# Patient Record
Sex: FEMALE | Race: WHITE | NOT HISPANIC OR LATINO | URBAN - METROPOLITAN AREA
[De-identification: names, ages, dates, MRNs, and addresses within clinical notes are randomized per-mention and may not be internally consistent; named-entity substitution may affect disease eponyms.]

---

## 2017-02-01 ENCOUNTER — EMERGENCY (EMERGENCY)
Facility: HOSPITAL | Age: 37
LOS: 1 days | Discharge: HOME | End: 2017-02-01
Attending: EMERGENCY MEDICINE
Payer: MEDICAID

## 2017-02-01 VITALS
SYSTOLIC BLOOD PRESSURE: 127 MMHG | WEIGHT: 199.96 LBS | RESPIRATION RATE: 18 BRPM | HEIGHT: 62 IN | TEMPERATURE: 98 F | HEART RATE: 89 BPM | DIASTOLIC BLOOD PRESSURE: 72 MMHG | OXYGEN SATURATION: 99 %

## 2017-02-01 DIAGNOSIS — Z90.49 ACQUIRED ABSENCE OF OTHER SPECIFIED PARTS OF DIGESTIVE TRACT: Chronic | ICD-10-CM

## 2017-02-01 DIAGNOSIS — R10.84 GENERALIZED ABDOMINAL PAIN: ICD-10-CM

## 2017-02-01 DIAGNOSIS — F90.0 ATTENTION-DEFICIT HYPERACTIVITY DISORDER, PREDOMINANTLY INATTENTIVE TYPE: ICD-10-CM

## 2017-02-01 DIAGNOSIS — N30.00 ACUTE CYSTITIS WITHOUT HEMATURIA: ICD-10-CM

## 2017-02-01 DIAGNOSIS — M67.432 GANGLION, LEFT WRIST: Chronic | ICD-10-CM

## 2017-02-01 DIAGNOSIS — D64.9 ANEMIA, UNSPECIFIED: ICD-10-CM

## 2017-02-01 DIAGNOSIS — Z98.84 BARIATRIC SURGERY STATUS: Chronic | ICD-10-CM

## 2017-02-01 DIAGNOSIS — F31.30 BIPOLAR DISORDER, CURRENT EPISODE DEPRESSED, MILD OR MODERATE SEVERITY, UNSPECIFIED: ICD-10-CM

## 2017-02-01 DIAGNOSIS — D50.9 IRON DEFICIENCY ANEMIA, UNSPECIFIED: ICD-10-CM

## 2017-02-01 DIAGNOSIS — F31.9 BIPOLAR DISORDER, UNSPECIFIED: ICD-10-CM

## 2017-02-01 DIAGNOSIS — Z98.89 OTHER SPECIFIED POSTPROCEDURAL STATES: Chronic | ICD-10-CM

## 2017-02-01 DIAGNOSIS — F32.9 MAJOR DEPRESSIVE DISORDER, SINGLE EPISODE, UNSPECIFIED: ICD-10-CM

## 2017-02-01 DIAGNOSIS — R53.1 WEAKNESS: ICD-10-CM

## 2017-02-01 DIAGNOSIS — Z46.51 ENCOUNTER FOR FITTING AND ADJUSTMENT OF GASTRIC LAP BAND: Chronic | ICD-10-CM

## 2017-02-01 DIAGNOSIS — K21.9 GASTRO-ESOPHAGEAL REFLUX DISEASE WITHOUT ESOPHAGITIS: ICD-10-CM

## 2017-02-01 LAB
ALBUMIN SERPL ELPH-MCNC: 3.5 G/DL — SIGNIFICANT CHANGE UP (ref 3.3–5)
ALP SERPL-CCNC: 51 U/L — SIGNIFICANT CHANGE UP (ref 40–120)
ALT FLD-CCNC: 21 U/L — SIGNIFICANT CHANGE UP (ref 12–78)
ANION GAP SERPL CALC-SCNC: 12 MMOL/L — SIGNIFICANT CHANGE UP (ref 5–17)
APPEARANCE UR: CLEAR — SIGNIFICANT CHANGE UP
APTT BLD: 31.5 SEC — SIGNIFICANT CHANGE UP (ref 27.5–37.4)
AST SERPL-CCNC: 11 U/L — LOW (ref 15–37)
BACTERIA # UR AUTO: ABNORMAL
BASOPHILS # BLD AUTO: 0.1 K/UL — SIGNIFICANT CHANGE UP (ref 0–0.2)
BASOPHILS NFR BLD AUTO: 0.9 % — SIGNIFICANT CHANGE UP (ref 0–2)
BILIRUB SERPL-MCNC: 0.2 MG/DL — SIGNIFICANT CHANGE UP (ref 0.2–1.2)
BILIRUB UR-MCNC: NEGATIVE — SIGNIFICANT CHANGE UP
BLD GP AB SCN SERPL QL: SIGNIFICANT CHANGE UP
BUN SERPL-MCNC: 15 MG/DL — SIGNIFICANT CHANGE UP (ref 7–23)
CALCIUM SERPL-MCNC: 8.3 MG/DL — LOW (ref 8.5–10.1)
CHLORIDE SERPL-SCNC: 107 MMOL/L — SIGNIFICANT CHANGE UP (ref 96–108)
CO2 SERPL-SCNC: 25 MMOL/L — SIGNIFICANT CHANGE UP (ref 22–31)
COLOR SPEC: YELLOW — SIGNIFICANT CHANGE UP
CREAT SERPL-MCNC: 0.73 MG/DL — SIGNIFICANT CHANGE UP (ref 0.5–1.3)
DIFF PNL FLD: NEGATIVE — SIGNIFICANT CHANGE UP
EOSINOPHIL # BLD AUTO: 0.1 K/UL — SIGNIFICANT CHANGE UP (ref 0–0.5)
EOSINOPHIL NFR BLD AUTO: 0.8 % — SIGNIFICANT CHANGE UP (ref 0–6)
EPI CELLS # UR: ABNORMAL
GLUCOSE SERPL-MCNC: 84 MG/DL — SIGNIFICANT CHANGE UP (ref 70–99)
GLUCOSE UR QL: NEGATIVE MG/DL — SIGNIFICANT CHANGE UP
HCG SERPL-ACNC: <1 MIU/ML — SIGNIFICANT CHANGE UP
HCT VFR BLD CALC: 34.2 % — LOW (ref 34.5–45)
HGB BLD-MCNC: 10.9 G/DL — LOW (ref 11.5–15.5)
INR BLD: 0.95 RATIO — SIGNIFICANT CHANGE UP (ref 0.88–1.16)
KETONES UR-MCNC: NEGATIVE — SIGNIFICANT CHANGE UP
LACTATE SERPL-SCNC: 0.8 MMOL/L — SIGNIFICANT CHANGE UP (ref 0.7–2)
LEUKOCYTE ESTERASE UR-ACNC: ABNORMAL
LIDOCAIN IGE QN: 149 U/L — SIGNIFICANT CHANGE UP (ref 73–393)
LYMPHOCYTES # BLD AUTO: 3.5 K/UL — HIGH (ref 1–3.3)
LYMPHOCYTES # BLD AUTO: 31.9 % — SIGNIFICANT CHANGE UP (ref 13–44)
MCHC RBC-ENTMCNC: 24.9 PG — LOW (ref 27–34)
MCHC RBC-ENTMCNC: 32 GM/DL — SIGNIFICANT CHANGE UP (ref 32–36)
MCV RBC AUTO: 77.8 FL — LOW (ref 80–100)
MONOCYTES # BLD AUTO: 0.5 K/UL — SIGNIFICANT CHANGE UP (ref 0–0.9)
MONOCYTES NFR BLD AUTO: 4.8 % — SIGNIFICANT CHANGE UP (ref 2–14)
NEUTROPHILS # BLD AUTO: 6.8 K/UL — SIGNIFICANT CHANGE UP (ref 1.8–7.4)
NEUTROPHILS NFR BLD AUTO: 61.6 % — SIGNIFICANT CHANGE UP (ref 43–77)
NITRITE UR-MCNC: NEGATIVE — SIGNIFICANT CHANGE UP
OB PNL STL: NEGATIVE — SIGNIFICANT CHANGE UP
PH UR: 8 — SIGNIFICANT CHANGE UP (ref 4.8–8)
PLATELET # BLD AUTO: 392 K/UL — SIGNIFICANT CHANGE UP (ref 150–400)
POTASSIUM SERPL-MCNC: 3.9 MMOL/L — SIGNIFICANT CHANGE UP (ref 3.5–5.3)
POTASSIUM SERPL-SCNC: 3.9 MMOL/L — SIGNIFICANT CHANGE UP (ref 3.5–5.3)
PROT SERPL-MCNC: 7 GM/DL — SIGNIFICANT CHANGE UP (ref 6–8.3)
PROT UR-MCNC: NEGATIVE MG/DL — SIGNIFICANT CHANGE UP
PROTHROM AB SERPL-ACNC: 10.6 SEC — SIGNIFICANT CHANGE UP (ref 10–13.1)
RBC # BLD: 4.39 M/UL — SIGNIFICANT CHANGE UP (ref 3.8–5.2)
RBC # FLD: 18.8 % — HIGH (ref 11–15)
SODIUM SERPL-SCNC: 144 MMOL/L — SIGNIFICANT CHANGE UP (ref 135–145)
SP GR SPEC: 1.01 — SIGNIFICANT CHANGE UP (ref 1.01–1.02)
UROBILINOGEN FLD QL: 4 MG/DL
WBC # BLD: 11 K/UL — HIGH (ref 3.8–10.5)
WBC # FLD AUTO: 11 K/UL — HIGH (ref 3.8–10.5)
WBC UR QL: SIGNIFICANT CHANGE UP

## 2017-02-01 PROCEDURE — 99285 EMERGENCY DEPT VISIT HI MDM: CPT

## 2017-02-01 RX ORDER — KETOROLAC TROMETHAMINE 30 MG/ML
30 SYRINGE (ML) INJECTION ONCE
Qty: 0 | Refills: 0 | Status: DISCONTINUED | OUTPATIENT
Start: 2017-02-01 | End: 2017-02-01

## 2017-02-01 RX ORDER — SODIUM CHLORIDE 9 MG/ML
1000 INJECTION INTRAMUSCULAR; INTRAVENOUS; SUBCUTANEOUS ONCE
Qty: 0 | Refills: 0 | Status: COMPLETED | OUTPATIENT
Start: 2017-02-01 | End: 2017-02-01

## 2017-02-01 RX ORDER — FAMOTIDINE 10 MG/ML
40 INJECTION INTRAVENOUS ONCE
Qty: 0 | Refills: 0 | Status: COMPLETED | OUTPATIENT
Start: 2017-02-01 | End: 2017-02-01

## 2017-02-01 RX ORDER — MORPHINE SULFATE 50 MG/1
6 CAPSULE, EXTENDED RELEASE ORAL ONCE
Qty: 0 | Refills: 0 | Status: DISCONTINUED | OUTPATIENT
Start: 2017-02-01 | End: 2017-02-01

## 2017-02-01 RX ORDER — ONDANSETRON 8 MG/1
4 TABLET, FILM COATED ORAL ONCE
Qty: 0 | Refills: 0 | Status: COMPLETED | OUTPATIENT
Start: 2017-02-01 | End: 2017-02-01

## 2017-02-01 RX ORDER — IOHEXOL 300 MG/ML
30 INJECTION, SOLUTION INTRAVENOUS ONCE
Qty: 0 | Refills: 0 | Status: COMPLETED | OUTPATIENT
Start: 2017-02-01 | End: 2017-02-01

## 2017-02-01 RX ADMIN — Medication 30 MILLILITER(S): at 20:23

## 2017-02-01 RX ADMIN — IOHEXOL 30 MILLILITER(S): 300 INJECTION, SOLUTION INTRAVENOUS at 20:28

## 2017-02-01 RX ADMIN — MORPHINE SULFATE 6 MILLIGRAM(S): 50 CAPSULE, EXTENDED RELEASE ORAL at 23:59

## 2017-02-01 RX ADMIN — Medication 30 MILLIGRAM(S): at 22:05

## 2017-02-01 RX ADMIN — ONDANSETRON 4 MILLIGRAM(S): 8 TABLET, FILM COATED ORAL at 20:29

## 2017-02-01 RX ADMIN — Medication 30 MILLIGRAM(S): at 23:56

## 2017-02-01 RX ADMIN — SODIUM CHLORIDE 1000 MILLILITER(S): 9 INJECTION INTRAMUSCULAR; INTRAVENOUS; SUBCUTANEOUS at 20:29

## 2017-02-01 RX ADMIN — FAMOTIDINE 40 MILLIGRAM(S): 10 INJECTION INTRAVENOUS at 20:29

## 2017-02-01 NOTE — ED PROVIDER NOTE - MEDICAL DECISION MAKING DETAILS
Patient with concern for anemia and acute on chronic abdominal pain.  VSS, Labs unremarkable, CT normal.  UA shows UTI, patient reports symptoms will treat.  Patient appears well.  Advised to follow up with her bariatric surgeon and already has hematology follow up in 2 days.  Discussed results and outcome of today's visit with the patient.  Patient advised to please follow up with their primary care doctor within the next 24 hours and return to the Emergency Department for worsening symptoms or any other concerns.  Patient advised that their doctor may call  to follow up on the specific results of the tests performed today in the emergency department.   Patient appears well on discharge.  Patient given prescription medications for their condition and advised to take them as prescribed and check with their Primary Care Provider if any questions arise.

## 2017-02-01 NOTE — ED PROVIDER NOTE - OBJECTIVE STATEMENT
Pertinent PMH/PSH/FHx/SHx and Review of Systems contained within:  Patient with history of ADD, depression, and recently diagnosed iron deficiency anemia presents to the ED for generalized weakness and abdominal pain.  Patient is concerned that her hemoglobin may be low.  Had new onset anemia requiring 2 transfusions at outside hospital over the last 10 days, also received iron infusion on Friday.  Denies history of heavy menses or GI bleed.  Has been having generalized abdominal pain, more periumbilical and RLQ.  Reports mild nausea, no vomiting, no diarrhea, no melena.  Last BM was.  Patient denies EtOH/tobacco/illicit substance use.    No fever/chills, No photophobia/eye pain/changes in vision, No ear pain/sore throat/dysphagia, No chest pain/palpitations, no SOB/cough/wheeze/stridor, no dysuria/frequency/discharge, No neck/back pain, no rash, no changes in neurological status/function. Pertinent PMH/PSH/FHx/SHx and Review of Systems contained within:  Patient with history of ADD, depression, and recently diagnosed iron deficiency anemia presents to the ED for generalized weakness and abdominal pain.  Patient is concerned that her hemoglobin may be low.  Had new onset anemia requiring 2 transfusions at outside hospital over the last 10 days, also received iron infusion on Friday.  Denies history of heavy menses or GI bleed.  Has been having generalized abdominal pain, more periumbilical and RLQ.  Reports mild nausea, no vomiting, no diarrhea, no melena.  Last BM was today, denies constipation.  Patient denies EtOH/tobacco/illicit substance use.    No fever/chills, No photophobia/eye pain/changes in vision, No ear pain/sore throat/dysphagia, No chest pain/palpitations, no SOB/cough/wheeze/stridor, no dysuria/frequency/discharge, No neck/back pain, no rash, no changes in neurological status/function. Pertinent PMH/PSH/FHx/SHx and Review of Systems contained within:  Patient with history of ADD, depression, and recently diagnosed iron deficiency anemia presents to the ED for generalized weakness and abdominal pain.  Patient is concerned that her hemoglobin may be low.  Had new onset anemia requiring 2 transfusions at outside hospital over the last 10 days, also received iron infusion on Friday.  Denies history of heavy menses or GI bleed.  Has been having generalized abdominal pain, more periumbilical and RLQ.  Reports mild nausea, no vomiting, no diarrhea, no melena.  Also has increased urinary frequency, no dysuria.  Last BM was today, denies constipation.  Patient denies EtOH/tobacco/illicit substance use.    No fever/chills, No photophobia/eye pain/changes in vision, No ear pain/sore throat/dysphagia, No chest pain/palpitations, no SOB/cough/wheeze/stridor, no discharge, No neck/back pain, no rash, no changes in neurological status/function. Pertinent PMH/PSH/FHx/SHx and Review of Systems contained within:  Patient with history of bariatric surgery, ADD, depression, and recently diagnosed iron deficiency anemia presents to the ED for generalized weakness and abdominal pain.  Patient is concerned that her hemoglobin may be low.  Had new onset anemia requiring 2 transfusions at outside hospital over the last 10 days, also received iron infusion on Friday.  Denies history of heavy menses or GI bleed.  Has been having generalized abdominal pain, more periumbilical and RLQ.  Reports mild nausea, no vomiting, no diarrhea, no melena.  Also has increased urinary frequency, no dysuria.  Last BM was today, denies constipation.  Patient denies EtOH/tobacco/illicit substance use.    No fever/chills, No photophobia/eye pain/changes in vision, No ear pain/sore throat/dysphagia, No chest pain/palpitations, no SOB/cough/wheeze/stridor, no discharge, No neck/back pain, no rash, no changes in neurological status/function.

## 2017-02-01 NOTE — ED PROVIDER NOTE - PROGRESS NOTE DETAILS
Patient walking in the ER, appears well, texting on phone.  Admits that her abdominal pain has been on/off for some time now.  Was seen at [facility] last week, also had transvag US to assess ovaries which appeared normal.  TSH pending. Patient walking in the ER, appears well, texting on phone.  Admits that her abdominal pain has been on/off for some time now.  Was seen at [facility] last week, also had transvag US to assess ovaries which appeared normal.  TSH pending.  Patient is mostly concerned that her Hgb was low since she feels week.  Has hematology follow up in 2 days.

## 2017-02-01 NOTE — ED ADULT TRIAGE NOTE - CHIEF COMPLAINT QUOTE
pt complaining of fatigue, weakness and abdominal pain. pt states she has history of anemia. states she feels like her blood count has dropped and she needs a blood transfusion

## 2017-02-01 NOTE — ED PROVIDER NOTE - CARE PLAN
Principal Discharge DX:	Acute cystitis without hematuria  Secondary Diagnosis:	Generalized abdominal pain  Secondary Diagnosis:	Iron deficiency anemia, unspecified iron deficiency anemia type

## 2017-02-01 NOTE — ED ADULT NURSE NOTE - OBJECTIVE STATEMENT
pt came in with c/o abd. pain for 3days and anaemia with transfusion 3days ago , denies any blood in stool , abd. soft and non distended, with nausea, denies vomiting , will monitor.

## 2017-02-02 LAB
CULTURE RESULTS: SIGNIFICANT CHANGE UP
SPECIMEN SOURCE: SIGNIFICANT CHANGE UP
TSH SERPL-MCNC: 1.05 UU/ML — SIGNIFICANT CHANGE UP (ref 0.36–3.74)

## 2017-02-02 PROCEDURE — 74177 CT ABD & PELVIS W/CONTRAST: CPT | Mod: 26

## 2017-02-02 RX ORDER — AZTREONAM 2 G
1 VIAL (EA) INJECTION
Qty: 6 | Refills: 0 | OUTPATIENT
Start: 2017-02-02 | End: 2017-02-05

## 2017-02-02 RX ADMIN — MORPHINE SULFATE 6 MILLIGRAM(S): 50 CAPSULE, EXTENDED RELEASE ORAL at 01:29

## 2017-02-02 RX ADMIN — Medication 1 TABLET(S): at 02:12

## 2017-02-08 ENCOUNTER — EMERGENCY (EMERGENCY)
Facility: HOSPITAL | Age: 37
LOS: 1 days | Discharge: HOME | End: 2017-02-08
Attending: EMERGENCY MEDICINE | Admitting: EMERGENCY MEDICINE
Payer: COMMERCIAL

## 2017-02-08 VITALS
SYSTOLIC BLOOD PRESSURE: 136 MMHG | TEMPERATURE: 99 F | HEART RATE: 93 BPM | RESPIRATION RATE: 18 BRPM | DIASTOLIC BLOOD PRESSURE: 86 MMHG | OXYGEN SATURATION: 98 %

## 2017-02-08 DIAGNOSIS — Z98.890 OTHER SPECIFIED POSTPROCEDURAL STATES: ICD-10-CM

## 2017-02-08 DIAGNOSIS — M79.1 MYALGIA: ICD-10-CM

## 2017-02-08 DIAGNOSIS — Z98.84 BARIATRIC SURGERY STATUS: Chronic | ICD-10-CM

## 2017-02-08 DIAGNOSIS — R60.0 LOCALIZED EDEMA: ICD-10-CM

## 2017-02-08 DIAGNOSIS — M67.432 GANGLION, LEFT WRIST: Chronic | ICD-10-CM

## 2017-02-08 DIAGNOSIS — F32.9 MAJOR DEPRESSIVE DISORDER, SINGLE EPISODE, UNSPECIFIED: ICD-10-CM

## 2017-02-08 DIAGNOSIS — Z46.51 ENCOUNTER FOR FITTING AND ADJUSTMENT OF GASTRIC LAP BAND: Chronic | ICD-10-CM

## 2017-02-08 DIAGNOSIS — Z90.49 ACQUIRED ABSENCE OF OTHER SPECIFIED PARTS OF DIGESTIVE TRACT: Chronic | ICD-10-CM

## 2017-02-08 DIAGNOSIS — Z90.49 ACQUIRED ABSENCE OF OTHER SPECIFIED PARTS OF DIGESTIVE TRACT: ICD-10-CM

## 2017-02-08 DIAGNOSIS — K21.9 GASTRO-ESOPHAGEAL REFLUX DISEASE WITHOUT ESOPHAGITIS: ICD-10-CM

## 2017-02-08 DIAGNOSIS — M25.572 PAIN IN LEFT ANKLE AND JOINTS OF LEFT FOOT: ICD-10-CM

## 2017-02-08 DIAGNOSIS — F17.200 NICOTINE DEPENDENCE, UNSPECIFIED, UNCOMPLICATED: ICD-10-CM

## 2017-02-08 DIAGNOSIS — Z98.89 OTHER SPECIFIED POSTPROCEDURAL STATES: Chronic | ICD-10-CM

## 2017-02-08 DIAGNOSIS — Z98.84 BARIATRIC SURGERY STATUS: ICD-10-CM

## 2017-02-08 PROCEDURE — 99053 MED SERV 10PM-8AM 24 HR FAC: CPT

## 2017-02-08 PROCEDURE — 99284 EMERGENCY DEPT VISIT MOD MDM: CPT | Mod: 25

## 2017-02-08 NOTE — ED ADULT NURSE NOTE - PMH
ADD (attention deficit disorder)    ADD (attention deficit disorder)    Attention deficit disorder (ADD)    Bipolar affect, depressed    Bipolar mood disorder    Depression    Depression    Depression    GERD (gastroesophageal reflux disease)

## 2017-02-08 NOTE — ED ADULT NURSE NOTE - OBJECTIVE STATEMENT
Presents c/o lt leg pain increasing tonight. Pt previously seen and evaluated for DVT at [facility]. DVT study negative at that time. Pt states pain became increasingly severe tonight and had difficulty with weight bearing. On arrival to ED, pt A&Ox3. ZAVALA. Ambulates with limp. +pulses. No CP/SOB. Respirations even/unlabored. Abd soft. No n/v/d. Skin WDI.

## 2017-02-08 NOTE — ED ADULT NURSE NOTE - PSH
Bariatric surgery status  x2  gastric sleeve  gastric band  Delivery by emergency caesarean section    Fitting and adjustment of gastric lap band    Ganglion cyst of wrist, left    H/O bariatric surgery    S/P  section    S/P cholecystectomy    S/P cholecystectomy    Status post cholecystectomy

## 2017-02-08 NOTE — ED ADULT NURSE NOTE - CHIEF COMPLAINT QUOTE
lt leg pain .swelling  and ha s/p blood transfusion last week  took DVT test was negative but still pain

## 2017-02-09 VITALS
SYSTOLIC BLOOD PRESSURE: 104 MMHG | DIASTOLIC BLOOD PRESSURE: 66 MMHG | HEART RATE: 94 BPM | RESPIRATION RATE: 18 BRPM | TEMPERATURE: 98 F | OXYGEN SATURATION: 96 %

## 2017-02-09 LAB
ALBUMIN SERPL ELPH-MCNC: 3.8 G/DL — SIGNIFICANT CHANGE UP (ref 3.3–5)
ALP SERPL-CCNC: 46 U/L — SIGNIFICANT CHANGE UP (ref 40–120)
ALT FLD-CCNC: 17 U/L RC — SIGNIFICANT CHANGE UP (ref 10–45)
ANION GAP SERPL CALC-SCNC: 14 MMOL/L — SIGNIFICANT CHANGE UP (ref 5–17)
AST SERPL-CCNC: 13 U/L — SIGNIFICANT CHANGE UP (ref 10–40)
BILIRUB SERPL-MCNC: 0.2 MG/DL — SIGNIFICANT CHANGE UP (ref 0.2–1.2)
BUN SERPL-MCNC: 16 MG/DL — SIGNIFICANT CHANGE UP (ref 7–23)
CALCIUM SERPL-MCNC: 9.2 MG/DL — SIGNIFICANT CHANGE UP (ref 8.4–10.5)
CHLORIDE SERPL-SCNC: 108 MMOL/L — SIGNIFICANT CHANGE UP (ref 96–108)
CO2 SERPL-SCNC: 22 MMOL/L — SIGNIFICANT CHANGE UP (ref 22–31)
CREAT SERPL-MCNC: 0.8 MG/DL — SIGNIFICANT CHANGE UP (ref 0.5–1.3)
GLUCOSE SERPL-MCNC: 112 MG/DL — HIGH (ref 70–99)
HCT VFR BLD CALC: 32.6 % — LOW (ref 34.5–45)
HGB BLD-MCNC: 10.9 G/DL — LOW (ref 11.5–15.5)
MAGNESIUM SERPL-MCNC: 1.9 MG/DL — SIGNIFICANT CHANGE UP (ref 1.6–2.6)
MCHC RBC-ENTMCNC: 27.4 PG — SIGNIFICANT CHANGE UP (ref 27–34)
MCHC RBC-ENTMCNC: 33.5 GM/DL — SIGNIFICANT CHANGE UP (ref 32–36)
MCV RBC AUTO: 81.8 FL — SIGNIFICANT CHANGE UP (ref 80–100)
PLATELET # BLD AUTO: 307 K/UL — SIGNIFICANT CHANGE UP (ref 150–400)
POTASSIUM SERPL-MCNC: 4 MMOL/L — SIGNIFICANT CHANGE UP (ref 3.5–5.3)
POTASSIUM SERPL-SCNC: 4 MMOL/L — SIGNIFICANT CHANGE UP (ref 3.5–5.3)
PROT SERPL-MCNC: 6.5 G/DL — SIGNIFICANT CHANGE UP (ref 6–8.3)
RBC # BLD: 3.99 M/UL — SIGNIFICANT CHANGE UP (ref 3.8–5.2)
RBC # FLD: 20.6 % — HIGH (ref 10.3–14.5)
SODIUM SERPL-SCNC: 144 MMOL/L — SIGNIFICANT CHANGE UP (ref 135–145)
WBC # BLD: 9.4 K/UL — SIGNIFICANT CHANGE UP (ref 3.8–10.5)
WBC # FLD AUTO: 9.4 K/UL — SIGNIFICANT CHANGE UP (ref 3.8–10.5)

## 2017-02-09 PROCEDURE — 83735 ASSAY OF MAGNESIUM: CPT

## 2017-02-09 PROCEDURE — 96372 THER/PROPH/DIAG INJ SC/IM: CPT

## 2017-02-09 PROCEDURE — 85027 COMPLETE CBC AUTOMATED: CPT

## 2017-02-09 PROCEDURE — 80053 COMPREHEN METABOLIC PANEL: CPT

## 2017-02-09 PROCEDURE — 99283 EMERGENCY DEPT VISIT LOW MDM: CPT | Mod: 25

## 2017-02-09 RX ORDER — KETOROLAC TROMETHAMINE 30 MG/ML
30 SYRINGE (ML) INJECTION ONCE
Qty: 0 | Refills: 0 | Status: DISCONTINUED | OUTPATIENT
Start: 2017-02-09 | End: 2017-02-09

## 2017-02-09 RX ADMIN — Medication 30 MILLIGRAM(S): at 01:48

## 2017-02-09 NOTE — ED PROVIDER NOTE - NEUROLOGICAL, MLM
Alert and oriented, speech normal, CN II-XII intact, no focal deficits, no motor or sensory deficits no pronator drift, finger to nose testing normal, gait normal.

## 2017-02-09 NOTE — ED PROVIDER NOTE - ATTENDING CONTRIBUTION TO CARE
patient presenting with negative DVT study today at OSH presenting with left lower extremity pain. no appreciable swelling, redness. patient has f/u with her hematologist that she see for anemia.

## 2017-02-09 NOTE — ED PROVIDER NOTE - PROGRESS NOTE DETAILS
Patient in bed and comfortable. Lengthy discussion regarding treatment, discharge instructions, and need for follow up. Patient understands and wishes to go home. NATHANIEL Kang MD Patient in bed and comfortable. Lengthy discussion regarding treatment, discharge instructions, and need for follow up. She knows if pain continues she should have DVT study repeated or additional testing. Patient understands and wishes to go home. NATHANIEL Kang MD

## 2017-02-09 NOTE — ED PROVIDER NOTE - PMH
ADD (attention deficit disorder)    ADD (attention deficit disorder)    Anemia    Attention deficit disorder (ADD)    Bipolar affect, depressed    Bipolar mood disorder    Depression    Depression    Depression    GERD (gastroesophageal reflux disease)

## 2017-02-09 NOTE — ED PROVIDER NOTE - OBJECTIVE STATEMENT
36 F PMH bariatric surgery, ADD, depression, and recently diagnosed iron deficiency anemia requiring transfusion x2 10 days and 7 days ago presents with leg swelling.  Developed headache last night, then noted L ankle pain, redness and swelling.  Applied ice and went to sleep, woke up and could not put weight on it, entire leg was painful.  Did DVT study which was negative at [facility].  Now complaining of pain in left arm and altered sensation, feels "limp," "doesn't feel correct."  Also has developed burning pain in R leg in waiting room.

## 2017-02-09 NOTE — ED PROVIDER NOTE - MUSCULOSKELETAL, MLM
5/5 strength upper and lower extremities.  Tender over entire foot, calf, and thigh on left side.  No swelling.  Full ROM.  DP and PT pulses +2 BL.

## 2017-02-27 ENCOUNTER — EMERGENCY (EMERGENCY)
Facility: HOSPITAL | Age: 37
LOS: 1 days | Discharge: HOME | End: 2017-02-27
Attending: EMERGENCY MEDICINE | Admitting: EMERGENCY MEDICINE
Payer: MEDICAID

## 2017-02-27 VITALS
SYSTOLIC BLOOD PRESSURE: 125 MMHG | HEART RATE: 92 BPM | RESPIRATION RATE: 14 BRPM | OXYGEN SATURATION: 99 % | DIASTOLIC BLOOD PRESSURE: 66 MMHG

## 2017-02-27 VITALS
TEMPERATURE: 99 F | HEART RATE: 92 BPM | SYSTOLIC BLOOD PRESSURE: 137 MMHG | OXYGEN SATURATION: 100 % | DIASTOLIC BLOOD PRESSURE: 84 MMHG | RESPIRATION RATE: 18 BRPM

## 2017-02-27 DIAGNOSIS — Z98.84 BARIATRIC SURGERY STATUS: Chronic | ICD-10-CM

## 2017-02-27 DIAGNOSIS — Z46.51 ENCOUNTER FOR FITTING AND ADJUSTMENT OF GASTRIC LAP BAND: Chronic | ICD-10-CM

## 2017-02-27 DIAGNOSIS — Z98.89 OTHER SPECIFIED POSTPROCEDURAL STATES: Chronic | ICD-10-CM

## 2017-02-27 DIAGNOSIS — Z90.49 ACQUIRED ABSENCE OF OTHER SPECIFIED PARTS OF DIGESTIVE TRACT: Chronic | ICD-10-CM

## 2017-02-27 DIAGNOSIS — M67.432 GANGLION, LEFT WRIST: Chronic | ICD-10-CM

## 2017-02-27 LAB
ALBUMIN SERPL ELPH-MCNC: 4.4 G/DL — SIGNIFICANT CHANGE UP (ref 3.3–5)
ALP SERPL-CCNC: 52 U/L — SIGNIFICANT CHANGE UP (ref 40–120)
ALT FLD-CCNC: 9 U/L — SIGNIFICANT CHANGE UP (ref 4–33)
APTT BLD: 33.7 SEC — SIGNIFICANT CHANGE UP (ref 27.5–37.4)
AST SERPL-CCNC: 12 U/L — SIGNIFICANT CHANGE UP (ref 4–32)
BASOPHILS # BLD AUTO: 0.03 K/UL — SIGNIFICANT CHANGE UP (ref 0–0.2)
BASOPHILS NFR BLD AUTO: 0.4 % — SIGNIFICANT CHANGE UP (ref 0–2)
BILIRUB SERPL-MCNC: < 0.2 MG/DL — LOW (ref 0.2–1.2)
BUN SERPL-MCNC: 15 MG/DL — SIGNIFICANT CHANGE UP (ref 7–23)
CALCIUM SERPL-MCNC: 9.2 MG/DL — SIGNIFICANT CHANGE UP (ref 8.4–10.5)
CHLORIDE SERPL-SCNC: 108 MMOL/L — HIGH (ref 98–107)
CK MB BLD-MCNC: 1 NG/ML — SIGNIFICANT CHANGE UP (ref 1–4.7)
CK SERPL-CCNC: 71 U/L — SIGNIFICANT CHANGE UP (ref 25–170)
CO2 SERPL-SCNC: 24 MMOL/L — SIGNIFICANT CHANGE UP (ref 22–31)
CREAT SERPL-MCNC: 0.82 MG/DL — SIGNIFICANT CHANGE UP (ref 0.5–1.3)
D DIMER BLD IA.RAPID-MCNC: < 150 NG/ML — SIGNIFICANT CHANGE UP
EOSINOPHIL # BLD AUTO: 0.14 K/UL — SIGNIFICANT CHANGE UP (ref 0–0.5)
EOSINOPHIL NFR BLD AUTO: 1.7 % — SIGNIFICANT CHANGE UP (ref 0–6)
GLUCOSE SERPL-MCNC: 101 MG/DL — HIGH (ref 70–99)
HCT VFR BLD CALC: 39.1 % — SIGNIFICANT CHANGE UP (ref 34.5–45)
HGB BLD-MCNC: 12.2 G/DL — SIGNIFICANT CHANGE UP (ref 11.5–15.5)
IMM GRANULOCYTES NFR BLD AUTO: 0.4 % — SIGNIFICANT CHANGE UP (ref 0–1.5)
INR BLD: 0.93 — SIGNIFICANT CHANGE UP (ref 0.87–1.18)
LYMPHOCYTES # BLD AUTO: 2.94 K/UL — SIGNIFICANT CHANGE UP (ref 1–3.3)
LYMPHOCYTES # BLD AUTO: 34.8 % — SIGNIFICANT CHANGE UP (ref 13–44)
MCHC RBC-ENTMCNC: 27.1 PG — SIGNIFICANT CHANGE UP (ref 27–34)
MCHC RBC-ENTMCNC: 31.2 % — LOW (ref 32–36)
MCV RBC AUTO: 86.7 FL — SIGNIFICANT CHANGE UP (ref 80–100)
MONOCYTES # BLD AUTO: 0.49 K/UL — SIGNIFICANT CHANGE UP (ref 0–0.9)
MONOCYTES NFR BLD AUTO: 5.8 % — SIGNIFICANT CHANGE UP (ref 2–14)
NEUTROPHILS # BLD AUTO: 4.81 K/UL — SIGNIFICANT CHANGE UP (ref 1.8–7.4)
NEUTROPHILS NFR BLD AUTO: 56.9 % — SIGNIFICANT CHANGE UP (ref 43–77)
PLATELET # BLD AUTO: 315 K/UL — SIGNIFICANT CHANGE UP (ref 150–400)
PMV BLD: 9.6 FL — SIGNIFICANT CHANGE UP (ref 7–13)
POTASSIUM SERPL-MCNC: 4.3 MMOL/L — SIGNIFICANT CHANGE UP (ref 3.5–5.3)
POTASSIUM SERPL-SCNC: 4.3 MMOL/L — SIGNIFICANT CHANGE UP (ref 3.5–5.3)
PROT SERPL-MCNC: 7 G/DL — SIGNIFICANT CHANGE UP (ref 6–8.3)
PROTHROM AB SERPL-ACNC: 10.6 SEC — SIGNIFICANT CHANGE UP (ref 10–13.1)
RBC # BLD: 4.51 M/UL — SIGNIFICANT CHANGE UP (ref 3.8–5.2)
RBC # FLD: 21.8 % — HIGH (ref 10.3–14.5)
SODIUM SERPL-SCNC: 147 MMOL/L — HIGH (ref 135–145)
TROPONIN T SERPL-MCNC: < 0.06 NG/ML — SIGNIFICANT CHANGE UP (ref 0–0.06)
WBC # BLD: 8.44 K/UL — SIGNIFICANT CHANGE UP (ref 3.8–10.5)
WBC # FLD AUTO: 8.44 K/UL — SIGNIFICANT CHANGE UP (ref 3.8–10.5)

## 2017-02-27 PROCEDURE — 93010 ELECTROCARDIOGRAM REPORT: CPT

## 2017-02-27 PROCEDURE — 71020: CPT | Mod: 26

## 2017-02-27 PROCEDURE — 99285 EMERGENCY DEPT VISIT HI MDM: CPT | Mod: 25

## 2017-02-27 RX ORDER — KETOROLAC TROMETHAMINE 30 MG/ML
30 SYRINGE (ML) INJECTION ONCE
Qty: 0 | Refills: 0 | Status: DISCONTINUED | OUTPATIENT
Start: 2017-02-27 | End: 2017-02-27

## 2017-02-27 RX ORDER — METOCLOPRAMIDE HCL 10 MG
10 TABLET ORAL ONCE
Qty: 0 | Refills: 0 | Status: COMPLETED | OUTPATIENT
Start: 2017-02-27 | End: 2017-02-27

## 2017-02-27 RX ORDER — ACETAMINOPHEN 500 MG
650 TABLET ORAL ONCE
Qty: 0 | Refills: 0 | Status: COMPLETED | OUTPATIENT
Start: 2017-02-27 | End: 2017-02-27

## 2017-02-27 RX ORDER — SODIUM CHLORIDE 9 MG/ML
1000 INJECTION INTRAMUSCULAR; INTRAVENOUS; SUBCUTANEOUS ONCE
Qty: 0 | Refills: 0 | Status: COMPLETED | OUTPATIENT
Start: 2017-02-27 | End: 2017-02-27

## 2017-02-27 RX ADMIN — SODIUM CHLORIDE 2000 MILLILITER(S): 9 INJECTION INTRAMUSCULAR; INTRAVENOUS; SUBCUTANEOUS at 21:19

## 2017-02-27 RX ADMIN — Medication 10 MILLIGRAM(S): at 21:30

## 2017-02-27 RX ADMIN — Medication 650 MILLIGRAM(S): at 22:30

## 2017-02-27 RX ADMIN — Medication 650 MILLIGRAM(S): at 21:30

## 2017-02-27 NOTE — ED PROVIDER NOTE - ATTENDING CONTRIBUTION TO CARE
LONDON HOLM, ATTENDING NOTE:  36 F past medical history of ADD, depression, and recently iron deficiency anemia presents with chest pain today. Patient states that symptoms started when at rest this AM, including left sided chest tightness, intermittent lasting minutes, no exacerbating/relieving symptoms. Patient also c/o nausea without vomiting and lightheaded today. Patient states she also has left leg numbness gradually worsening over the past several months. Patient also c/o leg swelling, ongoing for weeks with neg U/S 2 weeks ago.  Patient is awake and alert and in no acute distress.  Normocephalic/atraumatic.  Auricles are normal.  Neck supple.  Lungs CTAB, no wheeze, no rhonchi,  no rales.  Heart is regular rate and rhythm.  Abdomen is soft, not distended +BS.  Back is nontender, no CVAT.  Moving all 4 extremities.   Neurologically grossly intact.  Affect is appropriate.   DR. HOLM, ATTENDING MD-  I performed a face to face bedside interview with patient regarding history of present illness, review of symptoms and past medical history. I completed an independent physical exam.  I have discussed patient's plan of care with Dr. Gilman.   I agree with note as stated above, having amended the EMR as needed to reflect my findings.  I have discussed the assessment and plan of care.  This includes during the time I functioned as the attending physician for this patient.

## 2017-02-27 NOTE — ED PROVIDER NOTE - MEDICAL DECISION MAKING DETAILS
35 y/o female c/o intermittent chest pain and left arm numbness. Low suspicion for acs, heart score 1. PERC 1, ekg wnl. Given hx of chronicity to numbness and leg pain, will refer the patient to rheum/neuro for followup, if labs WNL will dispo to home

## 2017-02-27 NOTE — ED ADULT TRIAGE NOTE - CHIEF COMPLAINT QUOTE
Pt c/o L arm tingling/numbness for several days, today began to have L side CP and L side HA w/ left eye blurry vision. Pt states "my whole left side feels funny for several days"   PMH: anemia requiring blood and iron transfusions

## 2017-02-27 NOTE — ED ADULT NURSE NOTE - OBJECTIVE STATEMENT
pt is a 36 yr old female c/o chest/ HA pain 8/10 with blurred vision at times x 1 week, low grade temps. pt states" not feeling well" overall. EKG done in triage, NSR. pt states falling at home with possible LOC, denies head trama. no sob. PIC placed, labs sent. pt voiding, ambulatory with steady gait noted.

## 2017-02-27 NOTE — ED PROVIDER NOTE - OBJECTIVE STATEMENT
36 F PMH ADD, depression, and recently diagnosed iron deficiency anemia p/w chest pain for 1 day. Reports that symptoms started when at rest this AM, described left sided, intermittent lasting a few mins tightness. +nausea and lightheaded all day. no fever or chills. Also endorsing left leg numbness that was worsened over the past few months. When patient got up to go to bathroom, endorsed lightheadedness and being on the floor.  No falls or trauma. 36 F PMH ADD, depression, and recently diagnosed iron deficiency anemia p/w chest pain for 1 day. Reports that symptoms started when at rest this AM, described left sided, intermittent lasting a few mins tightness. +nausea and lightheaded all day. no fever or chills. Also endorsing left leg numbness that was worsened over the past few months. When patient got up to go to bathroom, endorsed lightheadedness and being on the floor.  No falls or trauma. ON ROS, she has also been c/o left lower leg tenderness and swelling. Has had an US 2 weeks ago and unremarkable

## 2017-02-28 RX ADMIN — Medication 30 MILLIGRAM(S): at 00:00

## 2017-02-28 RX ADMIN — Medication 30 MILLIGRAM(S): at 00:16

## 2017-03-30 ENCOUNTER — APPOINTMENT (OUTPATIENT)
Dept: CARDIOLOGY | Facility: CLINIC | Age: 37
End: 2017-03-30

## 2017-03-30 VITALS
BODY MASS INDEX: 42.14 KG/M2 | HEIGHT: 62 IN | WEIGHT: 229 LBS | SYSTOLIC BLOOD PRESSURE: 130 MMHG | HEART RATE: 107 BPM | OXYGEN SATURATION: 98 % | DIASTOLIC BLOOD PRESSURE: 84 MMHG

## 2017-03-30 DIAGNOSIS — Z01.810 ENCOUNTER FOR PREPROCEDURAL CARDIOVASCULAR EXAMINATION: ICD-10-CM

## 2017-03-30 DIAGNOSIS — R07.89 OTHER CHEST PAIN: ICD-10-CM

## 2017-03-30 RX ORDER — DULOXETINE HYDROCHLORIDE 60 MG/1
60 CAPSULE, DELAYED RELEASE ORAL TWICE DAILY
Qty: 180 | Refills: 3 | Status: ACTIVE | COMMUNITY

## 2017-04-04 PROBLEM — R07.89 CHEST DISCOMFORT: Status: ACTIVE | Noted: 2017-04-04

## 2017-04-12 ENCOUNTER — APPOINTMENT (OUTPATIENT)
Dept: CARDIOLOGY | Facility: CLINIC | Age: 37
End: 2017-04-12

## 2017-04-26 ENCOUNTER — APPOINTMENT (OUTPATIENT)
Dept: CARDIOLOGY | Facility: CLINIC | Age: 37
End: 2017-04-26

## 2017-05-30 ENCOUNTER — EMERGENCY (EMERGENCY)
Facility: HOSPITAL | Age: 37
LOS: 1 days | Discharge: HOME | End: 2017-05-30
Attending: EMERGENCY MEDICINE
Payer: MEDICAID

## 2017-05-30 VITALS
DIASTOLIC BLOOD PRESSURE: 85 MMHG | WEIGHT: 229.94 LBS | HEIGHT: 63 IN | TEMPERATURE: 99 F | RESPIRATION RATE: 19 BRPM | OXYGEN SATURATION: 99 % | HEART RATE: 91 BPM | SYSTOLIC BLOOD PRESSURE: 126 MMHG

## 2017-05-30 DIAGNOSIS — Z98.84 BARIATRIC SURGERY STATUS: Chronic | ICD-10-CM

## 2017-05-30 DIAGNOSIS — Z90.49 ACQUIRED ABSENCE OF OTHER SPECIFIED PARTS OF DIGESTIVE TRACT: Chronic | ICD-10-CM

## 2017-05-30 DIAGNOSIS — M25.572 PAIN IN LEFT ANKLE AND JOINTS OF LEFT FOOT: ICD-10-CM

## 2017-05-30 DIAGNOSIS — Z79.1 LONG TERM (CURRENT) USE OF NON-STEROIDAL ANTI-INFLAMMATORIES (NSAID): ICD-10-CM

## 2017-05-30 DIAGNOSIS — M67.432 GANGLION, LEFT WRIST: Chronic | ICD-10-CM

## 2017-05-30 DIAGNOSIS — Z98.89 OTHER SPECIFIED POSTPROCEDURAL STATES: Chronic | ICD-10-CM

## 2017-05-30 DIAGNOSIS — Z46.51 ENCOUNTER FOR FITTING AND ADJUSTMENT OF GASTRIC LAP BAND: Chronic | ICD-10-CM

## 2017-05-30 PROCEDURE — 29515 APPLICATION SHORT LEG SPLINT: CPT | Mod: LT

## 2017-05-30 PROCEDURE — 99283 EMERGENCY DEPT VISIT LOW MDM: CPT | Mod: 25

## 2017-05-30 PROCEDURE — 73610 X-RAY EXAM OF ANKLE: CPT | Mod: 26,LT

## 2017-05-30 PROCEDURE — 73630 X-RAY EXAM OF FOOT: CPT | Mod: 26,LT

## 2017-05-30 RX ORDER — DEXMETHYLPHENIDATE HYDROCHLORIDE 35 MG/1
0 CAPSULE, EXTENDED RELEASE ORAL
Qty: 0 | Refills: 0 | COMMUNITY

## 2017-05-30 NOTE — ED PROVIDER NOTE - OBJECTIVE STATEMENT
Pertinent PMH/PSH/FHx/SHx and Review of Systems contained within:  37f no med hx pw left ankle pain x3 weeks. had plain films taken at an osh that did not show a fx at the time of an inversion injury but she continues to have pain. no new trauma. symptoms have not improved  Fh and Sh not otherwise contributory  ROS otherwise negative

## 2017-05-30 NOTE — ED PROVIDER NOTE - MEDICAL DECISION MAKING DETAILS
patient pw left ankle pain for 3 weeks. small effusion noted. will splint and xray again. patient pw left ankle pain for 3 weeks. small effusion noted. will splint and xray again. xray neg for acute fracture. will splint, give cane and dc.

## 2017-05-30 NOTE — ED ADULT NURSE NOTE - OBJECTIVE STATEMENT
38 yo female, pt complaining of left ankle pain times 3 weeks. pt states she fell and twisted her ankle 3 weeks ago. was seen at [facility] and told that she has a sprain. pt states pain is not improving .

## 2017-05-30 NOTE — ED ADULT TRIAGE NOTE - CHIEF COMPLAINT QUOTE
pt complaining of left ankle pain times 3 weeks. pt states she fell and twisted her ankle 3 weeks ago. was seen at [facility] and told that she has a sprain. pt states pain is not improving .

## 2017-05-30 NOTE — ED ADULT NURSE NOTE - CHIEF COMPLAINT QUOTE
pt complaining of left ankle pain times 3 weeks. pt states she fell and twisted her ankle 3 weeks ago. was seen at [facility] and told that she has a sprain. pt states pain is not improving .

## 2017-05-30 NOTE — ED PROVIDER NOTE - PHYSICAL EXAMINATION
Gen: Alert, NAD  Head: NC, AT   Eyes: PERRL, EOMI, normal lids/conjunctiva  ENT: normal hearing, patent oropharynx without erythema/exudate, uvula midline  Neck: supple, no tenderness, Trachea midline  Pulm: Bilateral BS, normal resp effort, no wheeze/stridor/retractions  CV: RRR, no M/R/G, 2+ radial and dp pulses bl, no edema  Abd: soft, NT/ND, +BS, no hepatosplenomegaly  Mskel: left ankle with small effusion anterior to the lat mal. pain with passive rom.   Skin: no rash, no bruising   Neuro: AAOx3, no sensory/motor deficits, CN 2-12 intact

## 2017-06-07 ENCOUNTER — EMERGENCY (EMERGENCY)
Facility: HOSPITAL | Age: 37
LOS: 1 days | Discharge: HOME | End: 2017-06-07
Admitting: EMERGENCY MEDICINE
Payer: MEDICAID

## 2017-06-07 VITALS
HEART RATE: 73 BPM | SYSTOLIC BLOOD PRESSURE: 106 MMHG | RESPIRATION RATE: 17 BRPM | OXYGEN SATURATION: 99 % | DIASTOLIC BLOOD PRESSURE: 53 MMHG | TEMPERATURE: 99 F

## 2017-06-07 VITALS
RESPIRATION RATE: 16 BRPM | SYSTOLIC BLOOD PRESSURE: 122 MMHG | DIASTOLIC BLOOD PRESSURE: 82 MMHG | HEART RATE: 83 BPM | TEMPERATURE: 99 F | OXYGEN SATURATION: 100 %

## 2017-06-07 DIAGNOSIS — Z90.49 ACQUIRED ABSENCE OF OTHER SPECIFIED PARTS OF DIGESTIVE TRACT: Chronic | ICD-10-CM

## 2017-06-07 DIAGNOSIS — Z98.84 BARIATRIC SURGERY STATUS: Chronic | ICD-10-CM

## 2017-06-07 DIAGNOSIS — M67.432 GANGLION, LEFT WRIST: Chronic | ICD-10-CM

## 2017-06-07 DIAGNOSIS — Z98.89 OTHER SPECIFIED POSTPROCEDURAL STATES: Chronic | ICD-10-CM

## 2017-06-07 DIAGNOSIS — Z46.51 ENCOUNTER FOR FITTING AND ADJUSTMENT OF GASTRIC LAP BAND: Chronic | ICD-10-CM

## 2017-06-07 LAB
ALBUMIN SERPL ELPH-MCNC: 4.2 G/DL — SIGNIFICANT CHANGE UP (ref 3.3–5)
ALP SERPL-CCNC: 50 U/L — SIGNIFICANT CHANGE UP (ref 40–120)
ALT FLD-CCNC: 10 U/L — SIGNIFICANT CHANGE UP (ref 4–33)
AST SERPL-CCNC: 14 U/L — SIGNIFICANT CHANGE UP (ref 4–32)
BASOPHILS # BLD AUTO: 0.02 K/UL — SIGNIFICANT CHANGE UP (ref 0–0.2)
BASOPHILS NFR BLD AUTO: 0.3 % — SIGNIFICANT CHANGE UP (ref 0–2)
BILIRUB SERPL-MCNC: < 0.2 MG/DL — LOW (ref 0.2–1.2)
BUN SERPL-MCNC: 17 MG/DL — SIGNIFICANT CHANGE UP (ref 7–23)
CALCIUM SERPL-MCNC: 9 MG/DL — SIGNIFICANT CHANGE UP (ref 8.4–10.5)
CHLORIDE SERPL-SCNC: 104 MMOL/L — SIGNIFICANT CHANGE UP (ref 98–107)
CO2 SERPL-SCNC: 22 MMOL/L — SIGNIFICANT CHANGE UP (ref 22–31)
CREAT SERPL-MCNC: 1.04 MG/DL — SIGNIFICANT CHANGE UP (ref 0.5–1.3)
EOSINOPHIL # BLD AUTO: 0.11 K/UL — SIGNIFICANT CHANGE UP (ref 0–0.5)
EOSINOPHIL NFR BLD AUTO: 1.5 % — SIGNIFICANT CHANGE UP (ref 0–6)
GLUCOSE SERPL-MCNC: 82 MG/DL — SIGNIFICANT CHANGE UP (ref 70–99)
HCT VFR BLD CALC: 40.1 % — SIGNIFICANT CHANGE UP (ref 34.5–45)
HGB BLD-MCNC: 12.9 G/DL — SIGNIFICANT CHANGE UP (ref 11.5–15.5)
IMM GRANULOCYTES NFR BLD AUTO: 0.1 % — SIGNIFICANT CHANGE UP (ref 0–1.5)
LYMPHOCYTES # BLD AUTO: 2.51 K/UL — SIGNIFICANT CHANGE UP (ref 1–3.3)
LYMPHOCYTES # BLD AUTO: 35.4 % — SIGNIFICANT CHANGE UP (ref 13–44)
MCHC RBC-ENTMCNC: 30.3 PG — SIGNIFICANT CHANGE UP (ref 27–34)
MCHC RBC-ENTMCNC: 32.2 % — SIGNIFICANT CHANGE UP (ref 32–36)
MCV RBC AUTO: 94.1 FL — SIGNIFICANT CHANGE UP (ref 80–100)
MONOCYTES # BLD AUTO: 0.65 K/UL — SIGNIFICANT CHANGE UP (ref 0–0.9)
MONOCYTES NFR BLD AUTO: 9.2 % — SIGNIFICANT CHANGE UP (ref 2–14)
NEUTROPHILS # BLD AUTO: 3.8 K/UL — SIGNIFICANT CHANGE UP (ref 1.8–7.4)
NEUTROPHILS NFR BLD AUTO: 53.5 % — SIGNIFICANT CHANGE UP (ref 43–77)
PLATELET # BLD AUTO: 290 K/UL — SIGNIFICANT CHANGE UP (ref 150–400)
PMV BLD: 9.4 FL — SIGNIFICANT CHANGE UP (ref 7–13)
POTASSIUM SERPL-MCNC: 4.3 MMOL/L — SIGNIFICANT CHANGE UP (ref 3.5–5.3)
POTASSIUM SERPL-SCNC: 4.3 MMOL/L — SIGNIFICANT CHANGE UP (ref 3.5–5.3)
PROT SERPL-MCNC: 6.9 G/DL — SIGNIFICANT CHANGE UP (ref 6–8.3)
RBC # BLD: 4.26 M/UL — SIGNIFICANT CHANGE UP (ref 3.8–5.2)
RBC # FLD: 12.7 % — SIGNIFICANT CHANGE UP (ref 10.3–14.5)
SODIUM SERPL-SCNC: 140 MMOL/L — SIGNIFICANT CHANGE UP (ref 135–145)
WBC # BLD: 7.1 K/UL — SIGNIFICANT CHANGE UP (ref 3.8–10.5)
WBC # FLD AUTO: 7.1 K/UL — SIGNIFICANT CHANGE UP (ref 3.8–10.5)

## 2017-06-07 PROCEDURE — 99285 EMERGENCY DEPT VISIT HI MDM: CPT

## 2017-06-07 NOTE — ED ADULT NURSE NOTE - OBJECTIVE STATEMENT
Pt rec'd A&Ox3 c/o of rt jaw pain s/p tooth extraction procedure on Monday. Pt states she took up to 5 motrin q 4 hrs with no relief. On exam pt has slight selling to right check, no drainage or pus noted on exam. States she felt like she had a fever last night with sweats and chills. Currently in NAD, v/s stable. PA at bedside. Pt states she was bleeding in gums until this am x 2 days , states has hx of anemia, iron transfusions, also rpt hx of depression, anxiety , add, on lamictal, cymbalta.

## 2017-06-07 NOTE — ED PROVIDER NOTE - OBJECTIVE STATEMENT
37 year old female with a PMHx of anemia (s/p transfusion in Feb) pw right lower molar pain and subjective fever after tooth extraction on Monday. Pt also states that she bled for 48 hours after the procedure passing clots - now feeling fatigued and lightheaded. Pt has been taking Motrin last dose 17:00 with no relief. Denies n/v, CP, SOB, active bleeding, abdominal pain, urinary symptoms.

## 2017-06-07 NOTE — ED PROVIDER NOTE - MEDICAL DECISION MAKING DETAILS
37 year old female with a PMHx of anemia (s/p transfusion in Feb) pw right lower molar pain and subjective fever after tooth extraction on Monday.  Plan: pain management, dental consult, cbc, cmp

## 2017-06-07 NOTE — ED ADULT TRIAGE NOTE - CHIEF COMPLAINT QUOTE
Pt reports Rt sided dental pain - had tooth pulled RL Jaw on Monday, believes is having "dry sockets", has had before from wisdom teeth being extracted and this is similar.  Pt reports also hx Anemia and has had to be transfused before (Last in Feb), feels she went thru several gauze pads overnight and concerned her H&H is low 2/2 feeling weak at this time. Denies any current active bleeding - stopped this AM.  Took Motrin/Iced today w/o relief. Pt appearing calm and in NAD in triage, not appearing pale, ambulating w/o asst. Pt reports Rt sided dental pain - had tooth pulled RL Jaw on Monday, believes is having "dry sockets", has had before from wisdom teeth being extracted and this is similar.  Pt reports also hx Anemia and has had to be transfused before (Last in Feb), went thru several gauze pads overnight and concerned her H&H is low 2/2 feeling weak at this time. Denies any current active bleeding - stopped this AM.  Took Motrin/Iced today w/o relief. Pt appearing calm and in NAD in triage, not appearing pale, ambulating w/o asst.

## 2017-06-07 NOTE — ED PROVIDER NOTE - PROGRESS NOTE DETAILS
VIRGEN Merchant: paged dental - awaiting call back. VIRGEN Merchant: spoke with dental - will evaluate the patient shortly in the ED. VIRGEN Merchant: pt signed out to VIRGEN Alex - discussed plan of care - awaiting dental to evaluate the patient VIRGEN Alex: Dental paged again, state they will see the pt but it will probably be in an hour and a half secondary to many dental consults in the peds ED. Pt aware and wants to continue waiting. Toradol given now for persistent pain. Will sign out to VIRGEN Bradshaw to f/u dental rec's. Dental management appreciated - dry socket packed.  Recommend to prescribe Amoxicillin and pain medication.  Patient encouraged to follow-up with her dentist later today for further evaluation and management.  Discharge instructions reviewed with patient.

## 2017-06-07 NOTE — ED PROVIDER NOTE - PLAN OF CARE
Percocet 1 tablet every 6 hrs as needed for breakthrough discomfort- caution drowsiness while taking this medication- do not drive or operate heavy machinery.  Follow up in our Dental Clinic tomorrow - [phone] - call to make an appointment.  Return to the Emergency Department for any new, worsening or concerning symptoms.

## 2017-06-07 NOTE — ED ADULT NURSE NOTE - CHIEF COMPLAINT QUOTE
Pt reports Rt sided dental pain - had tooth pulled RL Jaw on Monday, believes is having "dry sockets", has had before from wisdom teeth being extracted and this is similar.  Pt reports also hx Anemia and has had to be transfused before (Last in Feb), went thru several gauze pads overnight and concerned her H&H is low 2/2 feeling weak at this time. Denies any current active bleeding - stopped this AM.  Took Motrin/Iced today w/o relief. Pt appearing calm and in NAD in triage, not appearing pale, ambulating w/o asst.

## 2017-06-07 NOTE — ED PROVIDER NOTE - CARE PLAN
Principal Discharge DX:	Tooth pain  Instructions for follow-up, activity and diet:	Percocet 1 tablet every 6 hrs as needed for breakthrough discomfort- caution drowsiness while taking this medication- do not drive or operate heavy machinery.  Follow up in our Dental Clinic tomorrow - [phone] - call to make an appointment.  Return to the Emergency Department for any new, worsening or concerning symptoms. Principal Discharge DX:	Tooth pain  Instructions for follow-up, activity and diet:	Percocet 1 tablet every 6 hrs as needed for breakthrough discomfort- caution drowsiness while taking this medication- do not drive or operate heavy machinery.  Follow up in our Dental Clinic tomorrow - [phone] - call to make an appointment.  Return to the Emergency Department for any new, worsening or concerning symptoms. Principal Discharge DX:	Tooth pain  Instructions for follow-up, activity and diet:	Percocet 1 tablet every 6 hrs as needed for breakthrough discomfort- caution drowsiness while taking this medication- do not drive or operate heavy machinery.  Follow up in our Dental Clinic tomorrow - [phone] - call to make an appointment.  Return to the Emergency Department for any new, worsening or concerning symptoms. Principal Discharge DX:	Tooth pain  Instructions for follow-up, activity and diet:	Percocet 1 tablet every 6 hrs as needed for breakthrough discomfort- caution drowsiness while taking this medication- do not drive or operate heavy machinery.  Follow up in our Dental Clinic tomorrow - [phone] - call to make an appointment.  Return to the Emergency Department for any new, worsening or concerning symptoms. Principal Discharge DX:	Tooth pain  Instructions for follow-up, activity and diet:	Percocet 1 tablet every 6 hrs as needed for breakthrough discomfort- caution drowsiness while taking this medication- do not drive or operate heavy machinery.  Follow up in our Dental Clinic tomorrow - [phone] - call to make an appointment.  Return to the Emergency Department for any new, worsening or concerning symptoms.

## 2017-06-08 LAB
BLD GP AB SCN SERPL QL: NEGATIVE — SIGNIFICANT CHANGE UP
RH IG SCN BLD-IMP: POSITIVE — SIGNIFICANT CHANGE UP

## 2017-06-08 RX ORDER — AMOXICILLIN 250 MG/5ML
1 SUSPENSION, RECONSTITUTED, ORAL (ML) ORAL
Qty: 30 | Refills: 0 | OUTPATIENT
Start: 2017-06-08 | End: 2017-06-18

## 2017-06-08 RX ORDER — SODIUM CHLORIDE 9 MG/ML
1000 INJECTION INTRAMUSCULAR; INTRAVENOUS; SUBCUTANEOUS ONCE
Qty: 0 | Refills: 0 | Status: COMPLETED | OUTPATIENT
Start: 2017-06-08 | End: 2017-06-08

## 2017-06-08 RX ORDER — KETOROLAC TROMETHAMINE 30 MG/ML
30 SYRINGE (ML) INJECTION ONCE
Qty: 0 | Refills: 0 | Status: DISCONTINUED | OUTPATIENT
Start: 2017-06-08 | End: 2017-06-08

## 2017-06-08 RX ORDER — OXYCODONE HYDROCHLORIDE 5 MG/1
1 TABLET ORAL
Qty: 8 | Refills: 0
Start: 2017-06-08 | End: 2017-06-10

## 2017-06-08 RX ADMIN — SODIUM CHLORIDE 1000 MILLILITER(S): 9 INJECTION INTRAMUSCULAR; INTRAVENOUS; SUBCUTANEOUS at 00:28

## 2017-06-08 RX ADMIN — Medication 30 MILLIGRAM(S): at 00:41

## 2017-06-08 RX ADMIN — Medication 30 MILLIGRAM(S): at 01:10

## 2017-06-08 NOTE — CONSULT NOTE ADULT - SUBJECTIVE AND OBJECTIVE BOX
Patient is a 37y old  Female who presents with a chief complaint of tooth pain 2 days after tooth extraction    HPI: 37 year old female with lower molar pain after tooth extraction on Monday.  Patient states that her surgery was surgical due to curved roots; patient states she bled about 2 days post surgery.  Has been taking Motrin with no relief.        PAST MEDICAL & SURGICAL HISTORY:  Anemia  GERD (gastroesophageal reflux disease)  Depression  Bipolar affect, depressed  ADD (attention deficit disorder)  Attention deficit disorder (ADD)  Bipolar mood disorder  Depression  ADD (attention deficit disorder)  Depression  Ganglion cyst of wrist, left  Bariatric surgery status: x2  gastric sleeve  gastric band  S/P  section  S/P cholecystectomy  Status post cholecystectomy  Fitting and adjustment of gastric lap band  Delivery by emergency caesarean section  H/O bariatric surgery  S/P cholecystectomy      Meds: Ketorolac, oxycodone, ambien, buspirone, colace, cymbalta, deyrel, klonopin, lamictal, lexapro, protonix, tenex, topomax, vyvanse, wellbutrin      Allergies No Known Allergies      FAMILY HISTORY:  No pertinent family history in first degree relatives    *Last Dental Visit: Monday    Vital Signs Last 24 Hrs  T(C): 37.1, Max: 37.3 (- @ 20:47)  T(F): 98.7, Max: 99.2 (- @ 20:47)  HR: 73 (73 - 83)  BP: 106/53 (106/53 - 122/82)  BP(mean): --  RR: 17 (16 - 17)  SpO2: 99% (99% - 100%)    EOE:  TMJ ( - ) clicks                    ( - ) pops                    ( - ) crepitus                           Facial bones grossly intact             ( + ) slight trismus             ( -  ) LAD             ( -  ) swelling             ( -  ) asymmetry             ( +  ) palpation left mandibular ramus             ( -  ) SOB             ( -  ) dysphagia             ( -  ) LOC    IOE:  permanent dentition present; multiple restorations and missing teeth           Limited oral exam performed.  Extraction site present on lower right; no heme or purulence expressed. Clot not present. Debri and food particles present in socket.  Patient is extremely tender to touch on the buccal vestibular near extraction site.  Gingival inflammation on the buccal of extraction site; gingiva very erythematous.  Slight inflammation on the lingual of extraction site.    LABS:                        12.9   7.10  )-----------( 290      ( 2017 22:24 )             40.1         140  |  104  |  17  ----------------------------<  82  4.3   |  22  |  1.04    Ca    9.0      2017 22:24    TPro  6.9  /  Alb  4.2  /  TBili  < 0.2<L>  /  DBili  x   /  AST  14  /  ALT  10  /  AlkPhos  50      WBC Count: 7.10 K/uL [3.8 - 10.5] ( @ 22:24)  Platelet Count - Automated: 290 K/uL [150 - 400] ( @ 22:24)        Procedure: Irrigated socket x2 with sterile water.  Dry socket paste placed into extraction socket      RECOMMENDATIONS:  1) Follow up with private dentist   2) Soft food diet   3) Pain med PRN  4) Amoxicillin 500mg TIP x 7 days    Eileen Tillman DDS

## 2017-06-10 ENCOUNTER — EMERGENCY (EMERGENCY)
Facility: HOSPITAL | Age: 37
LOS: 1 days | Discharge: HOME | End: 2017-06-10
Attending: EMERGENCY MEDICINE | Admitting: EMERGENCY MEDICINE
Payer: MEDICAID

## 2017-06-10 VITALS
TEMPERATURE: 99 F | HEART RATE: 80 BPM | SYSTOLIC BLOOD PRESSURE: 142 MMHG | OXYGEN SATURATION: 100 % | RESPIRATION RATE: 16 BRPM | DIASTOLIC BLOOD PRESSURE: 86 MMHG

## 2017-06-10 DIAGNOSIS — M67.432 GANGLION, LEFT WRIST: Chronic | ICD-10-CM

## 2017-06-10 DIAGNOSIS — Z90.49 ACQUIRED ABSENCE OF OTHER SPECIFIED PARTS OF DIGESTIVE TRACT: Chronic | ICD-10-CM

## 2017-06-10 DIAGNOSIS — Z98.89 OTHER SPECIFIED POSTPROCEDURAL STATES: Chronic | ICD-10-CM

## 2017-06-10 DIAGNOSIS — Z46.51 ENCOUNTER FOR FITTING AND ADJUSTMENT OF GASTRIC LAP BAND: Chronic | ICD-10-CM

## 2017-06-10 DIAGNOSIS — Z98.84 BARIATRIC SURGERY STATUS: Chronic | ICD-10-CM

## 2017-06-10 LAB
ALBUMIN SERPL ELPH-MCNC: 4.2 G/DL — SIGNIFICANT CHANGE UP (ref 3.3–5)
ALP SERPL-CCNC: 49 U/L — SIGNIFICANT CHANGE UP (ref 40–120)
ALT FLD-CCNC: 13 U/L — SIGNIFICANT CHANGE UP (ref 4–33)
AST SERPL-CCNC: 14 U/L — SIGNIFICANT CHANGE UP (ref 4–32)
BASE EXCESS BLDV CALC-SCNC: 2.3 MMOL/L — SIGNIFICANT CHANGE UP
BASOPHILS # BLD AUTO: 0.03 K/UL — SIGNIFICANT CHANGE UP (ref 0–0.2)
BASOPHILS NFR BLD AUTO: 0.4 % — SIGNIFICANT CHANGE UP (ref 0–2)
BILIRUB SERPL-MCNC: 0.2 MG/DL — SIGNIFICANT CHANGE UP (ref 0.2–1.2)
BLOOD GAS VENOUS - CREATININE: 0.82 MG/DL — SIGNIFICANT CHANGE UP (ref 0.5–1.3)
BUN SERPL-MCNC: 15 MG/DL — SIGNIFICANT CHANGE UP (ref 7–23)
CALCIUM SERPL-MCNC: 9 MG/DL — SIGNIFICANT CHANGE UP (ref 8.4–10.5)
CHLORIDE BLDV-SCNC: 108 MMOL/L — SIGNIFICANT CHANGE UP (ref 96–108)
CHLORIDE SERPL-SCNC: 106 MMOL/L — SIGNIFICANT CHANGE UP (ref 98–107)
CO2 SERPL-SCNC: 24 MMOL/L — SIGNIFICANT CHANGE UP (ref 22–31)
CREAT SERPL-MCNC: 0.85 MG/DL — SIGNIFICANT CHANGE UP (ref 0.5–1.3)
EOSINOPHIL # BLD AUTO: 0.11 K/UL — SIGNIFICANT CHANGE UP (ref 0–0.5)
EOSINOPHIL NFR BLD AUTO: 1.3 % — SIGNIFICANT CHANGE UP (ref 0–6)
GAS PNL BLDV: 140 MMOL/L — SIGNIFICANT CHANGE UP (ref 136–146)
GLUCOSE BLDV-MCNC: 85 — SIGNIFICANT CHANGE UP (ref 70–99)
GLUCOSE SERPL-MCNC: 90 MG/DL — SIGNIFICANT CHANGE UP (ref 70–99)
HCO3 BLDV-SCNC: 24 MMOL/L — SIGNIFICANT CHANGE UP (ref 20–27)
HCT VFR BLD CALC: 39.4 % — SIGNIFICANT CHANGE UP (ref 34.5–45)
HCT VFR BLDV CALC: 40.8 % — SIGNIFICANT CHANGE UP (ref 34.5–45)
HGB BLD-MCNC: 13 G/DL — SIGNIFICANT CHANGE UP (ref 11.5–15.5)
HGB BLDV-MCNC: 13.3 G/DL — SIGNIFICANT CHANGE UP (ref 11.5–15.5)
IMM GRANULOCYTES NFR BLD AUTO: 0.2 % — SIGNIFICANT CHANGE UP (ref 0–1.5)
LACTATE BLDV-MCNC: 0.7 MMOL/L — SIGNIFICANT CHANGE UP (ref 0.5–2)
LYMPHOCYTES # BLD AUTO: 3.19 K/UL — SIGNIFICANT CHANGE UP (ref 1–3.3)
LYMPHOCYTES # BLD AUTO: 38.8 % — SIGNIFICANT CHANGE UP (ref 13–44)
MCHC RBC-ENTMCNC: 30.7 PG — SIGNIFICANT CHANGE UP (ref 27–34)
MCHC RBC-ENTMCNC: 33 % — SIGNIFICANT CHANGE UP (ref 32–36)
MCV RBC AUTO: 93.1 FL — SIGNIFICANT CHANGE UP (ref 80–100)
MONOCYTES # BLD AUTO: 0.55 K/UL — SIGNIFICANT CHANGE UP (ref 0–0.9)
MONOCYTES NFR BLD AUTO: 6.7 % — SIGNIFICANT CHANGE UP (ref 2–14)
NEUTROPHILS # BLD AUTO: 4.33 K/UL — SIGNIFICANT CHANGE UP (ref 1.8–7.4)
NEUTROPHILS NFR BLD AUTO: 52.6 % — SIGNIFICANT CHANGE UP (ref 43–77)
PCO2 BLDV: 55 MMHG — HIGH (ref 41–51)
PH BLDV: 7.33 PH — SIGNIFICANT CHANGE UP (ref 7.32–7.43)
PLATELET # BLD AUTO: 333 K/UL — SIGNIFICANT CHANGE UP (ref 150–400)
PMV BLD: 9.3 FL — SIGNIFICANT CHANGE UP (ref 7–13)
PO2 BLDV: < 24 MMHG — LOW (ref 35–40)
POTASSIUM BLDV-SCNC: 4.2 MMOL/L — SIGNIFICANT CHANGE UP (ref 3.4–4.5)
POTASSIUM SERPL-MCNC: 4.4 MMOL/L — SIGNIFICANT CHANGE UP (ref 3.5–5.3)
POTASSIUM SERPL-SCNC: 4.4 MMOL/L — SIGNIFICANT CHANGE UP (ref 3.5–5.3)
PROT SERPL-MCNC: 6.9 G/DL — SIGNIFICANT CHANGE UP (ref 6–8.3)
RBC # BLD: 4.23 M/UL — SIGNIFICANT CHANGE UP (ref 3.8–5.2)
RBC # FLD: 12.6 % — SIGNIFICANT CHANGE UP (ref 10.3–14.5)
SAO2 % BLDV: 32.6 % — LOW (ref 60–85)
SODIUM SERPL-SCNC: 144 MMOL/L — SIGNIFICANT CHANGE UP (ref 135–145)
WBC # BLD: 8.23 K/UL — SIGNIFICANT CHANGE UP (ref 3.8–10.5)
WBC # FLD AUTO: 8.23 K/UL — SIGNIFICANT CHANGE UP (ref 3.8–10.5)

## 2017-06-10 PROCEDURE — 99285 EMERGENCY DEPT VISIT HI MDM: CPT

## 2017-06-10 RX ORDER — MORPHINE SULFATE 50 MG/1
4 CAPSULE, EXTENDED RELEASE ORAL ONCE
Qty: 0 | Refills: 0 | Status: DISCONTINUED | OUTPATIENT
Start: 2017-06-10 | End: 2017-06-10

## 2017-06-10 RX ADMIN — MORPHINE SULFATE 4 MILLIGRAM(S): 50 CAPSULE, EXTENDED RELEASE ORAL at 23:15

## 2017-06-10 RX ADMIN — MORPHINE SULFATE 4 MILLIGRAM(S): 50 CAPSULE, EXTENDED RELEASE ORAL at 23:30

## 2017-06-10 NOTE — ED PROVIDER NOTE - MEDICAL DECISION MAKING DETAILS
37 year old female with a PMHx of anemia (s/p transfusion in Feb) pw right lower molar pain that radiates to the right side of the neck, jaw and ear and fever after tooth extraction on 6/5/2017.   Plan: cbc, cmp, pain control, dental consult

## 2017-06-10 NOTE — ED ADULT NURSE NOTE - OBJECTIVE STATEMENT
Patient c/o pain in her mouth she was in the hospital on Wednesday for a tooth extraction, sent home in [facility], still not feeling well.

## 2017-06-10 NOTE — ED PROVIDER NOTE - ATTENDING CONTRIBUTION TO CARE
I performed a face to face evaluation of this patient and performed a history and physical examination on the patient.  I agree with the PA's history, physical examination, and plan of the patient. patient complaining of pain at site of recent tooth extraction. describes pain as going up and down neck, difficulty swallowing. well appearing, neck supple.  ct performed.  negative for abscess. afebrile, no leukocytosis. dental block performed by dental. follow up with her dentist.

## 2017-06-10 NOTE — ED PROVIDER NOTE - OBJECTIVE STATEMENT
37 year old female with a PMHx of anemia (s/p transfusion in Feb) pw right lower molar pain that radiates to the right side of the neck, jaw and ear and fever after tooth extraction on 6/5/2017. T-max 104. Pt has been taking Motrin 5 tabs at a time and tramadol with no relief. Pt has been seen here on the 7th - seen by dental - irrigated socket and socket paste placed in extraction socket - was seen by her private dentist yesterday and packing was placed - removed the packing a half hour and pain   Pt also states that she bled for 48 hours after the procedure passing clots - now feeling fatigued and lightheaded.  Denies n/v, CP, SOB, active bleeding, abdominal pain, urinary symptoms. 37 year old female with a PMHx of anemia (s/p transfusion in Feb) pw right lower molar pain that radiates to the right side of the neck, jaw and ear and fever after tooth extraction on 6/5/2017. T-max 104. Pt has been taking Motrin 5 tabs at a time and tramadol with no relief. Pt has been seen here on the 7th - seen by dental - irrigated socket and socket paste placed in extraction socket - was seen by her private dentist yesterday and packing was placed - removed the packing a half hour and pain has worsened. Denies n/v, CP, SOB, active bleeding, abdominal pain, urinary symptoms.

## 2017-06-10 NOTE — ED ADULT TRIAGE NOTE - CHIEF COMPLAINT QUOTE
Pt c/o "entire mouth hurting" sp R lower molar being pulled last week. Also c/o weakness, nausea, chills, fever. States amoxicillin & percocet (which she ran out of).

## 2017-06-11 VITALS
SYSTOLIC BLOOD PRESSURE: 131 MMHG | RESPIRATION RATE: 18 BRPM | OXYGEN SATURATION: 99 % | TEMPERATURE: 99 F | HEART RATE: 77 BPM | DIASTOLIC BLOOD PRESSURE: 76 MMHG

## 2017-06-11 PROCEDURE — 70487 CT MAXILLOFACIAL W/DYE: CPT | Mod: 26

## 2017-06-11 PROCEDURE — 70491 CT SOFT TISSUE NECK W/DYE: CPT | Mod: 26

## 2017-06-11 RX ORDER — IBUPROFEN 200 MG
1 TABLET ORAL
Qty: 21 | Refills: 0 | OUTPATIENT
Start: 2017-06-11 | End: 2017-06-18

## 2017-06-11 RX ORDER — IBUPROFEN 200 MG
800 TABLET ORAL ONCE
Qty: 0 | Refills: 0 | Status: COMPLETED | OUTPATIENT
Start: 2017-06-11 | End: 2017-06-11

## 2017-06-11 RX ORDER — SODIUM CHLORIDE 9 MG/ML
2000 INJECTION INTRAMUSCULAR; INTRAVENOUS; SUBCUTANEOUS ONCE
Qty: 0 | Refills: 0 | Status: COMPLETED | OUTPATIENT
Start: 2017-06-11 | End: 2017-06-11

## 2017-06-11 RX ORDER — DEXAMETHASONE 0.5 MG/5ML
10 ELIXIR ORAL ONCE
Qty: 0 | Refills: 0 | Status: COMPLETED | OUTPATIENT
Start: 2017-06-11 | End: 2017-06-11

## 2017-06-11 RX ADMIN — Medication 102 MILLIGRAM(S): at 00:42

## 2017-06-11 RX ADMIN — SODIUM CHLORIDE 2000 MILLILITER(S): 9 INJECTION INTRAMUSCULAR; INTRAVENOUS; SUBCUTANEOUS at 00:18

## 2017-06-11 RX ADMIN — Medication 800 MILLIGRAM(S): at 02:06

## 2017-06-11 NOTE — CONSULT NOTE ADULT - SUBJECTIVE AND OBJECTIVE BOX
Patient is a 37y old  Female who presents with a chief complaint of pain from dry socket    HPI: Pt had #30 extracted last week, then was seen at [facility] ED where dry socket was diagnosed. Dry socket paste was placed and pt was told to follow up with private dentist. Dry socket paste was lost both times they were placed by [facility] ED and dentist.      PAST MEDICAL & SURGICAL HISTORY:  Anemia  GERD (gastroesophageal reflux disease)  Depression  Bipolar affect, depressed  ADD (attention deficit disorder)  Attention deficit disorder (ADD)  Bipolar mood disorder  Depression  ADD (attention deficit disorder)  Depression  Ganglion cyst of wrist, left  Bariatric surgery status: x2  gastric sleeve  gastric band  S/P  section  S/P cholecystectomy  Status post cholecystectomy  Fitting and adjustment of gastric lap band  Delivery by emergency caesarean section  H/O bariatric surgery  S/P cholecystectomy    Allergies: No Known Allergies    FAMILY HISTORY:  No pertinent family history in first degree relatives      Vital Signs Last 24 Hrs  T(C): 37.4, Max: 37.4 (06-10 @ 21:22)  T(F): 99.3, Max: 99.3 (06-10 @ 21:22)  HR: 80 (80 - 80)  BP: 142/86 (142/86 - 142/86)  BP(mean): --  RR: 16 (16 - 16)  SpO2: 100% (100% - 100%)    EOE:  TMJ (   ) clicks                    (    ) pops                    (    ) crepitus             Mandible guarding, no true trismus noted             Facial bones and MOM grossly intact             (   ) trismus             (   ) LAD             (   ) swelling             (   ) asymmetry             (   ) palpation             (   ) SOB             (   ) dysphagia             (   ) LOC    IOE:  permanent dentition: grossly intact. #30 dry socket.           hard/soft palate:  (   ) palatal torus           tongue/FOM <<WNL>>           labial/buccal mucosa <<WNL>>           (   ) percussion           (   ) palpation           (   ) swelling     LABS:                        13.0   8.23  )-----------( 333      ( 10 Torsten 2017 11:00 )             39.4     06-10    144  |  106  |  15  ----------------------------<  90  4.4   |  24  |  0.85    Ca    9.0      10 Torsten 2017 11:00    TPro  6.9  /  Alb  4.2  /  TBili  0.2  /  DBili  x   /  AST  14  /  ALT  13  /  AlkPhos  49  -10    WBC Count: 8.23 K/uL [3.8 - 10.5] (06-10 @ 11:00)  Platelet Count - Automated: 333 K/uL [150 - 400] (06-10 @ 11:00)      RADIOLOGY & ADDITIONAL STUDIES: CT scan taken as pt was complaining of radiating pain.    ASSESSMENT: Dry socket, no infection noted.     PROCEDURE: Dry socket paste and iodoform gauze placed into dry socket. 0.5% marcaine 1:100,000 epi administered via local infiltration and right DARLENE block.    RECOMMENDATIONS:  1) Dental F/U with outpatient dentist for comprehensive dental care.   2) If any difficulty swallowing/breathing, fever occur, page dental.     Denton Byrne, DMD #[id number]

## 2017-11-04 ENCOUNTER — EMERGENCY (EMERGENCY)
Facility: HOSPITAL | Age: 37
LOS: 0 days | Discharge: HOME | End: 2017-11-04
Attending: EMERGENCY MEDICINE
Payer: MEDICAID

## 2017-11-04 VITALS
HEART RATE: 82 BPM | DIASTOLIC BLOOD PRESSURE: 88 MMHG | SYSTOLIC BLOOD PRESSURE: 145 MMHG | RESPIRATION RATE: 17 BRPM | OXYGEN SATURATION: 100 % | WEIGHT: 231.93 LBS | HEIGHT: 62 IN | TEMPERATURE: 98 F

## 2017-11-04 DIAGNOSIS — Z46.51 ENCOUNTER FOR FITTING AND ADJUSTMENT OF GASTRIC LAP BAND: Chronic | ICD-10-CM

## 2017-11-04 DIAGNOSIS — Z90.49 ACQUIRED ABSENCE OF OTHER SPECIFIED PARTS OF DIGESTIVE TRACT: Chronic | ICD-10-CM

## 2017-11-04 DIAGNOSIS — Z98.84 BARIATRIC SURGERY STATUS: Chronic | ICD-10-CM

## 2017-11-04 DIAGNOSIS — Z98.89 OTHER SPECIFIED POSTPROCEDURAL STATES: Chronic | ICD-10-CM

## 2017-11-04 DIAGNOSIS — M67.432 GANGLION, LEFT WRIST: Chronic | ICD-10-CM

## 2017-11-04 PROCEDURE — 73610 X-RAY EXAM OF ANKLE: CPT | Mod: 26,LT

## 2017-11-04 PROCEDURE — 99284 EMERGENCY DEPT VISIT MOD MDM: CPT

## 2017-11-04 PROCEDURE — 73630 X-RAY EXAM OF FOOT: CPT | Mod: 26,LT

## 2017-11-04 RX ORDER — KETOROLAC TROMETHAMINE 30 MG/ML
30 SYRINGE (ML) INJECTION ONCE
Qty: 0 | Refills: 0 | Status: DISCONTINUED | OUTPATIENT
Start: 2017-11-04 | End: 2017-11-04

## 2017-11-04 RX ADMIN — Medication 30 MILLIGRAM(S): at 19:04

## 2017-11-04 RX ADMIN — Medication 30 MILLIGRAM(S): at 19:34

## 2017-11-04 NOTE — ED ADULT NURSE NOTE - DISCHARGE DATE/TIME
Catrina Cordon was admitted to [facility] from ED via cart accompanied by RN.   Reason for hospitalization is SVT.   Upon arrival, patient is stable. Patient has history significant for CAD.  Patient oriented to bed, call light,  and room.  Patient provided with the following educational materials upon admission:safety, advanced directives, infection control and pain.   Level of understanding patient verbalized understanding.   Admission orders received at this time.   Param Starks NP notified of patient arrival.   See Epic documentation for patient individualized nursing care plan.   04-Nov-2017 19:33

## 2017-11-04 NOTE — ED ADULT TRIAGE NOTE - CHIEF COMPLAINT QUOTE
" Thursday I was moving for 13 hours and I got this pain, Friday I can barely walk" pain in left foot
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
## 2017-11-06 DIAGNOSIS — M79.672 PAIN IN LEFT FOOT: ICD-10-CM

## 2017-11-06 DIAGNOSIS — F32.9 MAJOR DEPRESSIVE DISORDER, SINGLE EPISODE, UNSPECIFIED: ICD-10-CM
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
## 2017-11-06 DIAGNOSIS — F98.8 OTHER SPECIFIED BEHAVIORAL AND EMOTIONAL DISORDERS WITH ONSET USUALLY OCCURRING IN CHILDHOOD AND ADOLESCENCE: ICD-10-CM

## 2017-11-06 DIAGNOSIS — S93.602A UNSPECIFIED SPRAIN OF LEFT FOOT, INITIAL ENCOUNTER: ICD-10-CM

## 2017-11-06 DIAGNOSIS — F31.9 BIPOLAR DISORDER, UNSPECIFIED: ICD-10-CM

## 2017-11-06 DIAGNOSIS — D64.9 ANEMIA, UNSPECIFIED: ICD-10-CM

## 2018-04-21 ENCOUNTER — EMERGENCY (EMERGENCY)
Facility: HOSPITAL | Age: 38
LOS: 0 days | Discharge: HOME | End: 2018-04-21
Attending: EMERGENCY MEDICINE
Payer: MEDICAID

## 2018-04-21 VITALS
HEIGHT: 62 IN | SYSTOLIC BLOOD PRESSURE: 113 MMHG | OXYGEN SATURATION: 98 % | WEIGHT: 210.1 LBS | DIASTOLIC BLOOD PRESSURE: 82 MMHG | HEART RATE: 118 BPM | RESPIRATION RATE: 20 BRPM | TEMPERATURE: 98 F

## 2018-04-21 VITALS
RESPIRATION RATE: 18 BRPM | SYSTOLIC BLOOD PRESSURE: 115 MMHG | OXYGEN SATURATION: 98 % | HEART RATE: 80 BPM | TEMPERATURE: 98 F | DIASTOLIC BLOOD PRESSURE: 68 MMHG

## 2018-04-21 DIAGNOSIS — Z98.84 BARIATRIC SURGERY STATUS: Chronic | ICD-10-CM

## 2018-04-21 DIAGNOSIS — M54.5 LOW BACK PAIN: ICD-10-CM

## 2018-04-21 DIAGNOSIS — Z90.49 ACQUIRED ABSENCE OF OTHER SPECIFIED PARTS OF DIGESTIVE TRACT: Chronic | ICD-10-CM

## 2018-04-21 DIAGNOSIS — F32.9 MAJOR DEPRESSIVE DISORDER, SINGLE EPISODE, UNSPECIFIED: ICD-10-CM

## 2018-04-21 DIAGNOSIS — M67.432 GANGLION, LEFT WRIST: Chronic | ICD-10-CM

## 2018-04-21 DIAGNOSIS — Z46.51 ENCOUNTER FOR FITTING AND ADJUSTMENT OF GASTRIC LAP BAND: Chronic | ICD-10-CM

## 2018-04-21 DIAGNOSIS — Z98.89 OTHER SPECIFIED POSTPROCEDURAL STATES: Chronic | ICD-10-CM

## 2018-04-21 DIAGNOSIS — K21.9 GASTRO-ESOPHAGEAL REFLUX DISEASE WITHOUT ESOPHAGITIS: ICD-10-CM

## 2018-04-21 DIAGNOSIS — D64.9 ANEMIA, UNSPECIFIED: ICD-10-CM

## 2018-04-21 DIAGNOSIS — F90.0 ATTENTION-DEFICIT HYPERACTIVITY DISORDER, PREDOMINANTLY INATTENTIVE TYPE: ICD-10-CM

## 2018-04-21 LAB
ANION GAP SERPL CALC-SCNC: 6 MMOL/L — SIGNIFICANT CHANGE UP (ref 5–17)
BASOPHILS # BLD AUTO: 0.04 K/UL — SIGNIFICANT CHANGE UP (ref 0–0.2)
BASOPHILS NFR BLD AUTO: 0.7 % — SIGNIFICANT CHANGE UP (ref 0–2)
BUN SERPL-MCNC: 10 MG/DL — SIGNIFICANT CHANGE UP (ref 7–23)
CALCIUM SERPL-MCNC: 8.4 MG/DL — LOW (ref 8.5–10.1)
CHLORIDE SERPL-SCNC: 111 MMOL/L — HIGH (ref 96–108)
CO2 SERPL-SCNC: 25 MMOL/L — SIGNIFICANT CHANGE UP (ref 22–31)
CREAT SERPL-MCNC: 0.66 MG/DL — SIGNIFICANT CHANGE UP (ref 0.5–1.3)
EOSINOPHIL # BLD AUTO: 0.09 K/UL — SIGNIFICANT CHANGE UP (ref 0–0.5)
EOSINOPHIL NFR BLD AUTO: 1.5 % — SIGNIFICANT CHANGE UP (ref 0–6)
GLUCOSE SERPL-MCNC: 81 MG/DL — SIGNIFICANT CHANGE UP (ref 70–99)
HCG SERPL-ACNC: <1 MIU/ML — SIGNIFICANT CHANGE UP
HCT VFR BLD CALC: 37.4 % — SIGNIFICANT CHANGE UP (ref 34.5–45)
HGB BLD-MCNC: 12.3 G/DL — SIGNIFICANT CHANGE UP (ref 11.5–15.5)
IMM GRANULOCYTES NFR BLD AUTO: 0.5 % — SIGNIFICANT CHANGE UP (ref 0–1.5)
LYMPHOCYTES # BLD AUTO: 2.16 K/UL — SIGNIFICANT CHANGE UP (ref 1–3.3)
LYMPHOCYTES # BLD AUTO: 35.3 % — SIGNIFICANT CHANGE UP (ref 13–44)
MCHC RBC-ENTMCNC: 30.2 PG — SIGNIFICANT CHANGE UP (ref 27–34)
MCHC RBC-ENTMCNC: 32.9 GM/DL — SIGNIFICANT CHANGE UP (ref 32–36)
MCV RBC AUTO: 91.9 FL — SIGNIFICANT CHANGE UP (ref 80–100)
MONOCYTES # BLD AUTO: 0.46 K/UL — SIGNIFICANT CHANGE UP (ref 0–0.9)
MONOCYTES NFR BLD AUTO: 7.5 % — SIGNIFICANT CHANGE UP (ref 2–14)
NEUTROPHILS # BLD AUTO: 3.34 K/UL — SIGNIFICANT CHANGE UP (ref 1.8–7.4)
NEUTROPHILS NFR BLD AUTO: 54.5 % — SIGNIFICANT CHANGE UP (ref 43–77)
NRBC # BLD: 0 /100 WBCS — SIGNIFICANT CHANGE UP (ref 0–0)
PLATELET # BLD AUTO: 284 K/UL — SIGNIFICANT CHANGE UP (ref 150–400)
POTASSIUM SERPL-MCNC: 3.8 MMOL/L — SIGNIFICANT CHANGE UP (ref 3.5–5.3)
POTASSIUM SERPL-SCNC: 3.8 MMOL/L — SIGNIFICANT CHANGE UP (ref 3.5–5.3)
RBC # BLD: 4.07 M/UL — SIGNIFICANT CHANGE UP (ref 3.8–5.2)
RBC # FLD: 13.6 % — SIGNIFICANT CHANGE UP (ref 10.3–14.5)
SODIUM SERPL-SCNC: 142 MMOL/L — SIGNIFICANT CHANGE UP (ref 135–145)
WBC # BLD: 6.12 K/UL — SIGNIFICANT CHANGE UP (ref 3.8–10.5)
WBC # FLD AUTO: 6.12 K/UL — SIGNIFICANT CHANGE UP (ref 3.8–10.5)

## 2018-04-21 PROCEDURE — 99285 EMERGENCY DEPT VISIT HI MDM: CPT | Mod: 25

## 2018-04-21 PROCEDURE — 72131 CT LUMBAR SPINE W/O DYE: CPT | Mod: 26

## 2018-04-21 RX ORDER — DULOXETINE HYDROCHLORIDE 30 MG/1
1 CAPSULE, DELAYED RELEASE ORAL
Qty: 0 | Refills: 0 | COMMUNITY

## 2018-04-21 RX ORDER — MORPHINE SULFATE 50 MG/1
4 CAPSULE, EXTENDED RELEASE ORAL ONCE
Qty: 0 | Refills: 0 | Status: DISCONTINUED | OUTPATIENT
Start: 2018-04-21 | End: 2018-04-21

## 2018-04-21 RX ORDER — CLONAZEPAM 1 MG
0 TABLET ORAL
Qty: 0 | Refills: 0 | COMMUNITY

## 2018-04-21 RX ORDER — LAMOTRIGINE 25 MG/1
0 TABLET, ORALLY DISINTEGRATING ORAL
Qty: 0 | Refills: 0 | COMMUNITY

## 2018-04-21 RX ORDER — LAMOTRIGINE 25 MG/1
1 TABLET, ORALLY DISINTEGRATING ORAL
Qty: 0 | Refills: 0 | COMMUNITY

## 2018-04-21 RX ORDER — ZOLPIDEM TARTRATE 10 MG/1
1 TABLET ORAL
Qty: 0 | Refills: 0 | COMMUNITY

## 2018-04-21 RX ORDER — KETOROLAC TROMETHAMINE 30 MG/ML
15 SYRINGE (ML) INJECTION ONCE
Qty: 0 | Refills: 0 | Status: DISCONTINUED | OUTPATIENT
Start: 2018-04-21 | End: 2018-04-21

## 2018-04-21 RX ORDER — IBUPROFEN 200 MG
1 TABLET ORAL
Qty: 20 | Refills: 0
Start: 2018-04-21 | End: 2018-04-25

## 2018-04-21 RX ADMIN — Medication 15 MILLIGRAM(S): at 07:40

## 2018-04-21 RX ADMIN — MORPHINE SULFATE 4 MILLIGRAM(S): 50 CAPSULE, EXTENDED RELEASE ORAL at 07:40

## 2018-04-21 RX ADMIN — Medication 15 MILLIGRAM(S): at 04:33

## 2018-04-21 RX ADMIN — MORPHINE SULFATE 4 MILLIGRAM(S): 50 CAPSULE, EXTENDED RELEASE ORAL at 06:02

## 2018-04-21 RX ADMIN — MORPHINE SULFATE 4 MILLIGRAM(S): 50 CAPSULE, EXTENDED RELEASE ORAL at 04:33

## 2018-04-21 NOTE — ED PROVIDER NOTE - PHYSICAL EXAMINATION
Gen: Alert, moderate distress, overweight  Head: NC, AT, EOMI, normal lids/conjunctiva  ENT: normal hearing, patent oropharynx without erythema/exudate, uvula midline  Neck: +supple, no tenderness/meningismus, +Trachea midline  Pulm: Bilateral BS, normal resp effort, no wheeze/stridor/retractions  CV: RRR, no M/R/G, +dist pulses  Abd: soft, NT/ND, +BS  Mskel: no edema/erythema/cyanosis, +midline lumbar pain, L1-L4  Skin: no rash, warm/dry  Neuro: AAOx3, no sensory/motor deficits, CN 2-12 intact, BL patellar reflexes

## 2018-04-21 NOTE — ED PROVIDER NOTE - OBJECTIVE STATEMENT
Pertinent PMH/PSH/FHx/SHx and Review of Systems contained within:  Patient presents to the ED for midline back pain.  Started yesterday, says that she was on the floor playing with her child, says that when she stood up felt a pull in her back with acute onset of nonradiating pain at L2, L3 level.  Denies fall or impact to the back.  Denies any flank pain, dysuria, hematuria, urinary retention, saddle numbness, or leg weakness.  Denies use of IVDU or fevers.      ROS: No fever/chills, No headache/photophobia/eye pain/changes in vision, No ear pain/sore throat/dysphagia, No chest pain/palpitations, no SOB/cough/wheeze/stridor, No abdominal pain, No N/V/D/melena, no dysuria/frequency/discharge, No neck pain, no rash, no changes in neurological status/function.

## 2018-04-21 NOTE — ED ADULT TRIAGE NOTE - CHIEF COMPLAINT QUOTE
Pt presents to ED with lower back pain gradual onset since yesterday but progressively worsening. . Pt denies injury.

## 2018-04-21 NOTE — ED ADULT NURSE NOTE - OBJECTIVE STATEMENT
patient stated that she was on the floor playing Angella with her daughter and she got up she felt pain in the lower back and she has been having pain since.  Reports pain of 10 on a scale of 0 to 10, lower back/spine.  Denies chest pain, SOB, numbness, tingling, denies urine and bowels incontinence.

## 2018-04-21 NOTE — ED PROVIDER NOTE - MEDICAL DECISION MAKING DETAILS
Lumbar back pain after going into standing position.  VSS.  No signs of cord compression, clinical symptoms discussed, has disk herniation.  Patient is already following with ortho spine and pain management, declined any referrals at this time. Lumbar back pain after going into standing position.  VSS.  No signs of cord compression, clinical symptoms discussed, has disk herniation.  Patient is already following with ortho spine and pain management, declined any referrals at this time. Discussed results and outcome of today's visit with the patient.  Patient advised to please follow up with another healthcare provider within the next 24 hours and return to the Emergency Department for worsening symptoms or any other concerns.  Patient advised that their doctor may call  to follow up on the specific results of the tests performed today in the emergency department.   Patient appears well on discharge. Lumbar back pain after going into standing position, has disk herniation.  VSS.  No signs of cord compression, clinical symptoms discussed.  Patient is already following with ortho spine for her neck and pain management, declined any referrals at this time. Discussed results and outcome of today's visit with the patient.  Patient advised to please follow up with another healthcare provider within the next 24 hours and return to the Emergency Department for worsening symptoms or any other concerns.  Patient advised that their doctor may call  to follow up on the specific results of the tests performed today in the emergency department.   Patient appears well on discharge.

## 2018-05-30 NOTE — ED ADULT NURSE NOTE - DOES PATIENT HAVE ADVANCE DIRECTIVE
From: Roseline Pandya  Sent: 5/29/2018 5:36 PM CDT  Subject: Medication Renewal Request    Roseline Pandya would like a refill of the following medications:     Metoprolol Succinate ER 25 MG Oral Tablet 24 Hr Tucker Le MD]     Triamterene-HCTZ 37.5-25 MG Oral Tab Tucker Le MD]     atorvastatin 10 MG Oral Tab Tucker Le MD]    Preferred pharmacy: [facility], [address] AT [address], [phone], [phone] No

## 2018-05-31 ENCOUNTER — TRANSCRIPTION ENCOUNTER (OUTPATIENT)
Age: 38
End: 2018-05-31

## 2019-03-02 ENCOUNTER — TRANSCRIPTION ENCOUNTER (OUTPATIENT)
Age: 39
End: 2019-03-02

## 2019-05-22 ENCOUNTER — EMERGENCY (EMERGENCY)
Facility: HOSPITAL | Age: 39
LOS: 1 days | Discharge: HOME | End: 2019-05-22
Attending: EMERGENCY MEDICINE
Payer: COMMERCIAL

## 2019-05-22 VITALS
TEMPERATURE: 99 F | WEIGHT: 190.04 LBS | HEART RATE: 81 BPM | DIASTOLIC BLOOD PRESSURE: 87 MMHG | RESPIRATION RATE: 17 BRPM | SYSTOLIC BLOOD PRESSURE: 142 MMHG | HEIGHT: 63 IN | OXYGEN SATURATION: 100 %

## 2019-05-22 VITALS
TEMPERATURE: 98 F | HEART RATE: 71 BPM | RESPIRATION RATE: 19 BRPM | DIASTOLIC BLOOD PRESSURE: 81 MMHG | SYSTOLIC BLOOD PRESSURE: 132 MMHG | OXYGEN SATURATION: 99 %

## 2019-05-22 DIAGNOSIS — M67.432 GANGLION, LEFT WRIST: Chronic | ICD-10-CM

## 2019-05-22 DIAGNOSIS — Z98.84 BARIATRIC SURGERY STATUS: Chronic | ICD-10-CM

## 2019-05-22 DIAGNOSIS — Z46.51 ENCOUNTER FOR FITTING AND ADJUSTMENT OF GASTRIC LAP BAND: Chronic | ICD-10-CM

## 2019-05-22 DIAGNOSIS — Z98.89 OTHER SPECIFIED POSTPROCEDURAL STATES: Chronic | ICD-10-CM

## 2019-05-22 DIAGNOSIS — Z90.49 ACQUIRED ABSENCE OF OTHER SPECIFIED PARTS OF DIGESTIVE TRACT: Chronic | ICD-10-CM

## 2019-05-22 LAB
ALBUMIN SERPL ELPH-MCNC: 3.7 G/DL — SIGNIFICANT CHANGE UP (ref 3.3–5)
ALP SERPL-CCNC: 77 U/L — SIGNIFICANT CHANGE UP (ref 40–120)
ALT FLD-CCNC: 18 U/L — SIGNIFICANT CHANGE UP (ref 12–78)
ANION GAP SERPL CALC-SCNC: 9 MMOL/L — SIGNIFICANT CHANGE UP (ref 5–17)
APTT BLD: 34.3 SEC — SIGNIFICANT CHANGE UP (ref 27.5–36.3)
AST SERPL-CCNC: 15 U/L — SIGNIFICANT CHANGE UP (ref 15–37)
BASOPHILS # BLD AUTO: 0.06 K/UL — SIGNIFICANT CHANGE UP (ref 0–0.2)
BASOPHILS NFR BLD AUTO: 0.6 % — SIGNIFICANT CHANGE UP (ref 0–2)
BILIRUB SERPL-MCNC: 0.2 MG/DL — SIGNIFICANT CHANGE UP (ref 0.2–1.2)
BUN SERPL-MCNC: 15 MG/DL — SIGNIFICANT CHANGE UP (ref 7–23)
CALCIUM SERPL-MCNC: 8.2 MG/DL — LOW (ref 8.5–10.1)
CHLORIDE SERPL-SCNC: 109 MMOL/L — HIGH (ref 96–108)
CO2 SERPL-SCNC: 24 MMOL/L — SIGNIFICANT CHANGE UP (ref 22–31)
CREAT SERPL-MCNC: 0.8 MG/DL — SIGNIFICANT CHANGE UP (ref 0.5–1.3)
EOSINOPHIL # BLD AUTO: 0.09 K/UL — SIGNIFICANT CHANGE UP (ref 0–0.5)
EOSINOPHIL NFR BLD AUTO: 0.9 % — SIGNIFICANT CHANGE UP (ref 0–6)
GLUCOSE SERPL-MCNC: 68 MG/DL — LOW (ref 70–99)
HCT VFR BLD CALC: 38.8 % — SIGNIFICANT CHANGE UP (ref 34.5–45)
HGB BLD-MCNC: 12.9 G/DL — SIGNIFICANT CHANGE UP (ref 11.5–15.5)
IMM GRANULOCYTES NFR BLD AUTO: 0.5 % — SIGNIFICANT CHANGE UP (ref 0–1.5)
INR BLD: 1 RATIO — SIGNIFICANT CHANGE UP (ref 0.88–1.16)
LYMPHOCYTES # BLD AUTO: 2.91 K/UL — SIGNIFICANT CHANGE UP (ref 1–3.3)
LYMPHOCYTES # BLD AUTO: 30.1 % — SIGNIFICANT CHANGE UP (ref 13–44)
MCHC RBC-ENTMCNC: 30.6 PG — SIGNIFICANT CHANGE UP (ref 27–34)
MCHC RBC-ENTMCNC: 33.2 GM/DL — SIGNIFICANT CHANGE UP (ref 32–36)
MCV RBC AUTO: 91.9 FL — SIGNIFICANT CHANGE UP (ref 80–100)
MONOCYTES # BLD AUTO: 0.51 K/UL — SIGNIFICANT CHANGE UP (ref 0–0.9)
MONOCYTES NFR BLD AUTO: 5.3 % — SIGNIFICANT CHANGE UP (ref 2–14)
NEUTROPHILS # BLD AUTO: 6.05 K/UL — SIGNIFICANT CHANGE UP (ref 1.8–7.4)
NEUTROPHILS NFR BLD AUTO: 62.6 % — SIGNIFICANT CHANGE UP (ref 43–77)
NRBC # BLD: 0 /100 WBCS — SIGNIFICANT CHANGE UP (ref 0–0)
PLATELET # BLD AUTO: 350 K/UL — SIGNIFICANT CHANGE UP (ref 150–400)
POTASSIUM SERPL-MCNC: 3.9 MMOL/L — SIGNIFICANT CHANGE UP (ref 3.5–5.3)
POTASSIUM SERPL-SCNC: 3.9 MMOL/L — SIGNIFICANT CHANGE UP (ref 3.5–5.3)
PROT SERPL-MCNC: 7.1 G/DL — SIGNIFICANT CHANGE UP (ref 6–8.3)
PROTHROM AB SERPL-ACNC: 11.3 SEC — SIGNIFICANT CHANGE UP (ref 10–12.9)
RBC # BLD: 4.22 M/UL — SIGNIFICANT CHANGE UP (ref 3.8–5.2)
RBC # FLD: 12.7 % — SIGNIFICANT CHANGE UP (ref 10.3–14.5)
SODIUM SERPL-SCNC: 142 MMOL/L — SIGNIFICANT CHANGE UP (ref 135–145)
WBC # BLD: 9.67 K/UL — SIGNIFICANT CHANGE UP (ref 3.8–10.5)
WBC # FLD AUTO: 9.67 K/UL — SIGNIFICANT CHANGE UP (ref 3.8–10.5)

## 2019-05-22 PROCEDURE — 85027 COMPLETE CBC AUTOMATED: CPT

## 2019-05-22 PROCEDURE — 85610 PROTHROMBIN TIME: CPT

## 2019-05-22 PROCEDURE — 99283 EMERGENCY DEPT VISIT LOW MDM: CPT

## 2019-05-22 PROCEDURE — 80053 COMPREHEN METABOLIC PANEL: CPT

## 2019-05-22 PROCEDURE — 85730 THROMBOPLASTIN TIME PARTIAL: CPT

## 2019-05-22 PROCEDURE — 36415 COLL VENOUS BLD VENIPUNCTURE: CPT

## 2019-05-22 RX ORDER — SODIUM CHLORIDE 9 MG/ML
3 INJECTION INTRAMUSCULAR; INTRAVENOUS; SUBCUTANEOUS ONCE
Refills: 0 | Status: COMPLETED | OUTPATIENT
Start: 2019-05-22 | End: 2019-05-22

## 2019-05-22 RX ADMIN — SODIUM CHLORIDE 3 MILLILITER(S): 9 INJECTION INTRAMUSCULAR; INTRAVENOUS; SUBCUTANEOUS at 19:14

## 2019-05-22 NOTE — ED PROVIDER NOTE - PROGRESS NOTE DETAILS
labs reviewed. pt advised to follow up with her hematologist dr bates. All  labs reviewed. all results reviewed with pt including abnormal results. pt given a copy of results. pt advised to follow up with pmd regarding abnormal results. All questions answered and concerns addressed. pt verbalized understanding and agreement with plan and dx. pt advised on next step and when/where to follow up. pt advised on all take home and otc medications. pt advised to follow up with PMD. pt advised to return to ed for worsenng symptoms including fever, cp, sob. will dc.

## 2019-05-22 NOTE — ED PROVIDER NOTE - OBJECTIVE STATEMENT
pt is a 40yo female with pmhx of JESS, bipolar, depression, c/o fatigue x 1 week. pt reports increased fatigue with sleepiness. pt reports she usually gets iron transfusions but has not had one in 8 months, follows up at [facility]. pt denies fever, cp, sob, n/v, abd pain, SI or homicidal ideation.

## 2019-05-22 NOTE — ED PROVIDER NOTE - NSFOLLOWUPCLINICS_GEN_ALL_ED_FT
[facility]  [address] [place] [address]  Phone: [phone]  Fax:   Follow Up Time:

## 2019-05-22 NOTE — ED PROVIDER NOTE - ATTENDING CONTRIBUTION TO CARE
I have personally performed a face to face diagnostic evaluation on this patient.  I have reviewed the PA note and agree with the history, exam, and plan of care, except as noted.  History and Exam by me shows  generalized weakness for 1-2 weeks and concerned for low hg c no other complaint.  lungs cta bl.  heart s1,s2, rrr

## 2019-05-22 NOTE — ED ADULT NURSE NOTE - OBJECTIVE STATEMENT
Patient with history of urosepsis, anxiety, depression and iron deficiency anemia presents to the ED with c/o weakness, mild SOB and increasing lethargy x1 week.  Patient sees an oncologist (Srinath Connor) and typically receives iron infusions every three months; last infusion was about eight months ago.  Also has history of blood transfusions.

## 2019-05-22 NOTE — ED PROVIDER NOTE - CHPI ED SYMPTOMS NEG
no fever/no decreased eating/drinking/no vomiting/no weakness/no nausea/no numbness/no pain/no tingling

## 2019-05-23 PROBLEM — D64.9 ANEMIA, UNSPECIFIED: Chronic | Status: ACTIVE | Noted: 2017-02-09

## 2019-06-03 ENCOUNTER — EMERGENCY (EMERGENCY)
Facility: HOSPITAL | Age: 39
LOS: 1 days | Discharge: HOME | End: 2019-06-03
Attending: EMERGENCY MEDICINE | Admitting: EMERGENCY MEDICINE
Payer: MEDICAID

## 2019-06-03 VITALS
DIASTOLIC BLOOD PRESSURE: 84 MMHG | OXYGEN SATURATION: 98 % | HEART RATE: 80 BPM | TEMPERATURE: 98 F | RESPIRATION RATE: 18 BRPM | SYSTOLIC BLOOD PRESSURE: 123 MMHG

## 2019-06-03 VITALS
HEART RATE: 80 BPM | WEIGHT: 190.04 LBS | DIASTOLIC BLOOD PRESSURE: 82 MMHG | RESPIRATION RATE: 18 BRPM | TEMPERATURE: 99 F | SYSTOLIC BLOOD PRESSURE: 124 MMHG | OXYGEN SATURATION: 97 %

## 2019-06-03 DIAGNOSIS — Z90.49 ACQUIRED ABSENCE OF OTHER SPECIFIED PARTS OF DIGESTIVE TRACT: Chronic | ICD-10-CM

## 2019-06-03 DIAGNOSIS — Z98.84 BARIATRIC SURGERY STATUS: Chronic | ICD-10-CM

## 2019-06-03 DIAGNOSIS — Z46.51 ENCOUNTER FOR FITTING AND ADJUSTMENT OF GASTRIC LAP BAND: Chronic | ICD-10-CM

## 2019-06-03 DIAGNOSIS — Z98.89 OTHER SPECIFIED POSTPROCEDURAL STATES: Chronic | ICD-10-CM

## 2019-06-03 DIAGNOSIS — M67.432 GANGLION, LEFT WRIST: Chronic | ICD-10-CM

## 2019-06-03 LAB
ALBUMIN SERPL ELPH-MCNC: 3.9 G/DL — SIGNIFICANT CHANGE UP (ref 3.3–5)
ALP SERPL-CCNC: 70 U/L — SIGNIFICANT CHANGE UP (ref 40–120)
ALT FLD-CCNC: 17 U/L — SIGNIFICANT CHANGE UP (ref 12–78)
ANION GAP SERPL CALC-SCNC: 6 MMOL/L — SIGNIFICANT CHANGE UP (ref 5–17)
APPEARANCE UR: ABNORMAL
AST SERPL-CCNC: 12 U/L — LOW (ref 15–37)
BASOPHILS # BLD AUTO: 0.04 K/UL — SIGNIFICANT CHANGE UP (ref 0–0.2)
BASOPHILS NFR BLD AUTO: 0.6 % — SIGNIFICANT CHANGE UP (ref 0–2)
BILIRUB SERPL-MCNC: 0.4 MG/DL — SIGNIFICANT CHANGE UP (ref 0.2–1.2)
BILIRUB UR-MCNC: NEGATIVE — SIGNIFICANT CHANGE UP
BUN SERPL-MCNC: 11 MG/DL — SIGNIFICANT CHANGE UP (ref 7–23)
CALCIUM SERPL-MCNC: 8.6 MG/DL — SIGNIFICANT CHANGE UP (ref 8.5–10.1)
CHLORIDE SERPL-SCNC: 107 MMOL/L — SIGNIFICANT CHANGE UP (ref 96–108)
CO2 SERPL-SCNC: 27 MMOL/L — SIGNIFICANT CHANGE UP (ref 22–31)
COLOR SPEC: SIGNIFICANT CHANGE UP
CREAT SERPL-MCNC: 0.84 MG/DL — SIGNIFICANT CHANGE UP (ref 0.5–1.3)
DIFF PNL FLD: NEGATIVE — SIGNIFICANT CHANGE UP
EOSINOPHIL # BLD AUTO: 0.04 K/UL — SIGNIFICANT CHANGE UP (ref 0–0.5)
EOSINOPHIL NFR BLD AUTO: 0.6 % — SIGNIFICANT CHANGE UP (ref 0–6)
GLUCOSE SERPL-MCNC: 76 MG/DL — SIGNIFICANT CHANGE UP (ref 70–99)
GLUCOSE UR QL: NEGATIVE — SIGNIFICANT CHANGE UP
HCG SERPL-ACNC: <1 MIU/ML — SIGNIFICANT CHANGE UP
HCT VFR BLD CALC: 39.4 % — SIGNIFICANT CHANGE UP (ref 34.5–45)
HGB BLD-MCNC: 13.1 G/DL — SIGNIFICANT CHANGE UP (ref 11.5–15.5)
IMM GRANULOCYTES NFR BLD AUTO: 0.3 % — SIGNIFICANT CHANGE UP (ref 0–1.5)
KETONES UR-MCNC: NEGATIVE — SIGNIFICANT CHANGE UP
LEUKOCYTE ESTERASE UR-ACNC: NEGATIVE — SIGNIFICANT CHANGE UP
LIDOCAIN IGE QN: 110 U/L — SIGNIFICANT CHANGE UP (ref 73–393)
LYMPHOCYTES # BLD AUTO: 2.44 K/UL — SIGNIFICANT CHANGE UP (ref 1–3.3)
LYMPHOCYTES # BLD AUTO: 38.3 % — SIGNIFICANT CHANGE UP (ref 13–44)
MCHC RBC-ENTMCNC: 30.7 PG — SIGNIFICANT CHANGE UP (ref 27–34)
MCHC RBC-ENTMCNC: 33.2 GM/DL — SIGNIFICANT CHANGE UP (ref 32–36)
MCV RBC AUTO: 92.3 FL — SIGNIFICANT CHANGE UP (ref 80–100)
MONOCYTES # BLD AUTO: 0.29 K/UL — SIGNIFICANT CHANGE UP (ref 0–0.9)
MONOCYTES NFR BLD AUTO: 4.6 % — SIGNIFICANT CHANGE UP (ref 2–14)
NEUTROPHILS # BLD AUTO: 3.54 K/UL — SIGNIFICANT CHANGE UP (ref 1.8–7.4)
NEUTROPHILS NFR BLD AUTO: 55.6 % — SIGNIFICANT CHANGE UP (ref 43–77)
NITRITE UR-MCNC: NEGATIVE — SIGNIFICANT CHANGE UP
NRBC # BLD: 0 /100 WBCS — SIGNIFICANT CHANGE UP (ref 0–0)
PH UR: 6 — SIGNIFICANT CHANGE UP (ref 5–8)
PLATELET # BLD AUTO: 317 K/UL — SIGNIFICANT CHANGE UP (ref 150–400)
POTASSIUM SERPL-MCNC: 4.1 MMOL/L — SIGNIFICANT CHANGE UP (ref 3.5–5.3)
POTASSIUM SERPL-SCNC: 4.1 MMOL/L — SIGNIFICANT CHANGE UP (ref 3.5–5.3)
PROT SERPL-MCNC: 7.1 G/DL — SIGNIFICANT CHANGE UP (ref 6–8.3)
PROT UR-MCNC: NEGATIVE — SIGNIFICANT CHANGE UP
RBC # BLD: 4.27 M/UL — SIGNIFICANT CHANGE UP (ref 3.8–5.2)
RBC # FLD: 12.7 % — SIGNIFICANT CHANGE UP (ref 10.3–14.5)
SODIUM SERPL-SCNC: 140 MMOL/L — SIGNIFICANT CHANGE UP (ref 135–145)
SP GR SPEC: 1.01 — SIGNIFICANT CHANGE UP (ref 1.01–1.02)
UROBILINOGEN FLD QL: NEGATIVE — SIGNIFICANT CHANGE UP
WBC # BLD: 6.37 K/UL — SIGNIFICANT CHANGE UP (ref 3.8–10.5)
WBC # FLD AUTO: 6.37 K/UL — SIGNIFICANT CHANGE UP (ref 3.8–10.5)

## 2019-06-03 PROCEDURE — 74176 CT ABD & PELVIS W/O CONTRAST: CPT | Mod: 26

## 2019-06-03 PROCEDURE — 83690 ASSAY OF LIPASE: CPT

## 2019-06-03 PROCEDURE — 84702 CHORIONIC GONADOTROPIN TEST: CPT

## 2019-06-03 PROCEDURE — 99284 EMERGENCY DEPT VISIT MOD MDM: CPT

## 2019-06-03 PROCEDURE — 36415 COLL VENOUS BLD VENIPUNCTURE: CPT

## 2019-06-03 PROCEDURE — 96375 TX/PRO/DX INJ NEW DRUG ADDON: CPT

## 2019-06-03 PROCEDURE — 80053 COMPREHEN METABOLIC PANEL: CPT

## 2019-06-03 PROCEDURE — 85027 COMPLETE CBC AUTOMATED: CPT

## 2019-06-03 PROCEDURE — 99284 EMERGENCY DEPT VISIT MOD MDM: CPT | Mod: 25

## 2019-06-03 PROCEDURE — 81001 URINALYSIS AUTO W/SCOPE: CPT

## 2019-06-03 PROCEDURE — 74176 CT ABD & PELVIS W/O CONTRAST: CPT

## 2019-06-03 PROCEDURE — 96374 THER/PROPH/DIAG INJ IV PUSH: CPT

## 2019-06-03 RX ORDER — SODIUM CHLORIDE 9 MG/ML
1000 INJECTION INTRAMUSCULAR; INTRAVENOUS; SUBCUTANEOUS ONCE
Refills: 0 | Status: COMPLETED | OUTPATIENT
Start: 2019-06-03 | End: 2019-06-03

## 2019-06-03 RX ORDER — CYCLOBENZAPRINE HYDROCHLORIDE 10 MG/1
1 TABLET, FILM COATED ORAL
Qty: 20 | Refills: 0
Start: 2019-06-03 | End: 2019-06-09

## 2019-06-03 RX ORDER — KETOROLAC TROMETHAMINE 30 MG/ML
30 SYRINGE (ML) INJECTION ONCE
Refills: 0 | Status: DISCONTINUED | OUTPATIENT
Start: 2019-06-03 | End: 2019-06-03

## 2019-06-03 RX ORDER — LIDOCAINE 4 G/100G
1 CREAM TOPICAL ONCE
Refills: 0 | Status: DISCONTINUED | OUTPATIENT
Start: 2019-06-03 | End: 2019-06-07

## 2019-06-03 RX ORDER — LIDOCAINE 4 G/100G
1 CREAM TOPICAL
Qty: 10 | Refills: 0
Start: 2019-06-03 | End: 2019-06-12

## 2019-06-03 RX ORDER — ONDANSETRON 8 MG/1
4 TABLET, FILM COATED ORAL ONCE
Refills: 0 | Status: COMPLETED | OUTPATIENT
Start: 2019-06-03 | End: 2019-06-03

## 2019-06-03 RX ORDER — IBUPROFEN 200 MG
1 TABLET ORAL
Qty: 30 | Refills: 0
Start: 2019-06-03 | End: 2019-06-12

## 2019-06-03 RX ADMIN — SODIUM CHLORIDE 1000 MILLILITER(S): 9 INJECTION INTRAMUSCULAR; INTRAVENOUS; SUBCUTANEOUS at 11:42

## 2019-06-03 RX ADMIN — ONDANSETRON 4 MILLIGRAM(S): 8 TABLET, FILM COATED ORAL at 11:42

## 2019-06-03 RX ADMIN — Medication 30 MILLIGRAM(S): at 11:44

## 2019-06-03 NOTE — ED PROVIDER NOTE - OBJECTIVE STATEMENT
39 year old female with history of depression, anxiety, ADD, and bipolar presents with left mid back pain that started about 2-3 days ago. no known injury or trauma. thought it was muscular pain. Then pain started to radiate down to left groin next day and occ to left shoulder today. no chest pain or RODGER. no urinary symptoms. has been taking motrin without improvement. pain worse with movement and palpation. mild nausea, no vomiting.  pain 8/10  PCP Monika Renteria

## 2019-06-03 NOTE — ED ADULT NURSE NOTE - OBJECTIVE STATEMENT
pt with complaints of left upper flank pain with radiation to left groin and under left breast. pt also states she has left shoulder blade pain. pt also states she has nausea.

## 2019-06-03 NOTE — ED PROVIDER NOTE - MUSCULOSKELETAL, MLM
Spine appears normal, no vert tenderness. soft tissue tenderness to left mid-upper back. no shoulder tenderness to palpation

## 2019-06-03 NOTE — ED PROVIDER NOTE - CLINICAL SUMMARY MEDICAL DECISION MAKING FREE TEXT BOX
left mid-upper flank pain for 2-3 days. pain radiates to left groin and occ left shoulder. will order labs, CT renal stone hunt, UA, preg, toradol, zofran

## 2019-06-03 NOTE — ED ADULT TRIAGE NOTE - CHIEF COMPLAINT QUOTE
" A couple of days ago I got pain on my back thought I pulled a muscle but now the pain goes to my groin and shoulder"

## 2019-06-03 NOTE — ED PROVIDER NOTE - ATTENDING CONTRIBUTION TO CARE
40 yo female hx of depression, anxiety, add, bipolar c/o left mid back pain x 2-3 days, radiating down to left groin today and to left shoulder.  No nausea/vomiting/diarrhea.  No chest pain.  No shortness of breath.  Took motrin without much relief.  No urinary symptoms.      Gen: Alert, NAD  Head/eyes: NC/AT, PERRL, EOMI, normal lids/conjunctiva, no scleral icterus  ENT: Bilateral TM WNL, normal hearing, patent oropharynx without erythema/exudate, uvula midline, no peritonsillar abscess, no tongue/uvula swelling  Neck: supple, no tenderness/meningismus/JVD, Trachea midline  Pulm/lung: Bilateral clear BS, normal resp effort, no wheeze/stridor/retractions  CV/heart: RRR, no M/R/G, +2 dist pulses (radial, pedal DP/PT, popliteal)  GI/Abd: soft, +mild ttp left flank/ND, +BS, no guarding/rebound tenderness  Musculoskeletal: no edema/erythema/cyanosis, FROM in all extremities, no C/T/L spine ttp, no CVAT b/l  Skin: no rash, no vesicles, no petechaie, no ecchymosis, no swelling  Neuro: AAOx3, CN 2-12 intact, normal sensation, 5/5 motor strength in all extremities, normal gait, no dysmetria     labs, CT renal wnl, no acute pathology, anti-inflammatories, muscle relaxer, f/u with PMD

## 2019-08-01 ENCOUNTER — OUTPATIENT (OUTPATIENT)
Dept: OUTPATIENT SERVICES | Facility: HOSPITAL | Age: 39
LOS: 1 days | End: 2019-08-01
Payer: MEDICAID

## 2019-08-01 DIAGNOSIS — Z98.84 BARIATRIC SURGERY STATUS: Chronic | ICD-10-CM

## 2019-08-01 DIAGNOSIS — Z46.51 ENCOUNTER FOR FITTING AND ADJUSTMENT OF GASTRIC LAP BAND: Chronic | ICD-10-CM

## 2019-08-01 DIAGNOSIS — Z98.89 OTHER SPECIFIED POSTPROCEDURAL STATES: Chronic | ICD-10-CM

## 2019-08-01 DIAGNOSIS — Z90.49 ACQUIRED ABSENCE OF OTHER SPECIFIED PARTS OF DIGESTIVE TRACT: Chronic | ICD-10-CM

## 2019-08-01 DIAGNOSIS — M67.432 GANGLION, LEFT WRIST: Chronic | ICD-10-CM

## 2019-08-06 ENCOUNTER — APPOINTMENT (OUTPATIENT)
Dept: SPINE | Facility: CLINIC | Age: 39
End: 2019-08-06
Payer: MEDICAID

## 2019-08-06 VITALS
DIASTOLIC BLOOD PRESSURE: 86 MMHG | WEIGHT: 220 LBS | HEIGHT: 62 IN | SYSTOLIC BLOOD PRESSURE: 122 MMHG | BODY MASS INDEX: 40.48 KG/M2

## 2019-08-06 PROCEDURE — 99204 OFFICE O/P NEW MOD 45 MIN: CPT

## 2019-08-07 NOTE — ASSESSMENT
[FreeTextEntry1] : Assess:\par lumbar radiculopathy \par MRI shows significant stenosis\par \par PLAN:\par L2 L3 Laminectomy and possible L 2 L3 instrumentation and fusion \par Risks of procedure reviewed and understood\par To schedule surgery

## 2019-08-07 NOTE — HISTORY OF PRESENT ILLNESS
[FreeTextEntry1] : Lower back pain  [de-identified] : 39 year old female who presents with a long standing history of lower back pain and has recently had urinary urgency and frequency.  She has had conservative measures over the last few years and underwent epidural injections in 2017 and this year in 2019.  She has no relief with conservative management and routinely presented to the ED with multiple admissions for lower back pain.  The pain is a 10/10 and is currently unable to work.  She reports radicular pain radiating into her left leg and goes into her groin with difficulty walking.  No urinary or GI symptoms.

## 2019-08-11 ENCOUNTER — INPATIENT (INPATIENT)
Facility: HOSPITAL | Age: 39
LOS: 0 days | Discharge: HOME | DRG: 552 | End: 2019-08-12
Attending: NEUROLOGICAL SURGERY | Admitting: NEUROLOGICAL SURGERY
Payer: MEDICAID

## 2019-08-11 VITALS
WEIGHT: 210.1 LBS | RESPIRATION RATE: 20 BRPM | HEIGHT: 62 IN | OXYGEN SATURATION: 98 % | HEART RATE: 81 BPM | DIASTOLIC BLOOD PRESSURE: 83 MMHG | TEMPERATURE: 98 F | SYSTOLIC BLOOD PRESSURE: 114 MMHG

## 2019-08-11 DIAGNOSIS — Z90.49 ACQUIRED ABSENCE OF OTHER SPECIFIED PARTS OF DIGESTIVE TRACT: Chronic | ICD-10-CM

## 2019-08-11 DIAGNOSIS — Z46.51 ENCOUNTER FOR FITTING AND ADJUSTMENT OF GASTRIC LAP BAND: Chronic | ICD-10-CM

## 2019-08-11 DIAGNOSIS — N84.0 POLYP OF CORPUS UTERI: Chronic | ICD-10-CM

## 2019-08-11 DIAGNOSIS — Z98.89 OTHER SPECIFIED POSTPROCEDURAL STATES: Chronic | ICD-10-CM

## 2019-08-11 DIAGNOSIS — M67.432 GANGLION, LEFT WRIST: Chronic | ICD-10-CM

## 2019-08-11 DIAGNOSIS — Z98.84 BARIATRIC SURGERY STATUS: Chronic | ICD-10-CM

## 2019-08-11 DIAGNOSIS — M54.9 DORSALGIA, UNSPECIFIED: ICD-10-CM

## 2019-08-11 LAB
ALBUMIN SERPL ELPH-MCNC: 4.3 G/DL — SIGNIFICANT CHANGE UP (ref 3.3–5)
ALBUMIN SERPL ELPH-MCNC: 4.3 G/DL — SIGNIFICANT CHANGE UP (ref 3.3–5)
ALP SERPL-CCNC: 82 U/L — SIGNIFICANT CHANGE UP (ref 40–120)
ALP SERPL-CCNC: 89 U/L — SIGNIFICANT CHANGE UP (ref 40–120)
ALT FLD-CCNC: 102 U/L — HIGH (ref 10–45)
ALT FLD-CCNC: 60 U/L — HIGH (ref 10–45)
ANION GAP SERPL CALC-SCNC: 10 MMOL/L — SIGNIFICANT CHANGE UP (ref 5–17)
ANION GAP SERPL CALC-SCNC: 11 MMOL/L — SIGNIFICANT CHANGE UP (ref 5–17)
APPEARANCE UR: ABNORMAL
APTT BLD: 33.8 SEC — SIGNIFICANT CHANGE UP (ref 27.5–36.3)
AST SERPL-CCNC: 23 U/L — SIGNIFICANT CHANGE UP (ref 10–40)
AST SERPL-CCNC: 88 U/L — HIGH (ref 10–40)
BACTERIA # UR AUTO: ABNORMAL
BASOPHILS # BLD AUTO: 0 K/UL — SIGNIFICANT CHANGE UP (ref 0–0.2)
BASOPHILS # BLD AUTO: 0.1 K/UL — SIGNIFICANT CHANGE UP (ref 0–0.2)
BASOPHILS NFR BLD AUTO: 0.4 % — SIGNIFICANT CHANGE UP (ref 0–2)
BASOPHILS NFR BLD AUTO: 0.5 % — SIGNIFICANT CHANGE UP (ref 0–2)
BILIRUB SERPL-MCNC: 0.3 MG/DL — SIGNIFICANT CHANGE UP (ref 0.2–1.2)
BILIRUB SERPL-MCNC: 0.3 MG/DL — SIGNIFICANT CHANGE UP (ref 0.2–1.2)
BILIRUB UR-MCNC: NEGATIVE — SIGNIFICANT CHANGE UP
BLD GP AB SCN SERPL QL: NEGATIVE — SIGNIFICANT CHANGE UP
BUN SERPL-MCNC: 13 MG/DL — SIGNIFICANT CHANGE UP (ref 7–23)
BUN SERPL-MCNC: 14 MG/DL — SIGNIFICANT CHANGE UP (ref 7–23)
CALCIUM SERPL-MCNC: 9.7 MG/DL — SIGNIFICANT CHANGE UP (ref 8.4–10.5)
CALCIUM SERPL-MCNC: 9.9 MG/DL — SIGNIFICANT CHANGE UP (ref 8.4–10.5)
CHLORIDE SERPL-SCNC: 100 MMOL/L — SIGNIFICANT CHANGE UP (ref 96–108)
CHLORIDE SERPL-SCNC: 100 MMOL/L — SIGNIFICANT CHANGE UP (ref 96–108)
CO2 SERPL-SCNC: 26 MMOL/L — SIGNIFICANT CHANGE UP (ref 22–31)
CO2 SERPL-SCNC: 28 MMOL/L — SIGNIFICANT CHANGE UP (ref 22–31)
COLOR SPEC: YELLOW — SIGNIFICANT CHANGE UP
CREAT SERPL-MCNC: 0.75 MG/DL — SIGNIFICANT CHANGE UP (ref 0.5–1.3)
CREAT SERPL-MCNC: 0.8 MG/DL — SIGNIFICANT CHANGE UP (ref 0.5–1.3)
DIFF PNL FLD: ABNORMAL
EOSINOPHIL # BLD AUTO: 0.1 K/UL — SIGNIFICANT CHANGE UP (ref 0–0.5)
EOSINOPHIL # BLD AUTO: 0.1 K/UL — SIGNIFICANT CHANGE UP (ref 0–0.5)
EOSINOPHIL NFR BLD AUTO: 0.7 % — SIGNIFICANT CHANGE UP (ref 0–6)
EOSINOPHIL NFR BLD AUTO: 0.8 % — SIGNIFICANT CHANGE UP (ref 0–6)
EPI CELLS # UR: 20 /HPF — HIGH
GAS PNL BLDV: SIGNIFICANT CHANGE UP
GLUCOSE SERPL-MCNC: 84 MG/DL — SIGNIFICANT CHANGE UP (ref 70–99)
GLUCOSE SERPL-MCNC: 87 MG/DL — SIGNIFICANT CHANGE UP (ref 70–99)
GLUCOSE UR QL: NEGATIVE — SIGNIFICANT CHANGE UP
HCG SERPL-ACNC: <2 MIU/ML — SIGNIFICANT CHANGE UP
HCT VFR BLD CALC: 40.8 % — SIGNIFICANT CHANGE UP (ref 34.5–45)
HCT VFR BLD CALC: 42.9 % — SIGNIFICANT CHANGE UP (ref 34.5–45)
HGB BLD-MCNC: 13.5 G/DL — SIGNIFICANT CHANGE UP (ref 11.5–15.5)
HGB BLD-MCNC: 13.8 G/DL — SIGNIFICANT CHANGE UP (ref 11.5–15.5)
HYALINE CASTS # UR AUTO: 5 /LPF — HIGH (ref 0–2)
INR BLD: 0.92 RATIO — SIGNIFICANT CHANGE UP (ref 0.88–1.16)
KETONES UR-MCNC: NEGATIVE — SIGNIFICANT CHANGE UP
LEUKOCYTE ESTERASE UR-ACNC: NEGATIVE — SIGNIFICANT CHANGE UP
LYMPHOCYTES # BLD AUTO: 3.8 K/UL — HIGH (ref 1–3.3)
LYMPHOCYTES # BLD AUTO: 31.2 % — SIGNIFICANT CHANGE UP (ref 13–44)
LYMPHOCYTES # BLD AUTO: 34.7 % — SIGNIFICANT CHANGE UP (ref 13–44)
LYMPHOCYTES # BLD AUTO: 4.6 K/UL — HIGH (ref 1–3.3)
MCHC RBC-ENTMCNC: 30.5 PG — SIGNIFICANT CHANGE UP (ref 27–34)
MCHC RBC-ENTMCNC: 31.2 PG — SIGNIFICANT CHANGE UP (ref 27–34)
MCHC RBC-ENTMCNC: 32.2 GM/DL — SIGNIFICANT CHANGE UP (ref 32–36)
MCHC RBC-ENTMCNC: 33 GM/DL — SIGNIFICANT CHANGE UP (ref 32–36)
MCV RBC AUTO: 94.4 FL — SIGNIFICANT CHANGE UP (ref 80–100)
MCV RBC AUTO: 94.7 FL — SIGNIFICANT CHANGE UP (ref 80–100)
MONOCYTES # BLD AUTO: 0.7 K/UL — SIGNIFICANT CHANGE UP (ref 0–0.9)
MONOCYTES # BLD AUTO: 0.8 K/UL — SIGNIFICANT CHANGE UP (ref 0–0.9)
MONOCYTES NFR BLD AUTO: 5.4 % — SIGNIFICANT CHANGE UP (ref 2–14)
MONOCYTES NFR BLD AUTO: 6 % — SIGNIFICANT CHANGE UP (ref 2–14)
NEUTROPHILS # BLD AUTO: 7.6 K/UL — HIGH (ref 1.8–7.4)
NEUTROPHILS # BLD AUTO: 7.7 K/UL — HIGH (ref 1.8–7.4)
NEUTROPHILS NFR BLD AUTO: 58.1 % — SIGNIFICANT CHANGE UP (ref 43–77)
NEUTROPHILS NFR BLD AUTO: 62 % — SIGNIFICANT CHANGE UP (ref 43–77)
NITRITE UR-MCNC: NEGATIVE — SIGNIFICANT CHANGE UP
PH UR: 6 — SIGNIFICANT CHANGE UP (ref 5–8)
PLATELET # BLD AUTO: 342 K/UL — SIGNIFICANT CHANGE UP (ref 150–400)
PLATELET # BLD AUTO: 362 K/UL — SIGNIFICANT CHANGE UP (ref 150–400)
POTASSIUM SERPL-MCNC: 4.4 MMOL/L — SIGNIFICANT CHANGE UP (ref 3.5–5.3)
POTASSIUM SERPL-MCNC: 4.5 MMOL/L — SIGNIFICANT CHANGE UP (ref 3.5–5.3)
POTASSIUM SERPL-SCNC: 4.4 MMOL/L — SIGNIFICANT CHANGE UP (ref 3.5–5.3)
POTASSIUM SERPL-SCNC: 4.5 MMOL/L — SIGNIFICANT CHANGE UP (ref 3.5–5.3)
PROT SERPL-MCNC: 6.8 G/DL — SIGNIFICANT CHANGE UP (ref 6–8.3)
PROT SERPL-MCNC: 7.1 G/DL — SIGNIFICANT CHANGE UP (ref 6–8.3)
PROT UR-MCNC: ABNORMAL
PROTHROM AB SERPL-ACNC: 10.5 SEC — SIGNIFICANT CHANGE UP (ref 10–12.9)
RBC # BLD: 4.32 M/UL — SIGNIFICANT CHANGE UP (ref 3.8–5.2)
RBC # BLD: 4.53 M/UL — SIGNIFICANT CHANGE UP (ref 3.8–5.2)
RBC # FLD: 11.8 % — SIGNIFICANT CHANGE UP (ref 10.3–14.5)
RBC # FLD: 11.9 % — SIGNIFICANT CHANGE UP (ref 10.3–14.5)
RBC CASTS # UR COMP ASSIST: 4 /HPF — SIGNIFICANT CHANGE UP (ref 0–4)
RH IG SCN BLD-IMP: POSITIVE — SIGNIFICANT CHANGE UP
SODIUM SERPL-SCNC: 137 MMOL/L — SIGNIFICANT CHANGE UP (ref 135–145)
SODIUM SERPL-SCNC: 138 MMOL/L — SIGNIFICANT CHANGE UP (ref 135–145)
SP GR SPEC: 1.03 — HIGH (ref 1.01–1.02)
UROBILINOGEN FLD QL: ABNORMAL
WBC # BLD: 12.2 K/UL — HIGH (ref 3.8–10.5)
WBC # BLD: 13.3 K/UL — HIGH (ref 3.8–10.5)
WBC # FLD AUTO: 12.2 K/UL — HIGH (ref 3.8–10.5)
WBC # FLD AUTO: 13.3 K/UL — HIGH (ref 3.8–10.5)
WBC UR QL: 3 /HPF — SIGNIFICANT CHANGE UP (ref 0–5)

## 2019-08-11 PROCEDURE — 72148 MRI LUMBAR SPINE W/O DYE: CPT | Mod: 26

## 2019-08-11 PROCEDURE — 72131 CT LUMBAR SPINE W/O DYE: CPT | Mod: 26

## 2019-08-11 PROCEDURE — 99284 EMERGENCY DEPT VISIT MOD MDM: CPT

## 2019-08-11 PROCEDURE — 72146 MRI CHEST SPINE W/O DYE: CPT | Mod: 26

## 2019-08-11 PROCEDURE — 72141 MRI NECK SPINE W/O DYE: CPT | Mod: 26

## 2019-08-11 RX ORDER — CLONAZEPAM 1 MG
0.5 TABLET ORAL THREE TIMES A DAY
Refills: 0 | Status: DISCONTINUED | OUTPATIENT
Start: 2019-08-11 | End: 2019-08-12

## 2019-08-11 RX ORDER — LIDOCAINE 4 G/100G
1 CREAM TOPICAL ONCE
Refills: 0 | Status: COMPLETED | OUTPATIENT
Start: 2019-08-11 | End: 2019-08-11

## 2019-08-11 RX ORDER — ONDANSETRON 8 MG/1
4 TABLET, FILM COATED ORAL EVERY 6 HOURS
Refills: 0 | Status: DISCONTINUED | OUTPATIENT
Start: 2019-08-11 | End: 2019-08-12

## 2019-08-11 RX ORDER — GABAPENTIN 400 MG/1
300 CAPSULE ORAL THREE TIMES A DAY
Refills: 0 | Status: DISCONTINUED | OUTPATIENT
Start: 2019-08-11 | End: 2019-08-12

## 2019-08-11 RX ORDER — OXYCODONE HYDROCHLORIDE 5 MG/1
10 TABLET ORAL EVERY 4 HOURS
Refills: 0 | Status: DISCONTINUED | OUTPATIENT
Start: 2019-08-11 | End: 2019-08-12

## 2019-08-11 RX ORDER — SODIUM CHLORIDE 9 MG/ML
1000 INJECTION INTRAMUSCULAR; INTRAVENOUS; SUBCUTANEOUS
Refills: 0 | Status: DISCONTINUED | OUTPATIENT
Start: 2019-08-11 | End: 2019-08-12

## 2019-08-11 RX ORDER — ONDANSETRON 8 MG/1
4 TABLET, FILM COATED ORAL ONCE
Refills: 0 | Status: COMPLETED | OUTPATIENT
Start: 2019-08-11 | End: 2019-08-11

## 2019-08-11 RX ORDER — TRAZODONE HCL 50 MG
150 TABLET ORAL DAILY
Refills: 0 | Status: DISCONTINUED | OUTPATIENT
Start: 2019-08-11 | End: 2019-08-12

## 2019-08-11 RX ORDER — OXYCODONE HYDROCHLORIDE 5 MG/1
5 TABLET ORAL EVERY 4 HOURS
Refills: 0 | Status: DISCONTINUED | OUTPATIENT
Start: 2019-08-11 | End: 2019-08-12

## 2019-08-11 RX ORDER — DULOXETINE HYDROCHLORIDE 30 MG/1
60 CAPSULE, DELAYED RELEASE ORAL DAILY
Refills: 0 | Status: DISCONTINUED | OUTPATIENT
Start: 2019-08-11 | End: 2019-08-12

## 2019-08-11 RX ORDER — VENLAFAXINE HCL 75 MG
300 CAPSULE, EXT RELEASE 24 HR ORAL DAILY
Refills: 0 | Status: DISCONTINUED | OUTPATIENT
Start: 2019-08-11 | End: 2019-08-12

## 2019-08-11 RX ORDER — SENNA PLUS 8.6 MG/1
2 TABLET ORAL AT BEDTIME
Refills: 0 | Status: DISCONTINUED | OUTPATIENT
Start: 2019-08-11 | End: 2019-08-12

## 2019-08-11 RX ORDER — HYDROMORPHONE HYDROCHLORIDE 2 MG/ML
1 INJECTION INTRAMUSCULAR; INTRAVENOUS; SUBCUTANEOUS ONCE
Refills: 0 | Status: DISCONTINUED | OUTPATIENT
Start: 2019-08-11 | End: 2019-08-11

## 2019-08-11 RX ORDER — VENLAFAXINE HCL 75 MG
300 CAPSULE, EXT RELEASE 24 HR ORAL DAILY
Refills: 0 | Status: DISCONTINUED | OUTPATIENT
Start: 2019-08-11 | End: 2019-08-11

## 2019-08-11 RX ORDER — OXYCODONE AND ACETAMINOPHEN 5; 325 MG/1; MG/1
2 TABLET ORAL EVERY 4 HOURS
Refills: 0 | Status: DISCONTINUED | OUTPATIENT
Start: 2019-08-11 | End: 2019-08-11

## 2019-08-11 RX ORDER — ACETAMINOPHEN 500 MG
975 TABLET ORAL ONCE
Refills: 0 | Status: COMPLETED | OUTPATIENT
Start: 2019-08-11 | End: 2019-08-11

## 2019-08-11 RX ORDER — HYDROMORPHONE HYDROCHLORIDE 2 MG/ML
0.5 INJECTION INTRAMUSCULAR; INTRAVENOUS; SUBCUTANEOUS ONCE
Refills: 0 | Status: DISCONTINUED | OUTPATIENT
Start: 2019-08-11 | End: 2019-08-11

## 2019-08-11 RX ORDER — FLUOXETINE HCL 10 MG
40 CAPSULE ORAL DAILY
Refills: 0 | Status: DISCONTINUED | OUTPATIENT
Start: 2019-08-11 | End: 2019-08-11

## 2019-08-11 RX ORDER — LAMOTRIGINE 25 MG/1
100 TABLET, ORALLY DISINTEGRATING ORAL DAILY
Refills: 0 | Status: DISCONTINUED | OUTPATIENT
Start: 2019-08-11 | End: 2019-08-12

## 2019-08-11 RX ORDER — HYDROMORPHONE HYDROCHLORIDE 2 MG/ML
0.5 INJECTION INTRAMUSCULAR; INTRAVENOUS; SUBCUTANEOUS EVERY 6 HOURS
Refills: 0 | Status: DISCONTINUED | OUTPATIENT
Start: 2019-08-11 | End: 2019-08-12

## 2019-08-11 RX ORDER — DOCUSATE SODIUM 100 MG
100 CAPSULE ORAL THREE TIMES A DAY
Refills: 0 | Status: DISCONTINUED | OUTPATIENT
Start: 2019-08-11 | End: 2019-08-12

## 2019-08-11 RX ORDER — ACETAMINOPHEN 500 MG
650 TABLET ORAL EVERY 6 HOURS
Refills: 0 | Status: DISCONTINUED | OUTPATIENT
Start: 2019-08-11 | End: 2019-08-12

## 2019-08-11 RX ORDER — OXYCODONE AND ACETAMINOPHEN 5; 325 MG/1; MG/1
1 TABLET ORAL EVERY 4 HOURS
Refills: 0 | Status: DISCONTINUED | OUTPATIENT
Start: 2019-08-11 | End: 2019-08-11

## 2019-08-11 RX ADMIN — HYDROMORPHONE HYDROCHLORIDE 0.5 MILLIGRAM(S): 2 INJECTION INTRAMUSCULAR; INTRAVENOUS; SUBCUTANEOUS at 20:31

## 2019-08-11 RX ADMIN — HYDROMORPHONE HYDROCHLORIDE 0.5 MILLIGRAM(S): 2 INJECTION INTRAMUSCULAR; INTRAVENOUS; SUBCUTANEOUS at 16:33

## 2019-08-11 RX ADMIN — LAMOTRIGINE 100 MILLIGRAM(S): 25 TABLET, ORALLY DISINTEGRATING ORAL at 20:32

## 2019-08-11 RX ADMIN — HYDROMORPHONE HYDROCHLORIDE 0.5 MILLIGRAM(S): 2 INJECTION INTRAMUSCULAR; INTRAVENOUS; SUBCUTANEOUS at 23:03

## 2019-08-11 RX ADMIN — SODIUM CHLORIDE 30 MILLILITER(S): 9 INJECTION INTRAMUSCULAR; INTRAVENOUS; SUBCUTANEOUS at 20:30

## 2019-08-11 RX ADMIN — Medication 975 MILLIGRAM(S): at 16:33

## 2019-08-11 RX ADMIN — Medication 100 MILLIGRAM(S): at 22:39

## 2019-08-11 RX ADMIN — HYDROMORPHONE HYDROCHLORIDE 1 MILLIGRAM(S): 2 INJECTION INTRAMUSCULAR; INTRAVENOUS; SUBCUTANEOUS at 17:30

## 2019-08-11 RX ADMIN — GABAPENTIN 300 MILLIGRAM(S): 400 CAPSULE ORAL at 20:31

## 2019-08-11 RX ADMIN — LIDOCAINE 1 PATCH: 4 CREAM TOPICAL at 16:33

## 2019-08-11 RX ADMIN — Medication 0.5 MILLIGRAM(S): at 20:31

## 2019-08-11 RX ADMIN — Medication 150 MILLIGRAM(S): at 20:31

## 2019-08-11 RX ADMIN — ONDANSETRON 4 MILLIGRAM(S): 8 TABLET, FILM COATED ORAL at 17:30

## 2019-08-11 RX ADMIN — HYDROMORPHONE HYDROCHLORIDE 0.5 MILLIGRAM(S): 2 INJECTION INTRAMUSCULAR; INTRAVENOUS; SUBCUTANEOUS at 22:38

## 2019-08-11 NOTE — ED ADULT NURSE REASSESSMENT NOTE - NS ED NURSE REASSESS COMMENT FT1
Pt ambulating to bathroom with walker.  Steady gait noted.  Pt states "I have anxiety and being in the hallway does not help.  My pain is returning after the MRI".  MD made aware, pending further action.  Pending MRI read.  Safety maintained.   Will continue to monitor.

## 2019-08-11 NOTE — ED PROVIDER NOTE - PHYSICAL EXAMINATION
PHYSICAL EXAM:   General: well-appearing, appears stated age, in no acute distress  HEENT: NC/AT, PERRLA, conjunctiva WNL, airway patent  Cardiovascular: regular rate and rhythm, + S1/S2, no murmurs, rubs, gallops appreciated  Respiratory: clear to auscultation bilaterally, good aeration bilaterally, nonlabored respirations  Abdominal: soft, nontender, nondistended, no rebound, guarding or rigidity,  Back: +lower thoracic midline spinal ttp, +lumbosacral midline spinal ttp, gets worse as you progress down the spine, +b/l sacroiliac joint ttp, no rashes noted  Extremities: no LE edema b/l  Neuro: Alert and oriented x3. CN2-12 grossly intact, Strength 5/5 in upper extremities Evaluation of lower extremity strength limited 2/2 pain - however patient able to flex both lower extremities at the knee. Sensation intact to light touch in upper and lower extremities - no objective LE sensory deficits to light touch.   Skin: appropriate color, warm, dry.   -Brenda Barreto PGY-2 PHYSICAL EXAM:   General: well-appearing, appears stated age, in no acute distress  HEENT: NC/AT, PERRLA, conjunctiva WNL, airway patent  Cardiovascular: regular rate and rhythm, + S1/S2, no murmurs, rubs, gallops appreciated  Respiratory: clear to auscultation bilaterally, good aeration bilaterally, nonlabored respirations  Abdominal: soft, nontender, nondistended, no rebound, guarding or rigidity,  Back: +lower thoracic midline spinal ttp, +lumbosacral midline spinal ttp, gets worse as you progress down the spine, +b/l sacroiliac joint ttp, no rashes noted  Extremities: no LE edema b/l  Neuro: Alert and oriented x3. CN2-12 grossly intact, Strength 5/5 in upper extremities Evaluation of lower extremity strength limited 2/2 pain - however patient able to flex both lower extremities at the knee. Sensation intact to light touch in upper and lower extremities - no objective LE sensory deficits to light touch.   Skin: appropriate color, warm, dry.   -Brenda Barreto PGY-2    Rectal: performed with chaperone TAMMIE Clement: +rectal tone

## 2019-08-11 NOTE — ED ADULT NURSE REASSESSMENT NOTE - NS ED NURSE REASSESS COMMENT FT1
MD at bedside talking to patient and updating on plan of care.  Safety and comfort maintained.  Will continue to monitor.

## 2019-08-11 NOTE — ED PROVIDER NOTE - CLINICAL SUMMARY MEDICAL DECISION MAKING FREE TEXT BOX
Brenda Barreto MD PGY-2 pt Is a 40 yo F with PMH depression, bipolar, ADD s/p recent endometrial ablation on 8/5 for polyps p/w worsening b/l lower back pain radiating into L groin and anterior L thigh x 4 days, a/w R leg numbness/tingling and urinary incontinence as well as constipation (patient states she "can't push" 2/2 pain). Most concerning in cord compression. will get labs, neurosx consult, pain control, imaging, reassess.

## 2019-08-11 NOTE — H&P ADULT - NSICDXPASTSURGICALHX_GEN_ALL_CORE_FT
PAST SURGICAL HISTORY:  Bariatric surgery status x2  gastric sleeve  gastric band    Delivery by emergency caesarean section     Endometrial polyp s/p ablation    Fitting and adjustment of gastric lap band     Ganglion cyst of wrist, left     H/O bariatric surgery     S/P  section     S/P cholecystectomy     S/P cholecystectomy     Status post cholecystectomy

## 2019-08-11 NOTE — ED ADULT NURSE NOTE - OBJECTIVE STATEMENT
39 yr old female came in with l leg numbness due to herniated disk in back scheduled for surg the 26th but could not take the pain anymore. on assessment a and o x 3 lungs clear abd soft non tender no swelling in extremities no n/v/d no fevers, no loss of urine and stool.  walks in pain and says l leg feels numb

## 2019-08-11 NOTE — ED PROVIDER NOTE - ATTENDING CONTRIBUTION TO CARE
39 yof pmh depression, anxiety, bipolar, scheduled for lumber laminectomy 8/26/19 for herniated disc here with back pain 39 yof pmh depression, anxiety, bipolar, scheduled for lumber laminectomy 8/26/19 for herniated disc here with worsening back pain x 4 days. Reports 39 yof pmh depression, anxiety, bipolar, scheduled for lumber laminectomy 8/26/19 for herniated disc here with worsening back pain x 4 days. Has been dealing with back pain for 2 months a/w pain radiating to bilateral groin and numbness to left leg. For past 4 days, symptoms have been the same but worsening. Also with episode of urinary incontinence a few days ago. Denies headache, dizziness, n/v, cp, sob, abd pain, dysuria, diarrhea/constipation, weakness. Came to ED via car, able to ambulate slowly.    Vital Signs Stable  Gen: well appearing, NAD  HEENT: MMM, neck supple  Cardiac: regular rate rhythm, normal S1S2  Chest: CTA BL, no wheezes or crackles  Abdomen: normal BS, soft, non tender non distended  Extremity:   Skin: no rash  Neuro: aox3, no weakness in bilateral UE or LE, +rectal tone, SILT bilateral UE or LE. motor intact    AP - will get neurosurgery consult stat concern for cord compression. MRI. labs. pain control. admit

## 2019-08-11 NOTE — ED PROVIDER NOTE - NS ED ROS FT
REVIEW OF SYSTEMS:  General:  no fever, no chills  HEENT: no headache, no vision changes  Cardiac: no chest pain, no palpitations  Respiratory: no cough, no shortness of breath  Gastrointestinal: no abdominal pain, no nausea, no vomiting, no diarrhea, no melena, no hematochezia, +constipation  Genitourinary: +urinary incontinence   Extremities: +L back pain radiating down anterior L thigh, +R leg subjective numbness/tingling  Neuro: see above  -Brenda Barreto PGY-2

## 2019-08-11 NOTE — ED PROVIDER NOTE - PROGRESS NOTE DETAILS
neurosurg at bedside. recommending CT Lumbar non contrast and MRI lumbar with contrast. Radiology aware and reporting she will go soon for MRI neurosurg at bedside. recommending CT Lumbar non contrast and MRI lumbar without contrast. Radiology aware and reporting she will go soon for MRI Brenda Barreto MD PGY-2 pt deferring rectal exam for rectal tone until after pain meds Brenda Barreto MD PGY-2 patient is still deferring rectal  to "wait for dilaudid to kick in" Brenda Barreto MD PGY-2 pt reportedly set to go to MRI at 6 Brenda Barreto MD PGY-2 pt reportedly set to go to MRI at 6. Brenda Barreto MD PGY-2 of note, blood in urine but pt had recent endometrial ablation

## 2019-08-11 NOTE — H&P ADULT - HISTORY OF PRESENT ILLNESS
38 yo F with PMH depression, bipolar, ADD s/p recent endometrial ablation on 8/5 for polyps p/w worsening b/l lower back pain radiating into L groin and anterior L thigh x 4 days, a/w R leg numbness/tingling and urinary incontinence as well as constipation (patient states she "can't push" 2/2 pain). Has been taking Advil and Motrin without relief. Has a surgery scheduled with Dr. Kerns on 8/26 for a L2/L3 laminectomy with possible fusion for an L2/L3 extruded disc. Denies saddle anesthesia. MRi from 7/29 shows Impression: "L sided disc protrusion at L2-L3 causing severe left spinal canal and left neural foraminal stenosis"

## 2019-08-11 NOTE — H&P ADULT - ASSESSMENT
Flor Paul   39F pw b/l LBP radiating into groin in L2/3 distribution, numbness anterior LEs below knee, intermittent urinary incontinence over the course of the last 4 days. She is scheduled to have surgery with Dr. Kerns is the 26th but is now presenting to ED due to worsening pain. Exam: AAOx3, UE 5/5, LLE 4/5 pain limited, RLE 5/5  -Admit to floor  -Pre op   -MRI L spine  -CT Lspine

## 2019-08-11 NOTE — ED ADULT TRIAGE NOTE - CHIEF COMPLAINT QUOTE
lower back since June, worsening x3 weeks  urinary incontinence x1 week  scheduled for lumbar laminectomy 08/26  denies trauma

## 2019-08-11 NOTE — ED PROVIDER NOTE - PSH
Bariatric surgery status  x2  gastric sleeve  gastric band  Delivery by emergency caesarean section    Endometrial polyp  s/p ablation  Fitting and adjustment of gastric lap band    Ganglion cyst of wrist, left    H/O bariatric surgery    S/P  section    S/P cholecystectomy    S/P cholecystectomy    Status post cholecystectomy

## 2019-08-11 NOTE — H&P ADULT - NSICDXPASTMEDICALHX_GEN_ALL_CORE_FT
PAST MEDICAL HISTORY:  ADD (attention deficit disorder)     ADD (attention deficit disorder)     Anemia     Attention deficit disorder (ADD)     Bipolar affect, depressed     Bipolar mood disorder     Depression     Depression     Depression     GERD (gastroesophageal reflux disease)

## 2019-08-11 NOTE — ED PROVIDER NOTE - OBJECTIVE STATEMENT
Brenda Barreto MD PGY-2 pt Is a 40 yo F with PMH depression, bipolar, ADD s/p recent endometrial ablation on 8/5 for polyps p/w worsening b/l lower back pain radiating into L groin and anterior L thigh x 4 days, a/w R leg numbness/tingling and urinary incontinence as well as constipation (patient states she "can't push" 2/2 pain). Has been taking Advil and Motrin without relief. Has a surgery scheduled with Dr. Kerns on 8/26 for a L2/L3 laminectomy with possible fusion for an L2/L3 extruded disc. Denies saddle anesthesia. MRi from 7/29 shows Impression: "L sided disc protrusion at L2-L3 causing severe left spinal canal and left neural foraminal stenosis"

## 2019-08-12 ENCOUNTER — TRANSCRIPTION ENCOUNTER (OUTPATIENT)
Age: 39
End: 2019-08-12

## 2019-08-12 VITALS
DIASTOLIC BLOOD PRESSURE: 73 MMHG | SYSTOLIC BLOOD PRESSURE: 110 MMHG | HEART RATE: 76 BPM | OXYGEN SATURATION: 96 % | RESPIRATION RATE: 18 BRPM | TEMPERATURE: 98 F

## 2019-08-12 PROCEDURE — 99239 HOSP IP/OBS DSCHRG MGMT >30: CPT

## 2019-08-12 RX ORDER — ACETAMINOPHEN 500 MG
2 TABLET ORAL
Qty: 0 | Refills: 0 | DISCHARGE
Start: 2019-08-12

## 2019-08-12 RX ORDER — SODIUM CHLORIDE 9 MG/ML
1000 INJECTION INTRAMUSCULAR; INTRAVENOUS; SUBCUTANEOUS ONCE
Refills: 0 | Status: COMPLETED | OUTPATIENT
Start: 2019-08-12 | End: 2019-08-12

## 2019-08-12 RX ORDER — DOCUSATE SODIUM 100 MG
1 CAPSULE ORAL
Qty: 180 | Refills: 0
Start: 2019-08-12 | End: 2019-10-10

## 2019-08-12 RX ORDER — SENNA PLUS 8.6 MG/1
2 TABLET ORAL
Qty: 120 | Refills: 0
Start: 2019-08-12 | End: 2019-10-10

## 2019-08-12 RX ORDER — ACETAMINOPHEN 500 MG
1000 TABLET ORAL ONCE
Refills: 0 | Status: COMPLETED | OUTPATIENT
Start: 2019-08-12 | End: 2019-08-12

## 2019-08-12 RX ORDER — HYDROMORPHONE HYDROCHLORIDE 2 MG/ML
1 INJECTION INTRAMUSCULAR; INTRAVENOUS; SUBCUTANEOUS
Qty: 12 | Refills: 0
Start: 2019-08-12

## 2019-08-12 RX ADMIN — Medication 400 MILLIGRAM(S): at 05:03

## 2019-08-12 RX ADMIN — ONDANSETRON 4 MILLIGRAM(S): 8 TABLET, FILM COATED ORAL at 00:15

## 2019-08-12 RX ADMIN — GABAPENTIN 300 MILLIGRAM(S): 400 CAPSULE ORAL at 05:02

## 2019-08-12 RX ADMIN — DULOXETINE HYDROCHLORIDE 60 MILLIGRAM(S): 30 CAPSULE, DELAYED RELEASE ORAL at 05:03

## 2019-08-12 RX ADMIN — Medication 300 MILLIGRAM(S): at 12:54

## 2019-08-12 RX ADMIN — OXYCODONE HYDROCHLORIDE 10 MILLIGRAM(S): 5 TABLET ORAL at 06:58

## 2019-08-12 RX ADMIN — LIDOCAINE 1 PATCH: 4 CREAM TOPICAL at 04:25

## 2019-08-12 RX ADMIN — Medication 100 MILLIGRAM(S): at 05:02

## 2019-08-12 RX ADMIN — OXYCODONE HYDROCHLORIDE 10 MILLIGRAM(S): 5 TABLET ORAL at 00:45

## 2019-08-12 RX ADMIN — OXYCODONE HYDROCHLORIDE 10 MILLIGRAM(S): 5 TABLET ORAL at 11:06

## 2019-08-12 RX ADMIN — OXYCODONE HYDROCHLORIDE 10 MILLIGRAM(S): 5 TABLET ORAL at 00:15

## 2019-08-12 RX ADMIN — LAMOTRIGINE 100 MILLIGRAM(S): 25 TABLET, ORALLY DISINTEGRATING ORAL at 12:55

## 2019-08-12 RX ADMIN — SODIUM CHLORIDE 1000 MILLILITER(S): 9 INJECTION INTRAMUSCULAR; INTRAVENOUS; SUBCUTANEOUS at 05:06

## 2019-08-12 RX ADMIN — Medication 150 MILLIGRAM(S): at 12:54

## 2019-08-12 RX ADMIN — OXYCODONE HYDROCHLORIDE 10 MILLIGRAM(S): 5 TABLET ORAL at 06:28

## 2019-08-12 RX ADMIN — Medication 1000 MILLIGRAM(S): at 05:33

## 2019-08-12 RX ADMIN — OXYCODONE HYDROCHLORIDE 10 MILLIGRAM(S): 5 TABLET ORAL at 11:37

## 2019-08-12 RX ADMIN — DULOXETINE HYDROCHLORIDE 60 MILLIGRAM(S): 30 CAPSULE, DELAYED RELEASE ORAL at 12:54

## 2019-08-12 NOTE — DISCHARGE NOTE NURSING/CASE MANAGEMENT/SOCIAL WORK - NSDCDPATPORTLINK_GEN_ALL_CORE
You can access the [facility] Portal, offered by [facility], by registering with the following website: [email or web address]

## 2019-08-12 NOTE — PATIENT PROFILE ADULT - STATED REASON FOR ADMISSION
"I was scheduled to have surgery on the 26th but I can't wait because I'm in so much pain so I came into the emergency room"

## 2019-08-12 NOTE — DISCHARGE NOTE PROVIDER - HOSPITAL COURSE
40 yo F with PMH depression, bipolar, ADD s/p recent endometrial ablation on 8/5 for polyps p/w worsening b/l lower back pain radiating into L groin and anterior L thigh x 4 days, a/w R leg numbness/tingling and urinary incontinence as well as constipation (patient states she "can't push" 2/2 pain). Has been taking Advil and Motrin without relief. Has a surgery scheduled with Dr. Kerns on 8/26 for a L2/L3 laminectomy with possible fusion for an L2/L3 extruded disc. CT lumbar spine done which showed No CT evidence of lumbar spine fracture or traumatic malalignment. Multilevel degenerative changes appear unchanged from 04/21/2019. Most  significantly, L2-L3, a left-sided disc bulge causes mild central spinal  canal stenosis, left lateral recess stenosis and mild to moderate left neural foraminal narrowing. Pain controlled on PO medications. Per attending Dr. Kerns plan to discharge patient home will schedule surgery date as an outpatient. 40 yo F with PMH depression, bipolar, ADD s/p recent endometrial ablation on 8/5 for polyps p/w worsening b/l lower back pain radiating into L groin and anterior L thigh x 4 days, a/w R leg numbness/tingling and urinary incontinence as well as constipation (patient states she "can't push" 2/2 pain). Has been taking Advil and Motrin without relief. Has a surgery scheduled with Dr. Kerns on 8/26 for a L2/L3 laminectomy with possible fusion for an L2/L3 extruded disc. Patient was admitted on 8/11. Imaging was done CT lumbar spine done which showed No CT evidence of lumbar spine fracture or traumatic malalignment. Multilevel degenerative changes appear unchanged from 04/21/2019. Most  significantly, L2-L3, a left-sided disc bulge causes mild central spinal  canal stenosis, left lateral recess stenosis and mild to moderate left neural foraminal narrowing. Pain controlled on PO medications. Per attending Dr. Kerns plan to discharge patient home will schedule surgery date as an outpatient.

## 2019-08-12 NOTE — DISCHARGE NOTE PROVIDER - CARE PROVIDER_API CALL
Frederick Kerns (DO)  Neurological Surgery  [address] [place] [address], [address]  [place] [address]  Phone: [phone]  Fax: [phone]  Follow Up Time:

## 2019-08-12 NOTE — CHART NOTE - NSCHARTNOTEFT_GEN_A_CORE
CAPRINI SCORE [CLOT] Score on Admission for     AGE RELATED RISK FACTORS                                                       MOBILITY RELATED FACTORS  [ ] Age 41-60 years                                            (1 Point)                  [ ] Bed rest                                                        (1 Point)  [ ] Age: 61-74 years                                           (2 Points)                 [ ] Plaster cast                                                   (2 Points)  [ ] Age= 75 years                                              (3 Points)                 [ ] Bed bound for more than 72 hours                 (2 Points)    DISEASE RELATED RISK FACTORS                                               GENDER SPECIFIC FACTORS  [ ] Edema in the lower extremities                       (1 Point)                  [ ] Pregnancy                                                     (1 Point)  [ ] Varicose veins                                               (1 Point)                  [ ] Post-partum < 6 weeks                                   (1 Point)             [x ] BMI > 25 Kg/m2                                            (1 Point)                  [ ] Hormonal therapy  or oral contraception          (1 Point)                 [ ] Sepsis (in the previous month)                        (1 Point)                  [ ] History of pregnancy complications                 (1 point)  [ ] Pneumonia or serious lung disease                                               [ ] Unexplained or recurrent                     (1 Point)           (in the previous month)                               (1 Point)  [ ] Abnormal pulmonary function test                     (1 Point)                 SURGERY RELATED RISK FACTORS (include planned surgeries)  [ ] Acute myocardial infarction                              (1 Point)                 [ ]  Section                                             (1 Point)  [ ] Congestive heart failure (in the previous month)  (1 Point)         [ ] Minor surgery                                                  (1 Point)   [ ] Inflammatory bowel disease                             (1 Point)                 [ ] Arthroscopic surgery                                        (2 Points)  [ ] Central venous access                                      (2 Points)                [ ] General surgery lasting more than 45 minutes   (2 Points)       [ ] Stroke (in the previous month)                          (5 Points)               [ ] Elective arthroplasty                                         (5 Points)            [ ] current or past malignancy                              (2 Points)                                                                                                       HEMATOLOGY RELATED FACTORS                                                 TRAUMA RELATED RISK FACTORS  [ ] Prior episodes of VTE                                     (3 Points)                [ ] Fracture of the hip, pelvis, or leg                       (5 Points)  [ ] Positive family history for VTE                         (3 Points)                 [ ] Acute spinal cord injury (in the previous month)  (5 Points)  [ ] Prothrombin 80811 A                                     (3 Points)                 [ ] Paralysis  (less than 1 month)                             (5 Points)  [ ] Factor V Leiden                                             (3 Points)                  [ ] Multiple Trauma within 1 month                        (5 Points)  [ ] Lupus anticoagulants                                     (3 Points)                                                           [ ] Anticardiolipin antibodies                               (3 Points)                                                       [ ] High homocysteine in the blood                      (3 Points)                                             [ ] Other congenital or acquired thrombophilia      (3 Points)                                                [ ] Heparin induced thrombocytopenia                  (3 Points)                                          Total Score [    1      ]    Risk:  Very low 0   Low 1 to 2   Moderate 3 to 4   High =5       VTE Prophylasix Recommednations:  [x ] mechanical pneumatic compression devices                                      [ ] contraindicated: _____________________  [ ] chemo prophylasix                                                                                   [x ] contraindicated ( OR PLANNING)     **** HIGH LIKELIHOOD DVT PRESENT ON ADMISSION  [ ] (please order LE dopplers within 24 hours of admission)

## 2019-08-12 NOTE — PROGRESS NOTE ADULT - SUBJECTIVE AND OBJECTIVE BOX
SUBJECTIVE: Seen and examined at bedside. Admitted overnight. This AM complains of back pain improved with pain medications. CT Lumbar spine was completed which showed no CT evidence of lumbar spine fracture or traumatic malalignment. Multilevel degenerative changes appear unchanged from 04/21/2019. Most  significantly, L2-L3, a left-sided disc bulge causes mild central spinal canal stenosis, left lateral recess stenosis and mild to moderate left   neural foraminal narrowing. Per attending Dr. Kerns plan to discharge patient home will schedule surgery date as an outpatient.     Vital Signs Last 24 Hrs  T(C): 36.6 (08-12-19 @ 09:18), Max: 36.7 (08-11-19 @ 14:07)  T(F): 97.8 (08-12-19 @ 09:18), Max: 98.1 (08-11-19 @ 14:07)  HR: 81 (08-12-19 @ 09:18) (66 - 81)  BP: 107/71 (08-12-19 @ 09:18) (89/57 - 114/83)  BP(mean): --  RR: 18 (08-12-19 @ 09:18) (18 - 20)  SpO2: 98% (08-12-19 @ 09:18) (93% - 100%)    PHYSICAL EXAM:    Constitutional: laying in bed    Neurological: Awake, alert, oriented to person, place and time, speech clear and fluent, face equal, tongue midline, briskly following commands, no drift, strength b/l UE 5/5, LLE 4/5  RLE 4/5 pain limited exam.   sensation intact to light touch throughout.     Pulmonary: Clear to Auscultation, No rales, No rhonchi, No wheezes     Cardiovascular: S1, S2, Regular rate and rhythm     Gastrointestinal: Soft, Non-tender, Non-distended, +bowel sounds x 4    Extremities: No calf tenderness bilaterally, no cyanosis, clubbing or edema          LABS:                          13.8   13.3  )-----------( 342      ( 11 Aug 2019 20:57 )             42.9    08-11    138  |  100  |  13  ----------------------------<  87  4.5   |  28  |  0.80    Ca    9.9      11 Aug 2019 20:57    TPro  7.1  /  Alb  4.3  /  TBili  0.3  /  DBili  x   /  AST  88<H>  /  ALT  102<H>  /  AlkPhos  89  08-11  PT/INR - ( 11 Aug 2019 16:16 )   PT: 10.5 sec;   INR: 0.92 ratio         PTT - ( 11 Aug 2019 16:16 )  PTT:33.8 sec    08-11 @ 07:01  -  08-12 @ 07:00  --------------------------------------------------------  IN: 400 mL / OUT: 375 mL / NET: 25 mL        IMAGING:     CT- Lumbar spine (8/11):  No CT evidence of lumbar spine fracture or traumatic malalignment. Multilevel degenerative changes appear unchanged from 04/21/2019. Most   significantly, L2-L3, a left-sided disc bulge causes mild central spinal  canal stenosis, left lateral recess stenosis and mild to moderate left neural foraminal narrowing.       MEDICATIONS:  Antibiotics:    Neuro:  acetaminophen   Tablet .. 650 milliGRAM(s) Oral every 6 hours PRN Temp greater or equal to 38C (100.4F), Mild Pain (1 - 3)  clonazePAM  Tablet 0.5 milliGRAM(s) Oral three times a day PRN pain  DULoxetine 60 milliGRAM(s) Oral daily  gabapentin 300 milliGRAM(s) Oral three times a day  HYDROmorphone  Injectable 0.5 milliGRAM(s) IV Push every 6 hours PRN breakthrough  lamoTRIgine 100 milliGRAM(s) Oral daily  ondansetron Injectable 4 milliGRAM(s) IV Push every 6 hours PRN Nausea and/or Vomiting  oxyCODONE    IR 5 milliGRAM(s) Oral every 4 hours PRN Moderate Pain (4 - 6)  oxyCODONE    IR 10 milliGRAM(s) Oral every 4 hours PRN Severe Pain (7 - 10)  traZODone Oral Tab/Cap - Peds 150 milliGRAM(s) Oral daily  venlafaxine XR. 300 milliGRAM(s) Oral daily    Cardiac:    Pulm:    GI/:  docusate sodium 100 milliGRAM(s) Oral three times a day  senna 2 Tablet(s) Oral at bedtime PRN Constipation    Other:   sodium chloride 0.9%. 1000 milliLiter(s) IV Continuous <Continuous>        DIET: Regular Diet

## 2019-08-12 NOTE — DISCHARGE NOTE PROVIDER - INSTRUCTIONS
Please resume regular diet  Please call to schedule surgery planning with Dr. Kerns upon discharge [phone]  While on pain medication. DO NOT DRIVE OR OPERATE MACHINERY.  Please take senna/ colace to prevent constipation  Return to ER immediately for any of the following: fever, bleeding, new onset numbness/tingling/weakness, nausea and/or vomiting, chest pain, shortness of breath, confusion, seizure, altered mental status, urinary and/or fecal incontinence or retention.

## 2019-08-12 NOTE — PROGRESS NOTE ADULT - ASSESSMENT
HPI: 38 yo F with PMH depression, bipolar, ADD s/p recent endometrial ablation on 8/5 for polyps p/w worsening b/l lower back pain radiating into L groin and anterior L thigh x 4 days, a/w R leg numbness/tingling and urinary incontinence as well as constipation (patient states she "can't push" 2/2 pain). Has been taking Advil and Motrin without relief. Has a surgery scheduled with Dr. Kerns on 8/26 for a L2/L3 laminectomy with possible fusion for an L2/L3 extruded disc. CT lumbar spine done which showed No CT evidence of lumbar spine fracture or traumatic malalignment. Multilevel degenerative changes appear unchanged from 04/21/2019. Most   significantly, L2-L3, a left-sided disc bulge causes mild central spinal  canal stenosis, left lateral recess stenosis and mild to moderate left neural foraminal narrowing.     PLAN:  - Con't acetaminophen, gabapentin, hydromorphone, oxycodone, trazodone for pain control  - Con't clonazepam, duloxetine, lamotrigine for mood stabilization  - Con't GI ppx senna/colace. Regular Diet   - Con't DVT ppx. Venodynes, OOB to chair with assistance    - Per attending Dr. Kerns plan to discharge patient home will schedule surgery date as an outpatient.     Case and plan discuss with attending Dr. Kerns  [id number]    Assessment:  Please Check When Present   [] Acute blood loss anemia  []Cerebral edema, [] Brain compression/ herniation,     []  GCS  E   V  M     Heart Failure: []Acute, [] acute on chronic , []chronic  Heart Failure:  [] Diastolic (HFpEF), [] Systolic (HFrEF), []Combined (HFpEF and HFrEF), [] RHF, [] Pulm HTN, [] Other    [] ARMANDO, [] ATN, [] AIN, [] other  [] CKD1, [] CKD2, [] CKD 3, [] CKD 4, [] CKD 5, []ESRD    Encephalopathy: [] Metabolic, [] Hepatic, [] toxic, [] Neurological, [] Other    Abnormal Nurtitional Status: [] malnurtition (see nutrition note), [ ]underweight: BMI < 19, [] morbid obesity: BMI >40, [] Cachexia  [] Functional quadriplegia  [] Sepsis  [] hypovolemic shock,[] cardiogenic shock, [] hemorrhagic shock, [] neuogenic shock  [] Acute Respiratory Failure

## 2019-08-12 NOTE — DISCHARGE NOTE PROVIDER - NSDCCPCAREPLAN_GEN_ALL_CORE_FT
PRINCIPAL DISCHARGE DIAGNOSIS  Diagnosis: Back pain  Assessment and Plan of Treatment: plan for surgery

## 2019-08-20 DIAGNOSIS — Z71.89 OTHER SPECIFIED COUNSELING: ICD-10-CM

## 2019-08-21 ENCOUNTER — OUTPATIENT (OUTPATIENT)
Dept: OUTPATIENT SERVICES | Facility: HOSPITAL | Age: 39
LOS: 1 days | End: 2019-08-21
Payer: MEDICAID

## 2019-08-21 VITALS
RESPIRATION RATE: 14 BRPM | SYSTOLIC BLOOD PRESSURE: 110 MMHG | TEMPERATURE: 99 F | DIASTOLIC BLOOD PRESSURE: 77 MMHG | HEIGHT: 63 IN | WEIGHT: 210.1 LBS | HEART RATE: 84 BPM | OXYGEN SATURATION: 96 %

## 2019-08-21 DIAGNOSIS — Z01.818 ENCOUNTER FOR OTHER PREPROCEDURAL EXAMINATION: ICD-10-CM

## 2019-08-21 DIAGNOSIS — Z98.84 BARIATRIC SURGERY STATUS: Chronic | ICD-10-CM

## 2019-08-21 DIAGNOSIS — M54.5 LOW BACK PAIN: ICD-10-CM

## 2019-08-21 DIAGNOSIS — Z90.49 ACQUIRED ABSENCE OF OTHER SPECIFIED PARTS OF DIGESTIVE TRACT: Chronic | ICD-10-CM

## 2019-08-21 DIAGNOSIS — M67.432 GANGLION, LEFT WRIST: Chronic | ICD-10-CM

## 2019-08-21 DIAGNOSIS — N84.0 POLYP OF CORPUS UTERI: Chronic | ICD-10-CM

## 2019-08-21 DIAGNOSIS — Z29.9 ENCOUNTER FOR PROPHYLACTIC MEASURES, UNSPECIFIED: ICD-10-CM

## 2019-08-21 DIAGNOSIS — Z46.51 ENCOUNTER FOR FITTING AND ADJUSTMENT OF GASTRIC LAP BAND: Chronic | ICD-10-CM

## 2019-08-21 DIAGNOSIS — Z98.89 OTHER SPECIFIED POSTPROCEDURAL STATES: Chronic | ICD-10-CM

## 2019-08-21 DIAGNOSIS — M51.9 UNSPECIFIED THORACIC, THORACOLUMBAR AND LUMBOSACRAL INTERVERTEBRAL DISC DISORDER: ICD-10-CM

## 2019-08-21 LAB
HCT VFR BLD CALC: 40.3 % — SIGNIFICANT CHANGE UP (ref 34.5–45)
HGB BLD-MCNC: 13.2 G/DL — SIGNIFICANT CHANGE UP (ref 11.5–15.5)
MCHC RBC-ENTMCNC: 30.6 PG — SIGNIFICANT CHANGE UP (ref 27–34)
MCHC RBC-ENTMCNC: 32.8 GM/DL — SIGNIFICANT CHANGE UP (ref 32–36)
MCV RBC AUTO: 93.5 FL — SIGNIFICANT CHANGE UP (ref 80–100)
PLATELET # BLD AUTO: 375 K/UL — SIGNIFICANT CHANGE UP (ref 150–400)
RBC # BLD: 4.31 M/UL — SIGNIFICANT CHANGE UP (ref 3.8–5.2)
RBC # FLD: 12.3 % — SIGNIFICANT CHANGE UP (ref 10.3–14.5)
WBC # BLD: 8.34 K/UL — SIGNIFICANT CHANGE UP (ref 3.8–10.5)
WBC # FLD AUTO: 8.34 K/UL — SIGNIFICANT CHANGE UP (ref 3.8–10.5)

## 2019-08-21 PROCEDURE — 87640 STAPH A DNA AMP PROBE: CPT

## 2019-08-21 PROCEDURE — G0463: CPT

## 2019-08-21 PROCEDURE — 85027 COMPLETE CBC AUTOMATED: CPT

## 2019-08-21 PROCEDURE — 87641 MR-STAPH DNA AMP PROBE: CPT

## 2019-08-21 RX ORDER — CEFAZOLIN SODIUM 1 G
2000 VIAL (EA) INJECTION ONCE
Refills: 0 | Status: DISCONTINUED | OUTPATIENT
Start: 2019-08-26 | End: 2019-08-27

## 2019-08-21 RX ORDER — LAMOTRIGINE 25 MG/1
1 TABLET, ORALLY DISINTEGRATING ORAL
Qty: 0 | Refills: 0 | DISCHARGE

## 2019-08-21 RX ORDER — LISDEXAMFETAMINE DIMESYLATE 70 MG/1
1 CAPSULE ORAL
Qty: 0 | Refills: 0 | DISCHARGE

## 2019-08-21 NOTE — H&P PST ADULT - NSICDXPROBLEM_GEN_ALL_CORE_FT
PROBLEM DIAGNOSES  Problem: Chronic lumbosacral pain  Assessment and Plan: L2-3 lumbar laminectomy  possible L2-3 instrumentation and fusion    Problem: Need for prophylactic measure  Assessment and Plan: The Caprini score indicates this patient is at risk for a VTE event (score 3-5).  Most surgical patients in this group would benefit from pharmacologic prophylaxis.  The surgical team will determine the balance between VTE risk and bleeding risk

## 2019-08-21 NOTE — H&P PST ADULT - PSY GEN HX ROS MEA POS PC
anxiety/bipolar, depression well mananged, in the past few years, the only hospitalization for mood disorder was in 12/2018 which was a voluntary admission due to "medication adjustment"/depression depression/anxiety/visual hallucinations/bipolar, depression well mananged, sees psychiatrist monthly, in the past few years, the only hospitalization for mood disorder was in 12/2018 which was a voluntary admission due to "medication adjustment"

## 2019-08-21 NOTE — H&P PST ADULT - ACTIVITY
prior to onset of severe low back pain, pt able to jog, walk up stairs, dance, currently activity level limited by backpain

## 2019-08-21 NOTE — H&P PST ADULT - ASSESSMENT

## 2019-08-21 NOTE — H&P PST ADULT - NSICDXPASTSURGICALHX_GEN_ALL_CORE_FT
PAST SURGICAL HISTORY:  Bariatric surgery status gastric sleeve  gastric band  removal of gastric band due displacement 2017    Delivery by emergency caesarean section     Endometrial polyp s/p ablation    Fitting and adjustment of gastric lap band     Ganglion cyst of wrist, left     H/O bariatric surgery     S/P  section     S/P cholecystectomy     S/P cholecystectomy     Status post cholecystectomy PAST SURGICAL HISTORY:  Bariatric surgery status s/p gastric sleeve, then s/p insertion of gastric band which was   removed in  secondary to displacement    Delivery by emergency caesarean section     Endometrial polyp s/p ablation 2019    Ganglion cyst of wrist, left     S/P  section     S/P cholecystectomy

## 2019-08-21 NOTE — H&P PST ADULT - HISTORY OF PRESENT ILLNESS
40 yo F with PMH depression, bipolar, ADD s/p recent endometrial ablation on 8/5 for polyps p/w worsening b/l lower back pain radiating into L groin and anterior L thigh x 4 days, a/w R leg numbness/tingling and urinary incontinence as well as constipation (patient states she "can't push" 2/2 pain). Has been taking Advil and Motrin without relief. Has a surgery scheduled with Dr. Kerns on 8/26 for a L2/L3 laminectomy with possible fusion for an L2/L3 extruded disc. Denies saddle anesthesia. MRi from 7/29 shows Impression: "L sided disc protrusion at L2-L3 causing severe left spinal canal and left neural foraminal stenosis"    40 yo F with PMH depression, bipolar, ADD s/p recent endometrial ablation on  8/5 for polyps p/w worsening b/l lower back pain radiating into L groin and  anterior L thigh x 4 days, a/w R leg numbness/tingling and urinary incontinence  as well as constipation (patient states she "can't push" 2/2 pain). Has been  taking Advil and Motrin without relief. Has a surgery scheduled with Dr. Kerns  on 8/26 for a L2/L3 laminectomy with possible fusion for an L2/L3 extruded  disc. Patient was admitted on 8/11. Imaging was done CT lumbar spine done which  showed No CT evidence of lumbar spine fracture or traumatic malalignment.    RUPALI CRUZ is a 39 year old female being seen for a new patient visit, Lumbar back pain. Patient accompanied by Self.       Multilevel degenerative changes appear unchanged from 04/21/2019. Most  significantly, L2-L3, a left-sided disc bulge causes mild central spinal canal  stenosis, left lateral recess stenosis and mild to moderate left neural  foraminal narrowing. Pain controlled on PO medications. Per attending Dr. Kerns plan to discharge patient home will schedule surgery date as an  outpatient. 38 yo F with PMH depression, bipolar, ADD. Hospitalized at [facility] 8/11-12 with c/o worsening b/l lower back pain radiating to BLE, L>R, pain started around May but got a lot worse, pain is constant, severe, "annoying, hurting, painful",9.5/10, associated with right leg numbness/tingling and urinary incontinence as well as constipation (patient states she "can't push" 2/2 pain). MRI revealedL sided disc protrusion at L2-L3 causing severe left spinal canal and left neural foraminal stenosis. now presents to PST scheduled for lumbar laminectomy and fusion. 38 yo F with PMH depression, bipolar, ADD. Hospitalized at [facility] 8/11-12 with c/o worsening b/l lower back pain radiating to BLE, L>R, pain started around May but got a lot worse in the past few weeks, pain is constant, severe, "annoying, hurting, painful",9.5/10, associated with right leg numbness/tingling and urinary incontinence as well as constipation (patient states she "can't push" 2/2 pain). MRI revealedL sided disc protrusion at L2-L3 causing severe left spinal canal and left neural foraminal stenosis. now presents to PST scheduled for lumbar laminectomy and fusion.

## 2019-08-21 NOTE — H&P PST ADULT - VENOUS THROMBOEMBOLISM CURRENT STATUS
(0) indicator not present (1) other risk factor (includes escalating BMI, pack-years of smoking, diabetes requiring insulin, chemotherapy, female gender and length of surgery)

## 2019-08-21 NOTE — H&P PST ADULT - NSICDXPASTMEDICALHX_GEN_ALL_CORE_FT
PAST MEDICAL HISTORY:  ADD (attention deficit disorder)     ADD (attention deficit disorder)     Anemia     Attention deficit disorder (ADD)     Bipolar affect, depressed     Bipolar mood disorder     Depression     Depression     Depression     GERD (gastroesophageal reflux disease) PAST MEDICAL HISTORY:  ADD (attention deficit disorder)     ADD (attention deficit disorder)     Anemia     Bipolar mood disorder     Depression     Depression     Depression     GERD (gastroesophageal reflux disease)

## 2019-08-21 NOTE — H&P PST ADULT - NSANTHOSAYNRD_GEN_A_CORE
No. FARHEEN screening performed.  STOP BANG Legend: 0-2 = LOW Risk; 3-4 = INTERMEDIATE Risk; 5-8 = HIGH Risk No. FARHEEN screening performed.  STOP BANG Legend: 0-2 = LOW Risk; 3-4 = INTERMEDIATE Risk; 5-8 = HIGH Risk/h/o FARHEEN prior to bariatric surgery.  post bariatric surgery, pt stated she had another sleep study and was tested negative

## 2019-08-22 LAB
MRSA PCR RESULT.: SIGNIFICANT CHANGE UP
S AUREUS DNA NOSE QL NAA+PROBE: SIGNIFICANT CHANGE UP

## 2019-08-25 ENCOUNTER — TRANSCRIPTION ENCOUNTER (OUTPATIENT)
Age: 39
End: 2019-08-25

## 2019-08-26 ENCOUNTER — APPOINTMENT (OUTPATIENT)
Dept: SPINE | Facility: HOSPITAL | Age: 39
End: 2019-08-26
Payer: MEDICAID

## 2019-08-26 ENCOUNTER — INPATIENT (INPATIENT)
Facility: HOSPITAL | Age: 39
LOS: 0 days | Discharge: HOME | DRG: 460 | End: 2019-08-27
Attending: NEUROLOGICAL SURGERY | Admitting: NEUROLOGICAL SURGERY
Payer: MEDICAID

## 2019-08-26 VITALS
SYSTOLIC BLOOD PRESSURE: 123 MMHG | HEART RATE: 82 BPM | HEIGHT: 63 IN | WEIGHT: 210.1 LBS | RESPIRATION RATE: 20 BRPM | DIASTOLIC BLOOD PRESSURE: 85 MMHG | OXYGEN SATURATION: 98 % | TEMPERATURE: 98 F

## 2019-08-26 DIAGNOSIS — Z90.49 ACQUIRED ABSENCE OF OTHER SPECIFIED PARTS OF DIGESTIVE TRACT: Chronic | ICD-10-CM

## 2019-08-26 DIAGNOSIS — Z98.84 BARIATRIC SURGERY STATUS: Chronic | ICD-10-CM

## 2019-08-26 DIAGNOSIS — M67.432 GANGLION, LEFT WRIST: Chronic | ICD-10-CM

## 2019-08-26 DIAGNOSIS — M51.9 UNSPECIFIED THORACIC, THORACOLUMBAR AND LUMBOSACRAL INTERVERTEBRAL DISC DISORDER: ICD-10-CM

## 2019-08-26 DIAGNOSIS — N84.0 POLYP OF CORPUS UTERI: Chronic | ICD-10-CM

## 2019-08-26 DIAGNOSIS — Z98.89 OTHER SPECIFIED POSTPROCEDURAL STATES: Chronic | ICD-10-CM

## 2019-08-26 LAB
BLD GP AB SCN SERPL QL: NEGATIVE — SIGNIFICANT CHANGE UP
HCG UR QL: NEGATIVE — SIGNIFICANT CHANGE UP
RH IG SCN BLD-IMP: POSITIVE — SIGNIFICANT CHANGE UP

## 2019-08-26 PROCEDURE — 22612 ARTHRD PST TQ 1NTRSPC LUMBAR: CPT

## 2019-08-26 PROCEDURE — 63047 LAM FACETEC & FORAMOT LUMBAR: CPT

## 2019-08-26 RX ORDER — LIDOCAINE HCL 20 MG/ML
0.2 VIAL (ML) INJECTION ONCE
Refills: 0 | Status: DISCONTINUED | OUTPATIENT
Start: 2019-08-26 | End: 2019-08-26

## 2019-08-26 RX ORDER — CHLORHEXIDINE GLUCONATE 213 G/1000ML
1 SOLUTION TOPICAL DAILY
Refills: 0 | Status: DISCONTINUED | OUTPATIENT
Start: 2019-08-26 | End: 2019-08-26

## 2019-08-26 RX ORDER — CLONAZEPAM 1 MG
0.5 TABLET ORAL THREE TIMES A DAY
Refills: 0 | Status: DISCONTINUED | OUTPATIENT
Start: 2019-08-26 | End: 2019-08-27

## 2019-08-26 RX ORDER — FLUOXETINE HCL 10 MG
40 CAPSULE ORAL DAILY
Refills: 0 | Status: DISCONTINUED | OUTPATIENT
Start: 2019-08-26 | End: 2019-08-27

## 2019-08-26 RX ORDER — DULOXETINE HYDROCHLORIDE 30 MG/1
90 CAPSULE, DELAYED RELEASE ORAL DAILY
Refills: 0 | Status: DISCONTINUED | OUTPATIENT
Start: 2019-08-26 | End: 2019-08-27

## 2019-08-26 RX ORDER — ENOXAPARIN SODIUM 100 MG/ML
40 INJECTION SUBCUTANEOUS AT BEDTIME
Refills: 0 | Status: DISCONTINUED | OUTPATIENT
Start: 2019-08-27 | End: 2019-08-27

## 2019-08-26 RX ORDER — TRAZODONE HCL 50 MG
150 TABLET ORAL AT BEDTIME
Refills: 0 | Status: DISCONTINUED | OUTPATIENT
Start: 2019-08-26 | End: 2019-08-27

## 2019-08-26 RX ORDER — ASCORBIC ACID 60 MG
500 TABLET,CHEWABLE ORAL
Refills: 0 | Status: DISCONTINUED | OUTPATIENT
Start: 2019-08-26 | End: 2019-08-27

## 2019-08-26 RX ORDER — HYDROMORPHONE HYDROCHLORIDE 2 MG/ML
30 INJECTION INTRAMUSCULAR; INTRAVENOUS; SUBCUTANEOUS
Refills: 0 | Status: DISCONTINUED | OUTPATIENT
Start: 2019-08-26 | End: 2019-08-26

## 2019-08-26 RX ORDER — GABAPENTIN 400 MG/1
600 CAPSULE ORAL AT BEDTIME
Refills: 0 | Status: DISCONTINUED | OUTPATIENT
Start: 2019-08-26 | End: 2019-08-27

## 2019-08-26 RX ORDER — TRAZODONE HCL 50 MG
150 TABLET ORAL AT BEDTIME
Refills: 0 | Status: DISCONTINUED | OUTPATIENT
Start: 2019-08-26 | End: 2019-08-26

## 2019-08-26 RX ORDER — CEFAZOLIN SODIUM 1 G
2000 VIAL (EA) INJECTION EVERY 8 HOURS
Refills: 0 | Status: COMPLETED | OUTPATIENT
Start: 2019-08-26 | End: 2019-08-27

## 2019-08-26 RX ORDER — VENLAFAXINE HCL 75 MG
300 CAPSULE, EXT RELEASE 24 HR ORAL DAILY
Refills: 0 | Status: DISCONTINUED | OUTPATIENT
Start: 2019-08-26 | End: 2019-08-26

## 2019-08-26 RX ORDER — SODIUM CHLORIDE 9 MG/ML
3 INJECTION INTRAMUSCULAR; INTRAVENOUS; SUBCUTANEOUS EVERY 8 HOURS
Refills: 0 | Status: DISCONTINUED | OUTPATIENT
Start: 2019-08-26 | End: 2019-08-27

## 2019-08-26 RX ORDER — LAMOTRIGINE 25 MG/1
100 TABLET, ORALLY DISINTEGRATING ORAL AT BEDTIME
Refills: 0 | Status: DISCONTINUED | OUTPATIENT
Start: 2019-08-26 | End: 2019-08-26

## 2019-08-26 RX ORDER — FLUOXETINE HCL 10 MG
40 CAPSULE ORAL DAILY
Refills: 0 | Status: DISCONTINUED | OUTPATIENT
Start: 2019-08-26 | End: 2019-08-26

## 2019-08-26 RX ORDER — DIPHENHYDRAMINE HCL 50 MG
12.5 CAPSULE ORAL EVERY 4 HOURS
Refills: 0 | Status: DISCONTINUED | OUTPATIENT
Start: 2019-08-26 | End: 2019-08-26

## 2019-08-26 RX ORDER — HYDROMORPHONE HYDROCHLORIDE 2 MG/ML
1 INJECTION INTRAMUSCULAR; INTRAVENOUS; SUBCUTANEOUS EVERY 4 HOURS
Refills: 0 | Status: DISCONTINUED | OUTPATIENT
Start: 2019-08-26 | End: 2019-08-27

## 2019-08-26 RX ORDER — ACETAMINOPHEN 500 MG
650 TABLET ORAL EVERY 6 HOURS
Refills: 0 | Status: DISCONTINUED | OUTPATIENT
Start: 2019-08-26 | End: 2019-08-27

## 2019-08-26 RX ORDER — LIDOCAINE HCL 20 MG/ML
0.2 VIAL (ML) INJECTION ONCE
Refills: 0 | Status: DISCONTINUED | OUTPATIENT
Start: 2019-08-26 | End: 2019-08-27

## 2019-08-26 RX ORDER — ONDANSETRON 8 MG/1
4 TABLET, FILM COATED ORAL ONCE
Refills: 0 | Status: COMPLETED | OUTPATIENT
Start: 2019-08-26 | End: 2019-08-26

## 2019-08-26 RX ORDER — HYDROMORPHONE HYDROCHLORIDE 2 MG/ML
1 INJECTION INTRAMUSCULAR; INTRAVENOUS; SUBCUTANEOUS EVERY 4 HOURS
Refills: 0 | Status: DISCONTINUED | OUTPATIENT
Start: 2019-08-26 | End: 2019-08-26

## 2019-08-26 RX ORDER — ONDANSETRON 8 MG/1
4 TABLET, FILM COATED ORAL EVERY 6 HOURS
Refills: 0 | Status: DISCONTINUED | OUTPATIENT
Start: 2019-08-26 | End: 2019-08-26

## 2019-08-26 RX ORDER — GABAPENTIN 400 MG/1
300 CAPSULE ORAL THREE TIMES A DAY
Refills: 0 | Status: DISCONTINUED | OUTPATIENT
Start: 2019-08-26 | End: 2019-08-26

## 2019-08-26 RX ORDER — LAMOTRIGINE 25 MG/1
100 TABLET, ORALLY DISINTEGRATING ORAL AT BEDTIME
Refills: 0 | Status: DISCONTINUED | OUTPATIENT
Start: 2019-08-26 | End: 2019-08-27

## 2019-08-26 RX ORDER — CLONAZEPAM 1 MG
0.5 TABLET ORAL THREE TIMES A DAY
Refills: 0 | Status: DISCONTINUED | OUTPATIENT
Start: 2019-08-26 | End: 2019-08-26

## 2019-08-26 RX ORDER — FOLIC ACID 0.8 MG
1 TABLET ORAL DAILY
Refills: 0 | Status: DISCONTINUED | OUTPATIENT
Start: 2019-08-26 | End: 2019-08-27

## 2019-08-26 RX ORDER — ACETAMINOPHEN 500 MG
1000 TABLET ORAL ONCE
Refills: 0 | Status: COMPLETED | OUTPATIENT
Start: 2019-08-26 | End: 2019-08-26

## 2019-08-26 RX ORDER — DEXTROSE MONOHYDRATE, SODIUM CHLORIDE, AND POTASSIUM CHLORIDE 50; .745; 4.5 G/1000ML; G/1000ML; G/1000ML
1000 INJECTION, SOLUTION INTRAVENOUS
Refills: 0 | Status: DISCONTINUED | OUTPATIENT
Start: 2019-08-26 | End: 2019-08-27

## 2019-08-26 RX ORDER — SENNA PLUS 8.6 MG/1
2 TABLET ORAL AT BEDTIME
Refills: 0 | Status: DISCONTINUED | OUTPATIENT
Start: 2019-08-26 | End: 2019-08-27

## 2019-08-26 RX ORDER — NALOXONE HYDROCHLORIDE 4 MG/.1ML
0.1 SPRAY NASAL
Refills: 0 | Status: DISCONTINUED | OUTPATIENT
Start: 2019-08-26 | End: 2019-08-26

## 2019-08-26 RX ORDER — SODIUM CHLORIDE 9 MG/ML
3 INJECTION INTRAMUSCULAR; INTRAVENOUS; SUBCUTANEOUS EVERY 8 HOURS
Refills: 0 | Status: DISCONTINUED | OUTPATIENT
Start: 2019-08-26 | End: 2019-08-26

## 2019-08-26 RX ORDER — DOCUSATE SODIUM 100 MG
100 CAPSULE ORAL THREE TIMES A DAY
Refills: 0 | Status: DISCONTINUED | OUTPATIENT
Start: 2019-08-26 | End: 2019-08-27

## 2019-08-26 RX ORDER — OXYCODONE HYDROCHLORIDE 5 MG/1
10 TABLET ORAL EVERY 6 HOURS
Refills: 0 | Status: DISCONTINUED | OUTPATIENT
Start: 2019-08-26 | End: 2019-08-26

## 2019-08-26 RX ORDER — OXYCODONE HYDROCHLORIDE 5 MG/1
10 TABLET ORAL EVERY 4 HOURS
Refills: 0 | Status: DISCONTINUED | OUTPATIENT
Start: 2019-08-26 | End: 2019-08-27

## 2019-08-26 RX ORDER — DULOXETINE HYDROCHLORIDE 30 MG/1
90 CAPSULE, DELAYED RELEASE ORAL DAILY
Refills: 0 | Status: DISCONTINUED | OUTPATIENT
Start: 2019-08-26 | End: 2019-08-26

## 2019-08-26 RX ORDER — VENLAFAXINE HCL 75 MG
150 CAPSULE, EXT RELEASE 24 HR ORAL DAILY
Refills: 0 | Status: DISCONTINUED | OUTPATIENT
Start: 2019-08-26 | End: 2019-08-27

## 2019-08-26 RX ORDER — HYDROMORPHONE HYDROCHLORIDE 2 MG/ML
0.5 INJECTION INTRAMUSCULAR; INTRAVENOUS; SUBCUTANEOUS
Refills: 0 | Status: DISCONTINUED | OUTPATIENT
Start: 2019-08-26 | End: 2019-08-26

## 2019-08-26 RX ADMIN — OXYCODONE HYDROCHLORIDE 10 MILLIGRAM(S): 5 TABLET ORAL at 21:31

## 2019-08-26 RX ADMIN — Medication 650 MILLIGRAM(S): at 22:00

## 2019-08-26 RX ADMIN — Medication 1000 MILLIGRAM(S): at 14:23

## 2019-08-26 RX ADMIN — Medication 100 MILLIGRAM(S): at 16:21

## 2019-08-26 RX ADMIN — Medication 400 MILLIGRAM(S): at 13:47

## 2019-08-26 RX ADMIN — Medication 650 MILLIGRAM(S): at 21:31

## 2019-08-26 RX ADMIN — LAMOTRIGINE 100 MILLIGRAM(S): 25 TABLET, ORALLY DISINTEGRATING ORAL at 21:37

## 2019-08-26 RX ADMIN — HYDROMORPHONE HYDROCHLORIDE 0.5 MILLIGRAM(S): 2 INJECTION INTRAMUSCULAR; INTRAVENOUS; SUBCUTANEOUS at 11:30

## 2019-08-26 RX ADMIN — OXYCODONE HYDROCHLORIDE 10 MILLIGRAM(S): 5 TABLET ORAL at 13:00

## 2019-08-26 RX ADMIN — SODIUM CHLORIDE 3 MILLILITER(S): 9 INJECTION INTRAMUSCULAR; INTRAVENOUS; SUBCUTANEOUS at 06:55

## 2019-08-26 RX ADMIN — OXYCODONE HYDROCHLORIDE 10 MILLIGRAM(S): 5 TABLET ORAL at 22:00

## 2019-08-26 RX ADMIN — HYDROMORPHONE HYDROCHLORIDE 1 MILLIGRAM(S): 2 INJECTION INTRAMUSCULAR; INTRAVENOUS; SUBCUTANEOUS at 19:52

## 2019-08-26 RX ADMIN — Medication 0.5 MILLIGRAM(S): at 11:15

## 2019-08-26 RX ADMIN — SODIUM CHLORIDE 3 MILLILITER(S): 9 INJECTION INTRAMUSCULAR; INTRAVENOUS; SUBCUTANEOUS at 21:38

## 2019-08-26 RX ADMIN — HYDROMORPHONE HYDROCHLORIDE 0.5 MILLIGRAM(S): 2 INJECTION INTRAMUSCULAR; INTRAVENOUS; SUBCUTANEOUS at 11:52

## 2019-08-26 RX ADMIN — Medication 100 MILLIGRAM(S): at 21:33

## 2019-08-26 RX ADMIN — OXYCODONE HYDROCHLORIDE 10 MILLIGRAM(S): 5 TABLET ORAL at 16:21

## 2019-08-26 RX ADMIN — HYDROMORPHONE HYDROCHLORIDE 1 MILLIGRAM(S): 2 INJECTION INTRAMUSCULAR; INTRAVENOUS; SUBCUTANEOUS at 19:37

## 2019-08-26 RX ADMIN — HYDROMORPHONE HYDROCHLORIDE 1 MILLIGRAM(S): 2 INJECTION INTRAMUSCULAR; INTRAVENOUS; SUBCUTANEOUS at 15:35

## 2019-08-26 RX ADMIN — HYDROMORPHONE HYDROCHLORIDE 1 MILLIGRAM(S): 2 INJECTION INTRAMUSCULAR; INTRAVENOUS; SUBCUTANEOUS at 11:30

## 2019-08-26 RX ADMIN — HYDROMORPHONE HYDROCHLORIDE 1 MILLIGRAM(S): 2 INJECTION INTRAMUSCULAR; INTRAVENOUS; SUBCUTANEOUS at 11:45

## 2019-08-26 RX ADMIN — OXYCODONE HYDROCHLORIDE 10 MILLIGRAM(S): 5 TABLET ORAL at 16:51

## 2019-08-26 RX ADMIN — GABAPENTIN 600 MILLIGRAM(S): 400 CAPSULE ORAL at 21:37

## 2019-08-26 RX ADMIN — DEXTROSE MONOHYDRATE, SODIUM CHLORIDE, AND POTASSIUM CHLORIDE 75 MILLILITER(S): 50; .745; 4.5 INJECTION, SOLUTION INTRAVENOUS at 11:23

## 2019-08-26 RX ADMIN — HYDROMORPHONE HYDROCHLORIDE 1 MILLIGRAM(S): 2 INJECTION INTRAMUSCULAR; INTRAVENOUS; SUBCUTANEOUS at 23:43

## 2019-08-26 RX ADMIN — HYDROMORPHONE HYDROCHLORIDE 0.5 MILLIGRAM(S): 2 INJECTION INTRAMUSCULAR; INTRAVENOUS; SUBCUTANEOUS at 12:00

## 2019-08-26 RX ADMIN — HYDROMORPHONE HYDROCHLORIDE 1 MILLIGRAM(S): 2 INJECTION INTRAMUSCULAR; INTRAVENOUS; SUBCUTANEOUS at 15:20

## 2019-08-26 RX ADMIN — OXYCODONE HYDROCHLORIDE 10 MILLIGRAM(S): 5 TABLET ORAL at 12:08

## 2019-08-26 RX ADMIN — HYDROMORPHONE HYDROCHLORIDE 0.5 MILLIGRAM(S): 2 INJECTION INTRAMUSCULAR; INTRAVENOUS; SUBCUTANEOUS at 11:15

## 2019-08-26 RX ADMIN — ONDANSETRON 4 MILLIGRAM(S): 8 TABLET, FILM COATED ORAL at 11:15

## 2019-08-26 RX ADMIN — SENNA PLUS 2 TABLET(S): 8.6 TABLET ORAL at 21:33

## 2019-08-26 RX ADMIN — CHLORHEXIDINE GLUCONATE 1 APPLICATION(S): 213 SOLUTION TOPICAL at 06:55

## 2019-08-26 NOTE — PROGRESS NOTE ADULT - SUBJECTIVE AND OBJECTIVE BOX
Subjective: Patient seen and examined at bedside this afternoon, post operation. She reports to be in significant pain, and was given Oxy 10 by RN. She wishes to speak to Dr. Kerns prior to her discharge.    Objective:  Vitals: Reviewed  General: Well developed, well nourished female resting in bed.   HEENT: NC. Pupils pinpoint (but had received opiates). EOMI. Face symmetric.  Neuro: A&O x3. b/l UE - 5/5, no drift.  RLE - 5/5 hip flexion, 5/5 knee extension, 5/5 DF and PF  LLE - 4/5 hip flexion, 4/5 knee extension (however patient reports significant pain with movement), 5/5 DF and PF  Per signout from team, patient's LLE was weaker (4/5) 2/2 to pain pre-op.   Sensation grossly intact in b/l LE.   Back: Incision covered in clean dressing.  Extremities: No edema.  Skin: Warm, dry. No rashes noted on exposed skin.   Psych: Co-operative.

## 2019-08-26 NOTE — PRE-ANESTHESIA EVALUATION ADULT - NSANTHOSAYNRD_GEN_A_CORE
No. FARHEEN screening performed.  STOP BANG Legend: 0-2 = LOW Risk; 3-4 = INTERMEDIATE Risk; 5-8 = HIGH Risk/h/o FARHEEN prior to bariatric surgery.  post bariatric surgery, pt stated she had another sleep study and was tested negative

## 2019-08-26 NOTE — PHYSICAL THERAPY INITIAL EVALUATION ADULT - ADDITIONAL COMMENTS
Pt lives in a private house with fiance and two children (6 y/o, 16 y/o) with one flight of steps inside to the basement. Pt was Ind with all ADLs and amb without AD.

## 2019-08-26 NOTE — PHYSICAL THERAPY INITIAL EVALUATION ADULT - PERTINENT HX OF CURRENT PROBLEM, REHAB EVAL
Pt is a 38 y/o female admitted to [facility] on 8/26/19 PMH depression, bipolar, ADD. Hospitalized at [facility] 8/11-12 with c/o worsening b/l lower back pain radiating to BLE, L>R, pain started around May but got a lot worse in the past few weeks, pain is constant, severe, "annoying, hurting, painful",9.5/10, associated with right leg numbness/tingling and urinary incontinence as well as constipation  (patient states she "can't push" 2/2 pain).

## 2019-08-26 NOTE — PHYSICAL THERAPY INITIAL EVALUATION ADULT - PRECAUTIONS/LIMITATIONS, REHAB EVAL
MRI revealed L sided disc protrusion at L2-L3 causing severe left spinal canal and left neural foraminal stenosis.  Pt is now s/p L2-L3 lumbar lami, discectomy, insitu fusion

## 2019-08-26 NOTE — PROGRESS NOTE ADULT - ASSESSMENT
Assessment & Plan: s/p L2-3 lumbar laminectomy, discectomy w/ in-situ fusion     - Pain control  - PT/OT  - Possible DC home tomorrow  - Pending discussion w/ NSGY attending

## 2019-08-27 ENCOUNTER — TRANSCRIPTION ENCOUNTER (OUTPATIENT)
Age: 39
End: 2019-08-27

## 2019-08-27 VITALS
HEART RATE: 95 BPM | SYSTOLIC BLOOD PRESSURE: 111 MMHG | DIASTOLIC BLOOD PRESSURE: 71 MMHG | RESPIRATION RATE: 18 BRPM | OXYGEN SATURATION: 97 % | TEMPERATURE: 100 F

## 2019-08-27 LAB
ANION GAP SERPL CALC-SCNC: 9 MMOL/L — SIGNIFICANT CHANGE UP (ref 5–17)
BASOPHILS # BLD AUTO: 0.02 K/UL — SIGNIFICANT CHANGE UP (ref 0–0.2)
BASOPHILS NFR BLD AUTO: 0.3 % — SIGNIFICANT CHANGE UP (ref 0–2)
BUN SERPL-MCNC: 9 MG/DL — SIGNIFICANT CHANGE UP (ref 7–23)
CALCIUM SERPL-MCNC: 8.6 MG/DL — SIGNIFICANT CHANGE UP (ref 8.4–10.5)
CHLORIDE SERPL-SCNC: 104 MMOL/L — SIGNIFICANT CHANGE UP (ref 96–108)
CO2 SERPL-SCNC: 26 MMOL/L — SIGNIFICANT CHANGE UP (ref 22–31)
CREAT SERPL-MCNC: 0.82 MG/DL — SIGNIFICANT CHANGE UP (ref 0.5–1.3)
EOSINOPHIL # BLD AUTO: 0.09 K/UL — SIGNIFICANT CHANGE UP (ref 0–0.5)
EOSINOPHIL NFR BLD AUTO: 1.3 % — SIGNIFICANT CHANGE UP (ref 0–6)
GLUCOSE SERPL-MCNC: 94 MG/DL — SIGNIFICANT CHANGE UP (ref 70–99)
HCT VFR BLD CALC: 33.5 % — LOW (ref 34.5–45)
HGB BLD-MCNC: 10.5 G/DL — LOW (ref 11.5–15.5)
IMM GRANULOCYTES NFR BLD AUTO: 0.4 % — SIGNIFICANT CHANGE UP (ref 0–1.5)
LYMPHOCYTES # BLD AUTO: 1.25 K/UL — SIGNIFICANT CHANGE UP (ref 1–3.3)
LYMPHOCYTES # BLD AUTO: 17.6 % — SIGNIFICANT CHANGE UP (ref 13–44)
MCHC RBC-ENTMCNC: 30.2 PG — SIGNIFICANT CHANGE UP (ref 27–34)
MCHC RBC-ENTMCNC: 31.3 GM/DL — LOW (ref 32–36)
MCV RBC AUTO: 96.3 FL — SIGNIFICANT CHANGE UP (ref 80–100)
MONOCYTES # BLD AUTO: 0.8 K/UL — SIGNIFICANT CHANGE UP (ref 0–0.9)
MONOCYTES NFR BLD AUTO: 11.2 % — SIGNIFICANT CHANGE UP (ref 2–14)
NEUTROPHILS # BLD AUTO: 4.93 K/UL — SIGNIFICANT CHANGE UP (ref 1.8–7.4)
NEUTROPHILS NFR BLD AUTO: 69.2 % — SIGNIFICANT CHANGE UP (ref 43–77)
PLATELET # BLD AUTO: 259 K/UL — SIGNIFICANT CHANGE UP (ref 150–400)
POTASSIUM SERPL-MCNC: 4.1 MMOL/L — SIGNIFICANT CHANGE UP (ref 3.5–5.3)
POTASSIUM SERPL-SCNC: 4.1 MMOL/L — SIGNIFICANT CHANGE UP (ref 3.5–5.3)
RBC # BLD: 3.48 M/UL — LOW (ref 3.8–5.2)
RBC # FLD: 12.5 % — SIGNIFICANT CHANGE UP (ref 10.3–14.5)
SODIUM SERPL-SCNC: 139 MMOL/L — SIGNIFICANT CHANGE UP (ref 135–145)
WBC # BLD: 7.12 K/UL — SIGNIFICANT CHANGE UP (ref 3.8–10.5)
WBC # FLD AUTO: 7.12 K/UL — SIGNIFICANT CHANGE UP (ref 3.8–10.5)

## 2019-08-27 PROCEDURE — C1889: CPT

## 2019-08-27 PROCEDURE — 81025 URINE PREGNANCY TEST: CPT

## 2019-08-27 PROCEDURE — 97161 PT EVAL LOW COMPLEX 20 MIN: CPT

## 2019-08-27 PROCEDURE — 85027 COMPLETE CBC AUTOMATED: CPT

## 2019-08-27 PROCEDURE — 80048 BASIC METABOLIC PNL TOTAL CA: CPT

## 2019-08-27 PROCEDURE — 86900 BLOOD TYPING SEROLOGIC ABO: CPT

## 2019-08-27 PROCEDURE — 76000 FLUOROSCOPY <1 HR PHYS/QHP: CPT

## 2019-08-27 PROCEDURE — 86901 BLOOD TYPING SEROLOGIC RH(D): CPT

## 2019-08-27 PROCEDURE — 86850 RBC ANTIBODY SCREEN: CPT

## 2019-08-27 RX ORDER — HYDROMORPHONE HYDROCHLORIDE 2 MG/ML
2 INJECTION INTRAMUSCULAR; INTRAVENOUS; SUBCUTANEOUS EVERY 4 HOURS
Refills: 0 | Status: DISCONTINUED | OUTPATIENT
Start: 2019-08-27 | End: 2019-08-27

## 2019-08-27 RX ORDER — SENNA PLUS 8.6 MG/1
2 TABLET ORAL
Qty: 0 | Refills: 0 | DISCHARGE
Start: 2019-08-27

## 2019-08-27 RX ORDER — HYDROMORPHONE HYDROCHLORIDE 2 MG/ML
1 INJECTION INTRAMUSCULAR; INTRAVENOUS; SUBCUTANEOUS
Qty: 42 | Refills: 0
Start: 2019-08-27 | End: 2019-09-02

## 2019-08-27 RX ORDER — ACETAMINOPHEN 500 MG
1000 TABLET ORAL ONCE
Refills: 0 | Status: COMPLETED | OUTPATIENT
Start: 2019-08-27 | End: 2019-08-27

## 2019-08-27 RX ORDER — HYDROMORPHONE HYDROCHLORIDE 2 MG/ML
1 INJECTION INTRAMUSCULAR; INTRAVENOUS; SUBCUTANEOUS
Qty: 0 | Refills: 0 | DISCHARGE
Start: 2019-08-27

## 2019-08-27 RX ORDER — ACETAMINOPHEN 500 MG
2 TABLET ORAL
Qty: 0 | Refills: 0 | DISCHARGE
Start: 2019-08-27

## 2019-08-27 RX ORDER — DOCUSATE SODIUM 100 MG
1 CAPSULE ORAL
Qty: 0 | Refills: 0 | DISCHARGE
Start: 2019-08-27

## 2019-08-27 RX ORDER — CYCLOBENZAPRINE HYDROCHLORIDE 10 MG/1
1 TABLET, FILM COATED ORAL
Qty: 21 | Refills: 0
Start: 2019-08-27 | End: 2019-09-02

## 2019-08-27 RX ORDER — HYDROMORPHONE HYDROCHLORIDE 2 MG/ML
4 INJECTION INTRAMUSCULAR; INTRAVENOUS; SUBCUTANEOUS EVERY 4 HOURS
Refills: 0 | Status: DISCONTINUED | OUTPATIENT
Start: 2019-08-27 | End: 2019-08-27

## 2019-08-27 RX ORDER — CYCLOBENZAPRINE HYDROCHLORIDE 10 MG/1
5 TABLET, FILM COATED ORAL THREE TIMES A DAY
Refills: 0 | Status: DISCONTINUED | OUTPATIENT
Start: 2019-08-27 | End: 2019-08-27

## 2019-08-27 RX ADMIN — SODIUM CHLORIDE 3 MILLILITER(S): 9 INJECTION INTRAMUSCULAR; INTRAVENOUS; SUBCUTANEOUS at 14:04

## 2019-08-27 RX ADMIN — HYDROMORPHONE HYDROCHLORIDE 4 MILLIGRAM(S): 2 INJECTION INTRAMUSCULAR; INTRAVENOUS; SUBCUTANEOUS at 14:33

## 2019-08-27 RX ADMIN — OXYCODONE HYDROCHLORIDE 10 MILLIGRAM(S): 5 TABLET ORAL at 04:57

## 2019-08-27 RX ADMIN — Medication 400 MILLIGRAM(S): at 04:57

## 2019-08-27 RX ADMIN — Medication 100 MILLIGRAM(S): at 05:01

## 2019-08-27 RX ADMIN — Medication 650 MILLIGRAM(S): at 11:30

## 2019-08-27 RX ADMIN — Medication 100 MILLIGRAM(S): at 14:03

## 2019-08-27 RX ADMIN — CYCLOBENZAPRINE HYDROCHLORIDE 5 MILLIGRAM(S): 10 TABLET, FILM COATED ORAL at 11:56

## 2019-08-27 RX ADMIN — HYDROMORPHONE HYDROCHLORIDE 4 MILLIGRAM(S): 2 INJECTION INTRAMUSCULAR; INTRAVENOUS; SUBCUTANEOUS at 14:03

## 2019-08-27 RX ADMIN — Medication 650 MILLIGRAM(S): at 10:53

## 2019-08-27 RX ADMIN — SODIUM CHLORIDE 3 MILLILITER(S): 9 INJECTION INTRAMUSCULAR; INTRAVENOUS; SUBCUTANEOUS at 04:47

## 2019-08-27 RX ADMIN — OXYCODONE HYDROCHLORIDE 10 MILLIGRAM(S): 5 TABLET ORAL at 05:30

## 2019-08-27 RX ADMIN — HYDROMORPHONE HYDROCHLORIDE 1 MILLIGRAM(S): 2 INJECTION INTRAMUSCULAR; INTRAVENOUS; SUBCUTANEOUS at 00:01

## 2019-08-27 RX ADMIN — Medication 500 MILLIGRAM(S): at 05:01

## 2019-08-27 RX ADMIN — Medication 1 MILLIGRAM(S): at 11:56

## 2019-08-27 RX ADMIN — HYDROMORPHONE HYDROCHLORIDE 4 MILLIGRAM(S): 2 INJECTION INTRAMUSCULAR; INTRAVENOUS; SUBCUTANEOUS at 09:00

## 2019-08-27 RX ADMIN — HYDROMORPHONE HYDROCHLORIDE 4 MILLIGRAM(S): 2 INJECTION INTRAMUSCULAR; INTRAVENOUS; SUBCUTANEOUS at 08:29

## 2019-08-27 NOTE — DISCHARGE NOTE PROVIDER - NSDCFUSCHEDAPPT_GEN_ALL_CORE_FT
RUPALI CRUZ ; 09/03/2019 ; [facility] RUPALI CRUZ ; 09/03/2019 ; [facility] RUPALI CRUZ ; 09/03/2019 ; [facility] RUPALI CRUZ ; 09/03/2019 ; [facility]

## 2019-08-27 NOTE — DISCHARGE NOTE PROVIDER - NSDCFUADDAPPT_GEN_ALL_CORE_FT
Please follow up with primary care provider in 10 days. Please follow up with primary care provider in 10 days.  Please follow up with Dr. Kerns on September 3rd at 10 am at [address] [place] [address]. Please follow up with primary care provider in 10 days.  Please follow up with Dr. Kerns on September 3rd at 10 am.

## 2019-08-27 NOTE — DISCHARGE NOTE NURSING/CASE MANAGEMENT/SOCIAL WORK - PATIENT PORTAL LINK FT
You can access the FollowMyHealth Patient Portal offered by [facility] by registering at the following website: [email or web address]. By joining [employer]’s FollowMyHealth portal, you will also be able to view your health information using other applications (apps) compatible with our system.

## 2019-08-27 NOTE — DISCHARGE NOTE NURSING/CASE MANAGEMENT/SOCIAL WORK - NSDCFUADDAPPT_GEN_ALL_CORE_FT
Please follow up with primary care provider in 10 days.  Please follow up with Dr. Kerns on September 3rd at 10 am.

## 2019-08-27 NOTE — CHART NOTE - NSCHARTNOTEFT_GEN_A_CORE
CAPRINI SCORE [CLOT] Score on Admission for     AGE RELATED RISK FACTORS                                                       MOBILITY RELATED FACTORS  [ x] Age 41-60 years                                            (1 Point)                  [ ] Bed rest                                                        (1 Point)  [ ] Age: 61-74 years                                           (2 Points)                 [ ] Plaster cast                                                   (2 Points)  [ ] Age= 75 years                                              (3 Points)                 [ ] Bed bound for more than 72 hours                 (2 Points)    DISEASE RELATED RISK FACTORS                                               GENDER SPECIFIC FACTORS  [ ] Edema in the lower extremities                       (1 Point)                  [ ] Pregnancy                                                     (1 Point)  [ ] Varicose veins                                               (1 Point)                  [ ] Post-partum < 6 weeks                                   (1 Point)             [x ] BMI > 25 Kg/m2                                            (1 Point)                  [ ] Hormonal therapy  or oral contraception          (1 Point)                 [ ] Sepsis (in the previous month)                        (1 Point)                  [ ] History of pregnancy complications                 (1 point)  [ ] Pneumonia or serious lung disease                                               [ ] Unexplained or recurrent                     (1 Point)           (in the previous month)                               (1 Point)  [ ] Abnormal pulmonary function test                     (1 Point)                 SURGERY RELATED RISK FACTORS (include planned surgeries)  [ ] Acute myocardial infarction                              (1 Point)                 [ ]  Section                                             (1 Point)  [ ] Congestive heart failure (in the previous month)  (1 Point)         [ ] Minor surgery                                                  (1 Point)   [ ] Inflammatory bowel disease                             (1 Point)                 [ ] Arthroscopic surgery                                        (2 Points)  [ ] Central venous access                                      (2 Points)                [ ] General surgery lasting more than 45 minutes   (2 Points)       [ ] Stroke (in the previous month)                          (5 Points)               [ ] Elective arthroplasty                                         (5 Points)            [ ] current or past malignancy                              (2 Points)                                                                                                       HEMATOLOGY RELATED FACTORS                                                 TRAUMA RELATED RISK FACTORS  [ ] Prior episodes of VTE                                     (3 Points)                [ ] Fracture of the hip, pelvis, or leg                       (5 Points)  [ ] Positive family history for VTE                         (3 Points)                 [ ] Acute spinal cord injury (in the previous month)  (5 Points)  [ ] Prothrombin 77347 A                                     (3 Points)                 [ ] Paralysis  (less than 1 month)                             (5 Points)  [ ] Factor V Leiden                                             (3 Points)                  [ ] Multiple Trauma within 1 month                        (5 Points)  [ ] Lupus anticoagulants                                     (3 Points)                                                           [ ] Anticardiolipin antibodies                               (3 Points)                                                       [ ] High homocysteine in the blood                      (3 Points)                                             [ ] Other congenital or acquired thrombophilia      (3 Points)                                                [ ] Heparin induced thrombocytopenia                  (3 Points)                                          Total Score [      2    ]    Risk:  Very low 0   Low 1 to 2   Moderate 3 to 4   High =5       VTE Prophylasix Recommednations:  [ x] mechanical pneumatic compression devices                                      [ ] contraindicated: _____________________  [x ] chemo prophylasix                                                                                   [ ] contraindicated _____________________    **** HIGH LIKELIHOOD DVT PRESENT ON ADMISSION  [ ] (please order LE dopplers within 24 hours of admission)

## 2019-08-27 NOTE — DISCHARGE NOTE PROVIDER - HOSPITAL COURSE
Post procedure patient was moved to recovery room. Patient was given pain meds, iv fluids and was started on routine home meds. Patient was seen by physical therapy in recovery room and was cleared for discharge to home. Patient's haddad was removed and patient voided. Patient had no other complications. Surgical site remained stable. Patient was discharged home with follow up instructions.

## 2019-08-27 NOTE — DISCHARGE NOTE PROVIDER - NSDCCPCAREPLAN_GEN_ALL_CORE_FT
PRINCIPAL DISCHARGE DIAGNOSIS  Diagnosis: Lumbar spinal stenosis  Assessment and Plan of Treatment:       SECONDARY DISCHARGE DIAGNOSES  Diagnosis: Depression  Assessment and Plan of Treatment:

## 2019-08-27 NOTE — DISCHARGE NOTE PROVIDER - REASON FOR ADMISSION
Patient was admitted for spinal surgery. Patient has history of low back pain.  Patient had a L3-L4 laminectomy/discectomy and fusion done on 8/26.

## 2019-08-27 NOTE — PROGRESS NOTE ADULT - ASSESSMENT
HPI:  40 yo F with PMH depression, bipolar, ADD. Hospitalized at [facility] 8/11-12 with c/o worsening b/l lower back pain radiating to BLE, L>R, pain started around May but got a lot worse in the past few weeks, pain is constant, severe, "annoying, hurting, painful",9.5/10, associated with right leg numbness/tingling and urinary incontinence as well as constipation (patient states she "can't push" 2/2 pain). MRI revealedL sided disc protrusion at L2-L3 causing severe left spinal canal and left neural foraminal stenosis. now presents to PST scheduled for lumbar laminectomy and fusion. (21 Aug 2019 15:55)    PROCEDURE: Laminectomy, spine, lumbar   s/p L3-L4 laminectomy and discectomy and insitu fusion on 8/26   POD#  1  PLAN:  1 Out of bed   2 continue pt   3 prn pain meds   4 dvt ppx sql and scds   5 continue clonipine, cymbalta, lamictal , prozac and effexor for mood stabilization   6 regular diet   7 stool softeners   8 dispo : dc home today     Assessment:  Please Check When Present   []  GCS  E   V  M     Heart Failure: []Acute, [] acute on chronic , []chronic  Heart Failure:  [] Diastolic (HFpEF), [] Systolic (HFrEF), []Combined (HFpEF and HFrEF), [] RHF, [] Pulm HTN, [] Other    [] ARMANDO, [] ATN, [] AIN, [] other  [] CKD1, [] CKD2, [] CKD 3, [] CKD 4, [] CKD 5, []ESRD    Encephalopathy: [] Metabolic, [] Hepatic, [] toxic, [] Neurological, [] Other    Abnormal Nurtitional Status: [] malnurtition (see nutrition note), [ ]underweight: BMI < 19, [] morbid obesity: BMI >40, [] Cachexia    [] Sepsis  [] hypovolemic shock,[] cardiogenic shock, [] hemorrhagic shock, [] neuogenic shock  [] Acute Respiratory Failure  []Cerebral edema, [] Brain compression/ herniation,   [] Functional quadriplegia  [] Acute blood loss anemia

## 2019-08-27 NOTE — DISCHARGE NOTE PROVIDER - NSDCFUADDINST_GEN_ALL_CORE_FT
May take shower 4 days from surgery. Let water run over surgical site and pat dry after shower.  Please remove surgical dressing on 8/29 in the morning.  If notice any new weakness, numbness, tingling, severe pain , nausea , vomiting, fever, incisional drainage or wound opening then please contact physician and come back to emergency room.

## 2019-08-27 NOTE — DISCHARGE NOTE PROVIDER - NSDCACTIVITY_GEN_ALL_CORE
Walking - Indoors allowed/Do not make important decisions/No heavy lifting/straining/Showering allowed/Stairs allowed/Walking - Outdoors allowed/Do not drive or operate machinery

## 2019-08-27 NOTE — DISCHARGE NOTE PROVIDER - CARE PROVIDER_API CALL
Frederick Kerns (DO)  Neurological Surgery  [address] [facility], [address]  [place] [address]  Phone: [phone]  Fax: [phone]  Follow Up Time: 1 week

## 2019-08-29 ENCOUNTER — TRANSCRIPTION ENCOUNTER (OUTPATIENT)
Age: 39
End: 2019-08-29

## 2019-08-29 ENCOUNTER — OTHER (OUTPATIENT)
Age: 39
End: 2019-08-29

## 2019-08-30 ENCOUNTER — OTHER (OUTPATIENT)
Age: 39
End: 2019-08-30

## 2019-08-30 ENCOUNTER — INPATIENT (INPATIENT)
Facility: HOSPITAL | Age: 39
LOS: 1 days | Discharge: HOME | DRG: 552 | End: 2019-09-01
Attending: NEUROLOGICAL SURGERY | Admitting: NEUROLOGICAL SURGERY
Payer: MEDICAID

## 2019-08-30 VITALS
HEART RATE: 113 BPM | SYSTOLIC BLOOD PRESSURE: 104 MMHG | WEIGHT: 210.1 LBS | RESPIRATION RATE: 20 BRPM | TEMPERATURE: 100 F | DIASTOLIC BLOOD PRESSURE: 69 MMHG | OXYGEN SATURATION: 96 % | HEIGHT: 62 IN

## 2019-08-30 DIAGNOSIS — Z90.49 ACQUIRED ABSENCE OF OTHER SPECIFIED PARTS OF DIGESTIVE TRACT: Chronic | ICD-10-CM

## 2019-08-30 DIAGNOSIS — M67.432 GANGLION, LEFT WRIST: Chronic | ICD-10-CM

## 2019-08-30 DIAGNOSIS — Z98.89 OTHER SPECIFIED POSTPROCEDURAL STATES: Chronic | ICD-10-CM

## 2019-08-30 DIAGNOSIS — M54.5 LOW BACK PAIN: ICD-10-CM

## 2019-08-30 DIAGNOSIS — N84.0 POLYP OF CORPUS UTERI: Chronic | ICD-10-CM

## 2019-08-30 DIAGNOSIS — Z98.84 BARIATRIC SURGERY STATUS: Chronic | ICD-10-CM

## 2019-08-30 LAB
ALBUMIN SERPL ELPH-MCNC: 3.8 G/DL — SIGNIFICANT CHANGE UP (ref 3.3–5)
ALP SERPL-CCNC: 218 U/L — HIGH (ref 40–120)
ALT FLD-CCNC: 58 U/L — HIGH (ref 10–45)
ANION GAP SERPL CALC-SCNC: 15 MMOL/L — SIGNIFICANT CHANGE UP (ref 5–17)
APPEARANCE UR: CLEAR — SIGNIFICANT CHANGE UP
AST SERPL-CCNC: 31 U/L — SIGNIFICANT CHANGE UP (ref 10–40)
BACTERIA # UR AUTO: ABNORMAL
BASOPHILS # BLD AUTO: 0 K/UL — SIGNIFICANT CHANGE UP (ref 0–0.2)
BASOPHILS NFR BLD AUTO: 0.5 % — SIGNIFICANT CHANGE UP (ref 0–2)
BILIRUB SERPL-MCNC: 0.4 MG/DL — SIGNIFICANT CHANGE UP (ref 0.2–1.2)
BILIRUB UR-MCNC: NEGATIVE — SIGNIFICANT CHANGE UP
BUN SERPL-MCNC: 6 MG/DL — LOW (ref 7–23)
CALCIUM SERPL-MCNC: 9.5 MG/DL — SIGNIFICANT CHANGE UP (ref 8.4–10.5)
CHLORIDE SERPL-SCNC: 96 MMOL/L — SIGNIFICANT CHANGE UP (ref 96–108)
CO2 SERPL-SCNC: 26 MMOL/L — SIGNIFICANT CHANGE UP (ref 22–31)
COLOR SPEC: YELLOW — SIGNIFICANT CHANGE UP
CREAT SERPL-MCNC: 0.65 MG/DL — SIGNIFICANT CHANGE UP (ref 0.5–1.3)
DIFF PNL FLD: NEGATIVE — SIGNIFICANT CHANGE UP
EOSINOPHIL # BLD AUTO: 0.1 K/UL — SIGNIFICANT CHANGE UP (ref 0–0.5)
EOSINOPHIL NFR BLD AUTO: 1.3 % — SIGNIFICANT CHANGE UP (ref 0–6)
EPI CELLS # UR: 5 /HPF — SIGNIFICANT CHANGE UP
GAS PNL BLDV: SIGNIFICANT CHANGE UP
GLUCOSE SERPL-MCNC: 86 MG/DL — SIGNIFICANT CHANGE UP (ref 70–99)
GLUCOSE UR QL: NEGATIVE — SIGNIFICANT CHANGE UP
HCG SERPL-ACNC: <2 MIU/ML — SIGNIFICANT CHANGE UP
HCT VFR BLD CALC: 30.7 % — LOW (ref 34.5–45)
HGB BLD-MCNC: 10.7 G/DL — LOW (ref 11.5–15.5)
HYALINE CASTS # UR AUTO: 1 /LPF — SIGNIFICANT CHANGE UP (ref 0–2)
KETONES UR-MCNC: NEGATIVE — SIGNIFICANT CHANGE UP
LEUKOCYTE ESTERASE UR-ACNC: ABNORMAL
LYMPHOCYTES # BLD AUTO: 2.8 K/UL — SIGNIFICANT CHANGE UP (ref 1–3.3)
LYMPHOCYTES # BLD AUTO: 25.3 % — SIGNIFICANT CHANGE UP (ref 13–44)
MCHC RBC-ENTMCNC: 32.7 PG — SIGNIFICANT CHANGE UP (ref 27–34)
MCHC RBC-ENTMCNC: 34.9 GM/DL — SIGNIFICANT CHANGE UP (ref 32–36)
MCV RBC AUTO: 93.5 FL — SIGNIFICANT CHANGE UP (ref 80–100)
MONOCYTES # BLD AUTO: 1 K/UL — HIGH (ref 0–0.9)
MONOCYTES NFR BLD AUTO: 9.6 % — SIGNIFICANT CHANGE UP (ref 2–14)
NEUTROPHILS # BLD AUTO: 7 K/UL — SIGNIFICANT CHANGE UP (ref 1.8–7.4)
NEUTROPHILS NFR BLD AUTO: 63.3 % — SIGNIFICANT CHANGE UP (ref 43–77)
NITRITE UR-MCNC: NEGATIVE — SIGNIFICANT CHANGE UP
PH UR: 6.5 — SIGNIFICANT CHANGE UP (ref 5–8)
PLATELET # BLD AUTO: 416 K/UL — HIGH (ref 150–400)
POTASSIUM SERPL-MCNC: 3.5 MMOL/L — SIGNIFICANT CHANGE UP (ref 3.5–5.3)
POTASSIUM SERPL-SCNC: 3.5 MMOL/L — SIGNIFICANT CHANGE UP (ref 3.5–5.3)
PROT SERPL-MCNC: 7 G/DL — SIGNIFICANT CHANGE UP (ref 6–8.3)
PROT UR-MCNC: NEGATIVE — SIGNIFICANT CHANGE UP
RBC # BLD: 3.28 M/UL — LOW (ref 3.8–5.2)
RBC # FLD: 11.1 % — SIGNIFICANT CHANGE UP (ref 10.3–14.5)
RBC CASTS # UR COMP ASSIST: 6 /HPF — HIGH (ref 0–4)
SODIUM SERPL-SCNC: 137 MMOL/L — SIGNIFICANT CHANGE UP (ref 135–145)
SP GR SPEC: 1.01 — SIGNIFICANT CHANGE UP (ref 1.01–1.02)
UROBILINOGEN FLD QL: ABNORMAL
WBC # BLD: 11 K/UL — HIGH (ref 3.8–10.5)
WBC # FLD AUTO: 11 K/UL — HIGH (ref 3.8–10.5)
WBC UR QL: 3 /HPF — SIGNIFICANT CHANGE UP (ref 0–5)

## 2019-08-30 PROCEDURE — 99285 EMERGENCY DEPT VISIT HI MDM: CPT

## 2019-08-30 PROCEDURE — 93970 EXTREMITY STUDY: CPT | Mod: 26

## 2019-08-30 PROCEDURE — 72131 CT LUMBAR SPINE W/O DYE: CPT | Mod: 26

## 2019-08-30 RX ORDER — HYDROMORPHONE HYDROCHLORIDE 2 MG/ML
2 INJECTION INTRAMUSCULAR; INTRAVENOUS; SUBCUTANEOUS EVERY 4 HOURS
Refills: 0 | Status: DISCONTINUED | OUTPATIENT
Start: 2019-08-30 | End: 2019-09-01

## 2019-08-30 RX ORDER — HYDROMORPHONE HYDROCHLORIDE 2 MG/ML
1 INJECTION INTRAMUSCULAR; INTRAVENOUS; SUBCUTANEOUS ONCE
Refills: 0 | Status: DISCONTINUED | OUTPATIENT
Start: 2019-08-30 | End: 2019-08-30

## 2019-08-30 RX ORDER — ONDANSETRON 8 MG/1
4 TABLET, FILM COATED ORAL EVERY 6 HOURS
Refills: 0 | Status: DISCONTINUED | OUTPATIENT
Start: 2019-08-30 | End: 2019-09-01

## 2019-08-30 RX ORDER — DOCUSATE SODIUM 100 MG
100 CAPSULE ORAL THREE TIMES A DAY
Refills: 0 | Status: DISCONTINUED | OUTPATIENT
Start: 2019-08-30 | End: 2019-09-01

## 2019-08-30 RX ORDER — GABAPENTIN 400 MG/1
300 CAPSULE ORAL THREE TIMES A DAY
Refills: 0 | Status: DISCONTINUED | OUTPATIENT
Start: 2019-08-30 | End: 2019-09-01

## 2019-08-30 RX ORDER — DULOXETINE HYDROCHLORIDE 30 MG/1
60 CAPSULE, DELAYED RELEASE ORAL DAILY
Refills: 0 | Status: DISCONTINUED | OUTPATIENT
Start: 2019-08-30 | End: 2019-08-31

## 2019-08-30 RX ORDER — FLUOXETINE HCL 10 MG
40 CAPSULE ORAL DAILY
Refills: 0 | Status: DISCONTINUED | OUTPATIENT
Start: 2019-08-30 | End: 2019-09-01

## 2019-08-30 RX ORDER — ACETAMINOPHEN 500 MG
650 TABLET ORAL EVERY 6 HOURS
Refills: 0 | Status: DISCONTINUED | OUTPATIENT
Start: 2019-08-30 | End: 2019-09-01

## 2019-08-30 RX ORDER — CLONAZEPAM 1 MG
0.5 TABLET ORAL THREE TIMES A DAY
Refills: 0 | Status: DISCONTINUED | OUTPATIENT
Start: 2019-08-30 | End: 2019-09-01

## 2019-08-30 RX ORDER — LAMOTRIGINE 25 MG/1
100 TABLET, ORALLY DISINTEGRATING ORAL AT BEDTIME
Refills: 0 | Status: DISCONTINUED | OUTPATIENT
Start: 2019-08-30 | End: 2019-09-01

## 2019-08-30 RX ORDER — VENLAFAXINE HCL 75 MG
300 CAPSULE, EXT RELEASE 24 HR ORAL DAILY
Refills: 0 | Status: DISCONTINUED | OUTPATIENT
Start: 2019-08-30 | End: 2019-08-30

## 2019-08-30 RX ORDER — SODIUM CHLORIDE 9 MG/ML
1000 INJECTION INTRAMUSCULAR; INTRAVENOUS; SUBCUTANEOUS ONCE
Refills: 0 | Status: COMPLETED | OUTPATIENT
Start: 2019-08-30 | End: 2019-08-30

## 2019-08-30 RX ORDER — ONDANSETRON 8 MG/1
4 TABLET, FILM COATED ORAL ONCE
Refills: 0 | Status: COMPLETED | OUTPATIENT
Start: 2019-08-30 | End: 2019-08-30

## 2019-08-30 RX ORDER — TRAZODONE HCL 50 MG
100 TABLET ORAL DAILY
Refills: 0 | Status: DISCONTINUED | OUTPATIENT
Start: 2019-08-30 | End: 2019-09-01

## 2019-08-30 RX ORDER — HYDROMORPHONE HYDROCHLORIDE 2 MG/ML
4 INJECTION INTRAMUSCULAR; INTRAVENOUS; SUBCUTANEOUS EVERY 4 HOURS
Refills: 0 | Status: DISCONTINUED | OUTPATIENT
Start: 2019-08-30 | End: 2019-09-01

## 2019-08-30 RX ORDER — SODIUM CHLORIDE 9 MG/ML
1000 INJECTION INTRAMUSCULAR; INTRAVENOUS; SUBCUTANEOUS
Refills: 0 | Status: DISCONTINUED | OUTPATIENT
Start: 2019-08-30 | End: 2019-08-31

## 2019-08-30 RX ORDER — SENNA PLUS 8.6 MG/1
2 TABLET ORAL AT BEDTIME
Refills: 0 | Status: DISCONTINUED | OUTPATIENT
Start: 2019-08-30 | End: 2019-09-01

## 2019-08-30 RX ORDER — CYCLOBENZAPRINE HYDROCHLORIDE 10 MG/1
5 TABLET, FILM COATED ORAL THREE TIMES A DAY
Refills: 0 | Status: DISCONTINUED | OUTPATIENT
Start: 2019-08-30 | End: 2019-09-01

## 2019-08-30 RX ADMIN — Medication 100 MILLIGRAM(S): at 21:40

## 2019-08-30 RX ADMIN — ONDANSETRON 4 MILLIGRAM(S): 8 TABLET, FILM COATED ORAL at 16:06

## 2019-08-30 RX ADMIN — Medication 100 MILLIGRAM(S): at 21:39

## 2019-08-30 RX ADMIN — HYDROMORPHONE HYDROCHLORIDE 1 MILLIGRAM(S): 2 INJECTION INTRAMUSCULAR; INTRAVENOUS; SUBCUTANEOUS at 18:01

## 2019-08-30 RX ADMIN — HYDROMORPHONE HYDROCHLORIDE 1 MILLIGRAM(S): 2 INJECTION INTRAMUSCULAR; INTRAVENOUS; SUBCUTANEOUS at 16:06

## 2019-08-30 RX ADMIN — SODIUM CHLORIDE 1000 MILLILITER(S): 9 INJECTION INTRAMUSCULAR; INTRAVENOUS; SUBCUTANEOUS at 16:06

## 2019-08-30 RX ADMIN — HYDROMORPHONE HYDROCHLORIDE 1 MILLIGRAM(S): 2 INJECTION INTRAMUSCULAR; INTRAVENOUS; SUBCUTANEOUS at 16:05

## 2019-08-30 RX ADMIN — HYDROMORPHONE HYDROCHLORIDE 4 MILLIGRAM(S): 2 INJECTION INTRAMUSCULAR; INTRAVENOUS; SUBCUTANEOUS at 22:10

## 2019-08-30 RX ADMIN — HYDROMORPHONE HYDROCHLORIDE 4 MILLIGRAM(S): 2 INJECTION INTRAMUSCULAR; INTRAVENOUS; SUBCUTANEOUS at 21:40

## 2019-08-30 RX ADMIN — GABAPENTIN 300 MILLIGRAM(S): 400 CAPSULE ORAL at 21:39

## 2019-08-30 RX ADMIN — HYDROMORPHONE HYDROCHLORIDE 1 MILLIGRAM(S): 2 INJECTION INTRAMUSCULAR; INTRAVENOUS; SUBCUTANEOUS at 17:03

## 2019-08-30 RX ADMIN — HYDROMORPHONE HYDROCHLORIDE 1 MILLIGRAM(S): 2 INJECTION INTRAMUSCULAR; INTRAVENOUS; SUBCUTANEOUS at 17:04

## 2019-08-30 RX ADMIN — LAMOTRIGINE 100 MILLIGRAM(S): 25 TABLET, ORALLY DISINTEGRATING ORAL at 21:41

## 2019-08-30 NOTE — H&P ADULT - NSICDXPASTMEDICALHX_GEN_ALL_CORE_FT
PAST MEDICAL HISTORY:  ADD (attention deficit disorder)     ADD (attention deficit disorder)     Anemia     Bipolar mood disorder     Depression     Depression     Depression     GERD (gastroesophageal reflux disease)

## 2019-08-30 NOTE — ED PROVIDER NOTE - PMH
ADD (attention deficit disorder)    ADD (attention deficit disorder)    Anemia    Bipolar mood disorder    Depression    Depression    Depression    GERD (gastroesophageal reflux disease)

## 2019-08-30 NOTE — ED PROVIDER NOTE - PROGRESS NOTE DETAILS
Marni PGY2: Discussed with Philip  from neurosurgery recommends CT lumbar spine and will eval in ER. Haverty PGY2- requiring more pain meds, seen by NSG, admit to Dr. Kerns, no abx at this time, team will f/u blood cxs, CT pending

## 2019-08-30 NOTE — ED PROVIDER NOTE - CLINICAL SUMMARY MEDICAL DECISION MAKING FREE TEXT BOX
Marni PGY2- back pain 4 days s/p L2/L3 lami, diskectomy, fusion, severe pain, fever, tachy, NSG- Dr. Kerns  normal neuro exam, ambulating, normal BLE strength, no changes in sensation in  region, saddle anesthesias  pain meds, labs, urine/blood cx, NSG consult  likely post op pain but given fever with obtain cultures/labs, possibly imaging

## 2019-08-30 NOTE — ED PROVIDER NOTE - PSH
Bariatric surgery status  s/p gastric sleeve, then s/p insertion of gastric band which was   removed in  secondary to displacement  Delivery by emergency caesarean section    Endometrial polyp  s/p ablation 2019  Ganglion cyst of wrist, left    S/P  section    S/P cholecystectomy

## 2019-08-30 NOTE — H&P ADULT - NSHPPHYSICALEXAM_GEN_ALL_CORE
Exam: Has   AOx3, Following Commands  Motor:          Upper extremity                       Delt      Bicep     Tricep     HG                                                 L         5/5 5/5 5/5 5/5                                               R          5/5 5/5 5/5 5/5          Lower extremity                           HF          KF         KE         DF        PF                                                  L           5/5 5/5 5/5 5/5 5/5                                               R           5/5 5/5 5/5 5/5 5/5  Sensation / Reflexes  [ x] intact to light touch  [ ] decreased:   No clonus

## 2019-08-30 NOTE — H&P ADULT - ASSESSMENT
Curtis Paul is back in the ED today.   4 days post op from L3/4 lami disc insitufusion pw LLE pain 6/10 in the L3/4 distribution down to feet, L groin and hip pain not relieved by dilaudid 2mg, made worse with movement. No back pain, b/b dysfunction. In Ed is tachycardic and febrile. Exam: AAOx3, UE 5/5, LLE 5/5 w/ pain  RLE 5/5, no clonus and hyperreflexia on LLE. insicion is c/d/i w/ staples. Had appt to see Dr. Kerns in 5 days.   -ESR, CRP f/u  -CT L spine  -pain control

## 2019-08-30 NOTE — ED PROVIDER NOTE - ATTENDING CONTRIBUTION TO CARE
I performed a history and physical exam of the patient and discussed their management with the resident. I reviewed the resident's note and agree with the documented findings and plan of care.  Nevaeh Linares MD

## 2019-08-30 NOTE — ED PROVIDER NOTE - OBJECTIVE STATEMENT
38 y/o F with pmhx of sepsis in 2017, GERD, anemia and pshx of bariatric surgery, , cholecystectomy presents to the ED c/o back pain s/p L2-L3 surgery x4 days ago. Since then pt reports significant lower back pain which worsened last night. Performing doctor prescribed Dilaudid for pain which pt has been compliant with but reports no relief. Pain worse with walking, states that her leg drags secondary to pain because she cannot move it without causing back pain. has not had a BM since surgery, but denies bowel or urinary incontinence. Yesterday had subjective fever along with mild cough and nausea.

## 2019-08-30 NOTE — ED PROVIDER NOTE - MUSCULOSKELETAL MINIMAL EXAM
Surgical staples present, no purulent drainage.  Minimal erythema aground surgical site. No ecchymosis. Able to ambulate to bathroom without assistance. No perineal or pelvic changes.

## 2019-08-30 NOTE — ED ADULT TRIAGE NOTE - CHIEF COMPLAINT QUOTE
back pain, left leg and groin pain since last night associated with numbness/tingling with difficulty ambulating. patient had L2-L3 surgery Monday. surgery done by MD Kerns

## 2019-08-30 NOTE — H&P ADULT - HISTORY OF PRESENT ILLNESS
38 y/o F with pmhx of sepsis in 2017, GERD, anemia and pshx of bariatric surgery, , cholecystectomy presents to the ED c/o back pain s/p L2-L3 surgery x4 days ago. Since then pt reports significant lower back pain which worsened last night.

## 2019-08-30 NOTE — ED ADULT NURSE NOTE - OBJECTIVE STATEMENT
39 yr old female came in with back pain s/p surg on monday having a laminectomy. on assessment a and o x 3 lungs clear abd soft non tender  no swelling in extremities no n/v/d no fevers, surgical site looks intact. no other complaints.

## 2019-08-31 PROCEDURE — 99024 POSTOP FOLLOW-UP VISIT: CPT

## 2019-08-31 RX ORDER — DULOXETINE HYDROCHLORIDE 30 MG/1
90 CAPSULE, DELAYED RELEASE ORAL DAILY
Refills: 0 | Status: DISCONTINUED | OUTPATIENT
Start: 2019-08-31 | End: 2019-09-01

## 2019-08-31 RX ORDER — DEXAMETHASONE 0.5 MG/5ML
6 ELIXIR ORAL ONCE
Refills: 0 | Status: COMPLETED | OUTPATIENT
Start: 2019-08-31 | End: 2019-08-31

## 2019-08-31 RX ORDER — DEXAMETHASONE 0.5 MG/5ML
4 ELIXIR ORAL EVERY 6 HOURS
Refills: 0 | Status: COMPLETED | OUTPATIENT
Start: 2019-08-31 | End: 2019-09-01

## 2019-08-31 RX ORDER — DEXAMETHASONE 0.5 MG/5ML
6 ELIXIR ORAL ONCE
Refills: 0 | Status: DISCONTINUED | OUTPATIENT
Start: 2019-08-31 | End: 2019-08-31

## 2019-08-31 RX ADMIN — Medication 650 MILLIGRAM(S): at 05:40

## 2019-08-31 RX ADMIN — Medication 100 MILLIGRAM(S): at 14:52

## 2019-08-31 RX ADMIN — HYDROMORPHONE HYDROCHLORIDE 4 MILLIGRAM(S): 2 INJECTION INTRAMUSCULAR; INTRAVENOUS; SUBCUTANEOUS at 18:15

## 2019-08-31 RX ADMIN — DULOXETINE HYDROCHLORIDE 90 MILLIGRAM(S): 30 CAPSULE, DELAYED RELEASE ORAL at 14:52

## 2019-08-31 RX ADMIN — GABAPENTIN 300 MILLIGRAM(S): 400 CAPSULE ORAL at 14:52

## 2019-08-31 RX ADMIN — SENNA PLUS 2 TABLET(S): 8.6 TABLET ORAL at 22:19

## 2019-08-31 RX ADMIN — GABAPENTIN 300 MILLIGRAM(S): 400 CAPSULE ORAL at 05:39

## 2019-08-31 RX ADMIN — Medication 100 MILLIGRAM(S): at 22:19

## 2019-08-31 RX ADMIN — HYDROMORPHONE HYDROCHLORIDE 4 MILLIGRAM(S): 2 INJECTION INTRAMUSCULAR; INTRAVENOUS; SUBCUTANEOUS at 22:50

## 2019-08-31 RX ADMIN — Medication 650 MILLIGRAM(S): at 23:51

## 2019-08-31 RX ADMIN — HYDROMORPHONE HYDROCHLORIDE 4 MILLIGRAM(S): 2 INJECTION INTRAMUSCULAR; INTRAVENOUS; SUBCUTANEOUS at 06:10

## 2019-08-31 RX ADMIN — Medication 4 MILLIGRAM(S): at 23:51

## 2019-08-31 RX ADMIN — HYDROMORPHONE HYDROCHLORIDE 4 MILLIGRAM(S): 2 INJECTION INTRAMUSCULAR; INTRAVENOUS; SUBCUTANEOUS at 11:10

## 2019-08-31 RX ADMIN — GABAPENTIN 300 MILLIGRAM(S): 400 CAPSULE ORAL at 22:19

## 2019-08-31 RX ADMIN — Medication 4 MILLIGRAM(S): at 17:47

## 2019-08-31 RX ADMIN — Medication 650 MILLIGRAM(S): at 00:10

## 2019-08-31 RX ADMIN — Medication 6 MILLIGRAM(S): at 14:51

## 2019-08-31 RX ADMIN — Medication 40 MILLIGRAM(S): at 12:18

## 2019-08-31 RX ADMIN — ONDANSETRON 4 MILLIGRAM(S): 8 TABLET, FILM COATED ORAL at 10:45

## 2019-08-31 RX ADMIN — HYDROMORPHONE HYDROCHLORIDE 4 MILLIGRAM(S): 2 INJECTION INTRAMUSCULAR; INTRAVENOUS; SUBCUTANEOUS at 17:47

## 2019-08-31 RX ADMIN — Medication 650 MILLIGRAM(S): at 06:10

## 2019-08-31 RX ADMIN — HYDROMORPHONE HYDROCHLORIDE 4 MILLIGRAM(S): 2 INJECTION INTRAMUSCULAR; INTRAVENOUS; SUBCUTANEOUS at 10:42

## 2019-08-31 RX ADMIN — Medication 650 MILLIGRAM(S): at 00:40

## 2019-08-31 RX ADMIN — HYDROMORPHONE HYDROCHLORIDE 4 MILLIGRAM(S): 2 INJECTION INTRAMUSCULAR; INTRAVENOUS; SUBCUTANEOUS at 22:19

## 2019-08-31 RX ADMIN — Medication 650 MILLIGRAM(S): at 12:17

## 2019-08-31 RX ADMIN — Medication 100 MILLIGRAM(S): at 05:39

## 2019-08-31 RX ADMIN — HYDROMORPHONE HYDROCHLORIDE 4 MILLIGRAM(S): 2 INJECTION INTRAMUSCULAR; INTRAVENOUS; SUBCUTANEOUS at 05:40

## 2019-08-31 RX ADMIN — LAMOTRIGINE 100 MILLIGRAM(S): 25 TABLET, ORALLY DISINTEGRATING ORAL at 22:19

## 2019-08-31 RX ADMIN — Medication 650 MILLIGRAM(S): at 12:45

## 2019-08-31 RX ADMIN — Medication 650 MILLIGRAM(S): at 17:47

## 2019-08-31 RX ADMIN — Medication 650 MILLIGRAM(S): at 18:15

## 2019-08-31 NOTE — PHYSICAL THERAPY INITIAL EVALUATION ADULT - PERTINENT HX OF CURRENT PROBLEM, REHAB EVAL
38 y/o F with pmhx of sepsis in 2017, GERD, anemia and pshx of bariatric surgery, , cholecystectomy presents to the ED c/o back pain s/p L2-L3 surgery x4 days ago. CT lumbar spine , s/p L2-L3 lami, VA duplex b/l Les (-) for DVT

## 2019-08-31 NOTE — CHART NOTE - NSCHARTNOTEFT_GEN_A_CORE
CAPRINI SCORE [CLOT] Score on Admission for     AGE RELATED RISK FACTORS                                                       MOBILITY RELATED FACTORS  [ ] Age 41-60 years                                            (1 Point)                  [ ] Bed rest                                                        (1 Point)  [ ] Age: 61-74 years                                           (2 Points)                 [ ] Plaster cast                                                   (2 Points)  [ ] Age= 75 years                                              (3 Points)                 [ ] Bed bound for more than 72 hours                 (2 Points)    DISEASE RELATED RISK FACTORS                                               GENDER SPECIFIC FACTORS  [ ] Edema in the lower extremities                       (1 Point)                  [ ] Pregnancy                                                     (1 Point)  [ ] Varicose veins                                               (1 Point)                  [ ] Post-partum < 6 weeks                                   (1 Point)             [ x] BMI > 25 Kg/m2                                            (1 Point)                  [ ] Hormonal therapy  or oral contraception          (1 Point)                 [ ] Sepsis (in the previous month)                        (1 Point)            [ ] History of pregnancy complications                 (1 point)  [ ] Pneumonia or serious lung disease                                            [ ] Unexplained or recurrent                     (1 Point)           (in the previous month)                               (1 Point)  [ ] Abnormal pulmonary function test                     (1 Point)                 SURGERY RELATED RISK FACTORS (include planned surgeries)  [ ] Acute myocardial infarction                              (1 Point)                 [ ]  Section                                             (1 Point)  [ ] Congestive heart failure (in the previous month)  (1 Point)         [ ] Minor surgery                                                  (1 Point)   [ ] Inflammatory bowel disease                             (1 Point)                 [ ] Arthroscopic surgery                                        (2 Points)  [ ] Central venous access                                      (2 Points)                 [ ] General surgery lasting more than 45 minutes   (2 Points)       [ ] Stroke (in the previous month)                          (5 Points)               [ ] Elective arthroplasty                                         (5 Points)            [ ] current or past malignancy                              (2 Points)                                                                                                       HEMATOLOGY RELATED FACTORS                                                 TRAUMA RELATED RISK FACTORS  [ ] Prior episodes of VTE                                     (3 Points)                [ ] Fracture of the hip, pelvis, or leg                       (5 Points)  [ ] Positive family history for VTE                         (3 Points)             [ ] Acute spinal cord injury (in the previous month)  (5 Points)  [ ] Prothrombin 55628 A                                     (3 Points)                [ ] Paralysis  (less than 1 month)                             (5 Points)  [ ] Factor V Leiden                                             (3 Points)                  [ ] Multiple Trauma within 1 month                        (5 Points)  [ ] Lupus anticoagulants                                     (3 Points)                                                           [ ] Anticardiolipin antibodies                               (3 Points)                                                       [ ] High homocysteine in the blood                      (3 Points)                                             [ ] Other congenital or acquired thrombophilia      (3 Points)                                                [ ] Heparin induced thrombocytopenia                  (3 Points)                                          Total Score [   1       ]    Risk:  Very low 0   Low 1 to 2   Moderate 3 to 4   High =5       VTE Prophylaxis Recommendations:  [ x] mechanical pneumatic compression devices                                      [ ] contraindicated: _____________________  [x ] chemo prophylaxis                                                                                  [ ] contraindicated _____________________    **** HIGH LIKELIHOOD DVT PRESENT ON ADMISSION  [ ] (please order LE dopplers within 24 hours of admission)

## 2019-08-31 NOTE — PROGRESS NOTE ADULT - SUBJECTIVE AND OBJECTIVE BOX
40 y/o F with pmhx of sepsis in 2017, GERD, anemia and pshx of bariatric surgery, , cholecystectomy presents to the ED c/o back pain s/p L2-L3 surgery x4 days ago. Since then pt reports significant lower back pain which worsened last night.     OVERNIGHT EVENTS: none    Vital Signs Last 24 Hrs  T(C): 36.6 (31 Aug 2019 08:58), Max: 38 (30 Aug 2019 14:37)  T(F): 97.8 (31 Aug 2019 08:58), Max: 100.4 (30 Aug 2019 14:37)  HR: 67 (31 Aug 2019 08:58) (67 - 113)  BP: 104/70 (31 Aug 2019 08:58) (94/63 - 109/75)  BP(mean): --  RR: 16 (31 Aug 2019 08:58) (16 - 20)  SpO2: 95% (31 Aug 2019 08:58) (93% - 99%)    I&O's Detail    30 Aug 2019 07:  -  31 Aug 2019 07:00  --------------------------------------------------------  IN:    Oral Fluid: 850 mL  Total IN: 850 mL    OUT:    Voided: 700 mL  Total OUT: 700 mL    Total NET: 150 mL        I&O's Summary    30 Aug 2019 07:01  -  31 Aug 2019 07:00  --------------------------------------------------------  IN: 850 mL / OUT: 700 mL / NET: 150 mL        PHYSICAL EXAM:  Neurological: Alert oriented x3    Motor exam:         [x] Upper extremity                 D             B          T          WE       WF      HI                                               R         5/5        5/5        5/5       5/5     5/5       5/5                                               L          5/5        5/5        5/5       5/5     5/5       5/5         [x] Lower extremity                Ps          Ha        Quad    EHL        FHL                                               R        5/5        5/5        5/5       5/5         5/5                                               L         5/5        5/5       5/5       5/5          5/5                                                 Sensation: [] intact to light touch  [] decreased:        Gait: not tested    Cardiovascular: Regular  Respiratory: Clear bilaterally  Gastrointestinal: Abd soft + BS non tender  Genitourinary:  Extremities: -C/C/E  Incision/Wound: Intact    TUBES/LINES:  [] CVC  [] A-line  [] Lumbar Drain  [] Ventriculostomy  [] Other    DIET: Regular  [] NPO  [] Mechanical  [] Tube feeds    LABS:                        10.7   11.0  )-----------( 416      ( 30 Aug 2019 16:18 )             30.7         137  |  96  |  6<L>  ----------------------------<  86  3.5   |  26  |  0.65    Ca    9.5      30 Aug 2019 16:18    TPro  7.0  /  Alb  3.8  /  TBili  0.4  /  DBili  x   /  AST  31  /  ALT  58<H>  /  AlkPhos  218<H>        Urinalysis Basic - ( 30 Aug 2019 21:26 )    Color: Yellow / Appearance: Clear / S.010 / pH: x  Gluc: x / Ketone: Negative  / Bili: Negative / Urobili: 4 mg/dL   Blood: x / Protein: Negative / Nitrite: Negative   Leuk Esterase: Small / RBC: 6 /hpf / WBC 3 /HPF   Sq Epi: x / Non Sq Epi: 5 /hpf / Bacteria: Few    CAPILLARY BLOOD GLUCOSE    C-Reactive Protein, Serum: 22.84 mg/dL <H> ( @ 19:20)    Sedimentation Rate, Erythrocyte: 99 mm/hr ( @ 16:18)      Drug Levels: [] N/A    CSF Analysis: [] N/A      Allergies    Robaxin (Anaphylaxis)    Intolerances      MEDICATIONS:  Antibiotics:    Neuro:  acetaminophen   Tablet .. 650 milliGRAM(s) Oral every 6 hours  clonazePAM  Tablet 0.5 milliGRAM(s) Oral three times a day PRN  cyclobenzaprine 5 milliGRAM(s) Oral three times a day PRN  DULoxetine 60 milliGRAM(s) Oral daily  FLUoxetine 40 milliGRAM(s) Oral daily  gabapentin 300 milliGRAM(s) Oral three times a day  HYDROmorphone   Tablet 2 milliGRAM(s) Oral every 4 hours PRN  HYDROmorphone   Tablet 4 milliGRAM(s) Oral every 4 hours PRN  lamoTRIgine 100 milliGRAM(s) Oral at bedtime  ondansetron Injectable 4 milliGRAM(s) IV Push every 6 hours PRN  traZODone 100 milliGRAM(s) Oral daily    Anticoagulation:    OTHER:  docusate sodium 100 milliGRAM(s) Oral three times a day  senna 2 Tablet(s) Oral at bedtime PRN    IVF:  sodium chloride 0.9%. 1000 milliLiter(s) IV Continuous <Continuous>    CULTURES:    RADIOLOGY & ADDITIONAL TESTS:      ASSESSMENT:     40 y/o F with pmhx of sepsis in 2017, GERD, anemia and pshx of bariatric surgery, , cholecystectomy presents to the ED c/o back pain s/p L2-L3 surgery x4 days ago. Since then pt reports significant lower back pain which worsened last night.   39y Female s/p Lumbar insitu fusion with post-op lumbar pain  Please Check When Present   [x]  GCS  E   V  M     Heart Failure: []Acute, [] acute on chronic , []chronic  Heart Failure:  [] Diastolic (HFpEF), [] Systolic (HFrEF), []Combined (HFpEF and HFrEF), [] RHF, [] Pulm HTN, [] Other    [] ARMANDO, [] ATN, [] AIN, [] other  [] CKD1, [] CKD2, [] CKD 3, [] CKD 4, [] CKD 5, []ESRD    Encephalopathy: [] Metabolic, [] Hepatic, [] toxic, [] Neurological, [] Other    Abnormal Nurtitional Status: [] malnurtition (see nutrition note), [ ]underweight: BMI < 19, [] morbid obesity: BMI >40, [] Cachexia    [] Sepsis  [] hypovolemic shock,[] cardiogenic shock, [] hemorrhagic shock, [] neuogenic shock  [] Acute Respiratory Failure  []Cerebral edema, [] Brain compression/ herniation,   [] Functional quadriplegia  [] Acute blood loss anemia          PLAN:  NEURO: Stable, analgesic prn, muscles relaxant, PT consult, increase activity as tolerated  CARDIOVASCULAR: B/p has been soft, will monitor while on dilaudid prn  PULMONARY: incentive spirometer  RENAL: Bun/Creat stable  GI: Tolerating PO  HEME: H/H stable  ID: Afebrile  ENDO: No issue    DVT PROPHYLAXIS:  [x] Venodynes                                [x] Heparin/Lovenox    FALL RISK:  [x] Low Risk                                    [] Impulsive

## 2019-08-31 NOTE — PHYSICAL THERAPY INITIAL EVALUATION ADULT - PLANNED THERAPY INTERVENTIONS, PT EVAL
gait training/stairs: GOAL: patient will be able to negotiated 6 stairs (I) with one HR in 2 weeks/transfer training

## 2019-08-31 NOTE — PHYSICAL THERAPY INITIAL EVALUATION ADULT - ADDITIONAL COMMENTS
Pt lives in a private house with fiance and two children (8 y/o, 16 y/o) with one flight of steps inside to the basement. Pt was Ind with all ADLs and amb without AD.

## 2019-09-01 ENCOUNTER — TRANSCRIPTION ENCOUNTER (OUTPATIENT)
Age: 39
End: 2019-09-01

## 2019-09-01 VITALS
TEMPERATURE: 98 F | HEART RATE: 43 BPM | DIASTOLIC BLOOD PRESSURE: 71 MMHG | SYSTOLIC BLOOD PRESSURE: 120 MMHG | RESPIRATION RATE: 18 BRPM | OXYGEN SATURATION: 98 %

## 2019-09-01 LAB
ANION GAP SERPL CALC-SCNC: 11 MMOL/L — SIGNIFICANT CHANGE UP (ref 5–17)
BUN SERPL-MCNC: 6 MG/DL — LOW (ref 7–23)
CALCIUM SERPL-MCNC: 9.6 MG/DL — SIGNIFICANT CHANGE UP (ref 8.4–10.5)
CHLORIDE SERPL-SCNC: 104 MMOL/L — SIGNIFICANT CHANGE UP (ref 96–108)
CO2 SERPL-SCNC: 26 MMOL/L — SIGNIFICANT CHANGE UP (ref 22–31)
CREAT SERPL-MCNC: 0.46 MG/DL — LOW (ref 0.5–1.3)
GLUCOSE SERPL-MCNC: 113 MG/DL — HIGH (ref 70–99)
HCT VFR BLD CALC: 30.4 % — LOW (ref 34.5–45)
HGB BLD-MCNC: 9.9 G/DL — LOW (ref 11.5–15.5)
MCHC RBC-ENTMCNC: 30.9 PG — SIGNIFICANT CHANGE UP (ref 27–34)
MCHC RBC-ENTMCNC: 32.6 GM/DL — SIGNIFICANT CHANGE UP (ref 32–36)
MCV RBC AUTO: 95 FL — SIGNIFICANT CHANGE UP (ref 80–100)
PLATELET # BLD AUTO: 387 K/UL — SIGNIFICANT CHANGE UP (ref 150–400)
POTASSIUM SERPL-MCNC: 4.1 MMOL/L — SIGNIFICANT CHANGE UP (ref 3.5–5.3)
POTASSIUM SERPL-SCNC: 4.1 MMOL/L — SIGNIFICANT CHANGE UP (ref 3.5–5.3)
RBC # BLD: 3.2 M/UL — LOW (ref 3.8–5.2)
RBC # FLD: 12 % — SIGNIFICANT CHANGE UP (ref 10.3–14.5)
SODIUM SERPL-SCNC: 141 MMOL/L — SIGNIFICANT CHANGE UP (ref 135–145)
WBC # BLD: 7.88 K/UL — SIGNIFICANT CHANGE UP (ref 3.8–10.5)
WBC # FLD AUTO: 7.88 K/UL — SIGNIFICANT CHANGE UP (ref 3.8–10.5)

## 2019-09-01 PROCEDURE — 93010 ELECTROCARDIOGRAM REPORT: CPT

## 2019-09-01 PROCEDURE — 99024 POSTOP FOLLOW-UP VISIT: CPT

## 2019-09-01 RX ORDER — HYDROMORPHONE HYDROCHLORIDE 2 MG/ML
1 INJECTION INTRAMUSCULAR; INTRAVENOUS; SUBCUTANEOUS
Qty: 36 | Refills: 0
Start: 2019-09-01 | End: 2019-09-06

## 2019-09-01 RX ORDER — CYCLOBENZAPRINE HYDROCHLORIDE 10 MG/1
1 TABLET, FILM COATED ORAL
Qty: 15 | Refills: 0
Start: 2019-09-01 | End: 2019-09-05

## 2019-09-01 RX ORDER — HYDROMORPHONE HYDROCHLORIDE 2 MG/ML
1 INJECTION INTRAMUSCULAR; INTRAVENOUS; SUBCUTANEOUS
Qty: 0 | Refills: 0 | DISCHARGE
Start: 2019-09-01

## 2019-09-01 RX ORDER — DOCUSATE SODIUM 100 MG
1 CAPSULE ORAL
Qty: 0 | Refills: 0 | DISCHARGE
Start: 2019-09-01

## 2019-09-01 RX ORDER — ENOXAPARIN SODIUM 100 MG/ML
40 INJECTION SUBCUTANEOUS
Refills: 0 | Status: DISCONTINUED | OUTPATIENT
Start: 2019-09-01 | End: 2019-09-01

## 2019-09-01 RX ORDER — SENNA PLUS 8.6 MG/1
2 TABLET ORAL
Qty: 0 | Refills: 0 | DISCHARGE
Start: 2019-09-01

## 2019-09-01 RX ORDER — TRAZODONE HCL 50 MG
50 TABLET ORAL DAILY
Refills: 0 | Status: DISCONTINUED | OUTPATIENT
Start: 2019-09-01 | End: 2019-09-01

## 2019-09-01 RX ORDER — TRAZODONE HCL 50 MG
1 TABLET ORAL
Qty: 0 | Refills: 0 | DISCHARGE
Start: 2019-09-01

## 2019-09-01 RX ORDER — TRAZODONE HCL 50 MG
1 TABLET ORAL
Qty: 0 | Refills: 0 | DISCHARGE

## 2019-09-01 RX ADMIN — Medication 4 MILLIGRAM(S): at 06:26

## 2019-09-01 RX ADMIN — DULOXETINE HYDROCHLORIDE 90 MILLIGRAM(S): 30 CAPSULE, DELAYED RELEASE ORAL at 12:26

## 2019-09-01 RX ADMIN — Medication 650 MILLIGRAM(S): at 06:25

## 2019-09-01 RX ADMIN — Medication 4 MILLIGRAM(S): at 12:26

## 2019-09-01 RX ADMIN — Medication 100 MILLIGRAM(S): at 06:26

## 2019-09-01 RX ADMIN — GABAPENTIN 300 MILLIGRAM(S): 400 CAPSULE ORAL at 06:26

## 2019-09-01 RX ADMIN — Medication 40 MILLIGRAM(S): at 12:25

## 2019-09-01 RX ADMIN — Medication 650 MILLIGRAM(S): at 06:55

## 2019-09-01 RX ADMIN — Medication 650 MILLIGRAM(S): at 00:20

## 2019-09-01 RX ADMIN — Medication 650 MILLIGRAM(S): at 12:26

## 2019-09-01 NOTE — DISCHARGE NOTE NURSING/CASE MANAGEMENT/SOCIAL WORK - PATIENT PORTAL LINK FT
You can access the FollowMyHealth Patient Portal offered by [facility] by registering at the following website: [email or web address]. By joining [employer]’s FollowMyHealth portal, you will also be able to view your health information using other applications (apps) compatible with our system.

## 2019-09-01 NOTE — DISCHARGE NOTE PROVIDER - CARE PROVIDER_API CALL
Frederick Kerns ()  Neurological Surgery  [address]  [place] [address]  Phone: [phone]  Fax: [phone]  Follow Up Time: 1 week    Kel Pichardo)  Cardiovascular Disease; Internal Medicine  [address]  [place] [address]  Phone: [phone]  Fax: [phone]  Follow Up Time:

## 2019-09-01 NOTE — DISCHARGE NOTE PROVIDER - NSDCACTIVITY_GEN_ALL_CORE
Do not drive or operate machinery/Do not make important decisions/Walking - Outdoors allowed/Stairs allowed/No heavy lifting/straining/Showering allowed/Walking - Indoors allowed

## 2019-09-01 NOTE — DISCHARGE NOTE PROVIDER - NSDCCPCAREPLAN_GEN_ALL_CORE_FT
PRINCIPAL DISCHARGE DIAGNOSIS  Diagnosis: Low back pain  Assessment and Plan of Treatment:       SECONDARY DISCHARGE DIAGNOSES  Diagnosis: Depression  Assessment and Plan of Treatment:

## 2019-09-01 NOTE — PROGRESS NOTE ADULT - ASSESSMENT
HPI:  38 y/o F with pmhx of sepsis in 2017, GERD, anemia and pshx of bariatric surgery, , cholecystectomy presents to the ED c/o back pain s/p L2-L3 surgery x4 days ago. Since then pt reports significant lower back pain which worsened last night. (30 Aug 2019 17:59)    PROCEDURE:     Laminectomy, spine, lumbar   s/p L3-L4 laminectomy and discectomy and insitu fusion on    readmitted on  with pain.   PLAN:  1 Out of bed   2 continue pt   3 prn pain meds and muscle relaxant   4 dvt ppx sql and scds   5 continue clonipine, cymbalta, lamictal , prozac and effexor for mood stabilization   6 regular diet   7 stool softeners   8 dispo : dc home once stable     Assessment:  Please Check When Present   []  GCS  E   V  M     Heart Failure: []Acute, [] acute on chronic , []chronic  Heart Failure:  [] Diastolic (HFpEF), [] Systolic (HFrEF), []Combined (HFpEF and HFrEF), [] RHF, [] Pulm HTN, [] Other    [] ARMANDO, [] ATN, [] AIN, [] other  [] CKD1, [] CKD2, [] CKD 3, [] CKD 4, [] CKD 5, []ESRD    Encephalopathy: [] Metabolic, [] Hepatic, [] toxic, [] Neurological, [] Other    Abnormal Nurtitional Status: [] malnurtition (see nutrition note), [ ]underweight: BMI < 19, [] morbid obesity: BMI >40, [] Cachexia    [] Sepsis  [] hypovolemic shock,[] cardiogenic shock, [] hemorrhagic shock, [] neuogenic shock  [] Acute Respiratory Failure  []Cerebral edema, [] Brain compression/ herniation,   [] Functional quadriplegia  [] Acute blood loss anemia HPI:  40 y/o F with pmhx of sepsis in 2017, GERD, anemia and pshx of bariatric surgery, , cholecystectomy presents to the ED c/o back pain s/p L2-L3 surgery x4 days ago. Since then pt reports significant lower back pain which worsened last night. (30 Aug 2019 17:59)    PROCEDURE:     Laminectomy, spine, lumbar   s/p L3-L4 laminectomy and discectomy and insitu fusion on    readmitted on  with pain.   PLAN:  1 Out of bed   2 continue pt   3 prn pain meds and muscle relaxant   4 dvt ppx sql and scds   5 continue clonipine, cymbalta, lamictal , prozac and effexor for mood stabilization   6 regular diet   7 stool softeners   8 dispo : dc home once stable   9 bradycardia asymptomatic obtain ekg     Assessment:  Please Check When Present   []  GCS  E   V  M     Heart Failure: []Acute, [] acute on chronic , []chronic  Heart Failure:  [] Diastolic (HFpEF), [] Systolic (HFrEF), []Combined (HFpEF and HFrEF), [] RHF, [] Pulm HTN, [] Other    [] ARMANDO, [] ATN, [] AIN, [] other  [] CKD1, [] CKD2, [] CKD 3, [] CKD 4, [] CKD 5, []ESRD    Encephalopathy: [] Metabolic, [] Hepatic, [] toxic, [] Neurological, [] Other    Abnormal Nurtitional Status: [] malnurtition (see nutrition note), [ ]underweight: BMI < 19, [] morbid obesity: BMI >40, [] Cachexia    [] Sepsis  [] hypovolemic shock,[] cardiogenic shock, [] hemorrhagic shock, [] neuogenic shock  [] Acute Respiratory Failure  []Cerebral edema, [] Brain compression/ herniation,   [] Functional quadriplegia  [] Acute blood loss anemia HPI:  40 y/o F with pmhx of sepsis in 2017, GERD, anemia and pshx of bariatric surgery, , cholecystectomy presents to the ED c/o back pain s/p L2-L3 surgery x4 days ago. Since then pt reports significant lower back pain which worsened last night. (30 Aug 2019 17:59)    PROCEDURE:     Laminectomy, spine, lumbar   s/p L3-L4 laminectomy and discectomy and insitu fusion on    readmitted on  with pain.   PLAN:  1 Out of bed   2 continue pt   3 prn pain meds and muscle relaxant   4 dvt ppx sql and scds   5 continue clonipine, cymbalta, lamictal , prozac and effexor for mood stabilization   6 regular diet   7 stool softeners   8 dispo : dc home once stable   9 bradycardia asymptomatic obtain ekg     Assessment:  Please Check When Present   []  GCS  E   V  M     Heart Failure: []Acute, [] acute on chronic , []chronic  Heart Failure:  [] Diastolic (HFpEF), [] Systolic (HFrEF), []Combined (HFpEF and HFrEF), [] RHF, [] Pulm HTN, [] Other    [] ARMANDO, [] ATN, [] AIN, [] other  [] CKD1, [] CKD2, [] CKD 3, [] CKD 4, [] CKD 5, []ESRD    Encephalopathy: [] Metabolic, [] Hepatic, [] toxic, [] Neurological, [] Other    Abnormal Nurtitional Status: [] malnurtition (see nutrition note), [ ]underweight: BMI < 19, [] morbid obesity: BMI >40, [] Cachexia    [] Sepsis  [] hypovolemic shock,[] cardiogenic shock, [] hemorrhagic shock, [] neuogenic shock  [] Acute Respiratory Failure  []Cerebral edema, [] Brain compression/ herniation,   [] Functional quadriplegia  [] Acute blood loss anemia  More than 40 minutes were spent educating the patient and family regarding condition, medications, follow up plans, signs and symptoms to be concerned with, preparing paperwork, and questions answered regarding discharge.

## 2019-09-01 NOTE — DISCHARGE NOTE PROVIDER - CARE PROVIDERS DIRECT ADDRESSES
[email or web address]

## 2019-09-01 NOTE — PROGRESS NOTE ADULT - SUBJECTIVE AND OBJECTIVE BOX
SUBJECTIVE:   Patient was seen and evaluated at bedside. Patient is resting in bed and is in no new acute distress   OVERNIGHT EVENTS:   none   Vital Signs Last 24 Hrs  T(C): 36.8 (01 Sep 2019 05:03), Max: 37 (31 Aug 2019 19:22)  T(F): 98.3 (01 Sep 2019 05:03), Max: 98.6 (31 Aug 2019 19:22)  HR: 54 (01 Sep 2019 05:03) (54 - 88)  BP: 113/74 (01 Sep 2019 05:03) (94/56 - 113/74)  BP(mean): --  RR: 18 (01 Sep 2019 05:03) (17 - 18)  SpO2: 96% (01 Sep 2019 05:03) (93% - 98%)    PHYSICAL EXAM:    General: No Acute Distress     Neurological: Awake, alert oriented to person, place and time, Following Commands, PERRL, EOMI, Face Symmetrical, Speech Fluent, Moving all extremities, Muscle Strength normal in all four extremities, No Drift, Sensation to Light Touch Intact    Pulmonary: Clear to Auscultation, No Rales, No Rhonchi, No Wheezes     Cardiovascular: S1, S2, Regular Rate and Rhythm     Gastrointestinal: Soft, Nontender, Nondistended     Incision: clean and dry     LABS:                        10.7   11.0  )-----------( 416      ( 30 Aug 2019 16:18 )             30.7        141  |  104  |  6<L>  ----------------------------<  113<H>  4.1   |  26  |  0.46<L>    Ca    9.6      01 Sep 2019 07:20    TPro  7.0  /  Alb  3.8  /  TBili  0.4  /  DBili  x   /  AST  31  /  ALT  58<H>  /  AlkPhos  218<H>   @ 07:01  -  09- @ 07:00  --------------------------------------------------------  IN: 925 mL / OUT: 0 mL / NET: 925 mL      DRAINS:     MEDICATIONS:  Antibiotics:    Neuro:  acetaminophen   Tablet .. 650 milliGRAM(s) Oral every 6 hours  clonazePAM  Tablet 0.5 milliGRAM(s) Oral three times a day PRN pain  cyclobenzaprine 5 milliGRAM(s) Oral three times a day PRN Muscle Spasm  DULoxetine 90 milliGRAM(s) Oral daily  FLUoxetine 40 milliGRAM(s) Oral daily  gabapentin 300 milliGRAM(s) Oral three times a day  HYDROmorphone   Tablet 2 milliGRAM(s) Oral every 4 hours PRN Moderate Pain (4 - 6)  HYDROmorphone   Tablet 4 milliGRAM(s) Oral every 4 hours PRN Severe Pain (7 - 10)  lamoTRIgine 100 milliGRAM(s) Oral at bedtime  ondansetron Injectable 4 milliGRAM(s) IV Push every 6 hours PRN Nausea and/or Vomiting  traZODone 100 milliGRAM(s) Oral daily    Cardiac:    Pulm:    GI/:  docusate sodium 100 milliGRAM(s) Oral three times a day  senna 2 Tablet(s) Oral at bedtime PRN Constipation    Other:   dexamethasone  Injectable 4 milliGRAM(s) IV Push every 6 hours  enoxaparin Injectable 40 milliGRAM(s) SubCutaneous <User Schedule>    DIET: [] Regular [] CCD [] Renal [] Puree [] Dysphagia [] Tube Feeds: regular     IMAGIN/30 ct lspine : Lumbar laminectomy performed since 2019. Overlying skin   staples. Small amount of fluid within the postoperative bed. For further   evaluation, MRI with contrast is recommended.

## 2019-09-01 NOTE — DISCHARGE NOTE PROVIDER - HOSPITAL COURSE
Patient was admitted to hospital with increased back pain which patient couldn't tolerate anymore. Patient was admitted to floor. Patient was given pain meds, muscle relaxant and was continued on routine home meds. Patient was seen and cleared by physical therapy. Patient had no other complications. Patient was discharged home with follow up instructions. Patient was noted to have lower heart rate on ekg and cardiology was consulted and was recommneded for outpatient follow up and keep trazodone on lower dose.

## 2019-09-01 NOTE — PROGRESS NOTE ADULT - REASON FOR ADMISSION
Low back pain
Patient was readmitted with worsening back pain.  s/p l3-l4 laminectomy /discectomy and insitu fusion on 8/26

## 2019-09-01 NOTE — DISCHARGE NOTE PROVIDER - NSDCFUADDAPPT_GEN_ALL_CORE_FT
Please follow up with cardiologist regarding hear rate and adjusting medications dose.  Please follow up with primary care provider in 10 days.

## 2019-09-01 NOTE — DISCHARGE NOTE PROVIDER - REASON FOR ADMISSION
Patient was admitted with increased back pain.   Patient had a L3-L4 laminectomy, discectomy and insitu fusion done on 8/26

## 2019-09-03 ENCOUNTER — APPOINTMENT (OUTPATIENT)
Dept: SPINE | Facility: CLINIC | Age: 39
End: 2019-09-03
Payer: MEDICAID

## 2019-09-03 VITALS
BODY MASS INDEX: 39.2 KG/M2 | SYSTOLIC BLOOD PRESSURE: 110 MMHG | DIASTOLIC BLOOD PRESSURE: 77 MMHG | HEIGHT: 62 IN | WEIGHT: 213 LBS | TEMPERATURE: 98.9 F | HEART RATE: 80 BPM

## 2019-09-03 PROCEDURE — 99024 POSTOP FOLLOW-UP VISIT: CPT

## 2019-09-03 RX ORDER — LAMOTRIGINE 25 MG/1
25 TABLET ORAL TWICE DAILY
Qty: 180 | Refills: 0 | Status: DISCONTINUED | COMMUNITY
End: 2019-09-03

## 2019-09-03 NOTE — HISTORY OF PRESENT ILLNESS
[FreeTextEntry1] : 39 year old female who presents one week post op from a lumbar fusion for disc herniation. She was home recovering and experienced increased amount of pain and was readmitted with lower back and groin pain.  She was medically managed and discharged two days ago.  She is now home recovering and ambulating well.  Her pain is under control.  No bowel or bloadder symptoms.  She is on MOtrin for apina dn increaeing activy.

## 2019-09-03 NOTE — REASON FOR VISIT
[Family Member] : family member [de-identified] : 8/26/2019 [de-identified] : L2 L3 laminectomy and facetectomy and in situ fusion

## 2019-09-03 NOTE — ASSESSMENT
[FreeTextEntry1] : \par Assess:\par lumbar hernoiatrted sic\par post one week lumbar fusion \par \par PLAN\par Return in two weeks\par Lumbar x ray in two weeks\par No B L T \par No PT until two weeks \par keep incision clean an dr y \par

## 2019-09-04 LAB
CULTURE RESULTS: SIGNIFICANT CHANGE UP
CULTURE RESULTS: SIGNIFICANT CHANGE UP
SPECIMEN SOURCE: SIGNIFICANT CHANGE UP
SPECIMEN SOURCE: SIGNIFICANT CHANGE UP

## 2019-09-11 NOTE — PRE-ANESTHESIA EVALUATION ADULT - NSANTHSUBSTSD_GEN_ALL_CORE
No Intermediate Repair Preamble Text (Leave Blank If You Do Not Want): The risks, benefits, and possible outcomes of this procedure were discussed along with the risks, benefits, and possible outcomes of other options for wound repair (including but not limited to: variations of complex closure, flaps, grafts, and second intention). The patient verbalized understanding and consent to the outlined procedure. The patient understands should the surgical site heal in a manner they find unsatisfactory further interventions or referral to an additional specialist may be required. Following sterile preparation and drape, two standing cones were removed at opposite ends of the postoperative defect (as needed) within, or parallel to, the relaxed skin tension lines. The resulting defect was undermined, as necessary, in the appropriate surgical plane taking care to preserve local arteries, veins, nerves, and other structures of anatomic importance. Hemostasis was achieved and then the wound was sutured together in layered fashion.

## 2019-09-17 ENCOUNTER — APPOINTMENT (OUTPATIENT)
Dept: SPINE | Facility: CLINIC | Age: 39
End: 2019-09-17
Payer: MEDICAID

## 2019-09-17 VITALS
BODY MASS INDEX: 39.56 KG/M2 | DIASTOLIC BLOOD PRESSURE: 89 MMHG | HEIGHT: 62 IN | WEIGHT: 215 LBS | SYSTOLIC BLOOD PRESSURE: 122 MMHG

## 2019-09-17 PROCEDURE — 99024 POSTOP FOLLOW-UP VISIT: CPT

## 2019-09-17 NOTE — ASSESSMENT
[FreeTextEntry1] : Assess\par Lumbar disc disease\par Herniated disc L 2 L3\par S/P lumbar lami 8/26/2019\par \par PLAN:\par Physical Therapy\par TrAmadol and flexeril prescribed and side effects reviewed\par Follow up after six weeks of PT

## 2019-09-17 NOTE — HISTORY OF PRESENT ILLNESS
[FreeTextEntry1] : 39 year old female who presents with a history of lower back pain and herniated disc.  She had undergone a lumbar lami/decompression  on 8/26/2019.  She had severe recurrent pain of the lower back post op and was readmitted for pain management.  She is now one month post op .  Her pain is present but significantly improved since prior to the OR.  She has left leg pain and radicular symptoms.

## 2019-09-17 NOTE — REASON FOR VISIT
[Other: _____] : [unfilled] [de-identified] : 8/26/2019 [de-identified] : L2 L3 laminectomy and decompression for herniated disc

## 2019-09-17 NOTE — REVIEW OF SYSTEMS
[Numbness] : numbness [Tingling] : tingling [Negative] : Endocrine [de-identified] : lower back pain and  left leg pain with weakness [FreeTextEntry1] : No urine or bowel symptoms

## 2019-10-01 ENCOUNTER — OUTPATIENT (OUTPATIENT)
Dept: OUTPATIENT SERVICES | Facility: HOSPITAL | Age: 39
LOS: 1 days | End: 2019-10-01
Payer: MEDICAID

## 2019-10-01 DIAGNOSIS — Z98.89 OTHER SPECIFIED POSTPROCEDURAL STATES: Chronic | ICD-10-CM

## 2019-10-01 DIAGNOSIS — N84.0 POLYP OF CORPUS UTERI: Chronic | ICD-10-CM

## 2019-10-01 DIAGNOSIS — M67.432 GANGLION, LEFT WRIST: Chronic | ICD-10-CM

## 2019-10-01 DIAGNOSIS — Z98.84 BARIATRIC SURGERY STATUS: Chronic | ICD-10-CM

## 2019-10-01 DIAGNOSIS — Z90.49 ACQUIRED ABSENCE OF OTHER SPECIFIED PARTS OF DIGESTIVE TRACT: Chronic | ICD-10-CM

## 2019-10-01 PROCEDURE — H0002: CPT

## 2019-10-29 ENCOUNTER — APPOINTMENT (OUTPATIENT)
Dept: SPINE | Facility: CLINIC | Age: 39
End: 2019-10-29
Payer: MEDICAID

## 2019-10-29 ENCOUNTER — OTHER (OUTPATIENT)
Age: 39
End: 2019-10-29

## 2019-10-29 VITALS
SYSTOLIC BLOOD PRESSURE: 102 MMHG | BODY MASS INDEX: 38.09 KG/M2 | HEIGHT: 63 IN | HEART RATE: 111 BPM | WEIGHT: 215 LBS | DIASTOLIC BLOOD PRESSURE: 62 MMHG

## 2019-10-29 PROCEDURE — 99213 OFFICE O/P EST LOW 20 MIN: CPT | Mod: 24

## 2019-10-29 RX ORDER — CYCLOBENZAPRINE HYDROCHLORIDE 10 MG/1
10 TABLET, FILM COATED ORAL
Qty: 30 | Refills: 0 | Status: DISCONTINUED | COMMUNITY
Start: 2019-09-17 | End: 2019-10-29

## 2019-10-29 RX ORDER — TRAMADOL HYDROCHLORIDE 50 MG/1
50 TABLET, COATED ORAL
Qty: 42 | Refills: 0 | Status: DISCONTINUED | COMMUNITY
Start: 2019-09-17 | End: 2019-10-29

## 2019-10-29 RX ORDER — LAMOTRIGINE 100 MG/1
100 TABLET ORAL
Refills: 0 | Status: DISCONTINUED | COMMUNITY
End: 2019-10-29

## 2019-10-31 PROCEDURE — 83605 ASSAY OF LACTIC ACID: CPT

## 2019-10-31 PROCEDURE — 85027 COMPLETE CBC AUTOMATED: CPT

## 2019-10-31 PROCEDURE — 82435 ASSAY OF BLOOD CHLORIDE: CPT

## 2019-10-31 PROCEDURE — 86901 BLOOD TYPING SEROLOGIC RH(D): CPT

## 2019-10-31 PROCEDURE — 96375 TX/PRO/DX INJ NEW DRUG ADDON: CPT

## 2019-10-31 PROCEDURE — 85652 RBC SED RATE AUTOMATED: CPT

## 2019-10-31 PROCEDURE — 85610 PROTHROMBIN TIME: CPT

## 2019-10-31 PROCEDURE — 82803 BLOOD GASES ANY COMBINATION: CPT

## 2019-10-31 PROCEDURE — 81001 URINALYSIS AUTO W/SCOPE: CPT

## 2019-10-31 PROCEDURE — 85730 THROMBOPLASTIN TIME PARTIAL: CPT

## 2019-10-31 PROCEDURE — 99285 EMERGENCY DEPT VISIT HI MDM: CPT | Mod: 25

## 2019-10-31 PROCEDURE — 93970 EXTREMITY STUDY: CPT

## 2019-10-31 PROCEDURE — 86900 BLOOD TYPING SEROLOGIC ABO: CPT

## 2019-10-31 PROCEDURE — 84132 ASSAY OF SERUM POTASSIUM: CPT

## 2019-10-31 PROCEDURE — 76498 UNLISTED MR PROCEDURE: CPT

## 2019-10-31 PROCEDURE — 80048 BASIC METABOLIC PNL TOTAL CA: CPT

## 2019-10-31 PROCEDURE — 96376 TX/PRO/DX INJ SAME DRUG ADON: CPT

## 2019-10-31 PROCEDURE — 82947 ASSAY GLUCOSE BLOOD QUANT: CPT

## 2019-10-31 PROCEDURE — 84702 CHORIONIC GONADOTROPIN TEST: CPT

## 2019-10-31 PROCEDURE — 87040 BLOOD CULTURE FOR BACTERIA: CPT

## 2019-10-31 PROCEDURE — 84295 ASSAY OF SERUM SODIUM: CPT

## 2019-10-31 PROCEDURE — 85014 HEMATOCRIT: CPT

## 2019-10-31 PROCEDURE — 72131 CT LUMBAR SPINE W/O DYE: CPT

## 2019-10-31 PROCEDURE — 96374 THER/PROPH/DIAG INJ IV PUSH: CPT

## 2019-10-31 PROCEDURE — 86850 RBC ANTIBODY SCREEN: CPT

## 2019-10-31 PROCEDURE — 86140 C-REACTIVE PROTEIN: CPT

## 2019-10-31 PROCEDURE — 80053 COMPREHEN METABOLIC PANEL: CPT

## 2019-10-31 PROCEDURE — 82330 ASSAY OF CALCIUM: CPT

## 2019-10-31 PROCEDURE — 97162 PT EVAL MOD COMPLEX 30 MIN: CPT

## 2019-10-31 PROCEDURE — 93005 ELECTROCARDIOGRAM TRACING: CPT

## 2019-10-31 PROCEDURE — G0378: CPT

## 2019-11-07 ENCOUNTER — TRANSCRIPTION ENCOUNTER (OUTPATIENT)
Age: 39
End: 2019-11-07

## 2019-11-12 ENCOUNTER — OTHER (OUTPATIENT)
Age: 39
End: 2019-11-12

## 2019-11-12 ENCOUNTER — APPOINTMENT (OUTPATIENT)
Dept: SPINE | Facility: CLINIC | Age: 39
End: 2019-11-12
Payer: MEDICAID

## 2019-11-19 ENCOUNTER — INPATIENT (INPATIENT)
Facility: HOSPITAL | Age: 39
LOS: 5 days | Discharge: HOME | DRG: 455 | End: 2019-11-25
Attending: NEUROLOGICAL SURGERY | Admitting: NEUROLOGICAL SURGERY
Payer: MEDICAID

## 2019-11-19 ENCOUNTER — TRANSCRIPTION ENCOUNTER (OUTPATIENT)
Age: 39
End: 2019-11-19

## 2019-11-19 ENCOUNTER — APPOINTMENT (OUTPATIENT)
Dept: SPINE | Facility: CLINIC | Age: 39
End: 2019-11-19
Payer: MEDICAID

## 2019-11-19 VITALS
TEMPERATURE: 99 F | RESPIRATION RATE: 20 BRPM | OXYGEN SATURATION: 98 % | WEIGHT: 220.02 LBS | HEART RATE: 100 BPM | HEIGHT: 63 IN | DIASTOLIC BLOOD PRESSURE: 79 MMHG | SYSTOLIC BLOOD PRESSURE: 116 MMHG

## 2019-11-19 VITALS
WEIGHT: 215 LBS | BODY MASS INDEX: 38.09 KG/M2 | SYSTOLIC BLOOD PRESSURE: 113 MMHG | OXYGEN SATURATION: 98 % | TEMPERATURE: 98.1 F | HEIGHT: 63 IN | DIASTOLIC BLOOD PRESSURE: 77 MMHG | HEART RATE: 75 BPM

## 2019-11-19 DIAGNOSIS — Z87.891 PERSONAL HISTORY OF NICOTINE DEPENDENCE: ICD-10-CM

## 2019-11-19 DIAGNOSIS — Z98.84 BARIATRIC SURGERY STATUS: Chronic | ICD-10-CM

## 2019-11-19 DIAGNOSIS — M51.9 UNSPECIFIED THORACIC, THORACOLUMBAR AND LUMBOSACRAL INTERVERTEBRAL DISC DISORDER: ICD-10-CM

## 2019-11-19 DIAGNOSIS — M67.432 GANGLION, LEFT WRIST: Chronic | ICD-10-CM

## 2019-11-19 DIAGNOSIS — Z90.49 ACQUIRED ABSENCE OF OTHER SPECIFIED PARTS OF DIGESTIVE TRACT: Chronic | ICD-10-CM

## 2019-11-19 DIAGNOSIS — N84.0 POLYP OF CORPUS UTERI: Chronic | ICD-10-CM

## 2019-11-19 DIAGNOSIS — Z98.89 OTHER SPECIFIED POSTPROCEDURAL STATES: Chronic | ICD-10-CM

## 2019-11-19 LAB
ALBUMIN SERPL ELPH-MCNC: 4.5 G/DL — SIGNIFICANT CHANGE UP (ref 3.3–5)
ALP SERPL-CCNC: 80 U/L — SIGNIFICANT CHANGE UP (ref 40–120)
ALT FLD-CCNC: 14 U/L — SIGNIFICANT CHANGE UP (ref 10–45)
ANION GAP SERPL CALC-SCNC: 14 MMOL/L — SIGNIFICANT CHANGE UP (ref 5–17)
APTT BLD: 36.9 SEC — HIGH (ref 27.5–36.3)
AST SERPL-CCNC: 12 U/L — SIGNIFICANT CHANGE UP (ref 10–40)
BILIRUB SERPL-MCNC: 0.2 MG/DL — SIGNIFICANT CHANGE UP (ref 0.2–1.2)
BUN SERPL-MCNC: 10 MG/DL — SIGNIFICANT CHANGE UP (ref 7–23)
CALCIUM SERPL-MCNC: 9.6 MG/DL — SIGNIFICANT CHANGE UP (ref 8.4–10.5)
CHLORIDE SERPL-SCNC: 103 MMOL/L — SIGNIFICANT CHANGE UP (ref 96–108)
CO2 SERPL-SCNC: 25 MMOL/L — SIGNIFICANT CHANGE UP (ref 22–31)
CREAT SERPL-MCNC: 0.8 MG/DL — SIGNIFICANT CHANGE UP (ref 0.5–1.3)
GLUCOSE SERPL-MCNC: 81 MG/DL — SIGNIFICANT CHANGE UP (ref 70–99)
HCG SERPL-ACNC: <2 MIU/ML — SIGNIFICANT CHANGE UP
INR BLD: 1.12 RATIO — SIGNIFICANT CHANGE UP (ref 0.88–1.16)
POTASSIUM SERPL-MCNC: 4.2 MMOL/L — SIGNIFICANT CHANGE UP (ref 3.5–5.3)
POTASSIUM SERPL-SCNC: 4.2 MMOL/L — SIGNIFICANT CHANGE UP (ref 3.5–5.3)
PROT SERPL-MCNC: 7.1 G/DL — SIGNIFICANT CHANGE UP (ref 6–8.3)
PROTHROM AB SERPL-ACNC: 12.8 SEC — SIGNIFICANT CHANGE UP (ref 10–12.9)
SODIUM SERPL-SCNC: 142 MMOL/L — SIGNIFICANT CHANGE UP (ref 135–145)

## 2019-11-19 PROCEDURE — 99024 POSTOP FOLLOW-UP VISIT: CPT

## 2019-11-19 PROCEDURE — 99285 EMERGENCY DEPT VISIT HI MDM: CPT

## 2019-11-19 RX ORDER — SODIUM CHLORIDE 9 MG/ML
1000 INJECTION, SOLUTION INTRAVENOUS
Refills: 0 | Status: DISCONTINUED | OUTPATIENT
Start: 2019-11-19 | End: 2019-11-20

## 2019-11-19 RX ORDER — HYDROMORPHONE HYDROCHLORIDE 2 MG/ML
2 INJECTION INTRAMUSCULAR; INTRAVENOUS; SUBCUTANEOUS EVERY 4 HOURS
Refills: 0 | Status: DISCONTINUED | OUTPATIENT
Start: 2019-11-19 | End: 2019-11-20

## 2019-11-19 RX ORDER — OXYCODONE HYDROCHLORIDE 5 MG/1
5 TABLET ORAL ONCE
Refills: 0 | Status: DISCONTINUED | OUTPATIENT
Start: 2019-11-19 | End: 2019-11-19

## 2019-11-19 RX ORDER — DULOXETINE HYDROCHLORIDE 30 MG/1
90 CAPSULE, DELAYED RELEASE ORAL DAILY
Refills: 0 | Status: DISCONTINUED | OUTPATIENT
Start: 2019-11-19 | End: 2019-11-20

## 2019-11-19 RX ORDER — PANTOPRAZOLE SODIUM 20 MG/1
40 TABLET, DELAYED RELEASE ORAL
Refills: 0 | Status: DISCONTINUED | OUTPATIENT
Start: 2019-11-19 | End: 2019-11-20

## 2019-11-19 RX ORDER — VENLAFAXINE HCL 75 MG
300 CAPSULE, EXT RELEASE 24 HR ORAL DAILY
Refills: 0 | Status: DISCONTINUED | OUTPATIENT
Start: 2019-11-19 | End: 2019-11-19

## 2019-11-19 RX ORDER — FLUOXETINE HCL 10 MG
40 CAPSULE ORAL DAILY
Refills: 0 | Status: DISCONTINUED | OUTPATIENT
Start: 2019-11-19 | End: 2019-11-20

## 2019-11-19 RX ORDER — OXYCODONE HYDROCHLORIDE 5 MG/1
10 TABLET ORAL EVERY 4 HOURS
Refills: 0 | Status: DISCONTINUED | OUTPATIENT
Start: 2019-11-19 | End: 2019-11-19

## 2019-11-19 RX ORDER — VENLAFAXINE HCL 75 MG
300 CAPSULE, EXT RELEASE 24 HR ORAL DAILY
Refills: 0 | Status: DISCONTINUED | OUTPATIENT
Start: 2019-11-19 | End: 2019-11-20

## 2019-11-19 RX ORDER — ONDANSETRON 8 MG/1
4 TABLET, FILM COATED ORAL EVERY 6 HOURS
Refills: 0 | Status: DISCONTINUED | OUTPATIENT
Start: 2019-11-19 | End: 2019-11-20

## 2019-11-19 RX ORDER — DEXAMETHASONE 0.5 MG/5ML
4 ELIXIR ORAL EVERY 12 HOURS
Refills: 0 | Status: DISCONTINUED | OUTPATIENT
Start: 2019-11-19 | End: 2019-11-19

## 2019-11-19 RX ORDER — CLONAZEPAM 1 MG
0.5 TABLET ORAL THREE TIMES A DAY
Refills: 0 | Status: DISCONTINUED | OUTPATIENT
Start: 2019-11-19 | End: 2019-11-20

## 2019-11-19 RX ORDER — HYDROMORPHONE HYDROCHLORIDE 2 MG/ML
1 INJECTION INTRAMUSCULAR; INTRAVENOUS; SUBCUTANEOUS EVERY 6 HOURS
Refills: 0 | Status: DISCONTINUED | OUTPATIENT
Start: 2019-11-19 | End: 2019-11-20

## 2019-11-19 RX ORDER — SODIUM CHLORIDE 9 MG/ML
3 INJECTION INTRAMUSCULAR; INTRAVENOUS; SUBCUTANEOUS EVERY 8 HOURS
Refills: 0 | Status: DISCONTINUED | OUTPATIENT
Start: 2019-11-19 | End: 2019-11-20

## 2019-11-19 RX ORDER — CYCLOBENZAPRINE HYDROCHLORIDE 10 MG/1
5 TABLET, FILM COATED ORAL THREE TIMES A DAY
Refills: 0 | Status: DISCONTINUED | OUTPATIENT
Start: 2019-11-19 | End: 2019-11-20

## 2019-11-19 RX ORDER — DOCUSATE SODIUM 100 MG
100 CAPSULE ORAL THREE TIMES A DAY
Refills: 0 | Status: DISCONTINUED | OUTPATIENT
Start: 2019-11-19 | End: 2019-11-19

## 2019-11-19 RX ORDER — POLYETHYLENE GLYCOL 3350 17 G/17G
17 POWDER, FOR SOLUTION ORAL DAILY
Refills: 0 | Status: DISCONTINUED | OUTPATIENT
Start: 2019-11-19 | End: 2019-11-20

## 2019-11-19 RX ORDER — OXYCODONE AND ACETAMINOPHEN 5; 325 MG/1; MG/1
1 TABLET ORAL ONCE
Refills: 0 | Status: DISCONTINUED | OUTPATIENT
Start: 2019-11-19 | End: 2019-11-19

## 2019-11-19 RX ORDER — LAMOTRIGINE 25 MG/1
150 TABLET, ORALLY DISINTEGRATING ORAL AT BEDTIME
Refills: 0 | Status: DISCONTINUED | OUTPATIENT
Start: 2019-11-19 | End: 2019-11-20

## 2019-11-19 RX ORDER — ACETAMINOPHEN 500 MG
650 TABLET ORAL EVERY 6 HOURS
Refills: 0 | Status: DISCONTINUED | OUTPATIENT
Start: 2019-11-19 | End: 2019-11-20

## 2019-11-19 RX ORDER — GABAPENTIN 400 MG/1
300 CAPSULE ORAL THREE TIMES A DAY
Refills: 0 | Status: DISCONTINUED | OUTPATIENT
Start: 2019-11-19 | End: 2019-11-20

## 2019-11-19 RX ORDER — SENNA PLUS 8.6 MG/1
2 TABLET ORAL AT BEDTIME
Refills: 0 | Status: DISCONTINUED | OUTPATIENT
Start: 2019-11-19 | End: 2019-11-20

## 2019-11-19 RX ORDER — ACETAMINOPHEN 500 MG
650 TABLET ORAL ONCE
Refills: 0 | Status: COMPLETED | OUTPATIENT
Start: 2019-11-19 | End: 2019-11-19

## 2019-11-19 RX ORDER — HYDROMORPHONE HYDROCHLORIDE 2 MG/ML
4 INJECTION INTRAMUSCULAR; INTRAVENOUS; SUBCUTANEOUS EVERY 4 HOURS
Refills: 0 | Status: DISCONTINUED | OUTPATIENT
Start: 2019-11-19 | End: 2019-11-20

## 2019-11-19 RX ORDER — TRAZODONE HCL 50 MG
100 TABLET ORAL AT BEDTIME
Refills: 0 | Status: DISCONTINUED | OUTPATIENT
Start: 2019-11-19 | End: 2019-11-20

## 2019-11-19 RX ORDER — TRAZODONE HCL 50 MG
50 TABLET ORAL DAILY
Refills: 0 | Status: DISCONTINUED | OUTPATIENT
Start: 2019-11-19 | End: 2019-11-19

## 2019-11-19 RX ORDER — LAMOTRIGINE 25 MG/1
100 TABLET, ORALLY DISINTEGRATING ORAL AT BEDTIME
Refills: 0 | Status: DISCONTINUED | OUTPATIENT
Start: 2019-11-19 | End: 2019-11-19

## 2019-11-19 RX ADMIN — GABAPENTIN 300 MILLIGRAM(S): 400 CAPSULE ORAL at 21:24

## 2019-11-19 RX ADMIN — OXYCODONE AND ACETAMINOPHEN 1 TABLET(S): 5; 325 TABLET ORAL at 17:20

## 2019-11-19 RX ADMIN — HYDROMORPHONE HYDROCHLORIDE 4 MILLIGRAM(S): 2 INJECTION INTRAMUSCULAR; INTRAVENOUS; SUBCUTANEOUS at 19:17

## 2019-11-19 RX ADMIN — HYDROMORPHONE HYDROCHLORIDE 1 MILLIGRAM(S): 2 INJECTION INTRAMUSCULAR; INTRAVENOUS; SUBCUTANEOUS at 21:49

## 2019-11-19 RX ADMIN — HYDROMORPHONE HYDROCHLORIDE 4 MILLIGRAM(S): 2 INJECTION INTRAMUSCULAR; INTRAVENOUS; SUBCUTANEOUS at 23:20

## 2019-11-19 RX ADMIN — Medication 650 MILLIGRAM(S): at 17:20

## 2019-11-19 RX ADMIN — OXYCODONE AND ACETAMINOPHEN 1 TABLET(S): 5; 325 TABLET ORAL at 18:00

## 2019-11-19 RX ADMIN — Medication 650 MILLIGRAM(S): at 18:00

## 2019-11-19 RX ADMIN — SODIUM CHLORIDE 3 MILLILITER(S): 9 INJECTION INTRAMUSCULAR; INTRAVENOUS; SUBCUTANEOUS at 21:45

## 2019-11-19 RX ADMIN — HYDROMORPHONE HYDROCHLORIDE 1 MILLIGRAM(S): 2 INJECTION INTRAMUSCULAR; INTRAVENOUS; SUBCUTANEOUS at 21:19

## 2019-11-19 RX ADMIN — HYDROMORPHONE HYDROCHLORIDE 4 MILLIGRAM(S): 2 INJECTION INTRAMUSCULAR; INTRAVENOUS; SUBCUTANEOUS at 20:15

## 2019-11-19 NOTE — ED PROVIDER NOTE - ATTENDING CONTRIBUTION TO CARE
Noted RN note- "39 year old female patient presents to ED c/o chronic lower back pain, acutely worsening x 2 weeks. Patient with hx of laminectomy Aug 26, 2019 and states she has had little relief since surgery. Patient has repeat MRI recently and was told L2 has herniated again and she will need another surgery. Patient states she has been taking Motrin for pain with little relief- last dose 8am. Patient also takes gabapentin but has not taken it today. Patient reporting LLE weakness and difficulty ambulating, and now intermittent numbness to L arm, denies currently. Patient denies urinary or bowel incontinence. Patient assisted into position of comfort, aware of plan of care for MD evaluation." Noted RN note- "39 year old female patient presents to ED c/o chronic lower back pain, acutely worsening x 2 weeks. Patient with hx of laminectomy Aug 26, 2019 and states she has had little relief since surgery. Patient has repeat MRI recently and was told L2 has herniated again and she will need another surgery. Patient states she has been taking Motrin for pain with little relief- last dose 8am. Patient also takes gabapentin but has not taken it today. Patient reporting LLE weakness and difficulty ambulating, and now intermittent numbness to L arm, denies currently. Patient denies urinary or bowel incontinence. Patient assisted into position of comfort, aware of plan of care for MD evaluation."    Patient with low back pain, under went recent surgery but since that time her pain has worsened and patient had another MRI showing a worsening disc herniation. Patient states she has left lower extremity weakness.  mild distress secondary to pain  lungs clear to auscultation bilaterally, equal breath sounds bilaterally  non-tachycardic, non-tachypneic   no rash/vesicles/petechiae   Patient ambulating to with high level of difficulty to restroom with pain   Jag Brandon MD, FACEP: In this physician's medical judgement based on clinical history and physical exam, patient with persistent pain and worsening disc herniation and with increased difficulty walking and inability to do activities of daily living   Based on patient's history and physical exam, as well as the results of today's workup, I feel that patient warrants admission to the hospital for further workup/evaluation and continued management. I discussed the findings of today's workup with the patient and addressed the patient's questions and concerns. The patient was agreeable with admission. Our team spoke with the inpatient receiving team who accepted the patient for admission and subsequently took over the patient's care at the time of admission.

## 2019-11-19 NOTE — ED ADULT NURSE NOTE - OBJECTIVE STATEMENT
39 year old female patient presents to ED c/o chronic lower back pain, acutely worsening x 2 weeks. Patient with hx of laminectomy Aug 26, 2019 and states she has had little relief since surgery. Patient has repeat MRI recently and was told L2 has herniated again and she will need another surgery. Patient states she has been taking motrin for pain with little relief- last dose 8am. Patient also takes gabapentin but has not taken it today. Patient reporting LLE weakness and difficulty ambulating, and now intermittent numbness to L arm, denies currently. Patient denies urinary or bowel incontinence. Patient assisted into position of comfort, aware of plan of care for MD evaluation.

## 2019-11-19 NOTE — ED PROVIDER NOTE - CLINICAL SUMMARY MEDICAL DECISION MAKING FREE TEXT BOX
James, PGY1 - 39F PMH L2-L3 herniation s/p L2-L3 lumbar lami, discectomy, insitu fusion 8/26/19 p/w L back pain x 2 months since surgery, worsening for past 2 weeks. MRI 2 weeks ago showing L2-L3 re-herniation. No red flags concerning for cauda equina, do not suspect spinal epidural abscess or other infection, or fracture. Vitals WNL, neuro exam non-focal. Plan: pain control, neurosurgery consult.

## 2019-11-19 NOTE — H&P ADULT - HISTORY OF PRESENT ILLNESS
39F s/p 8/26/19 laminectomy now with recurrent L2-3 L sided disc with intractable L3 radiculopathy for 2 weeks

## 2019-11-19 NOTE — ED PROVIDER NOTE - CARE PLAN
Principal Discharge DX:	Back pain Principal Discharge DX:	Disc disorder of lumbar region Principal Discharge DX:	Disc disorder of lumbar region  Goal:	low back pain

## 2019-11-19 NOTE — PATIENT PROFILE ADULT - HOME ACCESSIBILITY CONCERNS - OTHER
"I have trouble going up stairs in my house. I'll wait for my domestic partner to come home or my eldest child to come home to help assist me going up the stairs or with laundry. I have a really hard time showering and cooking. Even to lean back to wet my hair or shave my legs is difficult. I took a bath once and could not get out of the bathtub. I waited for my domestic partner to get back from the store for help."

## 2019-11-19 NOTE — ED PROVIDER NOTE - OBJECTIVE STATEMENT
39F PMH L2 herniation s/p L3/L4 lami disc insitufusion 8/26/19 p/w L back pain x 2 months, worsening for past 2 weeks. MRI performed 2 weeks ago showing that L2 had re-herniated. Saw neurosurgeon Dr. Kerns 39F PMH depression, anxiety, bipolar, L2-L3 herniation s/p L2-L3 lumbar lami, discectomy, insitu fusion 8/26/19 p/w L back pain x 2 months, worsening for past 2 weeks. Pain is sharp, constant, and radiates up to her L neck and down her L leg. Endorses chronic LLE weakness, but is able to ambulate. MRI performed 2 weeks ago showing that L2 had re-herniated. Saw neurosurgeon Dr. Kerns, who recommended she present to ED if pain is unbearable. No f/c, vomiting, 39F PMH depression, anxiety, bipolar, L2-L3 herniation s/p L2-L3 lumbar lami, discectomy, insitu fusion 8/26/19 p/w L back pain x 2 months, worsening for past 2 weeks. Pain is sharp, constant, and radiates up to her L neck and down her L leg. Endorses nausea 2/2 pain. Pain unrelieved by Motrin, last took Motrin at 8am today. States she has chronic LLE weakness, but is able to ambulate. MRI performed 2 weeks ago showing that L2 had re-herniated. Saw her neurosurgeon Dr. Kerns this AM, who recommended she present to ED if pain is unbearable. No f/c, vomiting, saddle anesthesia, urinary retention, or urinary or stool incontinence. No recent unexplained weight loss. No history of IVDU. No history of cancer. No trauma.

## 2019-11-19 NOTE — ED PROVIDER NOTE - PHYSICAL EXAMINATION
I have reviewed the triage vital signs.  Const: AAOx3, laying supine in NAD  Eyes: PERRL, no conjunctival injection  HENT: NCAT, Neck supple, cervical ROM WNL, oral mucosa moist  CV: RRR, no obvious murmurs, rubs, or gallops appreciated  Resp: CTAB, no respiratory distress, no wheezes, rales, or rhonchi  GI: Abdomen soft, NTND, no guarding, no CVA tenderness  Extremities: No peripheral edema,  2+ radial and DP pulses  Skin: Warm, well perfused, no rash  MSK: No gross deformities appreciated  Neuro: CN2-12 grossly intact, MS +5/5 in UE and LE BL, gross sensation intact in UE and LE BL, antalgic gait, negative rhomberg, negative pronator drift   Psych: Appropriate mood and affect

## 2019-11-19 NOTE — REASON FOR VISIT
[Follow-Up: _____] : a [unfilled] follow-up visit [Other: _____] : [unfilled] [FreeTextEntry1] : 39 year old female who presents with a history of left sided lumbar radiculopathy and suffering 10/10 lower back.  She reports her left side from her back to her leg is painful and shooting , tingling , numbness is present.  No urine or bowel symptoms.  She has trouble walking and standing.   In the past in August 2019 she had undergone a L2 L3 lami and decompression.

## 2019-11-19 NOTE — ED PROVIDER NOTE - NS ED ROS FT
General: Denies fevers or chills  Eyes: Denies vision changes  ENMT: Denies trouble swallowing or speaking, or sore throat  CV: Denies chest pain  Resp: Denies cough or SOB  GI: +Nausea. Denies abdominal pain, vomiting, diarrhea, or constipation   : Denies painful urination  Skin: Denies new rashes  Neuro: Denies headache, numbness, or weakness  MSK: +L neck/back/leg pain. Denies recent trauma or joint pain

## 2019-11-19 NOTE — PATIENT PROFILE ADULT - SAFE PLACE TO LIVE - DETAILS
"Not that it's not safe but I rent and I don't have an income. My domestic partner is the only one working for our family of four."

## 2019-11-19 NOTE — PATIENT PROFILE ADULT - HEALTH LITERACY UNDERSTANDING DOCTOR- DETAILS
"Sometimes, like I'll ask in-depth. Like today when Dr. Kerns explained to me what was going on because I thought he already took out the disc, so when he told me L2 was herniated, I was confused."

## 2019-11-19 NOTE — H&P ADULT - ASSESSMENT
39F s/p 8/26/19 laminectomy now with recurrent L2-3 L sided disc with intractable L3 radiculopathy for 2 weeks  - admit  - preop for tomorrow  - NPO afte rmidnight  - preop labs

## 2019-11-19 NOTE — H&P ADULT - NSHPPHYSICALEXAM_GEN_ALL_CORE
AOx3, FC, PERRL, EOMI, no facial   L HF 4+/5 pain limited otherwise 5/5 throughout, no drift  SILT  no clonus

## 2019-11-20 ENCOUNTER — APPOINTMENT (OUTPATIENT)
Dept: SPINE | Facility: HOSPITAL | Age: 39
End: 2019-11-20
Payer: MEDICAID

## 2019-11-20 LAB
ANION GAP SERPL CALC-SCNC: 12 MMOL/L — SIGNIFICANT CHANGE UP (ref 5–17)
BASOPHILS # BLD AUTO: 0.01 K/UL — SIGNIFICANT CHANGE UP (ref 0–0.2)
BASOPHILS NFR BLD AUTO: 0.1 % — SIGNIFICANT CHANGE UP (ref 0–2)
BLD GP AB SCN SERPL QL: NEGATIVE — SIGNIFICANT CHANGE UP
BUN SERPL-MCNC: 9 MG/DL — SIGNIFICANT CHANGE UP (ref 7–23)
CALCIUM SERPL-MCNC: 8.5 MG/DL — SIGNIFICANT CHANGE UP (ref 8.4–10.5)
CHLORIDE SERPL-SCNC: 108 MMOL/L — SIGNIFICANT CHANGE UP (ref 96–108)
CO2 SERPL-SCNC: 23 MMOL/L — SIGNIFICANT CHANGE UP (ref 22–31)
CREAT SERPL-MCNC: 0.83 MG/DL — SIGNIFICANT CHANGE UP (ref 0.5–1.3)
EOSINOPHIL # BLD AUTO: 0.02 K/UL — SIGNIFICANT CHANGE UP (ref 0–0.5)
EOSINOPHIL NFR BLD AUTO: 0.3 % — SIGNIFICANT CHANGE UP (ref 0–6)
GLUCOSE SERPL-MCNC: 125 MG/DL — HIGH (ref 70–99)
HCT VFR BLD CALC: 34.1 % — LOW (ref 34.5–45)
HGB BLD-MCNC: 11 G/DL — LOW (ref 11.5–15.5)
IMM GRANULOCYTES NFR BLD AUTO: 0.5 % — SIGNIFICANT CHANGE UP (ref 0–1.5)
LYMPHOCYTES # BLD AUTO: 0.77 K/UL — LOW (ref 1–3.3)
LYMPHOCYTES # BLD AUTO: 10 % — LOW (ref 13–44)
MCHC RBC-ENTMCNC: 29.9 PG — SIGNIFICANT CHANGE UP (ref 27–34)
MCHC RBC-ENTMCNC: 32.3 GM/DL — SIGNIFICANT CHANGE UP (ref 32–36)
MCV RBC AUTO: 92.7 FL — SIGNIFICANT CHANGE UP (ref 80–100)
MONOCYTES # BLD AUTO: 0.09 K/UL — SIGNIFICANT CHANGE UP (ref 0–0.9)
MONOCYTES NFR BLD AUTO: 1.2 % — LOW (ref 2–14)
NEUTROPHILS # BLD AUTO: 6.8 K/UL — SIGNIFICANT CHANGE UP (ref 1.8–7.4)
NEUTROPHILS NFR BLD AUTO: 87.9 % — HIGH (ref 43–77)
NRBC # BLD: 0 /100 WBCS — SIGNIFICANT CHANGE UP (ref 0–0)
PLATELET # BLD AUTO: 289 K/UL — SIGNIFICANT CHANGE UP (ref 150–400)
POTASSIUM SERPL-MCNC: 3.9 MMOL/L — SIGNIFICANT CHANGE UP (ref 3.5–5.3)
POTASSIUM SERPL-SCNC: 3.9 MMOL/L — SIGNIFICANT CHANGE UP (ref 3.5–5.3)
RBC # BLD: 3.68 M/UL — LOW (ref 3.8–5.2)
RBC # FLD: 11.9 % — SIGNIFICANT CHANGE UP (ref 10.3–14.5)
RH IG SCN BLD-IMP: POSITIVE — SIGNIFICANT CHANGE UP
SODIUM SERPL-SCNC: 143 MMOL/L — SIGNIFICANT CHANGE UP (ref 135–145)
WBC # BLD: 7.73 K/UL — SIGNIFICANT CHANGE UP (ref 3.8–10.5)
WBC # FLD AUTO: 7.73 K/UL — SIGNIFICANT CHANGE UP (ref 3.8–10.5)

## 2019-11-20 PROCEDURE — 22840 INSERT SPINE FIXATION DEVICE: CPT | Mod: 58

## 2019-11-20 PROCEDURE — 22633 ARTHRD CMBN 1NTRSPC LUMBAR: CPT | Mod: 58

## 2019-11-20 PROCEDURE — 22853 INSJ BIOMECHANICAL DEVICE: CPT | Mod: 58

## 2019-11-20 PROCEDURE — 63047 LAM FACETEC & FORAMOT LUMBAR: CPT | Mod: 58,59

## 2019-11-20 RX ORDER — DIPHENHYDRAMINE HCL 50 MG
25 CAPSULE ORAL EVERY 6 HOURS
Refills: 0 | Status: DISCONTINUED | OUTPATIENT
Start: 2019-11-20 | End: 2019-11-22

## 2019-11-20 RX ORDER — SENNA PLUS 8.6 MG/1
2 TABLET ORAL AT BEDTIME
Refills: 0 | Status: DISCONTINUED | OUTPATIENT
Start: 2019-11-20 | End: 2019-11-21

## 2019-11-20 RX ORDER — TOBRAMYCIN 0.3 %
1 DROPS OPHTHALMIC (EYE) EVERY 4 HOURS
Refills: 0 | Status: COMPLETED | OUTPATIENT
Start: 2019-11-20 | End: 2019-11-21

## 2019-11-20 RX ORDER — HYDROMORPHONE HYDROCHLORIDE 2 MG/ML
0.5 INJECTION INTRAMUSCULAR; INTRAVENOUS; SUBCUTANEOUS
Refills: 0 | Status: DISCONTINUED | OUTPATIENT
Start: 2019-11-20 | End: 2019-11-21

## 2019-11-20 RX ORDER — DIPHENHYDRAMINE HCL 50 MG
25 CAPSULE ORAL ONCE
Refills: 0 | Status: DISCONTINUED | OUTPATIENT
Start: 2019-11-20 | End: 2019-11-20

## 2019-11-20 RX ORDER — CYCLOBENZAPRINE HYDROCHLORIDE 10 MG/1
5 TABLET, FILM COATED ORAL THREE TIMES A DAY
Refills: 0 | Status: DISCONTINUED | OUTPATIENT
Start: 2019-11-20 | End: 2019-11-22

## 2019-11-20 RX ORDER — LAMOTRIGINE 25 MG/1
150 TABLET, ORALLY DISINTEGRATING ORAL AT BEDTIME
Refills: 0 | Status: DISCONTINUED | OUTPATIENT
Start: 2019-11-20 | End: 2019-11-25

## 2019-11-20 RX ORDER — HYDROMORPHONE HYDROCHLORIDE 2 MG/ML
1 INJECTION INTRAMUSCULAR; INTRAVENOUS; SUBCUTANEOUS
Refills: 0 | Status: DISCONTINUED | OUTPATIENT
Start: 2019-11-20 | End: 2019-11-21

## 2019-11-20 RX ORDER — HYDROMORPHONE HYDROCHLORIDE 2 MG/ML
0.5 INJECTION INTRAMUSCULAR; INTRAVENOUS; SUBCUTANEOUS
Refills: 0 | Status: DISCONTINUED | OUTPATIENT
Start: 2019-11-20 | End: 2019-11-22

## 2019-11-20 RX ORDER — DIPHENHYDRAMINE HCL 50 MG
25 CAPSULE ORAL EVERY 6 HOURS
Refills: 0 | Status: DISCONTINUED | OUTPATIENT
Start: 2019-11-20 | End: 2019-11-20

## 2019-11-20 RX ORDER — CLONAZEPAM 1 MG
0.5 TABLET ORAL THREE TIMES A DAY
Refills: 0 | Status: DISCONTINUED | OUTPATIENT
Start: 2019-11-20 | End: 2019-11-20

## 2019-11-20 RX ORDER — GABAPENTIN 400 MG/1
300 CAPSULE ORAL THREE TIMES A DAY
Refills: 0 | Status: DISCONTINUED | OUTPATIENT
Start: 2019-11-20 | End: 2019-11-24

## 2019-11-20 RX ORDER — NALOXONE HYDROCHLORIDE 4 MG/.1ML
0.1 SPRAY NASAL
Refills: 0 | Status: DISCONTINUED | OUTPATIENT
Start: 2019-11-20 | End: 2019-11-22

## 2019-11-20 RX ORDER — ONDANSETRON 8 MG/1
4 TABLET, FILM COATED ORAL EVERY 6 HOURS
Refills: 0 | Status: DISCONTINUED | OUTPATIENT
Start: 2019-11-20 | End: 2019-11-21

## 2019-11-20 RX ORDER — DIPHENHYDRAMINE HCL 50 MG
25 CAPSULE ORAL ONCE
Refills: 0 | Status: COMPLETED | OUTPATIENT
Start: 2019-11-20 | End: 2019-11-20

## 2019-11-20 RX ORDER — SODIUM CHLORIDE 9 MG/ML
1000 INJECTION INTRAMUSCULAR; INTRAVENOUS; SUBCUTANEOUS
Refills: 0 | Status: DISCONTINUED | OUTPATIENT
Start: 2019-11-20 | End: 2019-11-22

## 2019-11-20 RX ORDER — DULOXETINE HYDROCHLORIDE 30 MG/1
90 CAPSULE, DELAYED RELEASE ORAL DAILY
Refills: 0 | Status: DISCONTINUED | OUTPATIENT
Start: 2019-11-20 | End: 2019-11-25

## 2019-11-20 RX ORDER — HYDROMORPHONE HYDROCHLORIDE 2 MG/ML
30 INJECTION INTRAMUSCULAR; INTRAVENOUS; SUBCUTANEOUS
Refills: 0 | Status: DISCONTINUED | OUTPATIENT
Start: 2019-11-20 | End: 2019-11-21

## 2019-11-20 RX ORDER — ONDANSETRON 8 MG/1
4 TABLET, FILM COATED ORAL EVERY 6 HOURS
Refills: 0 | Status: DISCONTINUED | OUTPATIENT
Start: 2019-11-20 | End: 2019-11-25

## 2019-11-20 RX ORDER — ACETAMINOPHEN 500 MG
650 TABLET ORAL EVERY 6 HOURS
Refills: 0 | Status: DISCONTINUED | OUTPATIENT
Start: 2019-11-20 | End: 2019-11-25

## 2019-11-20 RX ORDER — TRAZODONE HCL 50 MG
100 TABLET ORAL AT BEDTIME
Refills: 0 | Status: DISCONTINUED | OUTPATIENT
Start: 2019-11-20 | End: 2019-11-25

## 2019-11-20 RX ORDER — CEFAZOLIN SODIUM 1 G
2000 VIAL (EA) INJECTION EVERY 8 HOURS
Refills: 0 | Status: COMPLETED | OUTPATIENT
Start: 2019-11-20 | End: 2019-11-21

## 2019-11-20 RX ORDER — FLUOXETINE HCL 10 MG
40 CAPSULE ORAL DAILY
Refills: 0 | Status: DISCONTINUED | OUTPATIENT
Start: 2019-11-20 | End: 2019-11-24

## 2019-11-20 RX ORDER — HYDROMORPHONE HYDROCHLORIDE 2 MG/ML
1 INJECTION INTRAMUSCULAR; INTRAVENOUS; SUBCUTANEOUS EVERY 4 HOURS
Refills: 0 | Status: DISCONTINUED | OUTPATIENT
Start: 2019-11-20 | End: 2019-11-20

## 2019-11-20 RX ORDER — PANTOPRAZOLE SODIUM 20 MG/1
40 TABLET, DELAYED RELEASE ORAL
Refills: 0 | Status: DISCONTINUED | OUTPATIENT
Start: 2019-11-20 | End: 2019-11-25

## 2019-11-20 RX ORDER — HYDROMORPHONE HYDROCHLORIDE 2 MG/ML
1 INJECTION INTRAMUSCULAR; INTRAVENOUS; SUBCUTANEOUS ONCE
Refills: 0 | Status: DISCONTINUED | OUTPATIENT
Start: 2019-11-20 | End: 2019-11-20

## 2019-11-20 RX ORDER — ALPRAZOLAM 0.25 MG
0.5 TABLET ORAL ONCE
Refills: 0 | Status: DISCONTINUED | OUTPATIENT
Start: 2019-11-20 | End: 2019-11-21

## 2019-11-20 RX ADMIN — Medication 100 MILLIGRAM(S): at 22:23

## 2019-11-20 RX ADMIN — GABAPENTIN 300 MILLIGRAM(S): 400 CAPSULE ORAL at 23:03

## 2019-11-20 RX ADMIN — HYDROMORPHONE HYDROCHLORIDE 4 MILLIGRAM(S): 2 INJECTION INTRAMUSCULAR; INTRAVENOUS; SUBCUTANEOUS at 06:00

## 2019-11-20 RX ADMIN — HYDROMORPHONE HYDROCHLORIDE 4 MILLIGRAM(S): 2 INJECTION INTRAMUSCULAR; INTRAVENOUS; SUBCUTANEOUS at 15:05

## 2019-11-20 RX ADMIN — LAMOTRIGINE 150 MILLIGRAM(S): 25 TABLET, ORALLY DISINTEGRATING ORAL at 23:03

## 2019-11-20 RX ADMIN — HYDROMORPHONE HYDROCHLORIDE 4 MILLIGRAM(S): 2 INJECTION INTRAMUSCULAR; INTRAVENOUS; SUBCUTANEOUS at 15:35

## 2019-11-20 RX ADMIN — SODIUM CHLORIDE 75 MILLILITER(S): 9 INJECTION, SOLUTION INTRAVENOUS at 03:14

## 2019-11-20 RX ADMIN — Medication 25 MILLIGRAM(S): at 23:43

## 2019-11-20 RX ADMIN — PANTOPRAZOLE SODIUM 40 MILLIGRAM(S): 20 TABLET, DELAYED RELEASE ORAL at 06:44

## 2019-11-20 RX ADMIN — Medication 25 MILLIGRAM(S): at 06:42

## 2019-11-20 RX ADMIN — HYDROMORPHONE HYDROCHLORIDE 4 MILLIGRAM(S): 2 INJECTION INTRAMUSCULAR; INTRAVENOUS; SUBCUTANEOUS at 05:07

## 2019-11-20 RX ADMIN — HYDROMORPHONE HYDROCHLORIDE 1 MILLIGRAM(S): 2 INJECTION INTRAMUSCULAR; INTRAVENOUS; SUBCUTANEOUS at 13:00

## 2019-11-20 RX ADMIN — CYCLOBENZAPRINE HYDROCHLORIDE 5 MILLIGRAM(S): 10 TABLET, FILM COATED ORAL at 09:28

## 2019-11-20 RX ADMIN — HYDROMORPHONE HYDROCHLORIDE 1 MILLIGRAM(S): 2 INJECTION INTRAMUSCULAR; INTRAVENOUS; SUBCUTANEOUS at 12:48

## 2019-11-20 RX ADMIN — HYDROMORPHONE HYDROCHLORIDE 4 MILLIGRAM(S): 2 INJECTION INTRAMUSCULAR; INTRAVENOUS; SUBCUTANEOUS at 10:00

## 2019-11-20 RX ADMIN — HYDROMORPHONE HYDROCHLORIDE 1 MILLIGRAM(S): 2 INJECTION INTRAMUSCULAR; INTRAVENOUS; SUBCUTANEOUS at 02:20

## 2019-11-20 RX ADMIN — Medication 25 MILLIGRAM(S): at 11:52

## 2019-11-20 RX ADMIN — CYCLOBENZAPRINE HYDROCHLORIDE 5 MILLIGRAM(S): 10 TABLET, FILM COATED ORAL at 03:15

## 2019-11-20 RX ADMIN — HYDROMORPHONE HYDROCHLORIDE 1 MILLIGRAM(S): 2 INJECTION INTRAMUSCULAR; INTRAVENOUS; SUBCUTANEOUS at 01:50

## 2019-11-20 RX ADMIN — HYDROMORPHONE HYDROCHLORIDE 1 MILLIGRAM(S): 2 INJECTION INTRAMUSCULAR; INTRAVENOUS; SUBCUTANEOUS at 21:05

## 2019-11-20 RX ADMIN — HYDROMORPHONE HYDROCHLORIDE 1 MILLIGRAM(S): 2 INJECTION INTRAMUSCULAR; INTRAVENOUS; SUBCUTANEOUS at 06:42

## 2019-11-20 RX ADMIN — HYDROMORPHONE HYDROCHLORIDE 4 MILLIGRAM(S): 2 INJECTION INTRAMUSCULAR; INTRAVENOUS; SUBCUTANEOUS at 09:21

## 2019-11-20 RX ADMIN — HYDROMORPHONE HYDROCHLORIDE 1 MILLIGRAM(S): 2 INJECTION INTRAMUSCULAR; INTRAVENOUS; SUBCUTANEOUS at 07:15

## 2019-11-20 RX ADMIN — Medication 25 MILLIGRAM(S): at 03:44

## 2019-11-20 RX ADMIN — Medication 0.5 MILLIGRAM(S): at 11:49

## 2019-11-20 RX ADMIN — Medication 100 MILLIGRAM(S): at 23:03

## 2019-11-20 RX ADMIN — Medication 25 MILLIGRAM(S): at 09:20

## 2019-11-20 RX ADMIN — HYDROMORPHONE HYDROCHLORIDE 30 MILLILITER(S): 2 INJECTION INTRAMUSCULAR; INTRAVENOUS; SUBCUTANEOUS at 22:01

## 2019-11-20 RX ADMIN — HYDROMORPHONE HYDROCHLORIDE 1 MILLIGRAM(S): 2 INJECTION INTRAMUSCULAR; INTRAVENOUS; SUBCUTANEOUS at 20:50

## 2019-11-20 RX ADMIN — SODIUM CHLORIDE 3 MILLILITER(S): 9 INJECTION INTRAMUSCULAR; INTRAVENOUS; SUBCUTANEOUS at 05:06

## 2019-11-20 RX ADMIN — SODIUM CHLORIDE 3 MILLILITER(S): 9 INJECTION INTRAMUSCULAR; INTRAVENOUS; SUBCUTANEOUS at 12:50

## 2019-11-20 RX ADMIN — HYDROMORPHONE HYDROCHLORIDE 4 MILLIGRAM(S): 2 INJECTION INTRAMUSCULAR; INTRAVENOUS; SUBCUTANEOUS at 00:20

## 2019-11-20 RX ADMIN — SODIUM CHLORIDE 75 MILLILITER(S): 9 INJECTION INTRAMUSCULAR; INTRAVENOUS; SUBCUTANEOUS at 21:47

## 2019-11-20 NOTE — PRE-ANESTHESIA EVALUATION ADULT - NSANTHOSAYNRD_GEN_A_CORE
No. FARHEEN screening performed.  STOP BANG Legend: 0-2 = LOW Risk; 3-4 = INTERMEDIATE Risk; 5-8 = HIGH Risk

## 2019-11-20 NOTE — PROGRESS NOTE ADULT - SUBJECTIVE AND OBJECTIVE BOX
Called to PACU spot due to discomfort and pain with left eye. Evaluated for corneal abrasion. After one eye drop to left eye of tetracaine solution 0.5%, symptoms improved. Gave 1 drop of tobramycin 0.3% solution to left eye and ordered for a total of 3 doses q4 hours including this one. Explained to patient what happened and to the nurse caring for her.

## 2019-11-21 RX ORDER — HYDROMORPHONE HYDROCHLORIDE 2 MG/ML
30 INJECTION INTRAMUSCULAR; INTRAVENOUS; SUBCUTANEOUS
Refills: 0 | Status: DISCONTINUED | OUTPATIENT
Start: 2019-11-21 | End: 2019-11-22

## 2019-11-21 RX ORDER — ENOXAPARIN SODIUM 100 MG/ML
40 INJECTION SUBCUTANEOUS AT BEDTIME
Refills: 0 | Status: DISCONTINUED | OUTPATIENT
Start: 2019-11-21 | End: 2019-11-25

## 2019-11-21 RX ORDER — SENNA PLUS 8.6 MG/1
2 TABLET ORAL AT BEDTIME
Refills: 0 | Status: DISCONTINUED | OUTPATIENT
Start: 2019-11-21 | End: 2019-11-25

## 2019-11-21 RX ADMIN — Medication 1 DROP(S): at 05:45

## 2019-11-21 RX ADMIN — Medication 100 MILLIGRAM(S): at 13:23

## 2019-11-21 RX ADMIN — ONDANSETRON 4 MILLIGRAM(S): 8 TABLET, FILM COATED ORAL at 19:57

## 2019-11-21 RX ADMIN — HYDROMORPHONE HYDROCHLORIDE 30 MILLILITER(S): 2 INJECTION INTRAMUSCULAR; INTRAVENOUS; SUBCUTANEOUS at 11:10

## 2019-11-21 RX ADMIN — Medication 25 MILLIGRAM(S): at 05:53

## 2019-11-21 RX ADMIN — CYCLOBENZAPRINE HYDROCHLORIDE 5 MILLIGRAM(S): 10 TABLET, FILM COATED ORAL at 08:39

## 2019-11-21 RX ADMIN — HYDROMORPHONE HYDROCHLORIDE 30 MILLILITER(S): 2 INJECTION INTRAMUSCULAR; INTRAVENOUS; SUBCUTANEOUS at 00:00

## 2019-11-21 RX ADMIN — PANTOPRAZOLE SODIUM 40 MILLIGRAM(S): 20 TABLET, DELAYED RELEASE ORAL at 05:45

## 2019-11-21 RX ADMIN — Medication 1 DROP(S): at 09:36

## 2019-11-21 RX ADMIN — Medication 100 MILLIGRAM(S): at 05:44

## 2019-11-21 RX ADMIN — ENOXAPARIN SODIUM 40 MILLIGRAM(S): 100 INJECTION SUBCUTANEOUS at 23:28

## 2019-11-21 RX ADMIN — Medication 1 DROP(S): at 02:39

## 2019-11-21 RX ADMIN — Medication 25 MILLIGRAM(S): at 11:53

## 2019-11-21 RX ADMIN — CYCLOBENZAPRINE HYDROCHLORIDE 5 MILLIGRAM(S): 10 TABLET, FILM COATED ORAL at 15:06

## 2019-11-21 RX ADMIN — Medication 25 MILLIGRAM(S): at 17:56

## 2019-11-21 RX ADMIN — SODIUM CHLORIDE 75 MILLILITER(S): 9 INJECTION INTRAMUSCULAR; INTRAVENOUS; SUBCUTANEOUS at 23:51

## 2019-11-21 RX ADMIN — Medication 0.5 MILLIGRAM(S): at 02:38

## 2019-11-21 NOTE — PROGRESS NOTE ADULT - ASSESSMENT
HPI:  39F s/p 19 laminectomy now with recurrent L2-3 L sided disc with intractable L3 radiculopathy for 2 weeks (2019 18:04)    PAST MEDICAL & SURGICAL HISTORY:  Anemia  GERD (gastroesophageal reflux disease)  Depression  ADD (attention deficit disorder)  Bipolar mood disorder  Depression  ADD (attention deficit disorder)  Depression  Endometrial polyp: s/p ablation 2019  Ganglion cyst of wrist, left  Bariatric surgery status: s/p gastric sleeve, then s/p insertion of gastric band which was   removed in  secondary to displacement  S/P  section  S/P cholecystectomy  Delivery by emergency caesarean section    PROCEDURE: 19 L2-L5 tlif for recurrent disc herniation    PLAN:  Neuro: continue PCA/IVF, flexeril PRN spasms for pain; (anaphylaxis throat closing with robaxin)  cont haddad and drains and monitor output.   Respiratory: patient instructed on incentive spirometer  CV: normotensive           DVT ppx: [] SQH [x] SQL and venodynes bilaterally  Renal: cont IVF and haddad, TOV tomorrow   ID: afebrile  GI: bowel regimen  PT/OT: TBD post op  f/u am labs    Will discuss with Dr Saumya Xavier # [id number]    Assessment:  Please Check When Present   []  GCS  E   V  M     Heart Failure: []Acute, [] acute on chronic , []chronic  Heart Failure:  [] Diastolic (HFpEF), [] Systolic (HFrEF), []Combined (HFpEF and HFrEF), [] RHF, [] Pulm HTN, [] Other    [] ARMANDO, [] ATN, [] AIN, [] other  [] CKD1, [] CKD2, [] CKD 3, [] CKD 4, [] CKD 5, []ESRD    Encephalopathy: [] Metabolic, [] Hepatic, [] toxic, [] Neurological, [] Other    Abnormal Nutritional Status: [] malnutrition (see nutrition note), [ ]underweight: BMI < 19, [] morbid obesity: BMI >40, [] Cachexia    [] Sepsis  [] hypovolemic shock,[] cardiogenic shock, [] hemorrhagic shock, [] neurogenic shock  [] Acute Respiratory Failure  []Cerebral edema, [] Brain compression/ herniation,   [] Functional quadriplegia  [] Acute blood loss anemia

## 2019-11-21 NOTE — PHYSICAL THERAPY INITIAL EVALUATION ADULT - PERTINENT HX OF CURRENT PROBLEM, REHAB EVAL
39F s/p 8/26/19 laminectomy now with recurrent L2-3 L sided disc with intractable L3 radiculopathy for 2 weeks; (-)DVT B LE; pt is s/p L2-5TLIF 11/20

## 2019-11-21 NOTE — PROGRESS NOTE ADULT - SUBJECTIVE AND OBJECTIVE BOX
CHIEF COMPLAINT: patient c/o itchy eyes and itching everywhere; +PCA/IVF, +haddad + HMVC x2    Vital Signs Last 24 Hrs  T(C): 36.6 (21 Nov 2019 08:46), Max: 37.1 (21 Nov 2019 05:13)  T(F): 97.9 (21 Nov 2019 08:46), Max: 98.8 (21 Nov 2019 05:13)  HR: 84 (21 Nov 2019 08:46) (71 - 97)  BP: 114/70 (21 Nov 2019 08:46) (103/68 - 133/72)  BP(mean): 81 (21 Nov 2019 00:00) (81 - 97)  RR: 18 (21 Nov 2019 08:46) (12 - 18)  SpO2: 96% (21 Nov 2019 08:46) (93% - 100%)    DRAINS: [] TRACIE (cc/24h) [x] HMV (10 cc/24h right, 125cc/24h left)    PHYSICAL EXAM:    General: No Acute Distress     Neurological: Awake, alert oriented to person, place and time, Following Commands, PERRL, EOMI, Face Symmetrical, Speech Fluent, Moving all extremities, Muscle Strength normal in all four extremities except L LE 5-5 secondary to pain; No Drift, Sensation to Light Touch Intact    Pulmonary: Clear to Auscultation, No Rales, No Rhonchi, No Wheezes     Cardiovascular: S1, S2, Regular Rate and Rhythm     Gastrointestinal: Soft, Nontender, Nondistended     Incision: +dressing c/d/i, HMVC x 2     LABS:                        11.0   7.73  )-----------( 289      ( 20 Nov 2019 20:48 )             34.1    11-20    143  |  108  |  9   ----------------------------<  125<H>  3.9   |  23  |  0.83    Ca    8.5      20 Nov 2019 20:48    TPro  7.1  /  Alb  4.5  /  TBili  0.2  /  DBili  x   /  AST  12  /  ALT  14  /  AlkPhos  80  11-19  PT/INR - ( 19 Nov 2019 18:56 )   PT: 12.8 sec;   INR: 1.12 ratio    PTT - ( 19 Nov 2019 18:56 )  PTT:36.9 sec    11-20 @ 07:01 - 11-21 @ 07:00  --------------------------------------------------------  IN: 1025 mL / OUT: 1925 mL / NET: -900 mL    MEDICATIONS:  Anticoagulation:  enoxaparin Injectable 40 milliGRAM(s) SubCutaneous at bedtime    Antibiotics:  ceFAZolin   IVPB 2000 milliGRAM(s) IV Intermittent every 8 hours    Neuro:  acetaminophen   Tablet .. 650 milliGRAM(s) Oral every 6 hours PRN Temp greater or equal to 38C (100.4F), Mild Pain (1 - 3)  cyclobenzaprine 5 milliGRAM(s) Oral three times a day PRN Muscle Spasm  DULoxetine 90 milliGRAM(s) Oral daily  FLUoxetine 40 milliGRAM(s) Oral daily  gabapentin 300 milliGRAM(s) Oral three times a day  HYDROmorphone PCA (1 mG/mL) 30 milliLiter(s) PCA Continuous PCA Continuous  HYDROmorphone PCA (1 mG/mL) Rescue Clinician Bolus 0.5 milliGRAM(s) IV Push every 15 minutes PRN for Pain Scale GREATER THAN 6  lamoTRIgine 150 milliGRAM(s) Oral at bedtime  ondansetron Injectable 4 milliGRAM(s) IV Push every 6 hours PRN Nausea and/or Vomiting  traZODone 100 milliGRAM(s) Oral at bedtime    Pulm:  diphenhydrAMINE   Injectable 25 milliGRAM(s) IV Push every 6 hours PRN Itching    GI/:  pantoprazole    Tablet 40 milliGRAM(s) Oral before breakfast  senna 2 Tablet(s) Oral at bedtime    Other:   artificial  tears Solution 1 Drop(s) Both EYES four times a day PRN Dry Eyes  naloxone Injectable 0.1 milliGRAM(s) IV Push every 3 minutes PRN For ANY of the following changes in patient status:  A. RR LESS THAN 10 breaths per minute, B. Oxygen saturation LESS THAN 90%, C. Sedation score of 6  sodium chloride 0.9%. 1000 milliLiter(s) IV Continuous <Continuous>  tetracaine 0.5% Solution 1 Drop(s) Left EYE once    DIET: [x] Regular [] CCD [] Renal [] Puree [] Dysphagia [] Tube Feeds:     IMAGING:

## 2019-11-21 NOTE — PHYSICAL THERAPY INITIAL EVALUATION ADULT - ADDITIONAL COMMENTS
pt states lives with fiyanirae and 2 children (8 years old and 17 years old) in private house, first floor set up, 3 steps to enter with HR. pt independent with moblity and did not use assistive device

## 2019-11-21 NOTE — PROGRESS NOTE ADULT - SUBJECTIVE AND OBJECTIVE BOX
Day 1 of Anesthesia Pain Management Service    SUBJECTIVE: I'm having some pain    Pain Scale Score:	[X] Refer to charted pain scores    THERAPY:    [ ] IV PCA Morphine		[ ] 5 mg/mL	[ ] 1 mg/mL  [X] IV PCA Hydromorphone	[ ] 5 mg/mL	[X] 1 mg/mL  [ ] IV PCA Fentanyl		[ ] 50 micrograms/mL    Demand dose: 0.2 mg     Lockout: 6 minutes   Continuous Rate: 0 mg/hr  4 Hour Limit: 4 mg    MEDICATIONS  (STANDING):  ceFAZolin   IVPB 2000 milliGRAM(s) IV Intermittent every 8 hours  DULoxetine 90 milliGRAM(s) Oral daily  FLUoxetine 40 milliGRAM(s) Oral daily  gabapentin 300 milliGRAM(s) Oral three times a day  HYDROmorphone PCA (1 mG/mL) 30 milliLiter(s) PCA Continuous PCA Continuous  lamoTRIgine 150 milliGRAM(s) Oral at bedtime  pantoprazole    Tablet 40 milliGRAM(s) Oral before breakfast  senna 2 Tablet(s) Oral at bedtime  sodium chloride 0.9%. 1000 milliLiter(s) (75 mL/Hr) IV Continuous <Continuous>  tetracaine 0.5% Solution 1 Drop(s) Left EYE once  traZODone 100 milliGRAM(s) Oral at bedtime    MEDICATIONS  (PRN):  acetaminophen   Tablet .. 650 milliGRAM(s) Oral every 6 hours PRN Temp greater or equal to 38C (100.4F), Mild Pain (1 - 3)  artificial  tears Solution 1 Drop(s) Both EYES four times a day PRN Dry Eyes  cyclobenzaprine 5 milliGRAM(s) Oral three times a day PRN Muscle Spasm  diphenhydrAMINE   Injectable 25 milliGRAM(s) IV Push every 6 hours PRN Itching  HYDROmorphone PCA (1 mG/mL) Rescue Clinician Bolus 0.5 milliGRAM(s) IV Push every 15 minutes PRN for Pain Scale GREATER THAN 6  naloxone Injectable 0.1 milliGRAM(s) IV Push every 3 minutes PRN For ANY of the following changes in patient status:  A. RR LESS THAN 10 breaths per minute, B. Oxygen saturation LESS THAN 90%, C. Sedation score of 6  ondansetron Injectable 4 milliGRAM(s) IV Push every 6 hours PRN Nausea and/or Vomiting      OBJECTIVE:    Sedation Score:	[ X] Alert 	[ ] Drowsy 	[ ] Arousable	[ ] Asleep	[ ] Unresponsive    Side Effects:	[X ] None	[ ] Nausea	[ ] Vomiting	[ ] Pruritus  		[ ] Other:    Vital Signs Last 24 Hrs  T(C): 36.6 (21 Nov 2019 08:46), Max: 37.1 (21 Nov 2019 05:13)  T(F): 97.9 (21 Nov 2019 08:46), Max: 98.8 (21 Nov 2019 05:13)  HR: 84 (21 Nov 2019 08:46) (71 - 97)  BP: 114/70 (21 Nov 2019 08:46) (103/68 - 133/72)  BP(mean): 81 (21 Nov 2019 00:00) (81 - 97)  RR: 18 (21 Nov 2019 08:46) (12 - 18)  SpO2: 96% (21 Nov 2019 08:46) (93% - 100%)    ASSESSMENT/ PLAN    Therapy to  be:               [X] Continued   [ ] Discontinued   [ ] Changed to PRN Analgesics    Documentation and Verification of current medications:   [X] Done	[ ] Not done, not eligible    Comments: Using 1-4x/hr. increased PCA dosing to 0.25mg and four hour limit to 5mg.

## 2019-11-21 NOTE — PHYSICAL THERAPY INITIAL EVALUATION ADULT - CRITERIA FOR SKILLED THERAPEUTIC INTERVENTIONS
impairments found/risk reduction/prevention/anticipated equipment needs at discharge/anticipated discharge recommendation

## 2019-11-21 NOTE — PHYSICAL THERAPY INITIAL EVALUATION ADULT - STRENGTHENING, PT EVAL
GOAL: pt will increase strength in B UE/LE 1/2 grade to improve overall functional mobility and promote independence by 2 weeks

## 2019-11-21 NOTE — PROGRESS NOTE ADULT - SUBJECTIVE AND OBJECTIVE BOX
Pain Management Attending Addendum    SUBJECTIVE: Patient doing well with IV PCA    Therapy:    [X] IV PCA         [ ] PRN Analgesics    OBJECTIVE:   [] Pain appropriately controlled    [X ] Other: Still in pain requiring increase in bolus dose    Side Effects:  [] None	             [ ] Nausea              [X ] Pruritis                	[ ] Other:    ASSESSMENT/PLAN: Continue current therapy    Comments: Increased dose from 0.2 to 0.25mg.

## 2019-11-22 ENCOUNTER — TRANSCRIPTION ENCOUNTER (OUTPATIENT)
Age: 39
End: 2019-11-22

## 2019-11-22 ENCOUNTER — OTHER (OUTPATIENT)
Age: 39
End: 2019-11-22

## 2019-11-22 LAB
ANION GAP SERPL CALC-SCNC: 12 MMOL/L — SIGNIFICANT CHANGE UP (ref 5–17)
BASOPHILS # BLD AUTO: 0.03 K/UL — SIGNIFICANT CHANGE UP (ref 0–0.2)
BASOPHILS NFR BLD AUTO: 0.3 % — SIGNIFICANT CHANGE UP (ref 0–2)
BUN SERPL-MCNC: 8 MG/DL — SIGNIFICANT CHANGE UP (ref 7–23)
CALCIUM SERPL-MCNC: 8.2 MG/DL — LOW (ref 8.4–10.5)
CHLORIDE SERPL-SCNC: 106 MMOL/L — SIGNIFICANT CHANGE UP (ref 96–108)
CO2 SERPL-SCNC: 23 MMOL/L — SIGNIFICANT CHANGE UP (ref 22–31)
CREAT SERPL-MCNC: 0.72 MG/DL — SIGNIFICANT CHANGE UP (ref 0.5–1.3)
EOSINOPHIL # BLD AUTO: 0.07 K/UL — SIGNIFICANT CHANGE UP (ref 0–0.5)
EOSINOPHIL NFR BLD AUTO: 0.8 % — SIGNIFICANT CHANGE UP (ref 0–6)
GLUCOSE SERPL-MCNC: 94 MG/DL — SIGNIFICANT CHANGE UP (ref 70–99)
HCT VFR BLD CALC: 28.6 % — LOW (ref 34.5–45)
HGB BLD-MCNC: 9.2 G/DL — LOW (ref 11.5–15.5)
IMM GRANULOCYTES NFR BLD AUTO: 0.2 % — SIGNIFICANT CHANGE UP (ref 0–1.5)
LYMPHOCYTES # BLD AUTO: 1.72 K/UL — SIGNIFICANT CHANGE UP (ref 1–3.3)
LYMPHOCYTES # BLD AUTO: 19.7 % — SIGNIFICANT CHANGE UP (ref 13–44)
MCHC RBC-ENTMCNC: 30.6 PG — SIGNIFICANT CHANGE UP (ref 27–34)
MCHC RBC-ENTMCNC: 32.2 GM/DL — SIGNIFICANT CHANGE UP (ref 32–36)
MCV RBC AUTO: 95 FL — SIGNIFICANT CHANGE UP (ref 80–100)
MONOCYTES # BLD AUTO: 0.89 K/UL — SIGNIFICANT CHANGE UP (ref 0–0.9)
MONOCYTES NFR BLD AUTO: 10.2 % — SIGNIFICANT CHANGE UP (ref 2–14)
NEUTROPHILS # BLD AUTO: 5.99 K/UL — SIGNIFICANT CHANGE UP (ref 1.8–7.4)
NEUTROPHILS NFR BLD AUTO: 68.8 % — SIGNIFICANT CHANGE UP (ref 43–77)
PLATELET # BLD AUTO: 246 K/UL — SIGNIFICANT CHANGE UP (ref 150–400)
POTASSIUM SERPL-MCNC: 3.5 MMOL/L — SIGNIFICANT CHANGE UP (ref 3.5–5.3)
POTASSIUM SERPL-SCNC: 3.5 MMOL/L — SIGNIFICANT CHANGE UP (ref 3.5–5.3)
RBC # BLD: 3.01 M/UL — LOW (ref 3.8–5.2)
RBC # FLD: 12.2 % — SIGNIFICANT CHANGE UP (ref 10.3–14.5)
SODIUM SERPL-SCNC: 141 MMOL/L — SIGNIFICANT CHANGE UP (ref 135–145)
WBC # BLD: 8.72 K/UL — SIGNIFICANT CHANGE UP (ref 3.8–10.5)
WBC # FLD AUTO: 8.72 K/UL — SIGNIFICANT CHANGE UP (ref 3.8–10.5)

## 2019-11-22 RX ORDER — DIAZEPAM 5 MG
5 TABLET ORAL ONCE
Refills: 0 | Status: DISCONTINUED | OUTPATIENT
Start: 2019-11-22 | End: 2019-11-22

## 2019-11-22 RX ORDER — HYDROMORPHONE HYDROCHLORIDE 2 MG/ML
2 INJECTION INTRAMUSCULAR; INTRAVENOUS; SUBCUTANEOUS
Refills: 0 | Status: DISCONTINUED | OUTPATIENT
Start: 2019-11-22 | End: 2019-11-23

## 2019-11-22 RX ORDER — CYCLOBENZAPRINE HYDROCHLORIDE 10 MG/1
5 TABLET, FILM COATED ORAL THREE TIMES A DAY
Refills: 0 | Status: DISCONTINUED | OUTPATIENT
Start: 2019-11-22 | End: 2019-11-23

## 2019-11-22 RX ORDER — HYDROMORPHONE HYDROCHLORIDE 2 MG/ML
4 INJECTION INTRAMUSCULAR; INTRAVENOUS; SUBCUTANEOUS
Refills: 0 | Status: DISCONTINUED | OUTPATIENT
Start: 2019-11-22 | End: 2019-11-25

## 2019-11-22 RX ADMIN — Medication 5 MILLIGRAM(S): at 14:17

## 2019-11-22 RX ADMIN — HYDROMORPHONE HYDROCHLORIDE 4 MILLIGRAM(S): 2 INJECTION INTRAMUSCULAR; INTRAVENOUS; SUBCUTANEOUS at 09:46

## 2019-11-22 RX ADMIN — HYDROMORPHONE HYDROCHLORIDE 4 MILLIGRAM(S): 2 INJECTION INTRAMUSCULAR; INTRAVENOUS; SUBCUTANEOUS at 13:40

## 2019-11-22 RX ADMIN — PANTOPRAZOLE SODIUM 40 MILLIGRAM(S): 20 TABLET, DELAYED RELEASE ORAL at 06:20

## 2019-11-22 RX ADMIN — ENOXAPARIN SODIUM 40 MILLIGRAM(S): 100 INJECTION SUBCUTANEOUS at 22:20

## 2019-11-22 RX ADMIN — CYCLOBENZAPRINE HYDROCHLORIDE 5 MILLIGRAM(S): 10 TABLET, FILM COATED ORAL at 22:20

## 2019-11-22 RX ADMIN — HYDROMORPHONE HYDROCHLORIDE 4 MILLIGRAM(S): 2 INJECTION INTRAMUSCULAR; INTRAVENOUS; SUBCUTANEOUS at 13:10

## 2019-11-22 RX ADMIN — HYDROMORPHONE HYDROCHLORIDE 4 MILLIGRAM(S): 2 INJECTION INTRAMUSCULAR; INTRAVENOUS; SUBCUTANEOUS at 10:16

## 2019-11-22 RX ADMIN — HYDROMORPHONE HYDROCHLORIDE 4 MILLIGRAM(S): 2 INJECTION INTRAMUSCULAR; INTRAVENOUS; SUBCUTANEOUS at 16:40

## 2019-11-22 RX ADMIN — HYDROMORPHONE HYDROCHLORIDE 4 MILLIGRAM(S): 2 INJECTION INTRAMUSCULAR; INTRAVENOUS; SUBCUTANEOUS at 16:10

## 2019-11-22 RX ADMIN — HYDROMORPHONE HYDROCHLORIDE 4 MILLIGRAM(S): 2 INJECTION INTRAMUSCULAR; INTRAVENOUS; SUBCUTANEOUS at 19:58

## 2019-11-22 RX ADMIN — HYDROMORPHONE HYDROCHLORIDE 4 MILLIGRAM(S): 2 INJECTION INTRAMUSCULAR; INTRAVENOUS; SUBCUTANEOUS at 19:28

## 2019-11-22 RX ADMIN — CYCLOBENZAPRINE HYDROCHLORIDE 5 MILLIGRAM(S): 10 TABLET, FILM COATED ORAL at 13:10

## 2019-11-22 NOTE — PROGRESS NOTE ADULT - SUBJECTIVE AND OBJECTIVE BOX
Day 2 of Anesthesia Pain Management Service    SUBJECTIVE: Doing well    Pain Scale Score:	[X] Refer to charted pain scores    THERAPY:    [ ] IV PCA Morphine		        [ ] 5 mg/mL	[ ] 1 mg/mL  [X] IV PCA Hydromorphone	[ ] 5 mg/mL	[X] 1 mg/mL  [ ] IV PCA Fentanyl		        [ ] 50 micrograms/mL    Demand dose: 0.25 mg     Lockout: 6 minutes   Continuous Rate: 0 mg/hr  4 Hour Limit: 5 mg    MEDICATIONS  (STANDING):  cyclobenzaprine 5 milliGRAM(s) Oral three times a day  DULoxetine 90 milliGRAM(s) Oral daily  enoxaparin Injectable 40 milliGRAM(s) SubCutaneous at bedtime  FLUoxetine 40 milliGRAM(s) Oral daily  gabapentin 300 milliGRAM(s) Oral three times a day  lamoTRIgine 150 milliGRAM(s) Oral at bedtime  pantoprazole    Tablet 40 milliGRAM(s) Oral before breakfast  senna 2 Tablet(s) Oral at bedtime  tetracaine 0.5% Solution 1 Drop(s) Left EYE once  traZODone 100 milliGRAM(s) Oral at bedtime    MEDICATIONS  (PRN):  acetaminophen   Tablet .. 650 milliGRAM(s) Oral every 6 hours PRN Temp greater or equal to 38C (100.4F), Mild Pain (1 - 3)  artificial  tears Solution 1 Drop(s) Both EYES four times a day PRN Dry Eyes  HYDROmorphone   Tablet 2 milliGRAM(s) Oral every 3 hours PRN Moderate Pain (4 - 6)  HYDROmorphone   Tablet 4 milliGRAM(s) Oral every 3 hours PRN Severe Pain (7 - 10)  ondansetron Injectable 4 milliGRAM(s) IV Push every 6 hours PRN Nausea and/or Vomiting      OBJECTIVE:    Sedation Score:	[ X] Alert	 [ ] Drowsy 	[ ] Arousable	[ ] Asleep	[ ] Unresponsive    Side Effects:	[X ] None	[ ] Nausea	[ ] Vomiting	[ ] Pruritus  		[ ] Other:    Vital Signs Last 24 Hrs  T(C): 37.2 (22 Nov 2019 08:08), Max: 37.2 (22 Nov 2019 08:08)  T(F): 99 (22 Nov 2019 08:08), Max: 99 (22 Nov 2019 08:08)  HR: 106 (22 Nov 2019 08:08) (81 - 116)  BP: 90/50 (22 Nov 2019 08:08) (88/60 - 122/76)  BP(mean): --  RR: 18 (22 Nov 2019 08:08) (17 - 18)  SpO2: 97% (22 Nov 2019 08:08) (94% - 97%)    ASSESSMENT/ PLAN    Therapy to  be:               [  ] Continued   [X ] Discontinued   [ X] Changed to PRN Analgesics    Documentation and Verification of current medications:   [X] Done	[ ] Not done, not eligible    Comments: PCA D\C'd by surgical service

## 2019-11-22 NOTE — DISCHARGE NOTE PROVIDER - NSDCFUADDINST_GEN_ALL_CORE_FT
please do not engage in strenuous activity, heavy lifting, drive, or return to work or school until cleared by surgeon.  please keep incision clean and dry, do not submerge wound in water for prolonged periods of time, pat dry after showering, and do not use any creams or ointments to incision. please do not engage in strenuous activity, heavy lifting, drive, or return to work or school until cleared by surgeon.  please keep incision clean and dry, do not submerge wound in water for prolonged periods of time, pat dry after showering, and do not use any creams or ointments to incision.  Staple/suture removal at follow up appointment

## 2019-11-22 NOTE — DISCHARGE NOTE PROVIDER - CARE PROVIDER_API CALL
Frederick Kerns (DO)  Neurological Surgery  [address] [place] [address], [address]  [place] [address]  Phone: [phone]  Fax: [phone]  Follow Up Time:

## 2019-11-22 NOTE — DISCHARGE NOTE PROVIDER - NSDCACTIVITY_GEN_ALL_CORE
Do not make important decisions/Do not drive or operate machinery/Stairs allowed/Showering allowed/Walking - Indoors allowed/Walking - Outdoors allowed/No heavy lifting/straining Walking - Outdoors allowed/Bathing allowed/Do not drive or operate machinery/Showering allowed/No heavy lifting/straining/Stairs allowed/Do not make important decisions/Walking - Indoors allowed

## 2019-11-22 NOTE — DISCHARGE NOTE PROVIDER - NSDCMRMEDTOKEN_GEN_ALL_CORE_FT
cyclobenzaprine 5 mg oral tablet: 1 tab(s) orally 3 times a day, As needed, Muscle Spasm MDD:3  Cymbalta: 90 mg once daily in the morning  docusate sodium 100 mg oral capsule: 1 cap(s) orally 3 times a day  Effexor: 300 milligram(s) orally once a day  gabapentin 300 mg oral tablet: 300 milligram(s) orally 3 times a day  HYDROmorphone 2 mg oral tablet: 1 tab(s) orally every 4 hours, As needed, Moderate Pain (4 - 6)  take 2 tab po q4 hours as needed for severe pain MDD:8  HYDROmorphone 4 mg oral tablet: 1 tab(s) orally every 4 hours, As needed, Severe Pain (7 - 10)  KlonoPIN 0.5 mg oral tablet: 1 tab(s) orally 3 times a day, As Needed  LaMICtal 100 mg oral tablet: 1 tab(s) orally once a day (at bedtime)  PROzac 40 mg oral capsule: 1 cap(s) orally once a day  senna oral tablet: 2 tab(s) orally once a day (at bedtime), As needed, Constipation  traZODone 50 mg oral tablet: 1 tab(s) orally once a day  Vyvanse 20 mg oral capsule: 2 cap(s) orally once a day (in the morning) acetaminophen 325 mg oral tablet: 2 tab(s) orally every 6 hours, As needed, Temp greater or equal to 38C (100.4F), Mild Pain (1 - 3)  cyclobenzaprine 5 mg oral tablet: 1 tab(s) orally 3 times a day, As needed, Muscle Spasm MDD:3  Cymbalta: 90 mg once daily in the morning  docusate sodium 100 mg oral capsule: 1 cap(s) orally 3 times a day  Effexor: 300 milligram(s) orally once a day  gabapentin 400 mg oral capsule: 1 cap(s) orally every 8 hours  HYDROmorphone 2 mg oral tablet: 1 tab(s) orally every 4 hours, As needed, Moderate Pain (4 - 6)  take 2 tab po q4 hours as needed for severe pain MDD:8  KlonoPIN 0.5 mg oral tablet: 1 tab(s) orally 3 times a day, As Needed  lamoTRIgine 150 mg oral tablet: 1 tab(s) orally once a day (at bedtime)  melatonin 5 mg oral tablet: 1 tab(s) orally once a day (at bedtime)  ocular lubricant ophthalmic solution: 1 drop(s) to each affected eye 4 times a day, As needed, Dry Eyes  PROzac 40 mg oral capsule: 1 cap(s) orally once a day  senna oral tablet: 2 tab(s) orally once a day (at bedtime), As needed, Constipation  tetracaine 0.5% ophthalmic solution: 1 drop(s) to each affected eye once  traZODone 50 mg oral tablet: 1 tab(s) orally once a day  Vyvanse 20 mg oral capsule: 2 cap(s) orally once a day (in the morning) acetaminophen 325 mg oral tablet: 2 tab(s) orally every 6 hours, As needed, Temp greater or equal to 38C (100.4F), Mild Pain (1 - 3)  acetaminophen 500 mg oral tablet: 2 tab(s) orally every 6 hours MDD:8  cyclobenzaprine 5 mg oral tablet: 2 tab(s) orally 3 times a day, As Needed -Muscle Spasm MDD:3   Cymbalta: 90 mg once daily in the morning  docusate sodium 100 mg oral capsule: 1 cap(s) orally 3 times a day  Effexor: 300 milligram(s) orally once a day  gabapentin 400 mg oral capsule: 1 cap(s) orally every 8 hours  HYDROmorphone 2 mg oral tablet: 1 tab(s) orally every 4 hours, As needed, Moderate Pain (4 - 6)  take 2 tab po q4 hours as needed for severe pain MDD:8  KlonoPIN 0.5 mg oral tablet: 1 tab(s) orally 3 times a day, As Needed  lamoTRIgine 150 mg oral tablet: 1 tab(s) orally once a day (at bedtime)  melatonin 5 mg oral tablet: 1 tab(s) orally once a day (at bedtime)  ocular lubricant ophthalmic solution: 1 drop(s) to each affected eye 4 times a day, As needed, Dry Eyes  PROzac 40 mg oral capsule: 1 cap(s) orally once a day  senna oral tablet: 2 tab(s) orally once a day (at bedtime), As needed, Constipation  sodium chloride 0.65% nasal spray: 1 drop(s) nasal 4 times a day, As Needed   tetracaine 0.5% ophthalmic solution: 1 drop(s) to each affected eye once  traZODone 50 mg oral tablet: 1 tab(s) orally once a day  Vyvanse 20 mg oral capsule: 2 cap(s) orally once a day (in the morning) acetaminophen 325 mg oral tablet: 2 tab(s) orally every 6 hours, As needed, Temp greater or equal to 38C (100.4F), Mild Pain (1 - 3)  acetaminophen 500 mg oral tablet: 2 tab(s) orally every 6 hours MDD:8  cyclobenzaprine 5 mg oral tablet: 2 tab(s) orally 3 times a day, As Needed -Muscle Spasm MDD:3   Cymbalta: 90 mg once daily in the morning  Dilaudid 2 mg oral tablet: 1 tab(s) orally every 4 hours, As Needed MDD:6  docusate sodium 100 mg oral capsule: 1 cap(s) orally 3 times a day  Effexor: 300 milligram(s) orally once a day  gabapentin 400 mg oral capsule: 1 cap(s) orally every 8 hours  HYDROmorphone 2 mg oral tablet: 1 tab(s) orally every 4 hours, As needed, Moderate Pain (4 - 6)  take 2 tab po q4 hours as needed for severe pain MDD:8  KlonoPIN 0.5 mg oral tablet: 1 tab(s) orally 3 times a day, As Needed  lamoTRIgine 150 mg oral tablet: 1 tab(s) orally once a day (at bedtime)  melatonin 5 mg oral tablet: 1 tab(s) orally once a day (at bedtime)  ocular lubricant ophthalmic solution: 1 drop(s) to each affected eye 4 times a day, As needed, Dry Eyes  PROzac 40 mg oral capsule: 1 cap(s) orally once a day  senna oral tablet: 2 tab(s) orally once a day (at bedtime), As needed, Constipation  sodium chloride 0.65% nasal spray: 1 drop(s) nasal 4 times a day, As Needed   tetracaine 0.5% ophthalmic solution: 1 drop(s) to each affected eye once  traZODone 50 mg oral tablet: 1 tab(s) orally once a day  Vyvanse 20 mg oral capsule: 2 cap(s) orally once a day (in the morning) acetaminophen 325 mg oral tablet: 2 tab(s) orally every 6 hours, As needed, Temp greater or equal to 38C (100.4F), Mild Pain (1 - 3)  acetaminophen 500 mg oral tablet: 2 tab(s) orally every 6 hours MDD:8  cyclobenzaprine 5 mg oral tablet: 2 tab(s) orally 3 times a day, As Needed -Muscle Spasm MDD:3   Cymbalta: 90 mg once daily in the morning  docusate sodium 100 mg oral capsule: 1 cap(s) orally 3 times a day  Effexor: 300 milligram(s) orally once a day  gabapentin 400 mg oral capsule: 1 cap(s) orally every 8 hours  HYDROmorphone 2 mg oral tablet: 1 tab(s) orally every 4 hours, As needed, Moderate Pain (4 - 6)  take 2 tab po q4 hours as needed for severe pain MDD:8  KlonoPIN 0.5 mg oral tablet: 1 tab(s) orally 3 times a day, As Needed  lamoTRIgine 150 mg oral tablet: 1 tab(s) orally once a day (at bedtime)  melatonin 5 mg oral tablet: 1 tab(s) orally once a day (at bedtime)  ocular lubricant ophthalmic solution: 1 drop(s) to each affected eye 4 times a day, As needed, Dry Eyes  Patient is s/p L2-5 TLIF, please evaluate for out patient PT:   PROzac 40 mg oral capsule: 1 cap(s) orally once a day  senna oral tablet: 2 tab(s) orally once a day (at bedtime), As needed, Constipation  sodium chloride 0.65% nasal spray: 1 drop(s) nasal 4 times a day, As Needed   tetracaine 0.5% ophthalmic solution: 1 drop(s) to each affected eye once  traZODone 50 mg oral tablet: 1 tab(s) orally once a day  Vyvanse 20 mg oral capsule: 2 cap(s) orally once a day (in the morning)

## 2019-11-22 NOTE — PROGRESS NOTE ADULT - SUBJECTIVE AND OBJECTIVE BOX
SUBJECTIVE: Patient seen and examined.  No acute distress. complains of incisional pain.    Vital Signs Last 24 Hrs  T(C): 37.2 (22 Nov 2019 08:08), Max: 37.2 (22 Nov 2019 08:08)  T(F): 99 (22 Nov 2019 08:08), Max: 99 (22 Nov 2019 08:08)  HR: 106 (22 Nov 2019 08:08) (81 - 116)  BP: 90/50 (22 Nov 2019 08:08) (88/60 - 122/76)  BP(mean): --  RR: 18 (22 Nov 2019 08:08) (17 - 18)  SpO2: 97% (22 Nov 2019 08:08) (94% - 97%)    PHYSICAL EXAM:    Constitutional: No Acute Distress     Neurological: AOx3, Following Commands, Moving all Extremities     Motor exam:          Upper extremity                         Delt     Bicep     Tricep    HG                                                 R         5/5        5/5        5/5       5/5                                               L          5/5        5/5        5/5       5/5          Lower extremity                        HF         KF        KE       DF         PF                                                  R        5/5        5/5        5/5       5/5         5/5                                               L         5/5        4/5       5/5       5/5          5/5                                                 Sensation: [x] intact to light touch  [] decreased:     Pulmonary: Clear to Auscultation, No rales, No rhonchi, No wheezes     Cardiovascular: S1, S2, Regular rate and rhythm     Gastrointestinal: Soft, Non-tender, Non-distended     Extremities: No calf tenderness     Incision: c/d/i  LABS:                        11.0   7.73  )-----------( 289      ( 20 Nov 2019 20:48 )             34.1    11-20    143  |  108  |  9   ----------------------------<  125<H>  3.9   |  23  |  0.83    Ca    8.5      20 Nov 2019 20:48        11-21 @ 07:01  -  11-22 @ 07:00  --------------------------------------------------------  IN: 2680 mL / OUT: 1583 mL / NET: 1097 mL      DRAINS: right 50  left 8     MEDICATIONS:  Anticoagulation:   enoxaparin Injectable 40 milliGRAM(s) SubCutaneous at bedtime    Antibiotics:    Endo:    Neuro:  acetaminophen   Tablet .. 650 milliGRAM(s) Oral every 6 hours PRN Temp greater or equal to 38C (100.4F), Mild Pain (1 - 3)  cyclobenzaprine 5 milliGRAM(s) Oral three times a day  DULoxetine 90 milliGRAM(s) Oral daily  FLUoxetine 40 milliGRAM(s) Oral daily  gabapentin 300 milliGRAM(s) Oral three times a day  HYDROmorphone   Tablet 2 milliGRAM(s) Oral every 3 hours PRN Moderate Pain (4 - 6)  HYDROmorphone   Tablet 4 milliGRAM(s) Oral every 3 hours PRN Severe Pain (7 - 10)  lamoTRIgine 150 milliGRAM(s) Oral at bedtime  ondansetron Injectable 4 milliGRAM(s) IV Push every 6 hours PRN Nausea and/or Vomiting  traZODone 100 milliGRAM(s) Oral at bedtime    Cardiac:    Pulm:    GI/:  pantoprazole    Tablet 40 milliGRAM(s) Oral before breakfast  senna 2 Tablet(s) Oral at bedtime    Other:   artificial  tears Solution 1 Drop(s) Both EYES four times a day PRN Dry Eyes  tetracaine 0.5% Solution 1 Drop(s) Left EYE once    DIET: regular     IMAGING:

## 2019-11-22 NOTE — DISCHARGE NOTE PROVIDER - HOSPITAL COURSE
39 year old female s/p 8/26/19 laminectomy now with recurrent L2-3 L sided disc with intractable L3 radiculopathy for 2 weeks.  Patient admitted 11/19/2019.  On 11/20/2019 patient had a l2-5 tlif.  Patient did well postoperative with pre operative symptoms improving.  Patient was seen by physical therapy and recommended for outpatient physical therapy.  On day of discharge patient is medically and neurologically stable for discharge.

## 2019-11-22 NOTE — DISCHARGE NOTE PROVIDER - NSDCCPGOAL_GEN_ALL_CORE_FT
To get better and follow your care plan as instructed. O-L Flap Text: The defect edges were debeveled with a #15 scalpel blade.  Given the location of the defect, shape of the defect and the proximity to free margins an O-L flap was deemed most appropriate.  Using a sterile surgical marker, an appropriate advancement flap was drawn incorporating the defect and placing the expected incisions within the relaxed skin tension lines where possible.    The area thus outlined was incised deep to adipose tissue with a #15 scalpel blade.  The skin margins were undermined to an appropriate distance in all directions utilizing iris scissors.

## 2019-11-22 NOTE — PROGRESS NOTE ADULT - ASSESSMENT
HPI:  39F s/p 19 laminectomy now with recurrent L2-3 L sided disc with intractable L3 radiculopathy for 2 weeks (2019 18:04)    PROCEDURE: Lumbar fusion     POD#  PAST MEDICAL & SURGICAL HISTORY:  Anemia  GERD (gastroesophageal reflux disease)  Depression  ADD (attention deficit disorder)  Bipolar mood disorder  Depression  ADD (attention deficit disorder)  Depression  Endometrial polyp: s/p ablation 2019  Ganglion cyst of wrist, left  Bariatric surgery status: s/p gastric sleeve, then s/p insertion of gastric band which was   removed in  secondary to displacement  S/P  section  S/P cholecystectomy  Delivery by emergency caesarean section        PLAN:  Neuro: neuro and vital checks q 4  pain control with tylenol dc pca switch to dilaudid   flexeril for muscle spasm  oob  dc left hmv today   Respiratory: incentive spirometry  CV: keep sbp 100-150  Endocrine:  euyglycemia   Heme/Onc:  stable           DVT ppx: lovenox and scds  Renal: ivl, voiding  ID: afebrile  GI:  tolerating diet   PT/OT: outpatine   Will discuss with Dr. Saumya Bob home in am       Assessment:  Please Check When Present   [] Acute blood loss anemia  []Cerebral edema, [] Brain compression/ herniation,     []  GCS  E   V  M     Heart Failure: []Acute, [] acute on chronic , []chronic  Heart Failure:  [] Diastolic (HFpEF), [] Systolic (HFrEF), []Combined (HFpEF and HFrEF), [] RHF, [] Pulm HTN, [] Other    [] ARMANDO, [] ATN, [] AIN, [] other  [] CKD1, [] CKD2, [] CKD 3, [] CKD 4, [] CKD 5, []ESRD    Encephalopathy: [] Metabolic, [] Hepatic, [] toxic, [] Neurological, [] Other    Abnormal Nurtitional Status: [] malnurtition (see nutrition note), [ ]underweight: BMI < 19, [] morbid obesity: BMI >40, [] Cachexia  [] Functional quadriplegia  [] Sepsis  [] hypovolemic shock,[] cardiogenic shock, [] hemorrhagic shock, [] neuogenic shock  [] Acute Respiratory Failure          [facility]ia # [id number]

## 2019-11-22 NOTE — DISCHARGE NOTE PROVIDER - NSDCCPCAREPLAN_GEN_ALL_CORE_FT
PRINCIPAL DISCHARGE DIAGNOSIS  Diagnosis: Disc disorder of lumbar region  Assessment and Plan of Treatment: l2-5 tlif PRINCIPAL DISCHARGE DIAGNOSIS  Diagnosis: Disc disorder of lumbar region  Assessment and Plan of Treatment: l2-5 tlif  D/c home with out patient PT

## 2019-11-22 NOTE — DISCHARGE NOTE PROVIDER - NSDCFUADDAPPT_GEN_ALL_CORE_FT
Follow up with Dr. Kerns in 1 week.  Call office for appointment Follow up with Dr. Kerns on 12/3 @ 2PM  Call office to confirm the appointment

## 2019-11-23 LAB
ANION GAP SERPL CALC-SCNC: 12 MMOL/L — SIGNIFICANT CHANGE UP (ref 5–17)
APPEARANCE UR: ABNORMAL
BACTERIA # UR AUTO: NEGATIVE — SIGNIFICANT CHANGE UP
BILIRUB UR-MCNC: NEGATIVE — SIGNIFICANT CHANGE UP
BUN SERPL-MCNC: 6 MG/DL — LOW (ref 7–23)
CALCIUM SERPL-MCNC: 8.5 MG/DL — SIGNIFICANT CHANGE UP (ref 8.4–10.5)
CHLORIDE SERPL-SCNC: 103 MMOL/L — SIGNIFICANT CHANGE UP (ref 96–108)
CO2 SERPL-SCNC: 24 MMOL/L — SIGNIFICANT CHANGE UP (ref 22–31)
COLOR SPEC: YELLOW — SIGNIFICANT CHANGE UP
CREAT SERPL-MCNC: 0.63 MG/DL — SIGNIFICANT CHANGE UP (ref 0.5–1.3)
DIFF PNL FLD: ABNORMAL
EPI CELLS # UR: 9 /HPF — HIGH
GLUCOSE SERPL-MCNC: 104 MG/DL — HIGH (ref 70–99)
GLUCOSE UR QL: NEGATIVE — SIGNIFICANT CHANGE UP
HCT VFR BLD CALC: 27.6 % — LOW (ref 34.5–45)
HGB BLD-MCNC: 9.1 G/DL — LOW (ref 11.5–15.5)
HYALINE CASTS # UR AUTO: 2 /LPF — SIGNIFICANT CHANGE UP (ref 0–2)
KETONES UR-MCNC: NEGATIVE — SIGNIFICANT CHANGE UP
LEUKOCYTE ESTERASE UR-ACNC: NEGATIVE — SIGNIFICANT CHANGE UP
MCHC RBC-ENTMCNC: 30.1 PG — SIGNIFICANT CHANGE UP (ref 27–34)
MCHC RBC-ENTMCNC: 33 GM/DL — SIGNIFICANT CHANGE UP (ref 32–36)
MCV RBC AUTO: 91.4 FL — SIGNIFICANT CHANGE UP (ref 80–100)
NITRITE UR-MCNC: NEGATIVE — SIGNIFICANT CHANGE UP
NRBC # BLD: 0 /100 WBCS — SIGNIFICANT CHANGE UP (ref 0–0)
PH UR: 6.5 — SIGNIFICANT CHANGE UP (ref 5–8)
PLATELET # BLD AUTO: 281 K/UL — SIGNIFICANT CHANGE UP (ref 150–400)
POTASSIUM SERPL-MCNC: 3.3 MMOL/L — LOW (ref 3.5–5.3)
POTASSIUM SERPL-SCNC: 3.3 MMOL/L — LOW (ref 3.5–5.3)
PROT UR-MCNC: SIGNIFICANT CHANGE UP
RBC # BLD: 3.02 M/UL — LOW (ref 3.8–5.2)
RBC # FLD: 12.1 % — SIGNIFICANT CHANGE UP (ref 10.3–14.5)
RBC CASTS # UR COMP ASSIST: 158 /HPF — HIGH (ref 0–4)
SODIUM SERPL-SCNC: 139 MMOL/L — SIGNIFICANT CHANGE UP (ref 135–145)
SP GR SPEC: 1.01 — SIGNIFICANT CHANGE UP (ref 1.01–1.02)
UROBILINOGEN FLD QL: ABNORMAL
WBC # BLD: 10.34 K/UL — SIGNIFICANT CHANGE UP (ref 3.8–10.5)
WBC # FLD AUTO: 10.34 K/UL — SIGNIFICANT CHANGE UP (ref 3.8–10.5)
WBC UR QL: 3 /HPF — SIGNIFICANT CHANGE UP (ref 0–5)

## 2019-11-23 PROCEDURE — 71045 X-RAY EXAM CHEST 1 VIEW: CPT | Mod: 26

## 2019-11-23 RX ORDER — LANOLIN ALCOHOL/MO/W.PET/CERES
5 CREAM (GRAM) TOPICAL AT BEDTIME
Refills: 0 | Status: DISCONTINUED | OUTPATIENT
Start: 2019-11-23 | End: 2019-11-25

## 2019-11-23 RX ORDER — CLONAZEPAM 1 MG
0.5 TABLET ORAL THREE TIMES A DAY
Refills: 0 | Status: DISCONTINUED | OUTPATIENT
Start: 2019-11-23 | End: 2019-11-25

## 2019-11-23 RX ORDER — CYCLOBENZAPRINE HYDROCHLORIDE 10 MG/1
10 TABLET, FILM COATED ORAL THREE TIMES A DAY
Refills: 0 | Status: DISCONTINUED | OUTPATIENT
Start: 2019-11-23 | End: 2019-11-25

## 2019-11-23 RX ORDER — POTASSIUM CHLORIDE 20 MEQ
40 PACKET (EA) ORAL EVERY 4 HOURS
Refills: 0 | Status: COMPLETED | OUTPATIENT
Start: 2019-11-23 | End: 2019-11-23

## 2019-11-23 RX ORDER — CYCLOBENZAPRINE HYDROCHLORIDE 10 MG/1
5 TABLET, FILM COATED ORAL ONCE
Refills: 0 | Status: COMPLETED | OUTPATIENT
Start: 2019-11-23 | End: 2019-11-23

## 2019-11-23 RX ADMIN — Medication 650 MILLIGRAM(S): at 18:47

## 2019-11-23 RX ADMIN — GABAPENTIN 300 MILLIGRAM(S): 400 CAPSULE ORAL at 22:12

## 2019-11-23 RX ADMIN — HYDROMORPHONE HYDROCHLORIDE 4 MILLIGRAM(S): 2 INJECTION INTRAMUSCULAR; INTRAVENOUS; SUBCUTANEOUS at 16:07

## 2019-11-23 RX ADMIN — Medication 650 MILLIGRAM(S): at 19:17

## 2019-11-23 RX ADMIN — HYDROMORPHONE HYDROCHLORIDE 4 MILLIGRAM(S): 2 INJECTION INTRAMUSCULAR; INTRAVENOUS; SUBCUTANEOUS at 16:37

## 2019-11-23 RX ADMIN — GABAPENTIN 300 MILLIGRAM(S): 400 CAPSULE ORAL at 13:07

## 2019-11-23 RX ADMIN — HYDROMORPHONE HYDROCHLORIDE 4 MILLIGRAM(S): 2 INJECTION INTRAMUSCULAR; INTRAVENOUS; SUBCUTANEOUS at 22:58

## 2019-11-23 RX ADMIN — HYDROMORPHONE HYDROCHLORIDE 4 MILLIGRAM(S): 2 INJECTION INTRAMUSCULAR; INTRAVENOUS; SUBCUTANEOUS at 09:29

## 2019-11-23 RX ADMIN — SENNA PLUS 2 TABLET(S): 8.6 TABLET ORAL at 22:11

## 2019-11-23 RX ADMIN — HYDROMORPHONE HYDROCHLORIDE 4 MILLIGRAM(S): 2 INJECTION INTRAMUSCULAR; INTRAVENOUS; SUBCUTANEOUS at 22:49

## 2019-11-23 RX ADMIN — Medication 0.5 MILLIGRAM(S): at 12:23

## 2019-11-23 RX ADMIN — CYCLOBENZAPRINE HYDROCHLORIDE 5 MILLIGRAM(S): 10 TABLET, FILM COATED ORAL at 05:55

## 2019-11-23 RX ADMIN — Medication 5 MILLIGRAM(S): at 22:49

## 2019-11-23 RX ADMIN — HYDROMORPHONE HYDROCHLORIDE 4 MILLIGRAM(S): 2 INJECTION INTRAMUSCULAR; INTRAVENOUS; SUBCUTANEOUS at 13:07

## 2019-11-23 RX ADMIN — Medication 650 MILLIGRAM(S): at 10:16

## 2019-11-23 RX ADMIN — HYDROMORPHONE HYDROCHLORIDE 4 MILLIGRAM(S): 2 INJECTION INTRAMUSCULAR; INTRAVENOUS; SUBCUTANEOUS at 19:34

## 2019-11-23 RX ADMIN — Medication 40 MILLIEQUIVALENT(S): at 18:46

## 2019-11-23 RX ADMIN — HYDROMORPHONE HYDROCHLORIDE 4 MILLIGRAM(S): 2 INJECTION INTRAMUSCULAR; INTRAVENOUS; SUBCUTANEOUS at 08:59

## 2019-11-23 RX ADMIN — HYDROMORPHONE HYDROCHLORIDE 4 MILLIGRAM(S): 2 INJECTION INTRAMUSCULAR; INTRAVENOUS; SUBCUTANEOUS at 13:37

## 2019-11-23 RX ADMIN — CYCLOBENZAPRINE HYDROCHLORIDE 5 MILLIGRAM(S): 10 TABLET, FILM COATED ORAL at 15:20

## 2019-11-23 RX ADMIN — CYCLOBENZAPRINE HYDROCHLORIDE 5 MILLIGRAM(S): 10 TABLET, FILM COATED ORAL at 13:07

## 2019-11-23 RX ADMIN — HYDROMORPHONE HYDROCHLORIDE 4 MILLIGRAM(S): 2 INJECTION INTRAMUSCULAR; INTRAVENOUS; SUBCUTANEOUS at 23:19

## 2019-11-23 RX ADMIN — Medication 40 MILLIEQUIVALENT(S): at 22:11

## 2019-11-23 RX ADMIN — ENOXAPARIN SODIUM 40 MILLIGRAM(S): 100 INJECTION SUBCUTANEOUS at 22:11

## 2019-11-23 RX ADMIN — HYDROMORPHONE HYDROCHLORIDE 4 MILLIGRAM(S): 2 INJECTION INTRAMUSCULAR; INTRAVENOUS; SUBCUTANEOUS at 06:31

## 2019-11-23 RX ADMIN — HYDROMORPHONE HYDROCHLORIDE 4 MILLIGRAM(S): 2 INJECTION INTRAMUSCULAR; INTRAVENOUS; SUBCUTANEOUS at 05:55

## 2019-11-23 RX ADMIN — Medication 650 MILLIGRAM(S): at 09:46

## 2019-11-23 NOTE — PROGRESS NOTE ADULT - SUBJECTIVE AND OBJECTIVE BOX
CHIEF COMPLAINT: patient c/o incisional pain and overall not feeling well, had fever this am. Reports pain well controlled.     Vital Signs Last 24 Hrs  T(C): 38.1 (23 Nov 2019 13:48), Max: 38.7 (23 Nov 2019 09:28)  T(F): 100.5 (23 Nov 2019 13:48), Max: 101.7 (23 Nov 2019 09:28)  HR: 109 (23 Nov 2019 13:48) (61 - 111)  BP: 108/67 (23 Nov 2019 13:48) (99/66 - 118/76)  BP(mean): --  RR: 18 (23 Nov 2019 13:48) (17 - 18)  SpO2: 95% (23 Nov 2019 13:48) (93% - 97%)    DRAINS: [] TRACIE (cc/24h) [x] HMV (25 cc/24h right- removed without difficulty, dressing changed)   PHYSICAL EXAM:    General: No Acute Distress     Neurological: Awake, alert oriented to person, place and time, Following Commands, PERRL, EOMI, Face Symmetrical, Speech Fluent, Moving all extremities, Muscle Strength normal in all four extremities except L LE 5-5 secondary to pain; No Drift, Sensation to Light Touch Intact    Pulmonary: Clear to Auscultation, No Rales, No Rhonchi, No Wheezes     Cardiovascular: S1, S2, Regular Rate and Rhythm     Gastrointestinal: Soft, Nontender, Nondistended     Incision: +staples c/d/i; drain removed without difficulty, dressing changed    LABS:                              9.1    10.34 )-----------( 281      ( 23 Nov 2019 10:55 )             27.6   11-23    139  |  103  |  6<L>  ----------------------------<  104<H>  3.3<L>   |  24  |  0.63    Ca    8.5      23 Nov 2019 10:55    Urinalysis (11.23.19 @ 14:19)    pH Urine: 6.5    Blood, Urine: Large    Glucose Qualitative, Urine: Negative    Color: Yellow    Urine Appearance: Slightly Turbid    Bilirubin: Negative    Ketone - Urine: Negative    Specific Gravity: 1.012    Protein, Urine: Trace    Urobilinogen: 2 mg/dL    Nitrite: Negative    Leukocyte Esterase Concentration: Negative    Urine Microscopic-Add On (NC) (11.23.19 @ 14:19)    Red Blood Cell - Urine: 158 /hpf    Bacteria: Negative    Epithelial Cells: 9 /hpf    White Blood Cell - Urine: 3 /HPF    Hyaline Casts: 2 /lpf    MEDICATIONS  (STANDING):  cyclobenzaprine 5 milliGRAM(s) Oral three times a day  DULoxetine 90 milliGRAM(s) Oral daily  enoxaparin Injectable 40 milliGRAM(s) SubCutaneous at bedtime  FLUoxetine 40 milliGRAM(s) Oral daily  gabapentin 300 milliGRAM(s) Oral three times a day  lamoTRIgine 150 milliGRAM(s) Oral at bedtime  melatonin 5 milliGRAM(s) Oral at bedtime  pantoprazole    Tablet 40 milliGRAM(s) Oral before breakfast  potassium chloride    Tablet ER 40 milliEquivalent(s) Oral every 4 hours  senna 2 Tablet(s) Oral at bedtime  tetracaine 0.5% Solution 1 Drop(s) Left EYE once  traZODone 100 milliGRAM(s) Oral at bedtime    MEDICATIONS  (PRN):  acetaminophen   Tablet .. 650 milliGRAM(s) Oral every 6 hours PRN Temp greater or equal to 38C (100.4F), Mild Pain (1 - 3)  artificial  tears Solution 1 Drop(s) Both EYES four times a day PRN Dry Eyes  clonazePAM  Tablet 0.5 milliGRAM(s) Oral three times a day PRN anxiety  HYDROmorphone   Tablet 4 milliGRAM(s) Oral every 3 hours PRN Severe Pain (7 - 10)  ondansetron Injectable 4 milliGRAM(s) IV Push every 6 hours PRN Nausea and/or Vomiting        DIET: [x] Regular [] CCD [] Renal [] Puree [] Dysphagia [] Tube Feeds:     IMAGING:   < from: Xray Chest 1 View- PORTABLE-Routine (11.23.19 @ 11:16) >  IMPRESSION: Clear lungs.    < end of copied text >

## 2019-11-23 NOTE — PROGRESS NOTE ADULT - ASSESSMENT
HPI:  39F s/p 19 laminectomy now with recurrent L2-3 L sided disc with intractable L3 radiculopathy for 2 weeks (2019 18:04)    PAST MEDICAL & SURGICAL HISTORY:  Anemia  GERD (gastroesophageal reflux disease)  Depression  ADD (attention deficit disorder)  Bipolar mood disorder  Depression  ADD (attention deficit disorder)  Depression  Endometrial polyp: s/p ablation 2019  Ganglion cyst of wrist, left  Bariatric surgery status: s/p gastric sleeve, then s/p insertion of gastric band which was   removed in  secondary to displacement  S/P  section  S/P cholecystectomy  Delivery by emergency caesarean section    PROCEDURE: 19 L2-L5 tlif for recurrent disc herniation    PLAN:  Neuro: continue po dilaudid Q3hrs PRN, con flexeril for spasms  Respiratory: patient instructed on incentive spirometer  CV: normotensive           DVT ppx: [] SQH [x] SQL and venodynes bilaterally  Renal: IVL and voiding   ID: f/u fever workup, UA neg; CXR neg, no leukocytosis noted, f/u blood cultures; no antibiotics at this time  GI: bowel regimen  PT/OT: outpatient PT  +Hypokalemia, supplemented and f/u am labs    Will discuss with Dr Saumya Xavier # [id number]    Assessment:  Please Check When Present   []  GCS  E   V  M     Heart Failure: []Acute, [] acute on chronic , []chronic  Heart Failure:  [] Diastolic (HFpEF), [] Systolic (HFrEF), []Combined (HFpEF and HFrEF), [] RHF, [] Pulm HTN, [] Other    [] ARMANDO, [] ATN, [] AIN, [] other  [] CKD1, [] CKD2, [] CKD 3, [] CKD 4, [] CKD 5, []ESRD    Encephalopathy: [] Metabolic, [] Hepatic, [] toxic, [] Neurological, [] Other    Abnormal Nutritional Status: [] malnutrition (see nutrition note), [ ]underweight: BMI < 19, [] morbid obesity: BMI >40, [] Cachexia    [] Sepsis  [] hypovolemic shock,[] cardiogenic shock, [] hemorrhagic shock, [] neurogenic shock  [] Acute Respiratory Failure  []Cerebral edema, [] Brain compression/ herniation,   [] Functional quadriplegia  [] Acute blood loss anemia

## 2019-11-24 LAB
ANION GAP SERPL CALC-SCNC: 12 MMOL/L — SIGNIFICANT CHANGE UP (ref 5–17)
BASOPHILS # BLD AUTO: 0.02 K/UL — SIGNIFICANT CHANGE UP (ref 0–0.2)
BASOPHILS NFR BLD AUTO: 0.3 % — SIGNIFICANT CHANGE UP (ref 0–2)
BUN SERPL-MCNC: 5 MG/DL — LOW (ref 7–23)
CALCIUM SERPL-MCNC: 8.7 MG/DL — SIGNIFICANT CHANGE UP (ref 8.4–10.5)
CHLORIDE SERPL-SCNC: 104 MMOL/L — SIGNIFICANT CHANGE UP (ref 96–108)
CO2 SERPL-SCNC: 25 MMOL/L — SIGNIFICANT CHANGE UP (ref 22–31)
CREAT SERPL-MCNC: 0.59 MG/DL — SIGNIFICANT CHANGE UP (ref 0.5–1.3)
EOSINOPHIL # BLD AUTO: 0.1 K/UL — SIGNIFICANT CHANGE UP (ref 0–0.5)
EOSINOPHIL NFR BLD AUTO: 1.3 % — SIGNIFICANT CHANGE UP (ref 0–6)
GLUCOSE SERPL-MCNC: 93 MG/DL — SIGNIFICANT CHANGE UP (ref 70–99)
HCT VFR BLD CALC: 29.6 % — LOW (ref 34.5–45)
HGB BLD-MCNC: 9.6 G/DL — LOW (ref 11.5–15.5)
IMM GRANULOCYTES NFR BLD AUTO: 0.3 % — SIGNIFICANT CHANGE UP (ref 0–1.5)
LYMPHOCYTES # BLD AUTO: 1.61 K/UL — SIGNIFICANT CHANGE UP (ref 1–3.3)
LYMPHOCYTES # BLD AUTO: 20.8 % — SIGNIFICANT CHANGE UP (ref 13–44)
MCHC RBC-ENTMCNC: 30.4 PG — SIGNIFICANT CHANGE UP (ref 27–34)
MCHC RBC-ENTMCNC: 32.4 GM/DL — SIGNIFICANT CHANGE UP (ref 32–36)
MCV RBC AUTO: 93.7 FL — SIGNIFICANT CHANGE UP (ref 80–100)
MONOCYTES # BLD AUTO: 0.83 K/UL — SIGNIFICANT CHANGE UP (ref 0–0.9)
MONOCYTES NFR BLD AUTO: 10.7 % — SIGNIFICANT CHANGE UP (ref 2–14)
NEUTROPHILS # BLD AUTO: 5.17 K/UL — SIGNIFICANT CHANGE UP (ref 1.8–7.4)
NEUTROPHILS NFR BLD AUTO: 66.6 % — SIGNIFICANT CHANGE UP (ref 43–77)
PLATELET # BLD AUTO: 308 K/UL — SIGNIFICANT CHANGE UP (ref 150–400)
POTASSIUM SERPL-MCNC: 3.8 MMOL/L — SIGNIFICANT CHANGE UP (ref 3.5–5.3)
POTASSIUM SERPL-SCNC: 3.8 MMOL/L — SIGNIFICANT CHANGE UP (ref 3.5–5.3)
RBC # BLD: 3.16 M/UL — LOW (ref 3.8–5.2)
RBC # FLD: 12.1 % — SIGNIFICANT CHANGE UP (ref 10.3–14.5)
SODIUM SERPL-SCNC: 141 MMOL/L — SIGNIFICANT CHANGE UP (ref 135–145)
WBC # BLD: 7.75 K/UL — SIGNIFICANT CHANGE UP (ref 3.8–10.5)
WBC # FLD AUTO: 7.75 K/UL — SIGNIFICANT CHANGE UP (ref 3.8–10.5)

## 2019-11-24 PROCEDURE — 72131 CT LUMBAR SPINE W/O DYE: CPT | Mod: 26

## 2019-11-24 RX ORDER — ACETAMINOPHEN 500 MG
1000 TABLET ORAL ONCE
Refills: 0 | Status: COMPLETED | OUTPATIENT
Start: 2019-11-24 | End: 2019-11-24

## 2019-11-24 RX ORDER — GABAPENTIN 400 MG/1
400 CAPSULE ORAL EVERY 8 HOURS
Refills: 0 | Status: DISCONTINUED | OUTPATIENT
Start: 2019-11-24 | End: 2019-11-25

## 2019-11-24 RX ORDER — HYDROMORPHONE HYDROCHLORIDE 2 MG/ML
0.5 INJECTION INTRAMUSCULAR; INTRAVENOUS; SUBCUTANEOUS ONCE
Refills: 0 | Status: DISCONTINUED | OUTPATIENT
Start: 2019-11-24 | End: 2019-11-24

## 2019-11-24 RX ORDER — SODIUM CHLORIDE 0.65 %
1 AEROSOL, SPRAY (ML) NASAL
Refills: 0 | Status: DISCONTINUED | OUTPATIENT
Start: 2019-11-24 | End: 2019-11-25

## 2019-11-24 RX ADMIN — Medication 1000 MILLIGRAM(S): at 16:12

## 2019-11-24 RX ADMIN — HYDROMORPHONE HYDROCHLORIDE 4 MILLIGRAM(S): 2 INJECTION INTRAMUSCULAR; INTRAVENOUS; SUBCUTANEOUS at 08:43

## 2019-11-24 RX ADMIN — Medication 5 MILLIGRAM(S): at 21:03

## 2019-11-24 RX ADMIN — CYCLOBENZAPRINE HYDROCHLORIDE 10 MILLIGRAM(S): 10 TABLET, FILM COATED ORAL at 21:04

## 2019-11-24 RX ADMIN — GABAPENTIN 400 MILLIGRAM(S): 400 CAPSULE ORAL at 21:04

## 2019-11-24 RX ADMIN — HYDROMORPHONE HYDROCHLORIDE 4 MILLIGRAM(S): 2 INJECTION INTRAMUSCULAR; INTRAVENOUS; SUBCUTANEOUS at 18:03

## 2019-11-24 RX ADMIN — Medication 1 SPRAY(S): at 13:35

## 2019-11-24 RX ADMIN — ENOXAPARIN SODIUM 40 MILLIGRAM(S): 100 INJECTION SUBCUTANEOUS at 21:03

## 2019-11-24 RX ADMIN — HYDROMORPHONE HYDROCHLORIDE 4 MILLIGRAM(S): 2 INJECTION INTRAMUSCULAR; INTRAVENOUS; SUBCUTANEOUS at 06:00

## 2019-11-24 RX ADMIN — HYDROMORPHONE HYDROCHLORIDE 0.5 MILLIGRAM(S): 2 INJECTION INTRAMUSCULAR; INTRAVENOUS; SUBCUTANEOUS at 11:58

## 2019-11-24 RX ADMIN — Medication 650 MILLIGRAM(S): at 10:05

## 2019-11-24 RX ADMIN — SENNA PLUS 2 TABLET(S): 8.6 TABLET ORAL at 21:03

## 2019-11-24 RX ADMIN — HYDROMORPHONE HYDROCHLORIDE 4 MILLIGRAM(S): 2 INJECTION INTRAMUSCULAR; INTRAVENOUS; SUBCUTANEOUS at 20:56

## 2019-11-24 RX ADMIN — Medication 400 MILLIGRAM(S): at 15:42

## 2019-11-24 RX ADMIN — HYDROMORPHONE HYDROCHLORIDE 4 MILLIGRAM(S): 2 INJECTION INTRAMUSCULAR; INTRAVENOUS; SUBCUTANEOUS at 21:30

## 2019-11-24 RX ADMIN — HYDROMORPHONE HYDROCHLORIDE 4 MILLIGRAM(S): 2 INJECTION INTRAMUSCULAR; INTRAVENOUS; SUBCUTANEOUS at 17:33

## 2019-11-24 RX ADMIN — HYDROMORPHONE HYDROCHLORIDE 4 MILLIGRAM(S): 2 INJECTION INTRAMUSCULAR; INTRAVENOUS; SUBCUTANEOUS at 09:13

## 2019-11-24 RX ADMIN — HYDROMORPHONE HYDROCHLORIDE 0.5 MILLIGRAM(S): 2 INJECTION INTRAMUSCULAR; INTRAVENOUS; SUBCUTANEOUS at 11:28

## 2019-11-24 RX ADMIN — CYCLOBENZAPRINE HYDROCHLORIDE 10 MILLIGRAM(S): 10 TABLET, FILM COATED ORAL at 05:04

## 2019-11-24 RX ADMIN — HYDROMORPHONE HYDROCHLORIDE 4 MILLIGRAM(S): 2 INJECTION INTRAMUSCULAR; INTRAVENOUS; SUBCUTANEOUS at 05:04

## 2019-11-24 RX ADMIN — Medication 0.5 MILLIGRAM(S): at 15:34

## 2019-11-24 RX ADMIN — HYDROMORPHONE HYDROCHLORIDE 4 MILLIGRAM(S): 2 INJECTION INTRAMUSCULAR; INTRAVENOUS; SUBCUTANEOUS at 13:38

## 2019-11-24 RX ADMIN — GABAPENTIN 400 MILLIGRAM(S): 400 CAPSULE ORAL at 13:41

## 2019-11-24 RX ADMIN — HYDROMORPHONE HYDROCHLORIDE 4 MILLIGRAM(S): 2 INJECTION INTRAMUSCULAR; INTRAVENOUS; SUBCUTANEOUS at 14:08

## 2019-11-24 RX ADMIN — Medication 650 MILLIGRAM(S): at 10:35

## 2019-11-24 RX ADMIN — CYCLOBENZAPRINE HYDROCHLORIDE 10 MILLIGRAM(S): 10 TABLET, FILM COATED ORAL at 10:05

## 2019-11-24 NOTE — PROGRESS NOTE ADULT - ASSESSMENT
HPI:  39F s/p 19 laminectomy now with recurrent L2-3 L sided disc with intractable L3 radiculopathy for 2 weeks (2019 18:04)    PAST MEDICAL & SURGICAL HISTORY:  Anemia  GERD (gastroesophageal reflux disease)  Depression  ADD (attention deficit disorder)  Bipolar mood disorder  Depression  ADD (attention deficit disorder)  Depression  Endometrial polyp: s/p ablation 2019  Ganglion cyst of wrist, left  Bariatric surgery status: s/p gastric sleeve, then s/p insertion of gastric band which was   removed in  secondary to displacement  S/P  section  S/P cholecystectomy  Delivery by emergency caesarean section    PROCEDURE: 19 L2-L5 tlif for recurrent disc herniation    PLAN:  Neuro: continue po dilaudid Q3hrs PRN, con increased flexeril for spasms and increased neurontin for radicular pain, awaiting CT LS spine  Respiratory: patient instructed on incentive spirometer  CV: normotensive           DVT ppx: [] SQH [x] SQL and venodynes bilaterally  Renal: IVL and voiding   ID: f/u fever workup, UA neg; CXR neg, no leukocytosis noted, f/u blood cultures-negative to date; remains afebrile  GI: bowel regimen  PT/OT: outpatient PT upon d/c  +Hypokalemia resolved    Will discuss with Dr Saumya Xavier # [id number]    Assessment:  Please Check When Present   []  GCS  E   V  M     Heart Failure: []Acute, [] acute on chronic , []chronic  Heart Failure:  [] Diastolic (HFpEF), [] Systolic (HFrEF), []Combined (HFpEF and HFrEF), [] RHF, [] Pulm HTN, [] Other    [] ARMANDO, [] ATN, [] AIN, [] other  [] CKD1, [] CKD2, [] CKD 3, [] CKD 4, [] CKD 5, []ESRD    Encephalopathy: [] Metabolic, [] Hepatic, [] toxic, [] Neurological, [] Other    Abnormal Nutritional Status: [] malnutrition (see nutrition note), [ ]underweight: BMI < 19, [] morbid obesity: BMI >40, [] Cachexia    [] Sepsis  [] hypovolemic shock,[] cardiogenic shock, [] hemorrhagic shock, [] neurogenic shock  [] Acute Respiratory Failure  []Cerebral edema, [] Brain compression/ herniation,   [] Functional quadriplegia  [] Acute blood loss anemia

## 2019-11-24 NOTE — PROGRESS NOTE ADULT - SUBJECTIVE AND OBJECTIVE BOX
CHIEF COMPLAINT: patient c/o incisional pain radiating into right groin and overall not feeling well, had fever yesterday- workup negative so far. Refusing steroids for radicular pain because it interferes with her psych meds and makes her very aggressive, has agreed to increase neurontin dose for now.     Vital Signs Last 24 Hrs  T(C): 36.9 (24 Nov 2019 16:07), Max: 37.2 (24 Nov 2019 08:35)  T(F): 98.4 (24 Nov 2019 16:07), Max: 99 (24 Nov 2019 08:35)  HR: 102 (24 Nov 2019 16:07) (94 - 102)  BP: 94/60 (24 Nov 2019 16:07) (94/60 - 122/71)  BP(mean): --  RR: 18 (24 Nov 2019 16:07) (18 - 18)  SpO2: 94% (24 Nov 2019 16:07) (93% - 98%)    PHYSICAL EXAM:    General: No Acute Distress     Neurological: Awake, alert oriented to person, place and time, Following Commands, PERRL, EOMI, Face Symmetrical, Speech Fluent, Moving all extremities, Muscle Strength normal in all four extremities except R LE 5-/5 secondary to pain; No Drift, Sensation to Light Touch Intact  ambulating halls independently without device    Pulmonary: Clear to Auscultation, No Rales, No Rhonchi, No Wheezes     Cardiovascular: S1, S2, Regular Rate and Rhythm     Gastrointestinal: Soft, Nontender, Nondistended     Incision: +staples c/d/i     LABS:                                         9.6    7.75  )-----------( 308      ( 24 Nov 2019 10:09 )             29.6   11-24    141  |  104  |  5<L>  ----------------------------<  93  3.8   |  25  |  0.59    Ca    8.7      24 Nov 2019 07:27      Urinalysis (11.23.19 @ 14:19)    pH Urine: 6.5    Blood, Urine: Large    Glucose Qualitative, Urine: Negative    Color: Yellow    Urine Appearance: Slightly Turbid    Bilirubin: Negative    Ketone - Urine: Negative    Specific Gravity: 1.012    Protein, Urine: Trace    Urobilinogen: 2 mg/dL    Nitrite: Negative    Leukocyte Esterase Concentration: Negative    Urine Microscopic-Add On (NC) (11.23.19 @ 14:19)    Red Blood Cell - Urine: 158 /hpf    Bacteria: Negative    Epithelial Cells: 9 /hpf    White Blood Cell - Urine: 3 /HPF    Hyaline Casts: 2 /lpf  Culture - Blood (11.23.19 @ 13:33)    Specimen Source: .Blood Blood    Culture Results:   No growth to date.    Culture - Blood (11.23.19 @ 13:33)    Specimen Source: .Blood Blood    Culture Results:   No growth to date.    MEDICATIONS  (STANDING):  DULoxetine 90 milliGRAM(s) Oral daily  enoxaparin Injectable 40 milliGRAM(s) SubCutaneous at bedtime  gabapentin 400 milliGRAM(s) Oral every 8 hours  lamoTRIgine 150 milliGRAM(s) Oral at bedtime  melatonin 5 milliGRAM(s) Oral at bedtime  pantoprazole    Tablet 40 milliGRAM(s) Oral before breakfast  senna 2 Tablet(s) Oral at bedtime  tetracaine 0.5% Solution 1 Drop(s) Left EYE once  traZODone 100 milliGRAM(s) Oral at bedtime    MEDICATIONS  (PRN):  acetaminophen   Tablet .. 650 milliGRAM(s) Oral every 6 hours PRN Temp greater or equal to 38C (100.4F), Mild Pain (1 - 3)  artificial  tears Solution 1 Drop(s) Both EYES four times a day PRN Dry Eyes  clonazePAM  Tablet 0.5 milliGRAM(s) Oral three times a day PRN anxiety  cyclobenzaprine 10 milliGRAM(s) Oral three times a day PRN Muscle Spasm  HYDROmorphone   Tablet 4 milliGRAM(s) Oral every 3 hours PRN Severe Pain (7 - 10)  ondansetron Injectable 4 milliGRAM(s) IV Push every 6 hours PRN Nausea and/or Vomiting  sodium chloride 0.65% Nasal 1 Spray(s) Both Nostrils four times a day PRN Nasal Congestion    DIET: [x] Regular [] CCD [] Renal [] Puree [] Dysphagia [] Tube Feeds:     IMAGING:   < from: Xray Chest 1 View- PORTABLE-Routine (11.23.19 @ 11:16) >  IMPRESSION: Clear lungs.    < end of copied text >

## 2019-11-25 ENCOUNTER — TRANSCRIPTION ENCOUNTER (OUTPATIENT)
Age: 39
End: 2019-11-25

## 2019-11-25 VITALS
TEMPERATURE: 99 F | OXYGEN SATURATION: 99 % | RESPIRATION RATE: 18 BRPM | HEART RATE: 93 BPM | SYSTOLIC BLOOD PRESSURE: 90 MMHG | DIASTOLIC BLOOD PRESSURE: 58 MMHG

## 2019-11-25 PROCEDURE — 84702 CHORIONIC GONADOTROPIN TEST: CPT

## 2019-11-25 PROCEDURE — C1713: CPT

## 2019-11-25 PROCEDURE — 97530 THERAPEUTIC ACTIVITIES: CPT

## 2019-11-25 PROCEDURE — C1889: CPT

## 2019-11-25 PROCEDURE — 85730 THROMBOPLASTIN TIME PARTIAL: CPT

## 2019-11-25 PROCEDURE — 81001 URINALYSIS AUTO W/SCOPE: CPT

## 2019-11-25 PROCEDURE — 72131 CT LUMBAR SPINE W/O DYE: CPT

## 2019-11-25 PROCEDURE — 87040 BLOOD CULTURE FOR BACTERIA: CPT

## 2019-11-25 PROCEDURE — 86900 BLOOD TYPING SEROLOGIC ABO: CPT

## 2019-11-25 PROCEDURE — 80053 COMPREHEN METABOLIC PANEL: CPT

## 2019-11-25 PROCEDURE — 80048 BASIC METABOLIC PNL TOTAL CA: CPT

## 2019-11-25 PROCEDURE — 76000 FLUOROSCOPY <1 HR PHYS/QHP: CPT

## 2019-11-25 PROCEDURE — 86901 BLOOD TYPING SEROLOGIC RH(D): CPT

## 2019-11-25 PROCEDURE — 85027 COMPLETE CBC AUTOMATED: CPT

## 2019-11-25 PROCEDURE — 99285 EMERGENCY DEPT VISIT HI MDM: CPT

## 2019-11-25 PROCEDURE — 97161 PT EVAL LOW COMPLEX 20 MIN: CPT

## 2019-11-25 PROCEDURE — 85610 PROTHROMBIN TIME: CPT

## 2019-11-25 PROCEDURE — 86850 RBC ANTIBODY SCREEN: CPT

## 2019-11-25 PROCEDURE — 71045 X-RAY EXAM CHEST 1 VIEW: CPT

## 2019-11-25 PROCEDURE — 97116 GAIT TRAINING THERAPY: CPT

## 2019-11-25 RX ORDER — LAMOTRIGINE 25 MG/1
1 TABLET, ORALLY DISINTEGRATING ORAL
Qty: 30 | Refills: 0
Start: 2019-11-25 | End: 2019-12-24

## 2019-11-25 RX ORDER — LANOLIN ALCOHOL/MO/W.PET/CERES
1 CREAM (GRAM) TOPICAL
Qty: 0 | Refills: 0 | DISCHARGE
Start: 2019-11-25

## 2019-11-25 RX ORDER — CYCLOBENZAPRINE HYDROCHLORIDE 10 MG/1
2 TABLET, FILM COATED ORAL
Qty: 42 | Refills: 0
Start: 2019-11-25 | End: 2019-12-01

## 2019-11-25 RX ORDER — ACETAMINOPHEN 500 MG
2 TABLET ORAL
Qty: 120 | Refills: 0
Start: 2019-11-25 | End: 2019-12-09

## 2019-11-25 RX ORDER — SODIUM CHLORIDE 0.65 %
1 AEROSOL, SPRAY (ML) NASAL
Qty: 1 | Refills: 0
Start: 2019-11-25

## 2019-11-25 RX ORDER — GABAPENTIN 400 MG/1
1 CAPSULE ORAL
Qty: 90 | Refills: 0
Start: 2019-11-25 | End: 2019-12-24

## 2019-11-25 RX ORDER — GABAPENTIN 400 MG/1
300 CAPSULE ORAL
Qty: 0 | Refills: 0 | DISCHARGE

## 2019-11-25 RX ORDER — LAMOTRIGINE 25 MG/1
1 TABLET, ORALLY DISINTEGRATING ORAL
Qty: 0 | Refills: 0 | DISCHARGE

## 2019-11-25 RX ORDER — HYDROMORPHONE HYDROCHLORIDE 2 MG/ML
1 INJECTION INTRAMUSCULAR; INTRAVENOUS; SUBCUTANEOUS
Qty: 42 | Refills: 0
Start: 2019-11-25 | End: 2019-12-01

## 2019-11-25 RX ORDER — ACETAMINOPHEN 500 MG
2 TABLET ORAL
Qty: 0 | Refills: 0 | DISCHARGE
Start: 2019-11-25

## 2019-11-25 RX ADMIN — HYDROMORPHONE HYDROCHLORIDE 4 MILLIGRAM(S): 2 INJECTION INTRAMUSCULAR; INTRAVENOUS; SUBCUTANEOUS at 00:12

## 2019-11-25 RX ADMIN — HYDROMORPHONE HYDROCHLORIDE 4 MILLIGRAM(S): 2 INJECTION INTRAMUSCULAR; INTRAVENOUS; SUBCUTANEOUS at 09:44

## 2019-11-25 RX ADMIN — HYDROMORPHONE HYDROCHLORIDE 4 MILLIGRAM(S): 2 INJECTION INTRAMUSCULAR; INTRAVENOUS; SUBCUTANEOUS at 01:12

## 2019-11-25 RX ADMIN — HYDROMORPHONE HYDROCHLORIDE 4 MILLIGRAM(S): 2 INJECTION INTRAMUSCULAR; INTRAVENOUS; SUBCUTANEOUS at 09:14

## 2019-11-25 RX ADMIN — Medication 650 MILLIGRAM(S): at 11:04

## 2019-11-25 RX ADMIN — CYCLOBENZAPRINE HYDROCHLORIDE 10 MILLIGRAM(S): 10 TABLET, FILM COATED ORAL at 00:13

## 2019-11-25 RX ADMIN — CYCLOBENZAPRINE HYDROCHLORIDE 10 MILLIGRAM(S): 10 TABLET, FILM COATED ORAL at 09:14

## 2019-11-25 RX ADMIN — Medication 650 MILLIGRAM(S): at 10:34

## 2019-11-25 NOTE — PROGRESS NOTE ADULT - SUBJECTIVE AND OBJECTIVE BOX
Post-op day # 5 s/p L2-5 TLIF    OVERNIGHT EVENTS: none    Vital Signs Last 24 Hrs  T(C): 37.3 (2019 08:25), Max: 37.6 (2019 00:04)  T(F): 99.1 (2019 08:25), Max: 99.7 (2019 00:04)  HR: 93 (2019 08:25) (89 - 104)  BP: 90/58 (2019 08:25) (87/57 - 123/80)  BP(mean): --  RR: 18 (2019 08:25) (18 - 18)  SpO2: 99% (2019 08:25) (94% - 99%)    I&O's Detail    I&O's Summary      PHYSICAL EXAM:  Neurological:    Motor exam:         [xx] Upper extremity                 D             B          T          WE       WF      HI                                               R         5/5        5/5        5/5       5/5     5/5       5/5                                               L          5/5        5/5        5/5       5/5     5/5       5/5         [x] Lower extremity                Ps          Ha        Quad    EHL        FHL                                               R        5/5        5/5        5/5       5/5         5/5                                               L         5/5        5/5       5/5       5/5          5/5                                                          Sensation: [x] intact to light touch  [] decreased:        Gait: intact    Cardiovascular: Regular  Respiratory: Clear bilaterally  Gastrointestinal: Abd soft + BS non tender  Genitourinary:  Extremities: -C/C/E  Incision/Wound: Intact    TUBES/LINES:  [] CVC  [] A-line  [] Lumbar Drain  [] Ventriculostomy  [] Other    DIET: Regular  [] NPO  [] Mechanical  [] Tube feeds    LABS:                        9.6    7.75  )-----------( 308      ( 2019 10:09 )             29.6     11-24    141  |  104  |  5<L>  ----------------------------<  93  3.8   |  25  |  0.59    Ca    8.7      2019 07:27        Urinalysis Basic - ( 2019 14:19 )    Color: Yellow / Appearance: Slightly Turbid / S.012 / pH: x  Gluc: x / Ketone: Negative  / Bili: Negative / Urobili: 2 mg/dL   Blood: x / Protein: Trace / Nitrite: Negative   Leuk Esterase: Negative / RBC: 158 /hpf / WBC 3 /HPF   Sq Epi: x / Non Sq Epi: 9 /hpf / Bacteria: Negative          Allergies    Robaxin (Anaphylaxis)    Intolerances      MEDICATIONS:  Antibiotics:    Neuro:  acetaminophen   Tablet .. 650 milliGRAM(s) Oral every 6 hours PRN  clonazePAM  Tablet 0.5 milliGRAM(s) Oral three times a day PRN  cyclobenzaprine 10 milliGRAM(s) Oral three times a day PRN  DULoxetine 90 milliGRAM(s) Oral daily  gabapentin 400 milliGRAM(s) Oral every 8 hours  HYDROmorphone   Tablet 4 milliGRAM(s) Oral every 3 hours PRN  lamoTRIgine 150 milliGRAM(s) Oral at bedtime  melatonin 5 milliGRAM(s) Oral at bedtime  ondansetron Injectable 4 milliGRAM(s) IV Push every 6 hours PRN  traZODone 100 milliGRAM(s) Oral at bedtime    Anticoagulation:  enoxaparin Injectable 40 milliGRAM(s) SubCutaneous at bedtime    OTHER:  artificial  tears Solution 1 Drop(s) Both EYES four times a day PRN  pantoprazole    Tablet 40 milliGRAM(s) Oral before breakfast  senna 2 Tablet(s) Oral at bedtime  sodium chloride 0.65% Nasal 1 Spray(s) Both Nostrils four times a day PRN  tetracaine 0.5% Solution 1 Drop(s) Left EYE once    IVF:    CULTURES:  Culture Results:   No growth to date. ( @ 13:33)  Culture Results:   No growth to date. ( @ 13:33)    RADIOLOGY & ADDITIONAL TESTS:      ASSESSMENT:     39F s/p 19 laminectomy now with recurrent L2-3 L sided disc with intractable L3 radiculopathy for 2 weeks     39y Female Post-op day # 5 s/p L2-5 TLIF  Please Check When Present   []  GCS  E   V  M     Heart Failure: []Acute, [] acute on chronic , []chronic  Heart Failure:  [] Diastolic (HFpEF), [] Systolic (HFrEF), []Combined (HFpEF and HFrEF), [] RHF, [] Pulm HTN, [] Other    [] ARMANDO, [] ATN, [] AIN, [] other  [] CKD1, [] CKD2, [] CKD 3, [] CKD 4, [] CKD 5, []ESRD    Encephalopathy: [] Metabolic, [] Hepatic, [] toxic, [] Neurological, [] Other    Abnormal Nurtitional Status: [] malnurtition (see nutrition note), [ ]underweight: BMI < 19, [] morbid obesity: BMI >40, [] Cachexia    [] Sepsis  [] hypovolemic shock,[] cardiogenic shock, [] hemorrhagic shock, [] neuogenic shock  [] Acute Respiratory Failure  []Cerebral edema, [] Brain compression/ herniation,   [] Functional quadriplegia  [] Acute blood loss anemia    PLAN:  NEURO: Stable will d/c home today with out patient PT  CARDIOVASCULAR: Hemodynamically stable  PULMONARY: Continue incentive spirometer  RENAL: Bun/Creat stable  GI: Tolerating po well  HEME: H/H stable  ID: Afebrile  ENDO: No issue    DVT PROPHYLAXIS:  [x] Venodynes                                [x Heparin/Lovenox    FALL RISK:  [x] Low Risk                                    [] Impulsive

## 2019-11-25 NOTE — DISCHARGE NOTE NURSING/CASE MANAGEMENT/SOCIAL WORK - PATIENT PORTAL LINK FT
You can access the FollowMyHealth Patient Portal offered by [facility] by registering at the following website: [email or web address]. By joining [employer]’s FollowMyHealth portal, you will also be able to view your health information using other applications (apps) compatible with our system.

## 2019-11-25 NOTE — PROGRESS NOTE ADULT - REASON FOR ADMISSION
LBP recurrent disc

## 2019-12-03 ENCOUNTER — APPOINTMENT (OUTPATIENT)
Dept: SPINE | Facility: CLINIC | Age: 39
End: 2019-12-03

## 2019-12-04 ENCOUNTER — OTHER (OUTPATIENT)
Age: 39
End: 2019-12-04

## 2019-12-05 ENCOUNTER — APPOINTMENT (OUTPATIENT)
Dept: SPINE | Facility: CLINIC | Age: 39
End: 2019-12-05
Payer: MEDICAID

## 2019-12-05 VITALS
WEIGHT: 215 LBS | DIASTOLIC BLOOD PRESSURE: 76 MMHG | HEART RATE: 98 BPM | HEIGHT: 63 IN | TEMPERATURE: 98.5 F | OXYGEN SATURATION: 97 % | BODY MASS INDEX: 38.09 KG/M2 | SYSTOLIC BLOOD PRESSURE: 124 MMHG | RESPIRATION RATE: 17 BRPM

## 2019-12-05 VITALS
SYSTOLIC BLOOD PRESSURE: 138 MMHG | BODY MASS INDEX: 38.09 KG/M2 | DIASTOLIC BLOOD PRESSURE: 84 MMHG | OXYGEN SATURATION: 94 % | HEIGHT: 63 IN | WEIGHT: 215 LBS | RESPIRATION RATE: 17 BRPM | HEART RATE: 72 BPM

## 2019-12-05 PROCEDURE — 99024 POSTOP FOLLOW-UP VISIT: CPT

## 2019-12-05 RX ORDER — LISDEXAMFETAMINE DIMESYLATE 40 MG/1
40 CAPSULE ORAL
Refills: 0 | Status: ACTIVE | COMMUNITY

## 2019-12-05 RX ORDER — VENLAFAXINE HCL 50 MG
TABLET ORAL
Refills: 0 | Status: ACTIVE | COMMUNITY

## 2019-12-05 RX ORDER — LAMOTRIGINE 150 MG/1
150 TABLET ORAL
Refills: 0 | Status: ACTIVE | COMMUNITY

## 2019-12-05 RX ORDER — TRAZODONE HYDROCHLORIDE 300 MG/1
TABLET ORAL
Refills: 0 | Status: ACTIVE | COMMUNITY

## 2019-12-05 NOTE — HISTORY OF PRESENT ILLNESS
[FreeTextEntry1] : 38 yo with recurrent lower back pain and in the past has a lumbar laminectomy from August 2019 and had minimal resolution and relief of her back pain.  She was seen in mid November of this year with recurrent lower back pain that was severe in nature.  In addition, she had  left sided lumbar radiculopathy with tingling and numbness.  She was taken back to the OR on 11/20/2019 for a lumbar fusion of L3 L4 for a recurrent herniated disc. \par \par Today she is three weeks postop and has 66% resolution of her lower back pain and no leg pain.  The pain is reported at a 3/10.  She is not taking any narcotics and at night on occasion she is taking cyclobenzaprine which has been effective for sleep and back stiffness.  At present, she can walk two blocks.  Her incision is intact with staples in place.  The site is clean and dry.

## 2019-12-05 NOTE — ASSESSMENT
[FreeTextEntry1] : 38 yo female three weeks post -op from a lumbar fusion L3 L4.  Her pain has improved by 65%.  All staples were removed and site unremarkable.  She will follow up in two to three weeks with a lumbar AP and lateral Xray.  She will not bend, lift and twist as instructed.  She will increase her walking.  Keep the site clean and dry and report any suspicion for infection to the office.  A back arch support  device was ordered for support and comfort.

## 2019-12-05 NOTE — PHYSICAL EXAM
[General Appearance - Alert] : alert [General Appearance - In No Acute Distress] : in no acute distress [General Appearance - Well Nourished] : well nourished [General Appearance - Well Developed] : well developed [General Appearance - Well-Appearing] : healthy appearing [Clean] : clean [Dry] : dry [Healing Well] : healing well [Intact] : intact [No Drainage] : without drainage [Normal Skin] : normal [Erythema] : not erythematous [Tender] : not tender [Warm] : not warm [FreeTextEntry1] : lower back   [Oriented To Time, Place, And Person] : oriented to person, place, and time [FreeTextEntry6] : staples removed  [Impaired Insight] : insight and judgment were intact [Affect] : the affect was normal [Person] : oriented to person [Place] : oriented to place [Time] : oriented to time [Short Term Intact] : short term memory intact [Remote Intact] : remote memory intact [Registration Intact] : recent registration memory intact [Fluency] : fluency intact [Current Events] : adequate knowledge of current events [Past History] : adequate knowledge of personal past history [Vocabulary] : adequate range of vocabulary [Involuntary Movements] : no involuntary movements were seen [No Visual Abnormalities] : no visible abnormailities [Sclera] : the sclera and conjunctiva were normal [Outer Ear] : the ears and nose were normal in appearance [Neck Appearance] : the appearance of the neck was normal [Abnormal Walk] : normal gait [] : no respiratory distress [Skin Color & Pigmentation] : normal skin color and pigmentation

## 2019-12-05 NOTE — REASON FOR VISIT
[Other: _____] : [unfilled] [de-identified] : 11/20/2019 [de-identified] : L3 L4 insertion of cage, bilateral facetectomy and posterior  segmental instrumentation and arthrodesis

## 2019-12-12 NOTE — ED ADULT NURSE NOTE - THOUGHTS OF SUICIDE/SELF-HARM YN, MLM
Consent 3/Introductory Paragraph: I gave the patient a chance to ask questions they had about the procedure.  Following this I explained the Mohs procedure and consent was obtained. The risks, benefits and alternatives to therapy were discussed in detail. Specifically, the risks of infection, scarring, bleeding, prolonged wound healing, incomplete removal, allergy to anesthesia, nerve injury and recurrence were addressed. Prior to the procedure, the treatment site was clearly identified and confirmed by the patient. All components of Universal Protocol/PAUSE Rule completed. No

## 2019-12-18 ENCOUNTER — APPOINTMENT (OUTPATIENT)
Dept: SPINE | Facility: CLINIC | Age: 39
End: 2019-12-18
Payer: MEDICAID

## 2019-12-18 VITALS
WEIGHT: 223 LBS | BODY MASS INDEX: 39.51 KG/M2 | TEMPERATURE: 98.1 F | SYSTOLIC BLOOD PRESSURE: 120 MMHG | OXYGEN SATURATION: 98 % | HEART RATE: 98 BPM | DIASTOLIC BLOOD PRESSURE: 78 MMHG | HEIGHT: 63 IN | RESPIRATION RATE: 17 BRPM

## 2019-12-18 DIAGNOSIS — Z98.1 ARTHRODESIS STATUS: ICD-10-CM

## 2019-12-18 PROCEDURE — 99024 POSTOP FOLLOW-UP VISIT: CPT

## 2019-12-23 NOTE — PHYSICAL EXAM
[Longitudinal] : longitudinal [General Appearance - In No Acute Distress] : in no acute distress [General Appearance - Alert] : alert [FreeTextEntry1] : Cervical  [Impaired Insight] : insight and judgment were intact [Oriented To Time, Place, And Person] : oriented to person, place, and time [I: Normal Smell] : smell intact bilaterally [Cranial Nerves Optic (II)] : visual acuity intact bilaterally,  pupils equal round and reactive to light [Cranial Nerves Vestibulocochlear (VIII)] : hearing was intact bilaterally [Cranial Nerves Facial (VII)] : face symmetrical [Cranial Nerves Trigeminal (V)] : facial sensation intact symmetrically [Cranial Nerves Oculomotor (III)] : extraocular motion intact [Cranial Nerves Hypoglossal (XII)] : there was no tongue deviation with protrusion [Cranial Nerves Accessory (XI - Cranial And Spinal)] : head turning and shoulder shrug symmetric [Cranial Nerves Glossopharyngeal (IX)] : tongue and palate midline [Sensation Tactile Decrease] : light touch was intact [Motor Tone] : muscle tone was normal in all four extremities [Motor Strength] : muscle strength was normal in all four extremities [Sclera] : the sclera and conjunctiva were normal [] : no respiratory distress [Outer Ear] : the ears and nose were normal in appearance [Neck Appearance] : the appearance of the neck was normal [Heart Rate And Rhythm] : heart rate was normal and rhythm regular [Skin Color & Pigmentation] : normal skin color and pigmentation [Abnormal Walk] : normal gait

## 2019-12-23 NOTE — ASSESSMENT
[FreeTextEntry1] : Doing Well Post Fusion\par Follow up 3 months with new Xrays\par Medication usage and side effects explained\par Education provided regarding plan of care.\par

## 2019-12-23 NOTE — REASON FOR VISIT
[de-identified] : Doing well.\par Slowly transitioning into activities [de-identified] : 11/20/19 [de-identified] : S/P L3-4 bilateral facetectomy, L3-4 radical discectomy, L3-4 insertion of intervertebral expandable  cage with L3-L4 posterior segmental instrumentation and posterolateral arthrodesis.\par

## 2019-12-29 ENCOUNTER — EMERGENCY (EMERGENCY)
Facility: HOSPITAL | Age: 39
LOS: 1 days | Discharge: HOME | End: 2019-12-29
Attending: EMERGENCY MEDICINE | Admitting: EMERGENCY MEDICINE
Payer: MEDICAID

## 2019-12-29 VITALS
SYSTOLIC BLOOD PRESSURE: 128 MMHG | DIASTOLIC BLOOD PRESSURE: 81 MMHG | TEMPERATURE: 99 F | OXYGEN SATURATION: 99 % | HEART RATE: 91 BPM | RESPIRATION RATE: 16 BRPM

## 2019-12-29 VITALS
WEIGHT: 225.09 LBS | DIASTOLIC BLOOD PRESSURE: 82 MMHG | HEART RATE: 104 BPM | SYSTOLIC BLOOD PRESSURE: 135 MMHG | OXYGEN SATURATION: 99 % | RESPIRATION RATE: 18 BRPM | TEMPERATURE: 99 F

## 2019-12-29 DIAGNOSIS — N84.0 POLYP OF CORPUS UTERI: Chronic | ICD-10-CM

## 2019-12-29 DIAGNOSIS — Z90.49 ACQUIRED ABSENCE OF OTHER SPECIFIED PARTS OF DIGESTIVE TRACT: Chronic | ICD-10-CM

## 2019-12-29 DIAGNOSIS — Z98.84 BARIATRIC SURGERY STATUS: Chronic | ICD-10-CM

## 2019-12-29 DIAGNOSIS — M67.432 GANGLION, LEFT WRIST: Chronic | ICD-10-CM

## 2019-12-29 DIAGNOSIS — Z98.89 OTHER SPECIFIED POSTPROCEDURAL STATES: Chronic | ICD-10-CM

## 2019-12-29 LAB
ALBUMIN SERPL ELPH-MCNC: 3.2 G/DL — LOW (ref 3.3–5)
ALP SERPL-CCNC: 169 U/L — HIGH (ref 40–120)
ALT FLD-CCNC: 43 U/L — SIGNIFICANT CHANGE UP (ref 12–78)
ANION GAP SERPL CALC-SCNC: 7 MMOL/L — SIGNIFICANT CHANGE UP (ref 5–17)
APPEARANCE UR: ABNORMAL
AST SERPL-CCNC: 34 U/L — SIGNIFICANT CHANGE UP (ref 15–37)
BACTERIA # UR AUTO: ABNORMAL
BASOPHILS # BLD AUTO: 0.03 K/UL — SIGNIFICANT CHANGE UP (ref 0–0.2)
BASOPHILS NFR BLD AUTO: 0.3 % — SIGNIFICANT CHANGE UP (ref 0–2)
BILIRUB SERPL-MCNC: 0.3 MG/DL — SIGNIFICANT CHANGE UP (ref 0.2–1.2)
BILIRUB UR-MCNC: ABNORMAL
BUN SERPL-MCNC: 10 MG/DL — SIGNIFICANT CHANGE UP (ref 7–23)
CALCIUM SERPL-MCNC: 8.9 MG/DL — SIGNIFICANT CHANGE UP (ref 8.5–10.1)
CHLORIDE SERPL-SCNC: 105 MMOL/L — SIGNIFICANT CHANGE UP (ref 96–108)
CO2 SERPL-SCNC: 26 MMOL/L — SIGNIFICANT CHANGE UP (ref 22–31)
COD CRY URNS QL: ABNORMAL
COLOR SPEC: YELLOW — SIGNIFICANT CHANGE UP
CREAT SERPL-MCNC: 0.65 MG/DL — SIGNIFICANT CHANGE UP (ref 0.5–1.3)
DIFF PNL FLD: ABNORMAL
EOSINOPHIL # BLD AUTO: 0.05 K/UL — SIGNIFICANT CHANGE UP (ref 0–0.5)
EOSINOPHIL NFR BLD AUTO: 0.6 % — SIGNIFICANT CHANGE UP (ref 0–6)
EPI CELLS # UR: SIGNIFICANT CHANGE UP
GLUCOSE SERPL-MCNC: 80 MG/DL — SIGNIFICANT CHANGE UP (ref 70–99)
GLUCOSE UR QL: NEGATIVE — SIGNIFICANT CHANGE UP
HCG SERPL-ACNC: <1 MIU/ML — SIGNIFICANT CHANGE UP
HCT VFR BLD CALC: 33.9 % — LOW (ref 34.5–45)
HGB BLD-MCNC: 11.1 G/DL — LOW (ref 11.5–15.5)
IMM GRANULOCYTES NFR BLD AUTO: 0.6 % — SIGNIFICANT CHANGE UP (ref 0–1.5)
KETONES UR-MCNC: ABNORMAL
LACTATE SERPL-SCNC: 0.8 MMOL/L — SIGNIFICANT CHANGE UP (ref 0.7–2)
LEUKOCYTE ESTERASE UR-ACNC: ABNORMAL
LIDOCAIN IGE QN: 76 U/L — SIGNIFICANT CHANGE UP (ref 73–393)
LYMPHOCYTES # BLD AUTO: 1.66 K/UL — SIGNIFICANT CHANGE UP (ref 1–3.3)
LYMPHOCYTES # BLD AUTO: 19 % — SIGNIFICANT CHANGE UP (ref 13–44)
MCHC RBC-ENTMCNC: 29.1 PG — SIGNIFICANT CHANGE UP (ref 27–34)
MCHC RBC-ENTMCNC: 32.7 GM/DL — SIGNIFICANT CHANGE UP (ref 32–36)
MCV RBC AUTO: 89 FL — SIGNIFICANT CHANGE UP (ref 80–100)
MONOCYTES # BLD AUTO: 0.56 K/UL — SIGNIFICANT CHANGE UP (ref 0–0.9)
MONOCYTES NFR BLD AUTO: 6.4 % — SIGNIFICANT CHANGE UP (ref 2–14)
NEUTROPHILS # BLD AUTO: 6.39 K/UL — SIGNIFICANT CHANGE UP (ref 1.8–7.4)
NEUTROPHILS NFR BLD AUTO: 73.1 % — SIGNIFICANT CHANGE UP (ref 43–77)
NITRITE UR-MCNC: NEGATIVE — SIGNIFICANT CHANGE UP
NRBC # BLD: 0 /100 WBCS — SIGNIFICANT CHANGE UP (ref 0–0)
PH UR: 5 — SIGNIFICANT CHANGE UP (ref 5–8)
PLATELET # BLD AUTO: 386 K/UL — SIGNIFICANT CHANGE UP (ref 150–400)
POTASSIUM SERPL-MCNC: 3.9 MMOL/L — SIGNIFICANT CHANGE UP (ref 3.5–5.3)
POTASSIUM SERPL-SCNC: 3.9 MMOL/L — SIGNIFICANT CHANGE UP (ref 3.5–5.3)
PROT SERPL-MCNC: 7.7 G/DL — SIGNIFICANT CHANGE UP (ref 6–8.3)
PROT UR-MCNC: 25 MG/DL
RBC # BLD: 3.81 M/UL — SIGNIFICANT CHANGE UP (ref 3.8–5.2)
RBC # FLD: 13.2 % — SIGNIFICANT CHANGE UP (ref 10.3–14.5)
RBC CASTS # UR COMP ASSIST: SIGNIFICANT CHANGE UP /HPF (ref 0–4)
SODIUM SERPL-SCNC: 138 MMOL/L — SIGNIFICANT CHANGE UP (ref 135–145)
SP GR SPEC: 1.01 — SIGNIFICANT CHANGE UP (ref 1.01–1.02)
UROBILINOGEN FLD QL: 1
WBC # BLD: 8.74 K/UL — SIGNIFICANT CHANGE UP (ref 3.8–10.5)
WBC # FLD AUTO: 8.74 K/UL — SIGNIFICANT CHANGE UP (ref 3.8–10.5)
WBC UR QL: ABNORMAL

## 2019-12-29 PROCEDURE — 83690 ASSAY OF LIPASE: CPT

## 2019-12-29 PROCEDURE — 96365 THER/PROPH/DIAG IV INF INIT: CPT

## 2019-12-29 PROCEDURE — 96361 HYDRATE IV INFUSION ADD-ON: CPT

## 2019-12-29 PROCEDURE — 36415 COLL VENOUS BLD VENIPUNCTURE: CPT

## 2019-12-29 PROCEDURE — 83605 ASSAY OF LACTIC ACID: CPT

## 2019-12-29 PROCEDURE — 87040 BLOOD CULTURE FOR BACTERIA: CPT

## 2019-12-29 PROCEDURE — 74176 CT ABD & PELVIS W/O CONTRAST: CPT | Mod: 26

## 2019-12-29 PROCEDURE — 84702 CHORIONIC GONADOTROPIN TEST: CPT

## 2019-12-29 PROCEDURE — 99284 EMERGENCY DEPT VISIT MOD MDM: CPT

## 2019-12-29 PROCEDURE — 96375 TX/PRO/DX INJ NEW DRUG ADDON: CPT

## 2019-12-29 PROCEDURE — 80053 COMPREHEN METABOLIC PANEL: CPT

## 2019-12-29 PROCEDURE — 81001 URINALYSIS AUTO W/SCOPE: CPT

## 2019-12-29 PROCEDURE — 74176 CT ABD & PELVIS W/O CONTRAST: CPT

## 2019-12-29 PROCEDURE — 85027 COMPLETE CBC AUTOMATED: CPT

## 2019-12-29 PROCEDURE — 99284 EMERGENCY DEPT VISIT MOD MDM: CPT | Mod: 25

## 2019-12-29 RX ORDER — KETOROLAC TROMETHAMINE 30 MG/ML
30 SYRINGE (ML) INJECTION ONCE
Refills: 0 | Status: DISCONTINUED | OUTPATIENT
Start: 2019-12-29 | End: 2019-12-29

## 2019-12-29 RX ORDER — CEFTRIAXONE 500 MG/1
1000 INJECTION, POWDER, FOR SOLUTION INTRAMUSCULAR; INTRAVENOUS ONCE
Refills: 0 | Status: COMPLETED | OUTPATIENT
Start: 2019-12-29 | End: 2019-12-29

## 2019-12-29 RX ORDER — CEFUROXIME AXETIL 250 MG
1 TABLET ORAL
Qty: 20 | Refills: 0
Start: 2019-12-29 | End: 2020-01-07

## 2019-12-29 RX ORDER — SODIUM CHLORIDE 9 MG/ML
1000 INJECTION INTRAMUSCULAR; INTRAVENOUS; SUBCUTANEOUS ONCE
Refills: 0 | Status: COMPLETED | OUTPATIENT
Start: 2019-12-29 | End: 2019-12-29

## 2019-12-29 RX ADMIN — CEFTRIAXONE 1000 MILLIGRAM(S): 500 INJECTION, POWDER, FOR SOLUTION INTRAMUSCULAR; INTRAVENOUS at 18:20

## 2019-12-29 RX ADMIN — SODIUM CHLORIDE 1000 MILLILITER(S): 9 INJECTION INTRAMUSCULAR; INTRAVENOUS; SUBCUTANEOUS at 17:06

## 2019-12-29 RX ADMIN — Medication 30 MILLIGRAM(S): at 17:20

## 2019-12-29 RX ADMIN — SODIUM CHLORIDE 1000 MILLILITER(S): 9 INJECTION INTRAMUSCULAR; INTRAVENOUS; SUBCUTANEOUS at 15:13

## 2019-12-29 RX ADMIN — Medication 30 MILLIGRAM(S): at 17:06

## 2019-12-29 RX ADMIN — CEFTRIAXONE 100 MILLIGRAM(S): 500 INJECTION, POWDER, FOR SOLUTION INTRAMUSCULAR; INTRAVENOUS at 17:51

## 2019-12-29 NOTE — ED PROVIDER NOTE - CHPI ED SYMPTOMS NEG
no abdominal distension/no burning urination/no blood in stool/no dysuria/no hematuria/no diarrhea/no vomiting/no nausea

## 2019-12-29 NOTE — ED PROVIDER NOTE - OBJECTIVE STATEMENT
Pt is a 38 yo female hx bipolar, geovany, add. pt states began with temp to 104 on verenice dimitry. pt states that that fevers continued and that taking motrin and tylenol for fever works breifly.  pt denies n/v, back pain, but over past 2 days with dysuria and left sided abd pain,  no cough, uri sx,  pain increased with movement.  denies vag d/c.    pmd: king ( hasn't seen)

## 2019-12-29 NOTE — ED PROVIDER NOTE - CLINICAL SUMMARY MEDICAL DECISION MAKING FREE TEXT BOX
pt with reported fever for several days, dysuria and left sided abd pain, check labs, lactate, ivf, toradol, check ua, ct to ro divertic vs stone

## 2019-12-29 NOTE — ED PROVIDER NOTE - CONSTITUTIONAL, MLM
normal... Well appearing, awake, alert, oriented to person, place, time/situation and in moderate pain

## 2019-12-29 NOTE — ED PROVIDER NOTE - NSFOLLOWUPINSTRUCTIONS_ED_ALL_ED_FT
drink lots of fluids,   take motrin or tylenol for pain or fever, take ceftin 1 tab every 12 hours for 10 days.  return for severe pain, uncontrolled vomiting.  follow with your doctor or urology in 1-2 days.

## 2019-12-29 NOTE — ED PROVIDER NOTE - CARE PROVIDER_API CALL
Santos Rascon)  Urology  [address] [place] [id number]  Phone: [phone]  Fax: [phone]  Follow Up Time:

## 2019-12-29 NOTE — ED PROVIDER NOTE - PATIENT PORTAL LINK FT
You can access the FollowMyHealth Patient Portal offered by [facility] by registering at the following website: [email or web address]. By joining [employer]’s FollowMyHealth portal, you will also be able to view your health information using other applications (apps) compatible with our system.

## 2019-12-29 NOTE — ED ADULT TRIAGE NOTE - CHIEF COMPLAINT QUOTE
Pt states that she has had intermittent fevers for 5 days, states that she took a Motrin 3 hours ago. Pt also reports left lower quadrant pain, complains of frequent urination, and urgency.

## 2019-12-29 NOTE — ED PROVIDER NOTE - CARE PLAN
Principal Discharge DX:	Left lower quadrant abdominal pain Principal Discharge DX:	Left lower quadrant abdominal pain  Secondary Diagnosis:	Urinary tract infection

## 2020-03-01 ENCOUNTER — OUTPATIENT (OUTPATIENT)
Dept: OUTPATIENT SERVICES | Facility: HOSPITAL | Age: 40
LOS: 1 days | End: 2020-03-01
Payer: MEDICAID

## 2020-03-01 DIAGNOSIS — Z90.49 ACQUIRED ABSENCE OF OTHER SPECIFIED PARTS OF DIGESTIVE TRACT: Chronic | ICD-10-CM

## 2020-03-01 DIAGNOSIS — Z98.84 BARIATRIC SURGERY STATUS: Chronic | ICD-10-CM

## 2020-03-01 DIAGNOSIS — N84.0 POLYP OF CORPUS UTERI: Chronic | ICD-10-CM

## 2020-03-01 DIAGNOSIS — Z98.89 OTHER SPECIFIED POSTPROCEDURAL STATES: Chronic | ICD-10-CM

## 2020-03-01 DIAGNOSIS — M67.432 GANGLION, LEFT WRIST: Chronic | ICD-10-CM

## 2020-03-01 PROCEDURE — H0002: CPT

## 2020-03-18 ENCOUNTER — APPOINTMENT (OUTPATIENT)
Dept: SPINE | Facility: CLINIC | Age: 40
End: 2020-03-18

## 2020-04-30 DIAGNOSIS — Z71.89 OTHER SPECIFIED COUNSELING: ICD-10-CM

## 2020-05-15 ENCOUNTER — TRANSCRIPTION ENCOUNTER (OUTPATIENT)
Age: 40
End: 2020-05-15

## 2020-05-15 ENCOUNTER — INPATIENT (INPATIENT)
Facility: HOSPITAL | Age: 40
LOS: 1 days | Discharge: HOME | DRG: 552 | End: 2020-05-17
Attending: INTERNAL MEDICINE | Admitting: INTERNAL MEDICINE
Payer: MEDICAID

## 2020-05-15 VITALS
HEART RATE: 98 BPM | RESPIRATION RATE: 20 BRPM | DIASTOLIC BLOOD PRESSURE: 82 MMHG | TEMPERATURE: 99 F | WEIGHT: 250 LBS | HEIGHT: 62 IN | SYSTOLIC BLOOD PRESSURE: 123 MMHG

## 2020-05-15 DIAGNOSIS — N84.0 POLYP OF CORPUS UTERI: Chronic | ICD-10-CM

## 2020-05-15 DIAGNOSIS — M54.5 LOW BACK PAIN: ICD-10-CM

## 2020-05-15 DIAGNOSIS — M67.432 GANGLION, LEFT WRIST: Chronic | ICD-10-CM

## 2020-05-15 DIAGNOSIS — Z98.89 OTHER SPECIFIED POSTPROCEDURAL STATES: Chronic | ICD-10-CM

## 2020-05-15 DIAGNOSIS — Z90.49 ACQUIRED ABSENCE OF OTHER SPECIFIED PARTS OF DIGESTIVE TRACT: Chronic | ICD-10-CM

## 2020-05-15 DIAGNOSIS — Z98.84 BARIATRIC SURGERY STATUS: Chronic | ICD-10-CM

## 2020-05-15 LAB
ALBUMIN SERPL ELPH-MCNC: 4.2 G/DL — SIGNIFICANT CHANGE UP (ref 3.3–5)
ALP SERPL-CCNC: 74 U/L — SIGNIFICANT CHANGE UP (ref 40–120)
ALT FLD-CCNC: 18 U/L — SIGNIFICANT CHANGE UP (ref 10–45)
ANION GAP SERPL CALC-SCNC: 12 MMOL/L — SIGNIFICANT CHANGE UP (ref 5–17)
AST SERPL-CCNC: 14 U/L — SIGNIFICANT CHANGE UP (ref 10–40)
BASOPHILS # BLD AUTO: 0.05 K/UL — SIGNIFICANT CHANGE UP (ref 0–0.2)
BASOPHILS NFR BLD AUTO: 0.7 % — SIGNIFICANT CHANGE UP (ref 0–2)
BILIRUB SERPL-MCNC: 0.2 MG/DL — SIGNIFICANT CHANGE UP (ref 0.2–1.2)
BUN SERPL-MCNC: 12 MG/DL — SIGNIFICANT CHANGE UP (ref 7–23)
CALCIUM SERPL-MCNC: 9.3 MG/DL — SIGNIFICANT CHANGE UP (ref 8.4–10.5)
CHLORIDE SERPL-SCNC: 104 MMOL/L — SIGNIFICANT CHANGE UP (ref 96–108)
CO2 SERPL-SCNC: 23 MMOL/L — SIGNIFICANT CHANGE UP (ref 22–31)
CREAT SERPL-MCNC: 0.88 MG/DL — SIGNIFICANT CHANGE UP (ref 0.5–1.3)
EOSINOPHIL # BLD AUTO: 0.93 K/UL — HIGH (ref 0–0.5)
EOSINOPHIL NFR BLD AUTO: 12.7 % — HIGH (ref 0–6)
GLUCOSE SERPL-MCNC: 76 MG/DL — SIGNIFICANT CHANGE UP (ref 70–99)
HCG SERPL-ACNC: <2 MIU/ML — SIGNIFICANT CHANGE UP
HCT VFR BLD CALC: 37.4 % — SIGNIFICANT CHANGE UP (ref 34.5–45)
HGB BLD-MCNC: 11.9 G/DL — SIGNIFICANT CHANGE UP (ref 11.5–15.5)
IMM GRANULOCYTES NFR BLD AUTO: 0.3 % — SIGNIFICANT CHANGE UP (ref 0–1.5)
LYMPHOCYTES # BLD AUTO: 2.06 K/UL — SIGNIFICANT CHANGE UP (ref 1–3.3)
LYMPHOCYTES # BLD AUTO: 28.1 % — SIGNIFICANT CHANGE UP (ref 13–44)
MCHC RBC-ENTMCNC: 29 PG — SIGNIFICANT CHANGE UP (ref 27–34)
MCHC RBC-ENTMCNC: 31.8 GM/DL — LOW (ref 32–36)
MCV RBC AUTO: 91 FL — SIGNIFICANT CHANGE UP (ref 80–100)
MONOCYTES # BLD AUTO: 0.35 K/UL — SIGNIFICANT CHANGE UP (ref 0–0.9)
MONOCYTES NFR BLD AUTO: 4.8 % — SIGNIFICANT CHANGE UP (ref 2–14)
NEUTROPHILS # BLD AUTO: 3.93 K/UL — SIGNIFICANT CHANGE UP (ref 1.8–7.4)
NEUTROPHILS NFR BLD AUTO: 53.4 % — SIGNIFICANT CHANGE UP (ref 43–77)
NRBC # BLD: 0 /100 WBCS — SIGNIFICANT CHANGE UP (ref 0–0)
PLATELET # BLD AUTO: 367 K/UL — SIGNIFICANT CHANGE UP (ref 150–400)
POTASSIUM SERPL-MCNC: 4.2 MMOL/L — SIGNIFICANT CHANGE UP (ref 3.5–5.3)
POTASSIUM SERPL-SCNC: 4.2 MMOL/L — SIGNIFICANT CHANGE UP (ref 3.5–5.3)
PROT SERPL-MCNC: 7.2 G/DL — SIGNIFICANT CHANGE UP (ref 6–8.3)
RBC # BLD: 4.11 M/UL — SIGNIFICANT CHANGE UP (ref 3.8–5.2)
RBC # FLD: 13.6 % — SIGNIFICANT CHANGE UP (ref 10.3–14.5)
SARS-COV-2 RNA SPEC QL NAA+PROBE: SIGNIFICANT CHANGE UP
SODIUM SERPL-SCNC: 139 MMOL/L — SIGNIFICANT CHANGE UP (ref 135–145)
WBC # BLD: 7.34 K/UL — SIGNIFICANT CHANGE UP (ref 3.8–10.5)
WBC # FLD AUTO: 7.34 K/UL — SIGNIFICANT CHANGE UP (ref 3.8–10.5)

## 2020-05-15 PROCEDURE — 72128 CT CHEST SPINE W/O DYE: CPT | Mod: 26

## 2020-05-15 PROCEDURE — 99285 EMERGENCY DEPT VISIT HI MDM: CPT

## 2020-05-15 PROCEDURE — 72131 CT LUMBAR SPINE W/O DYE: CPT | Mod: 26

## 2020-05-15 RX ORDER — CLONAZEPAM 1 MG
0.5 TABLET ORAL THREE TIMES A DAY
Refills: 0 | Status: DISCONTINUED | OUTPATIENT
Start: 2020-05-15 | End: 2020-05-15

## 2020-05-15 RX ORDER — ACETAMINOPHEN 500 MG
1000 TABLET ORAL ONCE
Refills: 0 | Status: COMPLETED | OUTPATIENT
Start: 2020-05-15 | End: 2020-05-15

## 2020-05-15 RX ORDER — HYDROMORPHONE HYDROCHLORIDE 2 MG/ML
1 INJECTION INTRAMUSCULAR; INTRAVENOUS; SUBCUTANEOUS ONCE
Refills: 0 | Status: DISCONTINUED | OUTPATIENT
Start: 2020-05-15 | End: 2020-05-15

## 2020-05-15 RX ORDER — SENNA PLUS 8.6 MG/1
2 TABLET ORAL AT BEDTIME
Refills: 0 | Status: DISCONTINUED | OUTPATIENT
Start: 2020-05-15 | End: 2020-05-17

## 2020-05-15 RX ORDER — LIDOCAINE 4 G/100G
1 CREAM TOPICAL ONCE
Refills: 0 | Status: COMPLETED | OUTPATIENT
Start: 2020-05-15 | End: 2020-05-15

## 2020-05-15 RX ORDER — DIAZEPAM 5 MG
2 TABLET ORAL EVERY 6 HOURS
Refills: 0 | Status: DISCONTINUED | OUTPATIENT
Start: 2020-05-15 | End: 2020-05-17

## 2020-05-15 RX ORDER — MORPHINE SULFATE 50 MG/1
6 CAPSULE, EXTENDED RELEASE ORAL ONCE
Refills: 0 | Status: DISCONTINUED | OUTPATIENT
Start: 2020-05-15 | End: 2020-05-15

## 2020-05-15 RX ORDER — HYDROMORPHONE HYDROCHLORIDE 2 MG/ML
1 INJECTION INTRAMUSCULAR; INTRAVENOUS; SUBCUTANEOUS EVERY 6 HOURS
Refills: 0 | Status: DISCONTINUED | OUTPATIENT
Start: 2020-05-15 | End: 2020-05-17

## 2020-05-15 RX ORDER — KETOROLAC TROMETHAMINE 30 MG/ML
15 SYRINGE (ML) INJECTION ONCE
Refills: 0 | Status: DISCONTINUED | OUTPATIENT
Start: 2020-05-15 | End: 2020-05-15

## 2020-05-15 RX ORDER — ACETAMINOPHEN 500 MG
650 TABLET ORAL EVERY 6 HOURS
Refills: 0 | Status: DISCONTINUED | OUTPATIENT
Start: 2020-05-15 | End: 2020-05-17

## 2020-05-15 RX ORDER — HEPARIN SODIUM 5000 [USP'U]/ML
7500 INJECTION INTRAVENOUS; SUBCUTANEOUS EVERY 8 HOURS
Refills: 0 | Status: DISCONTINUED | OUTPATIENT
Start: 2020-05-15 | End: 2020-05-17

## 2020-05-15 RX ORDER — DIAZEPAM 5 MG
5 TABLET ORAL ONCE
Refills: 0 | Status: DISCONTINUED | OUTPATIENT
Start: 2020-05-15 | End: 2020-05-15

## 2020-05-15 RX ORDER — GABAPENTIN 400 MG/1
100 CAPSULE ORAL EVERY 8 HOURS
Refills: 0 | Status: DISCONTINUED | OUTPATIENT
Start: 2020-05-15 | End: 2020-05-15

## 2020-05-15 RX ORDER — SODIUM CHLORIDE 9 MG/ML
1000 INJECTION INTRAMUSCULAR; INTRAVENOUS; SUBCUTANEOUS ONCE
Refills: 0 | Status: COMPLETED | OUTPATIENT
Start: 2020-05-15 | End: 2020-05-15

## 2020-05-15 RX ADMIN — Medication 2 MILLIGRAM(S): at 23:27

## 2020-05-15 RX ADMIN — LIDOCAINE 1 PATCH: 4 CREAM TOPICAL at 15:53

## 2020-05-15 RX ADMIN — HYDROMORPHONE HYDROCHLORIDE 1 MILLIGRAM(S): 2 INJECTION INTRAMUSCULAR; INTRAVENOUS; SUBCUTANEOUS at 23:27

## 2020-05-15 RX ADMIN — HYDROMORPHONE HYDROCHLORIDE 1 MILLIGRAM(S): 2 INJECTION INTRAMUSCULAR; INTRAVENOUS; SUBCUTANEOUS at 16:43

## 2020-05-15 RX ADMIN — Medication 5 MILLIGRAM(S): at 15:53

## 2020-05-15 RX ADMIN — MORPHINE SULFATE 6 MILLIGRAM(S): 50 CAPSULE, EXTENDED RELEASE ORAL at 15:12

## 2020-05-15 RX ADMIN — SODIUM CHLORIDE 1000 MILLILITER(S): 9 INJECTION INTRAMUSCULAR; INTRAVENOUS; SUBCUTANEOUS at 16:49

## 2020-05-15 RX ADMIN — Medication 15 MILLIGRAM(S): at 15:53

## 2020-05-15 RX ADMIN — Medication 400 MILLIGRAM(S): at 20:02

## 2020-05-15 NOTE — ED PROVIDER NOTE - PROGRESS NOTE DETAILS
Neurosx consulted, CT thoracic/lumbar spine ordered, possibility for MRI /65, 1000mL bolus run with Lucille -Phillip Aguilar PA-C

## 2020-05-15 NOTE — ED ADULT NURSE REASSESSMENT NOTE - NS ED NURSE REASSESS COMMENT FT1
Report received from TAMMIE Siddiqui. Pt is resting comfortably in bed asking for pain medication after walking to the bathroom. NP team called and NP Will order IV Ofirmev. Pt A&Ox4. Pt informed of POC and awaiting a bed upstairs still pending COVID results. Pt has no complaints at this time. Pt safety maintained.

## 2020-05-15 NOTE — ED PROVIDER NOTE - ATTENDING CONTRIBUTION TO CARE
Attending Statement (JAZ Ch MD):    HPI: 41y/o F h/o obesity, bipolar d/o, lumbar spinal disc disease s/p L2-3 laminectomy and fusion (Nov 2019), presenting with worsening back pain after helping set up an above ground pool and having "twisted the wrong way."  Pain is in lower left side of back.  Numbness reported in the left calf.  No swelling of LE.  No loss of urinary or fecal continence.  No numbness in inner thighs.  Able to walk after injury but with significant difficulty due to pain and needing assistance to get up from laying down; states she could not come to ED last night because she was unable to tolerate laying in car, and today called her neurosurgeon's office (Dr Kerns) who advised her to call [phone].      Review of Systems:  -General: no fever or chills  -ENT: no congestion, no difficulty swallowing  -Pulmonary: no cough, no shortness of breath  -Cardiac: no chest pain, no palpitations  -Gastrointestinal: no abdominal pain, no nausea, no vomiting, and no diarrhea.  -Genitourinary: no blood or pain with urination  -Musculoskeletal: + back pain (see hpi); no neck pain  -Skin: no rashes  -Endocrine: No h/o diabetes or thyroid disease  -Neurologic: left calf numbness (pins/needles sensation)    All else negative unless otherwise specified elsewhere in this note.    PSH/PMH as noted above    On Physical Exam:  General: appears uncomfortable, laying on side in stretcher but is speaking clearly in full sentences and without difficulty; cooperative with exam  HEENT: PERRL, MMM, airway patent.  Neck: no neck tenderness, no nuchal rigidity  Cardiac: normal s1, s2; RRR; no MGR  Lungs: CTABL  Abdomen: soft nontender/nondistended  : no bladder tenderness or distension  Back: left paraspinal tenderness in lower lumbar region; no CVA tenderness  Skin: intact, no rash; healed surgical scar over upper to mid lumbar region in back, no overlying erythema or ecchymoses  Extremities: no peripheral edema, no gross deformities  Neuro: no gross neurologic deficits; sensation to light touch reported in all areas of lower extremities, including calfs.  moving hips/knees/ankles (but limited by pain).    MDM: 40F with h/o LBP / lumbar spinal disease s/p L2-3 fusion, presenting with severe low back pain; neuro intact; concern for hardware failure/injury/fracture vs herniation of disc, vs muscular strain; will obtain CT L/S spine to eval hardware/vertebrae and consult neurosurgery (DR Kerns's team) for further recommendations; pain control as needed.    ED Course: required escalating pain control.  CT showed no hardware malposition, no fractures noted.  Neurosurgery evaluated and recommended admission for pain control and MR for further evaluation; will admit to medicine service for further evaluation/management.

## 2020-05-15 NOTE — H&P ADULT - ASSESSMENT
40F PMHx bipolar, depression, LBP s/p L2-3 PLIF 11/2019 w/ Dr. Kerns presents to ED with LBP radiating to L groin. Was setting up pool yesterday when she felt a stabbing pain in her back. CT grossly unremarkable hardware intact, final read pending. Exam: AAOx3, FC, LLE HF/KF/KE 4/5 pain limited, otherwise full strength. SILT.  - evaluated by neuro sx   - mri   pain control with Dilaudid + valium   - check tfts, lipid profiel, hba1c  - PT   dvt prophylaxis  discussed management plan with pt

## 2020-05-15 NOTE — ED ADULT NURSE NOTE - OBJECTIVE STATEMENT
40 y F with PMhx of L2/L3 fusion in November presents to the ED after exerting herself yesterday while helping her . Pt states that she is not sure what she did but she suddenly got a sharp pain in the back that has been constant since. Pt has pain at the surgical site and to the L of it radiating to the groin. Pt reports that pain gets worse when she exerts herself and rolls onto her back. Pt reports that she is walking but is hunched. ON assessment, AOx4. PERRL 4 mm. ZAVALA. no facial droop, slurred speech, and arm drift. strength equal in all extremities. no vision changes. Denies dizziness, headaches, lightheadedness, numbness and tingling. Breathing spontaneously and unlabored. Sating 100% on Room air. Denies cough, SOB and CP. S1 and S2 heard. No Peripheral edema. Cap refill 2s. No JVD. Peripheral pulses strong and equal bilaterally. Denies CP, SOB and palpitations. Abdomen soft, nontender, nondistended, negative CVA tenderness, positive bowel sounds in all four quadrants. Pt is continent. Denies n/v/d, dysuria, melena and hematuria. Awaiting dispo.

## 2020-05-15 NOTE — CONSULT NOTE ADULT - ASSESSMENT
Yash Paul  40F PMHx bipolar, depression, LBP s/p L2-3 PLIF 11/2019 w/ Dr. Kerns presents to ED with LBP radiating to L groin. Was setting up pool yesterday when she felt a stabbing pain in her back. CT grossly unremarkable hardware intact, final read pending. Exam: AAOx3, FC, LLE HF/KF/KE 4/5 pain limited, otherwise full strength. SILT.  - No acute neurosurgical intervention  - Medicine for pain control  - f/u CT read  - MRI L spine

## 2020-05-15 NOTE — ED ADULT NURSE REASSESSMENT NOTE - NS ED NURSE REASSESS COMMENT FT1
Patient reports pain is improved since morphine, but is still 6/10.  She was assisted to and from the bathroom with worsening pain upon moving and standing or sitting.  She feels improvement when she lays on her left side.

## 2020-05-15 NOTE — H&P ADULT - HISTORY OF PRESENT ILLNESS
40y F pmhx bipolar mood disorder, depression, GERD, L2-L3 fusion (November 2019), L2 decompression (August 2019) p/w back pain. Pt states she may have "twisted the wrong way" while setting up a pool in her backyard. Pain was immediate but worsened last night, sharp "stabbing pain", radiating for upper lumbar spine to groin, 8/10 severity. Able to ambulate but with severe pain. Lumbar fusion with Dr. Kerns at [facility]. Betty 7am this morning with no relief. Has left calf numbness and tingling. Pt denies chest pain, SOB, cough, abdominal pain, n/v/d, dysuria, hematuria, change in stools, urinary or fecal retention/incontinence, saddle anesthesia.

## 2020-05-15 NOTE — ED PROVIDER NOTE - OBJECTIVE STATEMENT
40y F pmhx bipolar mood disorder, depression, GERD, L2-L3 fusion (November 2019), L2 decompression (August 2019) p/w back pain. Pt states she may have "twisted the wrong way" while setting up a pool in her backyard. Pain was immediate but worsened last night, sharp "stabbing pain", radiating for upper lumbar spine to groin, 8/10 severity. Able to ambulate but with severe pain. Lumbar fusion with Dr. Kerns at [facility]. Betty 7am this morning with no relief. Has left calf numbness and tingling. Pt denies chest pain, SOB, cough, abdominal pain, n/v/d, dysuria, hematuria, change in stools, urinary or fecal retention/incontinence, saddle anesthesia.

## 2020-05-15 NOTE — ED ADULT NURSE REASSESSMENT NOTE - NS ED NURSE REASSESS COMMENT FT1
Pt remains in the ED. Resting comfortably in bed. A&Ox3. Breathing spontaneously and unlabored. NAD. VSS. Pt swabbed for covid. Pt given food tray. Will continue to monitor. Awaiting dispo.

## 2020-05-15 NOTE — H&P ADULT - NSICDXPASTSURGICALHX_GEN_ALL_CORE_FT
PAST SURGICAL HISTORY:  Bariatric surgery status s/p gastric sleeve, then s/p insertion of gastric band which was   removed in  secondary to displacement    Delivery by emergency caesarean section     Endometrial polyp s/p ablation 2019    Ganglion cyst of wrist, left     S/P  section     S/P cholecystectomy

## 2020-05-15 NOTE — ED PROVIDER NOTE - PHYSICAL EXAMINATION
Attending note (Dima):   On Physical Exam:  General: appears uncomfortable, laying on side in stretcher but is speaking clearly in full sentences and without difficulty; cooperative with exam  HEENT: PERRL, MMM, airway patent.  Neck: no neck tenderness, no nuchal rigidity  Cardiac: normal s1, s2; RRR; no MGR  Lungs: CTABL  Abdomen: soft nontender/nondistended  : no bladder tenderness or distension  Back: left paraspinal tenderness in lower lumbar region; no CVA tenderness  Skin: intact, no rash; healed surgical scar over upper to mid lumbar region in back, no overlying erythema or ecchymoses  Extremities: no peripheral edema, no gross deformities  Neuro: no gross neurologic deficits; sensation to light touch reported in all areas of lower extremities, including calfs.  moving hips/knees/ankles (but limited by pain).

## 2020-05-16 PROCEDURE — 72148 MRI LUMBAR SPINE W/O DYE: CPT | Mod: 26

## 2020-05-16 RX ORDER — HYDROMORPHONE HYDROCHLORIDE 2 MG/ML
1 INJECTION INTRAMUSCULAR; INTRAVENOUS; SUBCUTANEOUS ONCE
Refills: 0 | Status: DISCONTINUED | OUTPATIENT
Start: 2020-05-16 | End: 2020-05-16

## 2020-05-16 RX ORDER — HYDROMORPHONE HYDROCHLORIDE 2 MG/ML
0.5 INJECTION INTRAMUSCULAR; INTRAVENOUS; SUBCUTANEOUS ONCE
Refills: 0 | Status: DISCONTINUED | OUTPATIENT
Start: 2020-05-16 | End: 2020-05-16

## 2020-05-16 RX ADMIN — Medication 2 MILLIGRAM(S): at 12:24

## 2020-05-16 RX ADMIN — HEPARIN SODIUM 7500 UNIT(S): 5000 INJECTION INTRAVENOUS; SUBCUTANEOUS at 05:40

## 2020-05-16 RX ADMIN — HYDROMORPHONE HYDROCHLORIDE 0.5 MILLIGRAM(S): 2 INJECTION INTRAMUSCULAR; INTRAVENOUS; SUBCUTANEOUS at 04:03

## 2020-05-16 RX ADMIN — HYDROMORPHONE HYDROCHLORIDE 1 MILLIGRAM(S): 2 INJECTION INTRAMUSCULAR; INTRAVENOUS; SUBCUTANEOUS at 20:20

## 2020-05-16 RX ADMIN — Medication 2 MILLIGRAM(S): at 20:20

## 2020-05-16 RX ADMIN — HYDROMORPHONE HYDROCHLORIDE 1 MILLIGRAM(S): 2 INJECTION INTRAMUSCULAR; INTRAVENOUS; SUBCUTANEOUS at 05:40

## 2020-05-16 RX ADMIN — HEPARIN SODIUM 7500 UNIT(S): 5000 INJECTION INTRAVENOUS; SUBCUTANEOUS at 20:21

## 2020-05-16 RX ADMIN — HYDROMORPHONE HYDROCHLORIDE 1 MILLIGRAM(S): 2 INJECTION INTRAMUSCULAR; INTRAVENOUS; SUBCUTANEOUS at 08:56

## 2020-05-16 RX ADMIN — Medication 2 MILLIGRAM(S): at 05:40

## 2020-05-16 RX ADMIN — HEPARIN SODIUM 7500 UNIT(S): 5000 INJECTION INTRAVENOUS; SUBCUTANEOUS at 14:26

## 2020-05-16 RX ADMIN — LIDOCAINE 1 PATCH: 4 CREAM TOPICAL at 03:04

## 2020-05-16 RX ADMIN — HYDROMORPHONE HYDROCHLORIDE 1 MILLIGRAM(S): 2 INJECTION INTRAMUSCULAR; INTRAVENOUS; SUBCUTANEOUS at 14:27

## 2020-05-16 NOTE — PROVIDER CONTACT NOTE (OTHER) - ACTION/TREATMENT ORDERED:
PA aware. RN asked to give patient PRN tylenol for now, and give next PRN Dilaudid an hour early if needed.

## 2020-05-16 NOTE — PROVIDER CONTACT NOTE (OTHER) - SITUATION
Patient admitted for back pain. Patient is requesting Dilaudid for pain, and refusing Gabapentin and PRN medications.

## 2020-05-16 NOTE — PROGRESS NOTE ADULT - SUBJECTIVE AND OBJECTIVE BOX
Patient seen and examined at bedside.    --Anticoagulation--  heparin   Injectable 7500 Unit(s) SubCutaneous every 8 hours    T(C): 36.4 (05-16-20 @ 05:37), Max: 37.1 (05-15-20 @ 14:09)  HR: 75 (05-16-20 @ 05:37) (71 - 98)  BP: 99/62 (05-16-20 @ 05:37) (99/62 - 131/80)  RR: 20 (05-16-20 @ 05:37) (18 - 20)  SpO2: 97% (05-16-20 @ 05:37) (96% - 100%)  Wt(kg): --    Exam: AAOx3, FC, LLE HF/KF/KE 4/5 pain limited, otherwise full strength. SILT.

## 2020-05-16 NOTE — PROVIDER CONTACT NOTE (OTHER) - BACKGROUND
Patient admitted for back pain. [facility] back surgery, spinal fusion L2/L3 November 2019

## 2020-05-16 NOTE — PROGRESS NOTE ADULT - SUBJECTIVE AND OBJECTIVE BOX
SUBJECTIVE / OVERNIGHT EVENTS:      MEDICATIONS  (STANDING):  heparin   Injectable 7500 Unit(s) SubCutaneous every 8 hours    MEDICATIONS  (PRN):  acetaminophen   Tablet .. 650 milliGRAM(s) Oral every 6 hours PRN Temp greater or equal to 38C (100.4F), Mild Pain (1 - 3)  diazepam    Tablet 2 milliGRAM(s) Oral every 6 hours PRN muscle spasms  HYDROmorphone  Injectable 1 milliGRAM(s) IV Push every 6 hours PRN Severe Pain (7 - 10)  senna 2 Tablet(s) Oral at bedtime PRN Constipation        CAPILLARY BLOOD GLUCOSE        I&O's Summary    15 May 2020 07:01  -  16 May 2020 07:00  --------------------------------------------------------  IN: 440 mL / OUT: 0 mL / NET: 440 mL    16 May 2020 07:01  -  16 May 2020 22:26  --------------------------------------------------------  IN: 540 mL / OUT: 0 mL / NET: 540 mL        Constitutional: No fever, fatigue  Skin: No rash.  Eyes: No recent vision problems or eye pain.  ENT: No congestion, ear pain, or sore throat.  Cardiovascular: No chest pain or palpation.  Respiratory: No cough, shortness of breath, congestion, or wheezing.  Gastrointestinal: No abdominal pain, nausea, vomiting, or diarrhea.  Genitourinary: No dysuria.  Musculoskeletal: No joint swelling.  Neurologic: No headache.    PHYSICAL EXAM:  GENERAL: NAD  EYES: EOMI, PERRLA  NECK: Supple, No JVD  CHEST/LUNG: cta musa  HEART:  S1 , S2 +  ABDOMEN: soft , bs+  EXTREMITIES:  no edema  NEUROLOGY:alert awake oriented       LABS:                        11.9   7.34  )-----------( 367      ( 15 May 2020 15:23 )             37.4     05-15    139  |  104  |  12  ----------------------------<  76  4.2   |  23  |  0.88    Ca    9.3      15 May 2020 15:23    TPro  7.2  /  Alb  4.2  /  TBili  0.2  /  DBili  x   /  AST  14  /  ALT  18  /  AlkPhos  74  05-15              RADIOLOGY & ADDITIONAL TESTS:    Imaging Personally Reviewed:    Consultant(s) Notes Reviewed:      Care Discussed with Consultants/Other Providers:

## 2020-05-16 NOTE — PROGRESS NOTE ADULT - ASSESSMENT
Yash Paul  40F PMHx bipolar, depression, LBP s/p L2-3 PLIF 11/2019 w/ Dr. Kerns presents to ED with LBP radiating to L groin. Was setting up pool yesterday when she felt a stabbing pain in her back. CT grossly unremarkable hardware intact, final read pending. Exam: AAOx3, FC, LLE HF/KF/KE 4/5 pain limited, otherwise full strength. SILT.  - No acute neurosurgical intervention  - Medicine for pain control  - f/u CT read  - MRI L spine Yash Paul  40F PMHx bipolar, depression, LBP s/p L2-3 PLIF 11/2019 w/ Dr. Kerns presents to ED with LBP radiating to L groin. Was setting up pool yesterday when she felt a stabbing pain in her back. CT grossly unremarkable hardware intact, final read pending. Exam: AAOx3, FC, LLE HF/KF/KE 4/5 pain limited, otherwise full strength. SILT.  - CT L spine normal  - MRI L spine

## 2020-05-16 NOTE — CHART NOTE - NSCHARTNOTEFT_GEN_A_CORE
Non-contrasted CT scan as well as MRI of the lumbar spine performed for evaluation of new onset acute pain. No acute findings on either study. No further Neurosurgery recommendations at this time, we will sign off. Continue pain management per primary team.

## 2020-05-16 NOTE — PROVIDER CONTACT NOTE (OTHER) - BACKGROUND
Patient admitted for back pain. PMH of spinal fusion of L2/L3. Back surgery x2 2019, last one November 2019.

## 2020-05-17 ENCOUNTER — TRANSCRIPTION ENCOUNTER (OUTPATIENT)
Age: 40
End: 2020-05-17

## 2020-05-17 VITALS
TEMPERATURE: 98 F | HEART RATE: 75 BPM | SYSTOLIC BLOOD PRESSURE: 119 MMHG | RESPIRATION RATE: 18 BRPM | DIASTOLIC BLOOD PRESSURE: 68 MMHG | OXYGEN SATURATION: 97 %

## 2020-05-17 LAB
A1C WITH ESTIMATED AVERAGE GLUCOSE RESULT: 5.2 % — SIGNIFICANT CHANGE UP (ref 4–5.6)
ALBUMIN SERPL ELPH-MCNC: 3.7 G/DL — SIGNIFICANT CHANGE UP (ref 3.3–5)
ALP SERPL-CCNC: 64 U/L — SIGNIFICANT CHANGE UP (ref 40–120)
ALT FLD-CCNC: 13 U/L — SIGNIFICANT CHANGE UP (ref 10–45)
ANION GAP SERPL CALC-SCNC: 12 MMOL/L — SIGNIFICANT CHANGE UP (ref 5–17)
AST SERPL-CCNC: 9 U/L — LOW (ref 10–40)
BILIRUB DIRECT SERPL-MCNC: <0.1 MG/DL — SIGNIFICANT CHANGE UP (ref 0–0.2)
BILIRUB INDIRECT FLD-MCNC: >0.1 MG/DL — LOW (ref 0.2–1)
BILIRUB SERPL-MCNC: 0.2 MG/DL — SIGNIFICANT CHANGE UP (ref 0.2–1.2)
BUN SERPL-MCNC: 12 MG/DL — SIGNIFICANT CHANGE UP (ref 7–23)
CALCIUM SERPL-MCNC: 8.9 MG/DL — SIGNIFICANT CHANGE UP (ref 8.4–10.5)
CHLORIDE SERPL-SCNC: 104 MMOL/L — SIGNIFICANT CHANGE UP (ref 96–108)
CHOLEST SERPL-MCNC: 189 MG/DL — SIGNIFICANT CHANGE UP (ref 10–199)
CK SERPL-CCNC: 105 U/L — SIGNIFICANT CHANGE UP (ref 25–170)
CO2 SERPL-SCNC: 26 MMOL/L — SIGNIFICANT CHANGE UP (ref 22–31)
CREAT SERPL-MCNC: 0.83 MG/DL — SIGNIFICANT CHANGE UP (ref 0.5–1.3)
ESTIMATED AVERAGE GLUCOSE: 103 MG/DL — SIGNIFICANT CHANGE UP (ref 68–114)
GLUCOSE SERPL-MCNC: 71 MG/DL — SIGNIFICANT CHANGE UP (ref 70–99)
HCT VFR BLD CALC: 34.2 % — LOW (ref 34.5–45)
HDLC SERPL-MCNC: 50 MG/DL — SIGNIFICANT CHANGE UP
HGB BLD-MCNC: 11.1 G/DL — LOW (ref 11.5–15.5)
LIPID PNL WITH DIRECT LDL SERPL: 103 MG/DL — HIGH
MCHC RBC-ENTMCNC: 29.5 PG — SIGNIFICANT CHANGE UP (ref 27–34)
MCHC RBC-ENTMCNC: 32.5 GM/DL — SIGNIFICANT CHANGE UP (ref 32–36)
MCV RBC AUTO: 91 FL — SIGNIFICANT CHANGE UP (ref 80–100)
NRBC # BLD: 0 /100 WBCS — SIGNIFICANT CHANGE UP (ref 0–0)
PLATELET # BLD AUTO: 312 K/UL — SIGNIFICANT CHANGE UP (ref 150–400)
POTASSIUM SERPL-MCNC: 4.1 MMOL/L — SIGNIFICANT CHANGE UP (ref 3.5–5.3)
POTASSIUM SERPL-SCNC: 4.1 MMOL/L — SIGNIFICANT CHANGE UP (ref 3.5–5.3)
PROT SERPL-MCNC: 6.3 G/DL — SIGNIFICANT CHANGE UP (ref 6–8.3)
RBC # BLD: 3.76 M/UL — LOW (ref 3.8–5.2)
RBC # FLD: 13.6 % — SIGNIFICANT CHANGE UP (ref 10.3–14.5)
SODIUM SERPL-SCNC: 142 MMOL/L — SIGNIFICANT CHANGE UP (ref 135–145)
T3 SERPL-MCNC: 144 NG/DL — SIGNIFICANT CHANGE UP (ref 80–200)
T4 AB SER-ACNC: 6.6 UG/DL — SIGNIFICANT CHANGE UP (ref 4.6–12)
TOTAL CHOLESTEROL/HDL RATIO MEASUREMENT: 3.8 RATIO — SIGNIFICANT CHANGE UP (ref 3.3–7.1)
TRIGL SERPL-MCNC: 178 MG/DL — HIGH (ref 10–149)
TSH SERPL-MCNC: 1.34 UIU/ML — SIGNIFICANT CHANGE UP (ref 0.27–4.2)
WBC # BLD: 5.81 K/UL — SIGNIFICANT CHANGE UP (ref 3.8–10.5)
WBC # FLD AUTO: 5.81 K/UL — SIGNIFICANT CHANGE UP (ref 3.8–10.5)

## 2020-05-17 PROCEDURE — 84702 CHORIONIC GONADOTROPIN TEST: CPT

## 2020-05-17 PROCEDURE — 80061 LIPID PANEL: CPT

## 2020-05-17 PROCEDURE — 80053 COMPREHEN METABOLIC PANEL: CPT

## 2020-05-17 PROCEDURE — 82550 ASSAY OF CK (CPK): CPT

## 2020-05-17 PROCEDURE — 84480 ASSAY TRIIODOTHYRONINE (T3): CPT

## 2020-05-17 PROCEDURE — 85027 COMPLETE CBC AUTOMATED: CPT

## 2020-05-17 PROCEDURE — 96375 TX/PRO/DX INJ NEW DRUG ADDON: CPT

## 2020-05-17 PROCEDURE — 82248 BILIRUBIN DIRECT: CPT

## 2020-05-17 PROCEDURE — 83036 HEMOGLOBIN GLYCOSYLATED A1C: CPT

## 2020-05-17 PROCEDURE — 72131 CT LUMBAR SPINE W/O DYE: CPT

## 2020-05-17 PROCEDURE — 84443 ASSAY THYROID STIM HORMONE: CPT

## 2020-05-17 PROCEDURE — 72128 CT CHEST SPINE W/O DYE: CPT

## 2020-05-17 PROCEDURE — 84436 ASSAY OF TOTAL THYROXINE: CPT

## 2020-05-17 PROCEDURE — 96374 THER/PROPH/DIAG INJ IV PUSH: CPT

## 2020-05-17 PROCEDURE — 99285 EMERGENCY DEPT VISIT HI MDM: CPT | Mod: 25

## 2020-05-17 PROCEDURE — 72148 MRI LUMBAR SPINE W/O DYE: CPT

## 2020-05-17 RX ORDER — DIAZEPAM 5 MG
1 TABLET ORAL
Qty: 12 | Refills: 0
Start: 2020-05-17 | End: 2020-05-19

## 2020-05-17 RX ORDER — ACETAMINOPHEN 500 MG
1 TABLET ORAL
Qty: 21 | Refills: 0
Start: 2020-05-17 | End: 2020-05-23

## 2020-05-17 RX ADMIN — Medication 2 MILLIGRAM(S): at 05:31

## 2020-05-17 RX ADMIN — HYDROMORPHONE HYDROCHLORIDE 1 MILLIGRAM(S): 2 INJECTION INTRAMUSCULAR; INTRAVENOUS; SUBCUTANEOUS at 05:38

## 2020-05-17 RX ADMIN — Medication 2 MILLIGRAM(S): at 11:22

## 2020-05-17 RX ADMIN — HYDROMORPHONE HYDROCHLORIDE 1 MILLIGRAM(S): 2 INJECTION INTRAMUSCULAR; INTRAVENOUS; SUBCUTANEOUS at 11:23

## 2020-05-17 RX ADMIN — HEPARIN SODIUM 7500 UNIT(S): 5000 INJECTION INTRAVENOUS; SUBCUTANEOUS at 05:38

## 2020-05-17 NOTE — DISCHARGE NOTE PROVIDER - CARE PROVIDER_API CALL
Frederick Kerns  NEUROSURGERY  [address] [place] [address], [place] [address]  Phone: [phone]  Fax: [phone]  Follow Up Time:

## 2020-05-17 NOTE — DISCHARGE NOTE PROVIDER - NSDCFUADDINST_GEN_ALL_CORE_FT
Do not drive while taking narcotic pain medication.  Recommend colace/senna while taking narcotic pain medication to avoid constipation.   Continue all home medications.    Follow up with your primary care provider.

## 2020-05-17 NOTE — DISCHARGE NOTE PROVIDER - NSDCACTIVITY_GEN_ALL_CORE
Walking - Indoors allowed/No heavy lifting/straining/Walking - Outdoors allowed/Showering allowed/Do not drive or operate machinery

## 2020-05-17 NOTE — DISCHARGE NOTE PROVIDER - NSDCMRMEDTOKEN_GEN_ALL_CORE_FT
acetaminophen 500 mg oral tablet: 1 tab(s) orally every 8 hours, As Needed -for moderate pain MDD:6  Cymbalta: 90 mg once daily in the morning  diazePAM 2 mg oral tablet: 1 tab(s) orally every 6 hours, As needed, muscle spasms MDD:4  docusate sodium 100 mg oral capsule: 1 cap(s) orally 3 times a day  Effexor: 300 milligram(s) orally once a day  KlonoPIN 0.5 mg oral tablet: 1 tab(s) orally 3 times a day, As Needed  melatonin 5 mg oral tablet: 1 tab(s) orally once a day (at bedtime)  ocular lubricant ophthalmic solution: 1 drop(s) to each affected eye 4 times a day, As needed, Dry Eyes  PROzac 40 mg oral capsule: 1 cap(s) orally once a day  senna oral tablet: 2 tab(s) orally once a day (at bedtime), As needed, Constipation  sodium chloride 0.65% nasal spray: 1 drop(s) nasal 4 times a day, As Needed   tetracaine 0.5% ophthalmic solution: 1 drop(s) to each affected eye once  traZODone 50 mg oral tablet: 1 tab(s) orally once a day  Vyvanse 20 mg oral capsule: 2 cap(s) orally once a day (in the morning)

## 2020-05-17 NOTE — DISCHARGE NOTE PROVIDER - NSDCADMDATE_GEN_ALL_CORE_FT
Patient requesting refill clopidogrel 75mg tablet. Request sent to cardiologist for review given patient has been on DAPT >1 year.   15-May-2020 18:08

## 2020-05-17 NOTE — DISCHARGE NOTE NURSING/CASE MANAGEMENT/SOCIAL WORK - PATIENT PORTAL LINK FT
You can access the FollowMyHealth Patient Portal offered by [facility] by registering at the following website: [email or web address]. By joining [facility]’s FollowMyHealth portal, you will also be able to view your health information using other applications (apps) compatible with our system.

## 2020-05-17 NOTE — DISCHARGE NOTE PROVIDER - NSDCCPCAREPLAN_GEN_ALL_CORE_FT
PRINCIPAL DISCHARGE DIAGNOSIS  Diagnosis: Acute low back pain, unspecified back pain laterality, unspecified whether sciatica present  Assessment and Plan of Treatment: The imaging done while in the hospital showed no acute abnormalities and your hardware from previous surgeries is in place.  You may weight bear as tolerated.  No heavy lifting or straining.  Follow up with your spine surgeon.  A prescription has been sent for pain control.  Do not drive while on narcotic pain medications.      SECONDARY DISCHARGE DIAGNOSES  Diagnosis: Attention deficit hyperactivity disorder  Assessment and Plan of Treatment:     Diagnosis: Depression  Assessment and Plan of Treatment:     Diagnosis: Bipolar mood disorder  Assessment and Plan of Treatment: Continue all home medications.

## 2020-05-17 NOTE — DISCHARGE NOTE PROVIDER - NSDCFUSCHEDAPPT_GEN_ALL_CORE_FT
RUPALI CRUZ ; 05/20/2020 ; [facility] [address] RUPALI RCUZ ; 05/20/2020 ; [facility] [address] RUPALI CRUZ ; 05/20/2020 ; [facility] [address]

## 2020-05-17 NOTE — DISCHARGE NOTE PROVIDER - HOSPITAL COURSE
40y F with pmhx bipolar mood disorder, depression, GERD, L2-L3 fusion (November 2019), L2 decompression (August 2019) who was admitted 5/15/20 with back pain. Pt states she may have "twisted the wrong way" while setting up a pool in her backyard. She was able to ambulate but with severe pain.   CT was grossly unremarkable with hardware intact and MRI was ordered, which showed no acute findings.  Neurosurgery was consulted and no urgent intervention was indicated.    Her pain was controlled with dilaudid and valium.  She will be discharged with a prescription for Valium and Tylenol.  She can follow up with her neurosurgeon and primary care provider. She is stable for discharge home. Her COVID-19 PCR was negative.

## 2020-05-20 ENCOUNTER — APPOINTMENT (OUTPATIENT)
Dept: SPINE | Facility: CLINIC | Age: 40
End: 2020-05-20
Payer: MEDICAID

## 2020-05-20 DIAGNOSIS — F90.9 ATTENTION-DEFICIT HYPERACTIVITY DISORDER, UNSPECIFIED TYPE: ICD-10-CM

## 2020-05-20 PROCEDURE — 99213 OFFICE O/P EST LOW 20 MIN: CPT

## 2020-05-20 NOTE — REASON FOR VISIT
[Follow-Up: _____] : a [unfilled] follow-up visit [FreeTextEntry1] : S/P L3-4 bilateral facetectomy, L3-4 radical discectomy, L3-4 insertion of intervertebral expandable  cage with L3-L4 posterior segmental instrumentation and posterolateral arthrodesis on 11/2019\par \par \par Today she reports that she was recently admitted to [facility] \par 5/14 for evaluation of back spasm

## 2020-06-15 NOTE — ED ADULT NURSE NOTE - NSIMPLEMENTINTERV_GEN_ALL_ED
no Implemented All Universal Safety Interventions:  [place] to call system. Call bell, personal items and telephone within reach. Instruct patient to call for assistance. Room bathroom lighting operational. Non-slip footwear when patient is off stretcher. Physically safe environment: no spills, clutter or unnecessary equipment. Stretcher in lowest position, wheels locked, appropriate side rails in place.

## 2020-06-18 DIAGNOSIS — Z71.89 OTHER SPECIFIED COUNSELING: ICD-10-CM

## 2020-08-15 ENCOUNTER — TRANSCRIPTION ENCOUNTER (OUTPATIENT)
Age: 40
End: 2020-08-15

## 2020-08-19 ENCOUNTER — APPOINTMENT (OUTPATIENT)
Dept: SPINE | Facility: CLINIC | Age: 40
End: 2020-08-19

## 2020-09-03 ENCOUNTER — TRANSCRIPTION ENCOUNTER (OUTPATIENT)
Age: 40
End: 2020-09-03

## 2020-09-11 NOTE — PROVIDER CONTACT NOTE (OTHER) - ASSESSMENT
Patient's VSS, but patient is tearful complaining of back pain and refusing medications, stating that she no longer takes Gabapentin, and the Tylenol and Klonopin are not helpful for her. granddaughter

## 2020-09-23 ENCOUNTER — APPOINTMENT (OUTPATIENT)
Dept: SPINE | Facility: CLINIC | Age: 40
End: 2020-09-23
Payer: MEDICAID

## 2020-09-23 VITALS
HEIGHT: 63 IN | WEIGHT: 252 LBS | HEART RATE: 86 BPM | BODY MASS INDEX: 44.65 KG/M2 | DIASTOLIC BLOOD PRESSURE: 66 MMHG | RESPIRATION RATE: 18 BRPM | TEMPERATURE: 98 F | OXYGEN SATURATION: 94 % | SYSTOLIC BLOOD PRESSURE: 121 MMHG

## 2020-09-23 VITALS — BODY MASS INDEX: 44.64 KG/M2 | WEIGHT: 252 LBS

## 2020-09-23 PROCEDURE — 99213 OFFICE O/P EST LOW 20 MIN: CPT

## 2020-09-25 RX ORDER — CYCLOBENZAPRINE HYDROCHLORIDE 5 MG/1
5 TABLET, FILM COATED ORAL
Qty: 42 | Refills: 0 | Status: DISCONTINUED | COMMUNITY
Start: 2019-12-18 | End: 2020-09-25

## 2020-09-25 RX ORDER — CYCLOBENZAPRINE HYDROCHLORIDE 10 MG/1
10 TABLET, FILM COATED ORAL EVERY 8 HOURS
Qty: 30 | Refills: 0 | Status: DISCONTINUED | COMMUNITY
Start: 2020-05-15 | End: 2020-09-25

## 2020-09-25 NOTE — PHYSICAL EXAM
[General Appearance - Alert] : alert [General Appearance - In No Acute Distress] : in no acute distress [Oriented To Time, Place, And Person] : oriented to person, place, and time [Impaired Insight] : insight and judgment were intact [Cranial Nerves Optic (II)] : visual acuity intact bilaterally,  pupils equal round and reactive to light [Cranial Nerves Oculomotor (III)] : extraocular motion intact [Cranial Nerves Trigeminal (V)] : facial sensation intact symmetrically [Cranial Nerves Facial (VII)] : face symmetrical [Cranial Nerves Vestibulocochlear (VIII)] : hearing was intact bilaterally [Cranial Nerves Glossopharyngeal (IX)] : tongue and palate midline [Cranial Nerves Accessory (XI - Cranial And Spinal)] : head turning and shoulder shrug symmetric [Cranial Nerves Hypoglossal (XII)] : there was no tongue deviation with protrusion [Abnormal Walk] : normal gait [Skin Color & Pigmentation] : normal skin color and pigmentation [] : no respiratory distress

## 2020-09-25 NOTE — REVIEW OF SYSTEMS
[Leg Weakness] : leg weakness [Tingling] : tingling [Abnormal Sensation] : an abnormal sensation [Numbness] : no numbness

## 2020-09-25 NOTE — ASSESSMENT
[FreeTextEntry1] : S/P Lumbar Fusion\par Residual Left Leg weakness unchanged\par \par \par follow up with endocrinologist \par \par continue conservative plan of care\par \par 
home

## 2020-09-25 NOTE — REASON FOR VISIT
[Follow-Up: _____] : a [unfilled] follow-up visit [FreeTextEntry1] : She reports that she has not improved since surgery\par Left Sided Lowerback pain radiate to LEft Hip and posterior aspect of her legs into her toes\par She has gained weight (90 lbs) Since surgery\par She walks with a limp\par Pain interferes with sleep\par \par she reports that she was referred to an endocrinologist just and has appointment set up for December 2020\par

## 2020-10-16 ENCOUNTER — INPATIENT (INPATIENT)
Facility: HOSPITAL | Age: 40
LOS: 3 days | Discharge: HOME | DRG: 552 | End: 2020-10-20
Attending: INTERNAL MEDICINE | Admitting: HOSPITALIST
Payer: MEDICAID

## 2020-10-16 VITALS
WEIGHT: 259.93 LBS | DIASTOLIC BLOOD PRESSURE: 61 MMHG | HEIGHT: 62 IN | SYSTOLIC BLOOD PRESSURE: 95 MMHG | HEART RATE: 87 BPM | RESPIRATION RATE: 20 BRPM | TEMPERATURE: 98 F | OXYGEN SATURATION: 97 %

## 2020-10-16 DIAGNOSIS — M54.42 LUMBAGO WITH SCIATICA, LEFT SIDE: ICD-10-CM

## 2020-10-16 DIAGNOSIS — Z90.49 ACQUIRED ABSENCE OF OTHER SPECIFIED PARTS OF DIGESTIVE TRACT: Chronic | ICD-10-CM

## 2020-10-16 DIAGNOSIS — N84.0 POLYP OF CORPUS UTERI: Chronic | ICD-10-CM

## 2020-10-16 DIAGNOSIS — M67.432 GANGLION, LEFT WRIST: Chronic | ICD-10-CM

## 2020-10-16 DIAGNOSIS — Z98.84 BARIATRIC SURGERY STATUS: Chronic | ICD-10-CM

## 2020-10-16 DIAGNOSIS — Z98.89 OTHER SPECIFIED POSTPROCEDURAL STATES: Chronic | ICD-10-CM

## 2020-10-16 LAB
ALBUMIN SERPL ELPH-MCNC: 4.4 G/DL — SIGNIFICANT CHANGE UP (ref 3.3–5)
ALP SERPL-CCNC: 81 U/L — SIGNIFICANT CHANGE UP (ref 40–120)
ALT FLD-CCNC: 13 U/L — SIGNIFICANT CHANGE UP (ref 10–45)
ANION GAP SERPL CALC-SCNC: 13 MMOL/L — SIGNIFICANT CHANGE UP (ref 5–17)
AST SERPL-CCNC: 15 U/L — SIGNIFICANT CHANGE UP (ref 10–40)
BASOPHILS # BLD AUTO: 0.03 K/UL — SIGNIFICANT CHANGE UP (ref 0–0.2)
BASOPHILS NFR BLD AUTO: 0.3 % — SIGNIFICANT CHANGE UP (ref 0–2)
BILIRUB SERPL-MCNC: 0.3 MG/DL — SIGNIFICANT CHANGE UP (ref 0.2–1.2)
BUN SERPL-MCNC: 12 MG/DL — SIGNIFICANT CHANGE UP (ref 7–23)
CALCIUM SERPL-MCNC: 9.7 MG/DL — SIGNIFICANT CHANGE UP (ref 8.4–10.5)
CHLORIDE SERPL-SCNC: 103 MMOL/L — SIGNIFICANT CHANGE UP (ref 96–108)
CO2 SERPL-SCNC: 22 MMOL/L — SIGNIFICANT CHANGE UP (ref 22–31)
CREAT SERPL-MCNC: 0.9 MG/DL — SIGNIFICANT CHANGE UP (ref 0.5–1.3)
EOSINOPHIL # BLD AUTO: 0.03 K/UL — SIGNIFICANT CHANGE UP (ref 0–0.5)
EOSINOPHIL NFR BLD AUTO: 0.3 % — SIGNIFICANT CHANGE UP (ref 0–6)
GLUCOSE SERPL-MCNC: 80 MG/DL — SIGNIFICANT CHANGE UP (ref 70–99)
HCG SERPL-ACNC: <2 MIU/ML — SIGNIFICANT CHANGE UP
HCT VFR BLD CALC: 41.6 % — SIGNIFICANT CHANGE UP (ref 34.5–45)
HGB BLD-MCNC: 13.6 G/DL — SIGNIFICANT CHANGE UP (ref 11.5–15.5)
IMM GRANULOCYTES NFR BLD AUTO: 0.3 % — SIGNIFICANT CHANGE UP (ref 0–1.5)
LYMPHOCYTES # BLD AUTO: 2.77 K/UL — SIGNIFICANT CHANGE UP (ref 1–3.3)
LYMPHOCYTES # BLD AUTO: 31.2 % — SIGNIFICANT CHANGE UP (ref 13–44)
MCHC RBC-ENTMCNC: 29.8 PG — SIGNIFICANT CHANGE UP (ref 27–34)
MCHC RBC-ENTMCNC: 32.7 GM/DL — SIGNIFICANT CHANGE UP (ref 32–36)
MCV RBC AUTO: 91.2 FL — SIGNIFICANT CHANGE UP (ref 80–100)
MONOCYTES # BLD AUTO: 0.5 K/UL — SIGNIFICANT CHANGE UP (ref 0–0.9)
MONOCYTES NFR BLD AUTO: 5.6 % — SIGNIFICANT CHANGE UP (ref 2–14)
NEUTROPHILS # BLD AUTO: 5.51 K/UL — SIGNIFICANT CHANGE UP (ref 1.8–7.4)
NEUTROPHILS NFR BLD AUTO: 62.3 % — SIGNIFICANT CHANGE UP (ref 43–77)
NRBC # BLD: 0 /100 WBCS — SIGNIFICANT CHANGE UP (ref 0–0)
PLATELET # BLD AUTO: 369 K/UL — SIGNIFICANT CHANGE UP (ref 150–400)
POTASSIUM SERPL-MCNC: 4.1 MMOL/L — SIGNIFICANT CHANGE UP (ref 3.5–5.3)
POTASSIUM SERPL-SCNC: 4.1 MMOL/L — SIGNIFICANT CHANGE UP (ref 3.5–5.3)
PROT SERPL-MCNC: 7.4 G/DL — SIGNIFICANT CHANGE UP (ref 6–8.3)
RBC # BLD: 4.56 M/UL — SIGNIFICANT CHANGE UP (ref 3.8–5.2)
RBC # FLD: 12.6 % — SIGNIFICANT CHANGE UP (ref 10.3–14.5)
SODIUM SERPL-SCNC: 138 MMOL/L — SIGNIFICANT CHANGE UP (ref 135–145)
WBC # BLD: 8.87 K/UL — SIGNIFICANT CHANGE UP (ref 3.8–10.5)
WBC # FLD AUTO: 8.87 K/UL — SIGNIFICANT CHANGE UP (ref 3.8–10.5)

## 2020-10-16 PROCEDURE — 74176 CT ABD & PELVIS W/O CONTRAST: CPT | Mod: 26

## 2020-10-16 PROCEDURE — 99285 EMERGENCY DEPT VISIT HI MDM: CPT

## 2020-10-16 PROCEDURE — 72131 CT LUMBAR SPINE W/O DYE: CPT | Mod: 26

## 2020-10-16 PROCEDURE — 73502 X-RAY EXAM HIP UNI 2-3 VIEWS: CPT | Mod: 26,LT

## 2020-10-16 RX ORDER — MORPHINE SULFATE 50 MG/1
4 CAPSULE, EXTENDED RELEASE ORAL ONCE
Refills: 0 | Status: DISCONTINUED | OUTPATIENT
Start: 2020-10-16 | End: 2020-10-16

## 2020-10-16 RX ORDER — DIAZEPAM 5 MG
5 TABLET ORAL ONCE
Refills: 0 | Status: DISCONTINUED | OUTPATIENT
Start: 2020-10-16 | End: 2020-10-16

## 2020-10-16 RX ORDER — OXYCODONE HYDROCHLORIDE 5 MG/1
5 TABLET ORAL ONCE
Refills: 0 | Status: DISCONTINUED | OUTPATIENT
Start: 2020-10-16 | End: 2020-10-16

## 2020-10-16 RX ORDER — ACETAMINOPHEN 500 MG
975 TABLET ORAL ONCE
Refills: 0 | Status: COMPLETED | OUTPATIENT
Start: 2020-10-16 | End: 2020-10-16

## 2020-10-16 RX ORDER — SODIUM CHLORIDE 9 MG/ML
500 INJECTION INTRAMUSCULAR; INTRAVENOUS; SUBCUTANEOUS ONCE
Refills: 0 | Status: COMPLETED | OUTPATIENT
Start: 2020-10-16 | End: 2020-10-16

## 2020-10-16 RX ORDER — INFLUENZA VIRUS VACCINE 15; 15; 15; 15 UG/.5ML; UG/.5ML; UG/.5ML; UG/.5ML
0.5 SUSPENSION INTRAMUSCULAR ONCE
Refills: 0 | Status: DISCONTINUED | OUTPATIENT
Start: 2020-10-16 | End: 2020-10-20

## 2020-10-16 RX ADMIN — MORPHINE SULFATE 4 MILLIGRAM(S): 50 CAPSULE, EXTENDED RELEASE ORAL at 20:56

## 2020-10-16 RX ADMIN — OXYCODONE HYDROCHLORIDE 5 MILLIGRAM(S): 5 TABLET ORAL at 19:56

## 2020-10-16 RX ADMIN — OXYCODONE HYDROCHLORIDE 5 MILLIGRAM(S): 5 TABLET ORAL at 19:28

## 2020-10-16 RX ADMIN — MORPHINE SULFATE 4 MILLIGRAM(S): 50 CAPSULE, EXTENDED RELEASE ORAL at 22:27

## 2020-10-16 RX ADMIN — Medication 975 MILLIGRAM(S): at 19:28

## 2020-10-16 RX ADMIN — Medication 975 MILLIGRAM(S): at 19:56

## 2020-10-16 RX ADMIN — MORPHINE SULFATE 4 MILLIGRAM(S): 50 CAPSULE, EXTENDED RELEASE ORAL at 20:31

## 2020-10-16 RX ADMIN — SODIUM CHLORIDE 500 MILLILITER(S): 9 INJECTION INTRAMUSCULAR; INTRAVENOUS; SUBCUTANEOUS at 21:04

## 2020-10-16 RX ADMIN — MORPHINE SULFATE 4 MILLIGRAM(S): 50 CAPSULE, EXTENDED RELEASE ORAL at 22:11

## 2020-10-16 RX ADMIN — Medication 5 MILLIGRAM(S): at 19:28

## 2020-10-16 RX ADMIN — SODIUM CHLORIDE 500 MILLILITER(S): 9 INJECTION INTRAMUSCULAR; INTRAVENOUS; SUBCUTANEOUS at 20:31

## 2020-10-16 NOTE — ED PROVIDER NOTE - PHYSICAL EXAMINATION
A&Ox3, NAD. NCAT. PERRL, EOMI. Neck supple, no LAD. Lungs CTAB. +S1S2, RRR, No m/r/g. Abd soft, NT/ND, +BS, no rebound or guarding. Extremities: cap refill <2, pulses in distal extremities 4+, no edema. Skin without rash. CN II-XII intact. Strength 5/5 UE/LE. Sensations intact throughout, R>L, hip flexion reduced 4/5 left side 2/2 pain, left sided dorsiflexion 4/5 unsure by pt if weak or pain limiting, plantar flexion strength 5/5. rectal tone intact, no saddle anesthesia, chaperone Dr. Yousif. A&Ox3, NAD. NCAT. PERRL, EOMI. Neck supple, no LAD. Lungs CTAB. +S1S2, RRR, No m/r/g. Abd soft, NT/ND, +BS, no rebound or guarding. Extremities: cap refill <2, pulses in distal extremities 4+, no edema. Skin without rash. CN II-XII intact. Strength 5/5 UE/RLE. Sensations intact throughout, R>L, hip flexion reduced 4/5 left side 2/2 pain, left sided dorsiflexion 4/5 unsure by pt if weak or pain limiting, plantar flexion strength 5/5. rectal tone intact, no saddle anesthesia, +vertebral and paraspinal lumbar ttp, chaperone Dr. Yousif.

## 2020-10-16 NOTE — CONSULT NOTE ADULT - SUBJECTIVE AND OBJECTIVE BOX
p ([id number])     HPI:  41 yo female with pmh bipolar, L2-L3 Lumbar fusion with Dr. Kerns in 2019 presenting with concern of worsening left lower back pain radiating to left groin with associated numbness of the left leg/foot atraumatically x 3 days. Pt states pain never went away s/p lumbar fusion last year but started to slowly worsen yesterday, worse with movement but also intermittently worsen at rest, today has been unbearable and she has been unable to walk or perform ADLs. pt has been taking 1000mg of motrin every few hours with no relief of her pain. Pt states numbness of her left foot/leg started 3 days ago as well and has not improved, denies weakness of leg, bladder or bowel incontinence, no saddle anesthesias, no abdominal pain, dysuria, hematuria, fevers, chills, nausea, vomiting. Pt unable to f/u with pain management d/t insurance issues. Pt states this feels different from her usual lumbar back pain as this is radiating to her groin which it usually doesn't do    Imaging:  pending  Exam:  AAOx3, FC, LLE HF/KF/KE 4/5 pain limited, otherwise full strength. SILT.   --Anticoagulation:    =====================  PAST MEDICAL HISTORY   Anemia    GERD (gastroesophageal reflux disease)    Depression    ADD (attention deficit disorder)    Bipolar mood disorder    Depression    ADD (attention deficit disorder)    Depression      PAST SURGICAL HISTORY   Endometrial polyp    Ganglion cyst of wrist, left    Bariatric surgery status    S/P  section    S/P cholecystectomy    Status post cholecystectomy    Fitting and adjustment of gastric lap band    Delivery by emergency caesarean section    H/O bariatric surgery    S/P cholecystectomy      Ofirmev (Urticaria)  Robaxin (Anaphylaxis)      MEDICATIONS:  Antibiotics:    Neuro:  morphine  - Injectable 4 milliGRAM(s) IV Push Once    Other:  sodium chloride 0.9% Bolus 500 milliLiter(s) IV Bolus once      SOCIAL HISTORY:   Occupation:   Marital Status:     FAMILY HISTORY:  No pertinent family history in first degree relatives        ROS: Negative except per HPI    LABS:                          13.6   8.87  )-----------( 369      ( 16 Oct 2020 19:46 )             41.6     10-16    138  |  103  |  12  ----------------------------<  80  4.1   |  22  |  0.90    Ca    9.7      16 Oct 2020 19:46    TPro  7.4  /  Alb  4.4  /  TBili  0.3  /  DBili  x   /  AST  15  /  ALT  13  /  AlkPhos  81  10-16

## 2020-10-16 NOTE — ED ADULT NURSE NOTE - NSIMPLEMENTINTERV_GEN_ALL_ED
Implemented All Fall Risk Interventions:  [place] to call system. Call bell, personal items and telephone within reach. Instruct patient to call for assistance. Room bathroom lighting operational. Non-slip footwear when patient is off stretcher. Physically safe environment: no spills, clutter or unnecessary equipment. Stretcher in lowest position, wheels locked, appropriate side rails in place. Provide visual cue, wrist band, yellow gown, etc. Monitor gait and stability. Monitor for mental status changes and reorient to person, place, and time. Review medications for side effects contributing to fall risk. Reinforce activity limits and safety measures with patient and family.

## 2020-10-16 NOTE — ED PROVIDER NOTE - PROGRESS NOTE DETAILS
amina elliott for evaluation of patient. -Frida Leggett PA-C spoke with neurosurgery will come evaluate patient. -Frida Leggett PA-C neurosurgery at bedside. -Frida Leggett PA-C Neurosurgery sates they will follow CT scan read, recommending imaging of hip as well, no need for emergent MRI based on neurological examination, but will need repeat MRI inpatient likely hospital admission. -Frida Leggett PA-C Pt states no pain relief s/p oxycodone and valium, moderate improvement with morphine, unable to ambulate without assistance with steady gait, requesting more pain medication as pain is increasing again, will give another 4mg. discussed pending CT results and likely admission for pain control/MRI. -Frida Leggett PA-C

## 2020-10-16 NOTE — ED PROVIDER NOTE - ATTENDING CONTRIBUTION TO CARE
40F, pmh bipolar disorder, L2-L3 lumbar fusion (latefi) 2019, with subsequent revision later that year, presents with worsening L lower back pain, rad to groin, with numbness LLE x 3 days. No falls, trauma. No f/c. No urinary or fecal incontinence. No saddle anesthesia. No dysuria, hematuria. Pt states pain constant, severe, intermittently more severe, pt states primarily is worse with any movement.    PE: NAD, NCAT, MMM, Trachea midline, Normal conjunctiva, lungs CTAB, S1/S2 RRR, Normal perfusion, 2+ radial pulses bilat, Abdomen Soft, NTND, No rebound/guarding, No LE edema, No deformity of extremities, No rashes,  No focal motor or sensory deficits. +midline scar to lumbar region with overlying ttp. Strength exam LE's limited 2/2 pain, sensation intact throughout but subjectively decreased distal LLE.    Will obtain labs. Imaging. Pain control. Consult neurosurgery given surgical hx. Re-eval. - Ezequiel Yousif MD

## 2020-10-16 NOTE — ED PROVIDER NOTE - OBJECTIVE STATEMENT
39 yo female with pmh bipolar, L2-L3 Lumbar fusion with Dr. Kerns in 2019 presenting with concern of worsening left lower back pain radiating to left groin with associated numbness of the left leg/foot atraumatically x 3 days. Pt states pain never went away s/p lumbar fusion last year but started to slowly worsen yesterday, worse with movement but also intermittently worsen at rest, today has been unbearable and she has been unable to walk or perform ADLs. pt has been taking 1000mg of motrin every few hours with no relief of her pain. Pt states numbness of her left foot/leg started 3 days ago as well and has not improved, denies weakness of leg, bladder or bowel incontinence, no saddle anesthesias, no abdominal pain, dysuria, hematuria, fevers, chills, nausea, vomiting. Pt unable to f/u with pain management d/t insurance issues. Pt states this feels different from her usual lumbar back pain as this is radiating to her groin which it usually doesn't do.

## 2020-10-16 NOTE — ED ADULT NURSE NOTE - OBJECTIVE STATEMENT
41 yo female complaining of back pain (pt has chronic back pain) "for the last 3 days I am in so much pain, I cannot walk, it goes down my legs and I am feeling some numbness". Pt alerted and oriented x3. no signs of acute distress noted at this moment besides the pain. Pt states feeling both extremities and able to move, however, pt states being unable to walk. No edema noted, will continue to monitor closely. Heart sounds normal, lungs clear, abdomen soft, non distended.

## 2020-10-16 NOTE — CONSULT NOTE ADULT - ASSESSMENT
Yash Paul  40F PMHx bipolar, depression, LBP s/p L2-3 PLIF 11/2019 w/ Dr. Kerns presents to ED with LBP radiating to L groin, and LLE numbness. Pain is severe, making it difficult for her to ambulate long distances. Feels like "her hip is going to pop off." Pain is made worse with movement, sitting, and better with laying down. Pain has become progressively more severe over the last 3 days. No saddle anesthesia, no b/b dysfunction.  Exam: AAOx3, FC, LLE HF/KF/KE 4/5 pain limited, otherwise full strength. SILT.   - No acute neurosurgical intervention  - CT L spine  - MRI L spine  -L hip Xray

## 2020-10-17 DIAGNOSIS — F98.8 OTHER SPECIFIED BEHAVIORAL AND EMOTIONAL DISORDERS WITH ONSET USUALLY OCCURRING IN CHILDHOOD AND ADOLESCENCE: ICD-10-CM

## 2020-10-17 DIAGNOSIS — M54.9 DORSALGIA, UNSPECIFIED: ICD-10-CM

## 2020-10-17 DIAGNOSIS — N94.9 UNSPECIFIED CONDITION ASSOCIATED WITH FEMALE GENITAL ORGANS AND MENSTRUAL CYCLE: ICD-10-CM

## 2020-10-17 DIAGNOSIS — F31.9 BIPOLAR DISORDER, UNSPECIFIED: ICD-10-CM

## 2020-10-17 DIAGNOSIS — F32.9 MAJOR DEPRESSIVE DISORDER, SINGLE EPISODE, UNSPECIFIED: ICD-10-CM

## 2020-10-17 DIAGNOSIS — Z29.9 ENCOUNTER FOR PROPHYLACTIC MEASURES, UNSPECIFIED: ICD-10-CM

## 2020-10-17 LAB
SARS-COV-2 IGG SERPL QL IA: NEGATIVE — SIGNIFICANT CHANGE UP
SARS-COV-2 IGM SERPL IA-ACNC: <0.1 INDEX — SIGNIFICANT CHANGE UP
SARS-COV-2 RNA SPEC QL NAA+PROBE: SIGNIFICANT CHANGE UP

## 2020-10-17 PROCEDURE — 12345: CPT | Mod: NC,GC

## 2020-10-17 PROCEDURE — 72148 MRI LUMBAR SPINE W/O DYE: CPT | Mod: 26

## 2020-10-17 PROCEDURE — 99223 1ST HOSP IP/OBS HIGH 75: CPT

## 2020-10-17 RX ORDER — ALPRAZOLAM 0.25 MG
1 TABLET ORAL THREE TIMES A DAY
Refills: 0 | Status: DISCONTINUED | OUTPATIENT
Start: 2020-10-17 | End: 2020-10-20

## 2020-10-17 RX ORDER — GABAPENTIN 400 MG/1
400 CAPSULE ORAL AT BEDTIME
Refills: 0 | Status: DISCONTINUED | OUTPATIENT
Start: 2020-10-17 | End: 2020-10-19

## 2020-10-17 RX ORDER — DULOXETINE HYDROCHLORIDE 30 MG/1
0 CAPSULE, DELAYED RELEASE ORAL
Qty: 0 | Refills: 0 | DISCHARGE

## 2020-10-17 RX ORDER — LIDOCAINE 4 G/100G
1 CREAM TOPICAL DAILY
Refills: 0 | Status: DISCONTINUED | OUTPATIENT
Start: 2020-10-17 | End: 2020-10-20

## 2020-10-17 RX ORDER — FLUOXETINE HCL 10 MG
1 CAPSULE ORAL
Qty: 0 | Refills: 0 | DISCHARGE

## 2020-10-17 RX ORDER — ENOXAPARIN SODIUM 100 MG/ML
40 INJECTION SUBCUTANEOUS EVERY 12 HOURS
Refills: 0 | Status: DISCONTINUED | OUTPATIENT
Start: 2020-10-17 | End: 2020-10-20

## 2020-10-17 RX ORDER — TRAZODONE HCL 50 MG
50 TABLET ORAL AT BEDTIME
Refills: 0 | Status: DISCONTINUED | OUTPATIENT
Start: 2020-10-17 | End: 2020-10-17

## 2020-10-17 RX ORDER — VENLAFAXINE HCL 75 MG
300 CAPSULE, EXT RELEASE 24 HR ORAL
Qty: 0 | Refills: 0 | DISCHARGE

## 2020-10-17 RX ORDER — LANOLIN ALCOHOL/MO/W.PET/CERES
3 CREAM (GRAM) TOPICAL AT BEDTIME
Refills: 0 | Status: DISCONTINUED | OUTPATIENT
Start: 2020-10-17 | End: 2020-10-20

## 2020-10-17 RX ORDER — HYDROMORPHONE HYDROCHLORIDE 2 MG/ML
0.75 INJECTION INTRAMUSCULAR; INTRAVENOUS; SUBCUTANEOUS EVERY 6 HOURS
Refills: 0 | Status: DISCONTINUED | OUTPATIENT
Start: 2020-10-17 | End: 2020-10-19

## 2020-10-17 RX ORDER — ACETAMINOPHEN 500 MG
650 TABLET ORAL EVERY 6 HOURS
Refills: 0 | Status: DISCONTINUED | OUTPATIENT
Start: 2020-10-17 | End: 2020-10-17

## 2020-10-17 RX ORDER — CLONAZEPAM 1 MG
1 TABLET ORAL
Qty: 0 | Refills: 0 | DISCHARGE

## 2020-10-17 RX ORDER — TRAZODONE HCL 50 MG
200 TABLET ORAL AT BEDTIME
Refills: 0 | Status: DISCONTINUED | OUTPATIENT
Start: 2020-10-17 | End: 2020-10-20

## 2020-10-17 RX ORDER — HYDROMORPHONE HYDROCHLORIDE 2 MG/ML
0.5 INJECTION INTRAMUSCULAR; INTRAVENOUS; SUBCUTANEOUS EVERY 4 HOURS
Refills: 0 | Status: DISCONTINUED | OUTPATIENT
Start: 2020-10-17 | End: 2020-10-19

## 2020-10-17 RX ORDER — DULOXETINE HYDROCHLORIDE 30 MG/1
60 CAPSULE, DELAYED RELEASE ORAL
Refills: 0 | Status: DISCONTINUED | OUTPATIENT
Start: 2020-10-17 | End: 2020-10-20

## 2020-10-17 RX ORDER — MORPHINE SULFATE 50 MG/1
4 CAPSULE, EXTENDED RELEASE ORAL EVERY 4 HOURS
Refills: 0 | Status: DISCONTINUED | OUTPATIENT
Start: 2020-10-17 | End: 2020-10-17

## 2020-10-17 RX ORDER — LAMOTRIGINE 25 MG/1
150 TABLET, ORALLY DISINTEGRATING ORAL AT BEDTIME
Refills: 0 | Status: DISCONTINUED | OUTPATIENT
Start: 2020-10-17 | End: 2020-10-20

## 2020-10-17 RX ORDER — OXYCODONE HYDROCHLORIDE 5 MG/1
5 TABLET ORAL EVERY 4 HOURS
Refills: 0 | Status: DISCONTINUED | OUTPATIENT
Start: 2020-10-17 | End: 2020-10-20

## 2020-10-17 RX ORDER — GABAPENTIN 400 MG/1
400 CAPSULE ORAL DAILY
Refills: 0 | Status: DISCONTINUED | OUTPATIENT
Start: 2020-10-17 | End: 2020-10-17

## 2020-10-17 RX ORDER — LISDEXAMFETAMINE DIMESYLATE 70 MG/1
2 CAPSULE ORAL
Qty: 0 | Refills: 0 | DISCHARGE

## 2020-10-17 RX ORDER — ACETAMINOPHEN 500 MG
650 TABLET ORAL EVERY 6 HOURS
Refills: 0 | Status: COMPLETED | OUTPATIENT
Start: 2020-10-17 | End: 2020-10-20

## 2020-10-17 RX ORDER — MORPHINE SULFATE 50 MG/1
6 CAPSULE, EXTENDED RELEASE ORAL EVERY 6 HOURS
Refills: 0 | Status: DISCONTINUED | OUTPATIENT
Start: 2020-10-17 | End: 2020-10-17

## 2020-10-17 RX ADMIN — Medication 3 MILLIGRAM(S): at 21:11

## 2020-10-17 RX ADMIN — LIDOCAINE 1 PATCH: 4 CREAM TOPICAL at 18:38

## 2020-10-17 RX ADMIN — GABAPENTIN 400 MILLIGRAM(S): 400 CAPSULE ORAL at 21:11

## 2020-10-17 RX ADMIN — MORPHINE SULFATE 6 MILLIGRAM(S): 50 CAPSULE, EXTENDED RELEASE ORAL at 05:07

## 2020-10-17 RX ADMIN — HYDROMORPHONE HYDROCHLORIDE 0.5 MILLIGRAM(S): 2 INJECTION INTRAMUSCULAR; INTRAVENOUS; SUBCUTANEOUS at 16:31

## 2020-10-17 RX ADMIN — HYDROMORPHONE HYDROCHLORIDE 0.5 MILLIGRAM(S): 2 INJECTION INTRAMUSCULAR; INTRAVENOUS; SUBCUTANEOUS at 21:05

## 2020-10-17 RX ADMIN — HYDROMORPHONE HYDROCHLORIDE 0.5 MILLIGRAM(S): 2 INJECTION INTRAMUSCULAR; INTRAVENOUS; SUBCUTANEOUS at 16:50

## 2020-10-17 RX ADMIN — MORPHINE SULFATE 4 MILLIGRAM(S): 50 CAPSULE, EXTENDED RELEASE ORAL at 02:30

## 2020-10-17 RX ADMIN — DULOXETINE HYDROCHLORIDE 60 MILLIGRAM(S): 30 CAPSULE, DELAYED RELEASE ORAL at 17:41

## 2020-10-17 RX ADMIN — ENOXAPARIN SODIUM 40 MILLIGRAM(S): 100 INJECTION SUBCUTANEOUS at 05:07

## 2020-10-17 RX ADMIN — HYDROMORPHONE HYDROCHLORIDE 0.75 MILLIGRAM(S): 2 INJECTION INTRAMUSCULAR; INTRAVENOUS; SUBCUTANEOUS at 12:18

## 2020-10-17 RX ADMIN — Medication 1 MILLIGRAM(S): at 05:07

## 2020-10-17 RX ADMIN — LIDOCAINE 1 PATCH: 4 CREAM TOPICAL at 12:17

## 2020-10-17 RX ADMIN — Medication 650 MILLIGRAM(S): at 17:41

## 2020-10-17 RX ADMIN — ENOXAPARIN SODIUM 40 MILLIGRAM(S): 100 INJECTION SUBCUTANEOUS at 17:41

## 2020-10-17 RX ADMIN — Medication 650 MILLIGRAM(S): at 23:56

## 2020-10-17 RX ADMIN — LAMOTRIGINE 150 MILLIGRAM(S): 25 TABLET, ORALLY DISINTEGRATING ORAL at 21:11

## 2020-10-17 RX ADMIN — HYDROMORPHONE HYDROCHLORIDE 0.5 MILLIGRAM(S): 2 INJECTION INTRAMUSCULAR; INTRAVENOUS; SUBCUTANEOUS at 20:35

## 2020-10-17 RX ADMIN — MORPHINE SULFATE 4 MILLIGRAM(S): 50 CAPSULE, EXTENDED RELEASE ORAL at 08:20

## 2020-10-17 RX ADMIN — Medication 1 MILLIGRAM(S): at 14:23

## 2020-10-17 RX ADMIN — MORPHINE SULFATE 4 MILLIGRAM(S): 50 CAPSULE, EXTENDED RELEASE ORAL at 01:50

## 2020-10-17 RX ADMIN — Medication 200 MILLIGRAM(S): at 21:11

## 2020-10-17 RX ADMIN — MORPHINE SULFATE 4 MILLIGRAM(S): 50 CAPSULE, EXTENDED RELEASE ORAL at 09:07

## 2020-10-17 RX ADMIN — HYDROMORPHONE HYDROCHLORIDE 0.75 MILLIGRAM(S): 2 INJECTION INTRAMUSCULAR; INTRAVENOUS; SUBCUTANEOUS at 12:40

## 2020-10-17 RX ADMIN — Medication 1 MILLIGRAM(S): at 21:11

## 2020-10-17 NOTE — H&P ADULT - PROBLEM SELECTOR PLAN 2
Continue home dose of alprazolam, trazodone and duloxetine.  Clarify in AM if patient Is still taking prozac or if this had been discontinued.

## 2020-10-17 NOTE — PROGRESS NOTE ADULT - ASSESSMENT
Yash Paul  40F PMHx bipolar, depression, LBP s/p L2-3 PLIF 11/2019 w/ Dr. Kerns presents to ED with LBP radiating to L groin, and LLE numbness. Pain is severe, making it difficult for her to ambulate long distances. Feels like "her hip is going to pop off." Pain is made worse with movement, sitting, and better with laying down. Pain has become progressively more severe over the last 3 days. No saddle anesthesia, no b/b dysfunction.  Exam: AAOx3, FC, LLE HF/KF/KE 4/5 pain limited, otherwise full strength. SILT.   - No acute neurosurgical intervention  - ADM medicine for pain control  - q4h neuro checks  - PT/OT  - Patient responds better to PRN dilaudid than morphine, would reccommend switching  - Pending MR L spine  - CT L and XRs stable

## 2020-10-17 NOTE — H&P ADULT - NSICDXFAMILYHX_GEN_ALL_CORE_FT
FAMILY HISTORY:  No pertinent family history in first degree relatives     FAMILY HISTORY:  FH: diabetes mellitus, mother  FH: hypertension, mother and father  FH: hypothyroidism, father

## 2020-10-17 NOTE — H&P ADULT - ASSESSMENT
This patient is a 39yo lady with PMH of bipolar disorder, depression, GERD, L2-L3 fusion (Nov 2019) with L2 decompression (August 2019) who presents to the ED with complaint of back pain. The patient has chronic lower back pain, however over the last 3 days, pain has worsened to 8-9/10 severity and radiates to the groin. Pain is worsened with movement, sitting upright, turning in bed. She also endorses decreased sensation in LLE, sensation of weakness of LLE. She denies bowel or bladder incontinence. She also denies fever, chills.   She has been taking 5 tablets of motrin about 5 times a day, without pain relief.   She visited Dr. Kerns about 2 weeks ago for concern of lower back pain, and reports she was not offered other surgical intervention but rather advised to see pain management.   This patient is a 41yo lady with PMH of bipolar disorder, depression, GERD, L2-L3 fusion (Nov 2019) with L2 decompression (August 2019) who presents to the ED with complaint of back pain.

## 2020-10-17 NOTE — H&P ADULT - NSHPPHYSICALEXAM_GEN_ALL_CORE
T(C): 36.5 (10-16-20 @ 23:40), Max: 36.7 (10-16-20 @ 18:35)  HR: 97 (10-16-20 @ 23:40) (87 - 97)  BP: 114/80 (10-16-20 @ 23:40) (95/61 - 114/80)  RR: 18 (10-16-20 @ 23:40) (18 - 20)  SpO2: 97% (10-16-20 @ 23:40) (97% - 99%)  Wt(kg): --    PHYSICAL EXAM:  GENERAL: NAD, well-groomed, well-developed, obese  HEAD:  Atraumatic, Normocephalic  EYES: EOMI, PERRLA, conjunctiva and sclera clear  ENMT: No oropharyngeal exudates, erythema or lesions,  Moist mucous membranes  NECK: Supple, no cervical lymphadenopathy  NERVOUS SYSTEM:  Alert & Oriented X3, CN II-XII intact,%and BLE motor strength, full sensation to light touch   CHEST/LUNG: Clear to auscultation bilaterally; No rales, no  rhonchi, no wheezing  HEART: Regular rate and rhythm; No murmurs, rubs, or gallops  ABDOMEN: Soft, Nontender, Nondistended  EXTREMITIES:  2+ Peripheral Pulses, No clubbing, cyanosis, or edema  SKIN: warm, dry T(C): 36.5 (10-16-20 @ 23:40), Max: 36.7 (10-16-20 @ 18:35)  HR: 97 (10-16-20 @ 23:40) (87 - 97)  BP: 114/80 (10-16-20 @ 23:40) (95/61 - 114/80)  RR: 18 (10-16-20 @ 23:40) (18 - 20)  SpO2: 97% (10-16-20 @ 23:40) (97% - 99%)  Wt(kg): --    PHYSICAL EXAM:  GENERAL: NAD, well-groomed, well-developed, obese  HEAD:  Atraumatic, Normocephalic  EYES: EOMI, PERRLA, conjunctiva and sclera clear  ENMT: No oropharyngeal exudates, erythema or lesions,  Moist mucous membranes  NECK: Supple, no cervical lymphadenopathy  NERVOUS SYSTEM:  Alert & Oriented X3, CN II-XII intact, 5/5 BUE and RLE strength, 4/5 LLE motor strength limited due to pain, subjective decreased sensation to light touch at LLE  MSK: L lumbosacral tenderness to palpation.   CHEST/LUNG: Clear to auscultation bilaterally; No rales, no  rhonchi, no wheezing  HEART: Regular rate and rhythm; No murmurs, rubs, or gallops  ABDOMEN: Soft, Nontender, Nondistended  EXTREMITIES:  2+ Peripheral Pulses, No cyanosis or edema  SKIN: warm, dry

## 2020-10-17 NOTE — H&P ADULT - ATTENDING COMMENTS
Patient assigned to me by night hospitalist in charge for management and care for patient overnight.   Karine Garcia MD p [phone]

## 2020-10-17 NOTE — H&P ADULT - NSHPSOCIALHISTORY_GEN_ALL_CORE
The patient lives with her male partner and daughter.  She denies alcohol use.  She quit smoking in 2016, but uses e-cigarettes on occasion.

## 2020-10-17 NOTE — PROGRESS NOTE ADULT - PROBLEM SELECTOR PLAN 4
Continue home dose of vyvanse Continue home dose of vyvanse; have pt family bring to hospital as [facility] does not have vyvanse

## 2020-10-17 NOTE — PROGRESS NOTE ADULT - ASSESSMENT
40F PMHx bipolar, depression, LBP s/p L2-3 PLIF 11/2019 w/ Dr. Kerns presents to ED with LBP radiating to L groin. Was setting up pool yesterday when she felt a stabbing pain in her back. CT grossly unremarkable hardware intact, final read pending. Exam: AAOx3, FC, LLE HF/KF/KE 4/5 pain limited, otherwise full strength. SILT.  - evaluated by neuro sx   - mri   pain control with Dilaudid + valium   - check tfts, lipid profiel, hba1c  - PT   dvt prophylaxis  discussed management plan with pt 40F PMHx bipolar, depression, LBP s/p L2-3 PLIF 11/2019 w/ Dr. Kerns presents to ED with LBP radiating to L groin. Was setting up pool yesterday when she felt a stabbing pain in her back. CT grossly unremarkable hardware intact, no source of pain identified. MRI w/o acute acute pathology.

## 2020-10-17 NOTE — PROGRESS NOTE ADULT - SUBJECTIVE AND OBJECTIVE BOX
PROGRESS NOTE:     CONTACT INFO:  Asher Emery MD (Resident Physician - PGY-1 - Internal Medicine)  [email or web address]  Pager: [phone] (any site) or [phone] ([facility] only)    Patient is a 40y old  Female who presents with a chief complaint of back pain (17 Oct 2020 01:47)      SUBJECTIVE / OVERNIGHT EVENTS:  No acute events overnight. Patient seen and evaluated at bedside. No fever/chills.  Denies SOB at rest, chest pain, palpitations, abdominal pain, nausea/vomiting    ADDITIONAL REVIEW OF SYSTEMS:    MEDICATIONS  (STANDING):  ALPRAZolam 1 milliGRAM(s) Oral three times a day  DULoxetine 60 milliGRAM(s) Oral two times a day  enoxaparin Injectable 40 milliGRAM(s) SubCutaneous every 12 hours  gabapentin 400 milliGRAM(s) Oral daily  influenza   Vaccine 0.5 milliLiter(s) IntraMuscular once  lamoTRIgine 150 milliGRAM(s) Oral at bedtime  lidocaine   Patch 1 Patch Transdermal daily  morphine  - Injectable 6 milliGRAM(s) IV Push every 6 hours  traZODone 50 milliGRAM(s) Oral at bedtime    MEDICATIONS  (PRN):  acetaminophen   Tablet .. 650 milliGRAM(s) Oral every 6 hours PRN Temp greater or equal to 38C (100.4F), Mild Pain (1 - 3), Moderate Pain (4 - 6)  morphine  - Injectable 4 milliGRAM(s) IV Push every 4 hours PRN Severe Pain (7 - 10)  oxyCODONE    IR 5 milliGRAM(s) Oral every 4 hours PRN Moderate Pain (4 - 6)      CAPILLARY BLOOD GLUCOSE        I&O's Summary    16 Oct 2020 07:01  -  17 Oct 2020 07:00  --------------------------------------------------------  IN: 50 mL / OUT: 0 mL / NET: 50 mL        PHYSICAL EXAM:  Vital Signs Last 24 Hrs  T(C): 36.4 (17 Oct 2020 04:39), Max: 36.7 (16 Oct 2020 18:35)  T(F): 97.5 (17 Oct 2020 04:39), Max: 98 (16 Oct 2020 18:35)  HR: 86 (17 Oct 2020 04:39) (86 - 97)  BP: 115/79 (17 Oct 2020 05:00) (95/61 - 115/79)  BP(mean): --  RR: 18 (17 Oct 2020 04:39) (18 - 20)  SpO2: 94% (17 Oct 2020 04:39) (94% - 99%)    CONSTITUTIONAL: NAD, well-developed  RESPIRATORY: Normal respiratory effort; lungs are clear to auscultation bilaterally  CARDIOVASCULAR: Regular rate and rhythm, normal S1 and S2, no murmur/rub/gallop; No lower extremity edema; Peripheral pulses are 2+ bilaterally  ABDOMEN: Nontender to palpation, normoactive bowel sounds, no rebound/guarding; No hepatosplenomegaly  MUSCLOSKELETAL: no clubbing or cyanosis of digits; no joint swelling or tenderness to palpation  PSYCH: A+O to person, place, and time; affect appropriate    LABS:                        13.6   8.87  )-----------( 369      ( 16 Oct 2020 19:46 )             41.6     10-16    138  |  103  |  12  ----------------------------<  80  4.1   |  22  |  0.90    Ca    9.7      16 Oct 2020 19:46    TPro  7.4  /  Alb  4.4  /  TBili  0.3  /  DBili  x   /  AST  15  /  ALT  13  /  AlkPhos  81  10-16                RADIOLOGY & ADDITIONAL TESTS:  Results Reviewed:   Imaging Personally Reviewed:  Electrocardiogram Personally Reviewed:    COORDINATION OF CARE:  Care Discussed with Consultants/Other Providers [Y/N]:  Prior or Outpatient Records Reviewed [Y/N]:   PROGRESS NOTE: a     CONTACT INFO:  Asher Emery MD (Resident Physician - PGY-1 - Internal Medicine)  [email or web address]  Pager: [phone] (any site) or [phone] ([facility] only)    Patient is a 40y old  Female who presents with a chief complaint of back pain (17 Oct 2020 01:47)      SUBJECTIVE / OVERNIGHT EVENTS:  No acute events overnight. Patient seen and evaluated at bedside. No fever/chills.  Denies SOB at rest, chest pain, palpitations, abdominal pain, nausea/vomiting    ADDITIONAL REVIEW OF SYSTEMS:    MEDICATIONS  (STANDING):  ALPRAZolam 1 milliGRAM(s) Oral three times a day  DULoxetine 60 milliGRAM(s) Oral two times a day  enoxaparin Injectable 40 milliGRAM(s) SubCutaneous every 12 hours  gabapentin 400 milliGRAM(s) Oral daily  influenza   Vaccine 0.5 milliLiter(s) IntraMuscular once  lamoTRIgine 150 milliGRAM(s) Oral at bedtime  lidocaine   Patch 1 Patch Transdermal daily  morphine  - Injectable 6 milliGRAM(s) IV Push every 6 hours  traZODone 50 milliGRAM(s) Oral at bedtime    MEDICATIONS  (PRN):  acetaminophen   Tablet .. 650 milliGRAM(s) Oral every 6 hours PRN Temp greater or equal to 38C (100.4F), Mild Pain (1 - 3), Moderate Pain (4 - 6)  morphine  - Injectable 4 milliGRAM(s) IV Push every 4 hours PRN Severe Pain (7 - 10)  oxyCODONE    IR 5 milliGRAM(s) Oral every 4 hours PRN Moderate Pain (4 - 6)      CAPILLARY BLOOD GLUCOSE        I&O's Summary    16 Oct 2020 07:01  -  17 Oct 2020 07:00  --------------------------------------------------------  IN: 50 mL / OUT: 0 mL / NET: 50 mL        PHYSICAL EXAM:  Vital Signs Last 24 Hrs  T(C): 36.4 (17 Oct 2020 04:39), Max: 36.7 (16 Oct 2020 18:35)  T(F): 97.5 (17 Oct 2020 04:39), Max: 98 (16 Oct 2020 18:35)  HR: 86 (17 Oct 2020 04:39) (86 - 97)  BP: 115/79 (17 Oct 2020 05:00) (95/61 - 115/79)  BP(mean): --  RR: 18 (17 Oct 2020 04:39) (18 - 20)  SpO2: 94% (17 Oct 2020 04:39) (94% - 99%)    CONSTITUTIONAL: NAD, well-developed  RESPIRATORY: Normal respiratory effort; lungs are clear to auscultation bilaterally  CARDIOVASCULAR: Regular rate and rhythm, normal S1 and S2, no murmur/rub/gallop; No lower extremity edema; Peripheral pulses are 2+ bilaterally  ABDOMEN: Nontender to palpation, normoactive bowel sounds, no rebound/guarding; No hepatosplenomegaly  MUSCLOSKELETAL: no clubbing or cyanosis of digits; no joint swelling or tenderness to palpation  PSYCH: A+O to person, place, and time; affect appropriate    LABS:                        13.6   8.87  )-----------( 369      ( 16 Oct 2020 19:46 )             41.6     10-16    138  |  103  |  12  ----------------------------<  80  4.1   |  22  |  0.90    Ca    9.7      16 Oct 2020 19:46    TPro  7.4  /  Alb  4.4  /  TBili  0.3  /  DBili  x   /  AST  15  /  ALT  13  /  AlkPhos  81  10-16                RADIOLOGY & ADDITIONAL TESTS:  Results Reviewed:   Imaging Personally Reviewed:  Electrocardiogram Personally Reviewed:    COORDINATION OF CARE:  Care Discussed with Consultants/Other Providers [Y/N]:  Prior or Outpatient Records Reviewed [Y/N]:   PROGRESS NOTE:     CONTACT INFO:  Asher Emery MD (Resident Physician - PGY-1 - Internal Medicine)  [email or web address]  Pager: [phone] (any site) or [phone] ([facility] only)    Patient is a 40y old  Female who presents with a chief complaint of back pain (17 Oct 2020 01:47)      SUBJECTIVE / OVERNIGHT EVENTS:  No acute events overnight. Patient seen and evaluated at bedside. No fever/chills.  Denies SOB at rest, chest pain, palpitations, abdominal pain, nausea/vomiting    ADDITIONAL REVIEW OF SYSTEMS:    MEDICATIONS  (STANDING):  ALPRAZolam 1 milliGRAM(s) Oral three times a day  DULoxetine 60 milliGRAM(s) Oral two times a day  enoxaparin Injectable 40 milliGRAM(s) SubCutaneous every 12 hours  gabapentin 400 milliGRAM(s) Oral daily  influenza   Vaccine 0.5 milliLiter(s) IntraMuscular once  lamoTRIgine 150 milliGRAM(s) Oral at bedtime  lidocaine   Patch 1 Patch Transdermal daily  morphine  - Injectable 6 milliGRAM(s) IV Push every 6 hours  traZODone 50 milliGRAM(s) Oral at bedtime    MEDICATIONS  (PRN):  acetaminophen   Tablet .. 650 milliGRAM(s) Oral every 6 hours PRN Temp greater or equal to 38C (100.4F), Mild Pain (1 - 3), Moderate Pain (4 - 6)  morphine  - Injectable 4 milliGRAM(s) IV Push every 4 hours PRN Severe Pain (7 - 10)  oxyCODONE    IR 5 milliGRAM(s) Oral every 4 hours PRN Moderate Pain (4 - 6)      CAPILLARY BLOOD GLUCOSE        I&O's Summary    16 Oct 2020 07:01  -  17 Oct 2020 07:00  --------------------------------------------------------  IN: 50 mL / OUT: 0 mL / NET: 50 mL        PHYSICAL EXAM:  Vital Signs Last 24 Hrs  T(C): 36.4 (17 Oct 2020 04:39), Max: 36.7 (16 Oct 2020 18:35)  T(F): 97.5 (17 Oct 2020 04:39), Max: 98 (16 Oct 2020 18:35)  HR: 86 (17 Oct 2020 04:39) (86 - 97)  BP: 115/79 (17 Oct 2020 05:00) (95/61 - 115/79)  BP(mean): --  RR: 18 (17 Oct 2020 04:39) (18 - 20)  SpO2: 94% (17 Oct 2020 04:39) (94% - 99%)    GENERAL: NAD, well-groomed, well-developed, obese  HEAD:  Atraumatic, Normocephalic  EYES: EOMI, PERRLA, conjunctiva and sclera clear  ENMT: Moist mucous membranes; no nasal discharge; FROM of neck  NEURO: AAOx3, CN II-XII intact, 5/5 BUE and RLE strength, 4/5 LLE motor strength limited due to pain, subjective decreased sensation to light touch at LLE; sensation of RLE and remainder of body is grossly intact  MSK: L lumbosacral tenderness to palpation   CHEST/LUNG: Clear to auscultation bilaterally; No rales, no rhonchi, no wheezing  HEART: Regular rate and rhythm; No murmurs, rubs, or gallops  ABDOMEN: Soft, Nontender, Nondistended; dull to percussion  EXTREMITIES:  2+ Peripheral Pulses, No cyanosis or edema  SKIN: warm, dry, intact    LABS:                        13.6   8.87  )-----------( 369      ( 16 Oct 2020 19:46 )             41.6     10-16    138  |  103  |  12  ----------------------------<  80  4.1   |  22  |  0.90    Ca    9.7      16 Oct 2020 19:46    TPro  7.4  /  Alb  4.4  /  TBili  0.3  /  DBili  x   /  AST  15  /  ALT  13  /  AlkPhos  81  10-16                RADIOLOGY & ADDITIONAL TESTS:  Results Reviewed:   Imaging Personally Reviewed:  Electrocardiogram Personally Reviewed:    COORDINATION OF CARE:  Care Discussed with Consultants/Other Providers [Y/N]:  Prior or Outpatient Records Reviewed [Y/N]:

## 2020-10-17 NOTE — H&P ADULT - NSHPLABSRESULTS_GEN_ALL_CORE
Labs, imaging  personally reviewed by me.     CBC is unremarkable. CMP unremarkable.   hCG <2.     LABS:                        13.6   8.87  )-----------( 369      ( 16 Oct 2020 19:46 )             41.6     Hgb Trend: 13.6<--  10-16    138  |  103  |  12  ----------------------------<  80  4.1   |  22  |  0.90    Ca    9.7      16 Oct 2020 19:46    TPro  7.4  /  Alb  4.4  /  TBili  0.3  /  DBili  x   /  AST  15  /  ALT  13  /  AlkPhos  81  10-16    Creatinine Trend: 0.90<--    < from: CT Lumbar Spine Reform No Cont (10.16.20 @ 21:32) >    FINDINGS:  LOWER CHEST: Within normal limits.    LIVER: Within normal limits.  BILE DUCTS: Mild prominence of the common bile duct expected post cholecystectomy.  GALLBLADDER: Cholecystectomy.  SPLEEN: Within normal limits.  PANCREAS: Within normal limits.  ADRENALS: Within normal limits.  KIDNEYS/URETERS: No renal stones or hydronephrosis.    BLADDER: Within normal limits.  REPRODUCTIVE ORGANS: Uterus normal limits. A 3.0 cm right adnexal cyst. A single dropped surgical clip is seen in the right iliac fossa.    BOWEL: Sleeve gastrectomy. No bowel obstruction. Appendix is normal.  PERITONEUM: No ascites.  VESSELS: Within normal limits.  RETROPERITONEUM/LYMPH NODES: No lymphadenopathy.  ABDOMINAL WALL: Tiny fat-containing umbilical hernia.  BONES: Status post L2-L3 laminectomies with L2-L3 posterior lumbar fusion construct consisting of paired metallic spinal rods and transpedicular screws in the L2 and L3 vertebral bodies and intervening intervertebral disc spacer. Evaluation of the spinal canal at L2-L3 is limited due to metallic streak artifact. Hardware is satisfactory in alignment. No periprosthetic fracture. No listhesis seen. Bilateral facet hypertrophy is seen in the lumbar spine.    IMPRESSION:  No acute intra-abdominal pathology.    Redemonstration of postsurgical changes related to L2-L3 laminectomies and discectomies with stable appearance of posterior fusion construct and interposition graft.    < end of copied text > Labs, imaging  personally reviewed by me.     CBC is unremarkable. CMP unremarkable.   hCG <2.     LABS:                        13.6   8.87  )-----------( 369      ( 16 Oct 2020 19:46 )             41.6     Hgb Trend: 13.6<--  10-16    138  |  103  |  12  ----------------------------<  80  4.1   |  22  |  0.90    Ca    9.7      16 Oct 2020 19:46    TPro  7.4  /  Alb  4.4  /  TBili  0.3  /  DBili  x   /  AST  15  /  ALT  13  /  AlkPhos  81  10-16    Creatinine Trend: 0.90<--    < from: CT Lumbar Spine Reform No Cont (10.16.20 @ 21:32) >    FINDINGS:  LOWER CHEST: Within normal limits.    LIVER: Within normal limits.  BILE DUCTS: Mild prominence of the common bile duct expected post cholecystectomy.  GALLBLADDER: Cholecystectomy.  SPLEEN: Within normal limits.  PANCREAS: Within normal limits.  ADRENALS: Within normal limits.  KIDNEYS/URETERS: No renal stones or hydronephrosis.    BLADDER: Within normal limits.  REPRODUCTIVE ORGANS: Uterus normal limits. A 3.0 cm right adnexal cyst. A single dropped surgical clip is seen in the right iliac fossa.    BOWEL: Sleeve gastrectomy. No bowel obstruction. Appendix is normal.  PERITONEUM: No ascites.  VESSELS: Within normal limits.  RETROPERITONEUM/LYMPH NODES: No lymphadenopathy.  ABDOMINAL WALL: Tiny fat-containing umbilical hernia.  BONES: Status post L2-L3 laminectomies with L2-L3 posterior lumbar fusion construct consisting of paired metallic spinal rods and transpedicular screws in the L2 and L3 vertebral bodies and intervening intervertebral disc spacer. Evaluation of the spinal canal at L2-L3 is limited due to metallic streak artifact. Hardware is satisfactory in alignment. No periprosthetic fracture. No listhesis seen. Bilateral facet hypertrophy is seen in the lumbar spine.    IMPRESSION:  No acute intra-abdominal pathology.    Redemonstration of postsurgical changes related to L2-L3 laminectomies and discectomies with stable appearance of posterior fusion construct and interposition graft.    < end of copied text >    < from: Xray Hip w/ Pelvis 2 or 3 Views, Left (10.16.20 @ 21:33) >    FINDINGS:    Partially imaged lumbar spinal hardware. Surgical clip is identified in the right hemipelvis. No acute fracture or dislocation of the left proximal femur. No displaced fracture of the pelvis or right proximal femur. Bilateral hip cartilage spaces are maintained.    < end of copied text >

## 2020-10-17 NOTE — H&P ADULT - HISTORY OF PRESENT ILLNESS
This patient is a 41yo lady with PMH of bipolar disorder, depression, GERD, L2-L3 fusion (Nov 2019) with L2 decompression (August 2019) who presents to the ED with complaint of back pain. This patient is a 41yo lady with PMH of bipolar disorder, depression, GERD, L2-L3 fusion (Nov 2019) with L2 decompression (August 2019) who presents to the ED with complaint of back pain. The patient has chronic lower back pain, however over the last 3 days, pain has worsened to 8-9/10 severity and radiates to the groin. Pain is worsened with movement, sitting upright, turning in bed. She also endorses decreased sensation in LLE, sensation of weakness of LLE. She denies bowel or bladder incontinence. She had no recent direct trauma to the back. She also denies fever, chills.   She has been taking 5 tablets of motrin about 5 times a day, without pain relief.   She visited Dr. Kerns about 2 weeks ago for concern of lower back pain, and reports she was not offered other surgical intervention but rather advised to see pain management. This patient is a 41yo lady with PMH of bipolar disorder, depression, GERD, L2-L3 fusion (Nov 2019) with L2 decompression (August 2019) who presents to the ED with complaint of back pain. The patient has chronic lower back pain, however over the last 3 days, pain has worsened to 8-9/10 severity and radiates to the groin. Pain is worsened with movement, sitting upright, turning in bed. She also endorses decreased sensation in LLE, sensation of weakness of LLE. She denies bowel or bladder incontinence. She also denies fever, chills.   She has been taking 5 tablets of motrin about 5 times a day, without pain relief.   She visited Dr. Kerns about 2 weeks ago for concern of lower back pain, and reports she was not offered other surgical intervention but rather advised to see pain management.

## 2020-10-17 NOTE — H&P ADULT - NSHPREVIEWOFSYSTEMS_GEN_ALL_CORE
REVIEW OF SYSTEMS  CONSTITUTIONAL: No fever, no chills, + nightsweats  EYES: No eye pain, no vision changes  ENMT:  No difficulty hearing, no throat pain  RESPIRATORY: No cough, no wheezing, no sputum production; No shortness of breath  CARDIOVASCULAR: No chest pain, no palpitations, no DUEÑAS  GASTROINTESTINAL: No abdominal pain, no nausea, no vomiting, no hematemesis, no diarrhea, + constipation  GENITOURINARY: No dysuria, no hematuria  NEUROLOGICAL: No headaches, +decreased strength LLE, +decreased sensation in LLE, no incontinence  SKIN: No itching, no rashes, no lesions   MUSCULOSKELETAL: No joint pain, no joint swelling; No muscle pain  HEME/LYMPH: No easy bruising, bleeding

## 2020-10-17 NOTE — PROGRESS NOTE ADULT - SUBJECTIVE AND OBJECTIVE BOX
Patient seen and examined at bedside.    --Anticoagulation--  enoxaparin Injectable 40 milliGRAM(s) SubCutaneous every 12 hours    T(C): 36.4 (10-17-20 @ 04:39), Max: 36.7 (10-16-20 @ 18:35)  HR: 86 (10-17-20 @ 04:39) (86 - 97)  BP: 115/79 (10-17-20 @ 05:00) (95/61 - 115/79)  RR: 18 (10-17-20 @ 04:39) (18 - 20)  SpO2: 94% (10-17-20 @ 04:39) (94% - 99%)  Wt(kg): --    Exam:  AAOx3, FC, LLE HF/KF/KE 4/5 pain limited, otherwise full strength. SILT.    Imaging:     Labs:                         13.6   8.87  )-----------( 369      ( 16 Oct 2020 19:46 )             41.6     10-16    138  |  103  |  12  ----------------------------<  80  4.1   |  22  |  0.90    Ca    9.7      16 Oct 2020 19:46    TPro  7.4  /  Alb  4.4  /  TBili  0.3  /  DBili  x   /  AST  15  /  ALT  13  /  AlkPhos  81  10-16

## 2020-10-17 NOTE — PROGRESS NOTE ADULT - PROBLEM SELECTOR PLAN 2
Continue home dose of alprazolam, trazodone and duloxetine.  Clarify in AM if patient Is still taking prozac or if this had been discontinued. Continue home dose of alprazolam, trazodone and duloxetine.  Clarify with pharmacy if patient Is still taking prozac or if this had been discontinued.

## 2020-10-17 NOTE — PROGRESS NOTE ADULT - PROBLEM SELECTOR PLAN 1
The patient complains of worsening L lumbosacral pain which radiates to the groin. She does not have saddle anesthesia or incontinence. She has subjective decreased sensation in LLE, and strength assessment is limited due to pain. Patient had reduction in pain severity from 9 to 7/10 with morphine 4mg IV.   I advised the patient to stop taking morphine; the dose she has been consuming places her at risk of gastritis or PUD.   CT of lumbar spine found hardware in alignment, without periprosthetic fracture or listhesis. Xray did not find femoral or pelvic fracture.  Obtain MRI lumbosacral spine  Pt was scheduled to have appt with Dr. Carreon (orthopedics) on Monday.   Start lidocaine patch.  Start morphine 6mg IV q6hg PRN for severe pain.   Consult pain management team in AM.    ISTOP reviewed- Reference #: [id number]  10/2/2020-vyvanse 50 mg capsule , 30 tab,30 day supply  10/02/2020- alprazolam 1 mg tablet , quantity 90 tab. 30 day supply The patient complains of worsening L lumbosacral pain which radiates to the groin. She does not have saddle anesthesia or incontinence. She has subjective decreased sensation in LLE, and strength assessment is limited due to pain. Patient had reduction in pain severity from 9 to 7/10 with morphine 4mg IV.   - CT of lumbar spine found hardware in alignment, without periprosthetic fracture or listhesis.   - Xray did not find femoral or pelvic fracture.  - MRI lumbosacral spine w/o acute pathology  - Pt was scheduled to have appt with Dr. Carreon (orthopedics) on Monday; ortho c/s placed, f/u recs  - Lidocaine patch  - Dilaudid standing 0.75mg q6h  - Dilaudid PRN 0.5mg q6h  - Consult pain management in AM  - Neurosurgery c/s and signed off; NTD and f/u OPT  ISTOP reviewed- Reference #: [id number]  10/2/2020-vyvanse 50 mg capsule , 30 tab,30 day supply  10/02/2020- alprazolam 1 mg tablet , quantity 90 tab. 30 day supply

## 2020-10-17 NOTE — CHART NOTE - NSCHARTNOTEFT_GEN_A_CORE
MRI reviewed, hardware intact and no new pathology identified, no e/o cord compression or foraminal stenosis. Would continue pain control, rec PT. Can follow up outpatient with Dr. Kerns in 2-4 weeks prn.    Will s/o. Please reconsult as needed.

## 2020-10-18 ENCOUNTER — TRANSCRIPTION ENCOUNTER (OUTPATIENT)
Age: 40
End: 2020-10-18

## 2020-10-18 LAB
ALBUMIN SERPL ELPH-MCNC: 4 G/DL — SIGNIFICANT CHANGE UP (ref 3.3–5)
ALP SERPL-CCNC: 105 U/L — SIGNIFICANT CHANGE UP (ref 40–120)
ALT FLD-CCNC: 59 U/L — HIGH (ref 10–45)
ANION GAP SERPL CALC-SCNC: 12 MMOL/L — SIGNIFICANT CHANGE UP (ref 5–17)
AST SERPL-CCNC: 49 U/L — HIGH (ref 10–40)
BILIRUB SERPL-MCNC: 0.3 MG/DL — SIGNIFICANT CHANGE UP (ref 0.2–1.2)
BUN SERPL-MCNC: 14 MG/DL — SIGNIFICANT CHANGE UP (ref 7–23)
CALCIUM SERPL-MCNC: 9.3 MG/DL — SIGNIFICANT CHANGE UP (ref 8.4–10.5)
CHLORIDE SERPL-SCNC: 104 MMOL/L — SIGNIFICANT CHANGE UP (ref 96–108)
CO2 SERPL-SCNC: 24 MMOL/L — SIGNIFICANT CHANGE UP (ref 22–31)
CREAT SERPL-MCNC: 0.8 MG/DL — SIGNIFICANT CHANGE UP (ref 0.5–1.3)
GLUCOSE SERPL-MCNC: 139 MG/DL — HIGH (ref 70–99)
HCT VFR BLD CALC: 38.2 % — SIGNIFICANT CHANGE UP (ref 34.5–45)
HGB BLD-MCNC: 12.4 G/DL — SIGNIFICANT CHANGE UP (ref 11.5–15.5)
MCHC RBC-ENTMCNC: 30.1 PG — SIGNIFICANT CHANGE UP (ref 27–34)
MCHC RBC-ENTMCNC: 32.5 GM/DL — SIGNIFICANT CHANGE UP (ref 32–36)
MCV RBC AUTO: 92.7 FL — SIGNIFICANT CHANGE UP (ref 80–100)
NRBC # BLD: 0 /100 WBCS — SIGNIFICANT CHANGE UP (ref 0–0)
PLATELET # BLD AUTO: 299 K/UL — SIGNIFICANT CHANGE UP (ref 150–400)
POTASSIUM SERPL-MCNC: 3.7 MMOL/L — SIGNIFICANT CHANGE UP (ref 3.5–5.3)
POTASSIUM SERPL-SCNC: 3.7 MMOL/L — SIGNIFICANT CHANGE UP (ref 3.5–5.3)
PROT SERPL-MCNC: 6.6 G/DL — SIGNIFICANT CHANGE UP (ref 6–8.3)
RBC # BLD: 4.12 M/UL — SIGNIFICANT CHANGE UP (ref 3.8–5.2)
RBC # FLD: 12.6 % — SIGNIFICANT CHANGE UP (ref 10.3–14.5)
SODIUM SERPL-SCNC: 140 MMOL/L — SIGNIFICANT CHANGE UP (ref 135–145)
WBC # BLD: 4.85 K/UL — SIGNIFICANT CHANGE UP (ref 3.8–10.5)
WBC # FLD AUTO: 4.85 K/UL — SIGNIFICANT CHANGE UP (ref 3.8–10.5)

## 2020-10-18 PROCEDURE — 99232 SBSQ HOSP IP/OBS MODERATE 35: CPT | Mod: GC

## 2020-10-18 RX ORDER — CALCIUM CARBONATE 500(1250)
2 TABLET ORAL ONCE
Refills: 0 | Status: COMPLETED | OUTPATIENT
Start: 2020-10-18 | End: 2020-10-18

## 2020-10-18 RX ORDER — FLUOXETINE HCL 10 MG
40 CAPSULE ORAL DAILY
Refills: 0 | Status: DISCONTINUED | OUTPATIENT
Start: 2020-10-18 | End: 2020-10-20

## 2020-10-18 RX ADMIN — LIDOCAINE 1 PATCH: 4 CREAM TOPICAL at 20:41

## 2020-10-18 RX ADMIN — Medication 2 TABLET(S): at 13:48

## 2020-10-18 RX ADMIN — ENOXAPARIN SODIUM 40 MILLIGRAM(S): 100 INJECTION SUBCUTANEOUS at 05:34

## 2020-10-18 RX ADMIN — ENOXAPARIN SODIUM 40 MILLIGRAM(S): 100 INJECTION SUBCUTANEOUS at 17:46

## 2020-10-18 RX ADMIN — LAMOTRIGINE 150 MILLIGRAM(S): 25 TABLET, ORALLY DISINTEGRATING ORAL at 22:15

## 2020-10-18 RX ADMIN — HYDROMORPHONE HYDROCHLORIDE 0.75 MILLIGRAM(S): 2 INJECTION INTRAMUSCULAR; INTRAVENOUS; SUBCUTANEOUS at 17:42

## 2020-10-18 RX ADMIN — HYDROMORPHONE HYDROCHLORIDE 0.75 MILLIGRAM(S): 2 INJECTION INTRAMUSCULAR; INTRAVENOUS; SUBCUTANEOUS at 05:34

## 2020-10-18 RX ADMIN — GABAPENTIN 400 MILLIGRAM(S): 400 CAPSULE ORAL at 22:15

## 2020-10-18 RX ADMIN — HYDROMORPHONE HYDROCHLORIDE 0.5 MILLIGRAM(S): 2 INJECTION INTRAMUSCULAR; INTRAVENOUS; SUBCUTANEOUS at 16:08

## 2020-10-18 RX ADMIN — Medication 650 MILLIGRAM(S): at 17:46

## 2020-10-18 RX ADMIN — Medication 1 MILLIGRAM(S): at 05:34

## 2020-10-18 RX ADMIN — DULOXETINE HYDROCHLORIDE 60 MILLIGRAM(S): 30 CAPSULE, DELAYED RELEASE ORAL at 05:34

## 2020-10-18 RX ADMIN — HYDROMORPHONE HYDROCHLORIDE 0.5 MILLIGRAM(S): 2 INJECTION INTRAMUSCULAR; INTRAVENOUS; SUBCUTANEOUS at 10:47

## 2020-10-18 RX ADMIN — LIDOCAINE 1 PATCH: 4 CREAM TOPICAL at 23:15

## 2020-10-18 RX ADMIN — Medication 650 MILLIGRAM(S): at 06:04

## 2020-10-18 RX ADMIN — HYDROMORPHONE HYDROCHLORIDE 0.75 MILLIGRAM(S): 2 INJECTION INTRAMUSCULAR; INTRAVENOUS; SUBCUTANEOUS at 12:28

## 2020-10-18 RX ADMIN — Medication 650 MILLIGRAM(S): at 05:34

## 2020-10-18 RX ADMIN — Medication 1 MILLIGRAM(S): at 22:15

## 2020-10-18 RX ADMIN — DULOXETINE HYDROCHLORIDE 60 MILLIGRAM(S): 30 CAPSULE, DELAYED RELEASE ORAL at 17:45

## 2020-10-18 RX ADMIN — LIDOCAINE 1 PATCH: 4 CREAM TOPICAL at 11:34

## 2020-10-18 RX ADMIN — Medication 650 MILLIGRAM(S): at 11:34

## 2020-10-18 RX ADMIN — Medication 3 MILLIGRAM(S): at 22:15

## 2020-10-18 RX ADMIN — HYDROMORPHONE HYDROCHLORIDE 0.5 MILLIGRAM(S): 2 INJECTION INTRAMUSCULAR; INTRAVENOUS; SUBCUTANEOUS at 16:25

## 2020-10-18 RX ADMIN — HYDROMORPHONE HYDROCHLORIDE 0.75 MILLIGRAM(S): 2 INJECTION INTRAMUSCULAR; INTRAVENOUS; SUBCUTANEOUS at 06:04

## 2020-10-18 RX ADMIN — Medication 200 MILLIGRAM(S): at 22:15

## 2020-10-18 RX ADMIN — Medication 650 MILLIGRAM(S): at 00:26

## 2020-10-18 RX ADMIN — Medication 1 MILLIGRAM(S): at 13:02

## 2020-10-18 RX ADMIN — HYDROMORPHONE HYDROCHLORIDE 0.5 MILLIGRAM(S): 2 INJECTION INTRAMUSCULAR; INTRAVENOUS; SUBCUTANEOUS at 11:05

## 2020-10-18 NOTE — DISCHARGE NOTE PROVIDER - HOSPITAL COURSE
39 yo female with pmh bipolar, L2-L3 Lumbar fusion with Dr. Kerns in 2019 presenting with concern of worsening left lower back pain radiating to left groin with associated numbness of the left leg/foot atraumatically x 3 days. Pain in dermatomal distribution c/w radiculopathy or peripheral nerve compression. CT and MRI eval w/o identification of acute pathology or explain ation for symptoms. NeuroSx c/s recommends f/u as OPT w/Dr. Ojeda; ortho c/s recommending PT/OT w/both services ordered. Pain management also consulted. Course c/b mildly elevated transaminases. Pain controlled from 8-9/10 to ~5/10 on opiate ladder tapering from IV dilaudid to oral medications.         39 yo female with pmh bipolar, L2-L3 Lumbar fusion with Dr. Kerns in 2019 presenting with concern of worsening left lower back pain radiating to left groin with associated numbness of the left leg/foot atraumatically x 3 days. Pain in dermatomal distribution c/w radiculopathy or peripheral nerve compression. CT and MRI eval w/o identification of acute pathology or explain ation for symptoms. NeuroSx c/s recommends f/u as OPT w/Dr. Ojeda; ortho c/s recommending PT/OT w/both services ordered. Pain management also consulted. Course c/b mildly elevated transaminases. Pain controlled from 8-9/10 to ~5/10 on opiate ladder tapering from IV dilaudid to oral medications.   41 yo female with pmh bipolar, L2-L3 Lumbar fusion with Dr. Kerns in 2019 presenting with concern of worsening left lower back pain radiating to left groin with associated numbness of the left leg/foot atraumatically x 3 days. Pain in dermatomal distribution c/w radiculopathy or peripheral nerve compression. CT and MRI eval w/o identification of acute pathology or explain ation for symptoms. NeuroSx c/s recommends f/u as OPT w/Dr. Ojeda; ortho c/s recommending PT/OT w/both services ordered. Pain management also consulted. Course c/b mildly elevated transaminases. Pain controlled from 8-9/10 to ~5/10 on opiate ladder tapering from IV dilaudid to oral medications.  She was seen by inpatient pain management and transitioned to oral oxycodone 5mg every 4 hours as needed, and gabapentin 400 mg every 8 hours.     39 yo female with pmh bipolar, L2-L3 Lumbar fusion with Dr. Kerns in 2019 presenting with concern of worsening left lower back pain radiating to left groin with associated numbness of the left leg/foot atraumatically x 3 days. Pain in dermatomal distribution c/w radiculopathy or peripheral nerve compression. CT and MRI eval w/o identification of acute pathology or explain ation for symptoms. NeuroSx c/s recommends f/u as OPT w/Dr. Ojeda; ortho c/s recommending PT/OT w/both services ordered. Pain management also consulted. Course c/b mildly elevated transaminases. Pain controlled from 8-9/10 to ~5/10 on opiate ladder tapering from IV dilaudid to oral medications.  She was seen by inpatient pain management and transitioned to oral oxycodone 5mg every 4 hours as needed, and gabapentin 400 mg every 8 hours. She was provided with chronic pain management outpatient follow up options.

## 2020-10-18 NOTE — PROGRESS NOTE ADULT - ASSESSMENT
40F PMHx bipolar, depression, LBP s/p L2-3 PLIF 11/2019 w/ Dr. Kerns presents to ED with LBP radiating to L groin. Was setting up pool yesterday when she felt a stabbing pain in her back. CT grossly unremarkable hardware intact, no source of pain identified. MRI w/o acute acute pathology.

## 2020-10-18 NOTE — DISCHARGE NOTE PROVIDER - NSDCFUSCHEDAPPT_GEN_ALL_CORE_FT
RUPALI CRUZ ; 10/19/2020 ; [facility] 12[facility]03[facility]2020 ; NPP Endocrin [address] [facility] RUPALI Dyson ; 12/23/2020 ; N[facility] [address] RUPALI CRUZ ; 12/03/2020 ; NPP Endocrin [facility]  RUPALI CRUZ ; 12/23/2020 ; NPP Spine [facility] [address]

## 2020-10-18 NOTE — DISCHARGE NOTE PROVIDER - CARE PROVIDERS DIRECT ADDRESSES
[email or web address]

## 2020-10-18 NOTE — CONSULT NOTE ADULT - SUBJECTIVE AND OBJECTIVE BOX
40y Female admitted for left sided pain. Patient has had chronic radiculopathy for which she underwent lumbar fusion with neurosurgery. Patient describes left hip pain as a shooting pain from her back and buttock region into the hip and groin. Denies trauma. Denies numbness/tingling/burning in the affected extremity. No other bone/joint complaints.      PAST MEDICAL & SURGICAL HISTORY:  Anemia    GERD (gastroesophageal reflux disease)    Depression    ADD (attention deficit disorder)    Bipolar mood disorder    Depression    ADD (attention deficit disorder)    Depression    Endometrial polyp  s/p ablation 2019    Ganglion cyst of wrist, left    Bariatric surgery status  s/p gastric sleeve, then s/p insertion of gastric band which was   removed in 2017 secondary to displacement    S/P  section    S/P cholecystectomy    Delivery by emergency caesarean section      MEDICATIONS  (STANDING):  acetaminophen   Tablet .. 650 milliGRAM(s) Oral every 6 hours  ALPRAZolam 1 milliGRAM(s) Oral three times a day  DULoxetine 60 milliGRAM(s) Oral two times a day  enoxaparin Injectable 40 milliGRAM(s) SubCutaneous every 12 hours  gabapentin 400 milliGRAM(s) Oral at bedtime  HYDROmorphone  Injectable 0.75 milliGRAM(s) IV Push every 6 hours  influenza   Vaccine 0.5 milliLiter(s) IntraMuscular once  lamoTRIgine 150 milliGRAM(s) Oral at bedtime  lidocaine   Patch 1 Patch Transdermal daily  melatonin 3 milliGRAM(s) Oral at bedtime  traZODone 200 milliGRAM(s) Oral at bedtime    MEDICATIONS  (PRN):  HYDROmorphone  Injectable 0.5 milliGRAM(s) IV Push every 4 hours PRN Severe Pain (7 - 10)  oxyCODONE    IR 5 milliGRAM(s) Oral every 4 hours PRN Moderate Pain (4 - 6)    Allergies    Ofirmev (Urticaria)  Robaxin (Anaphylaxis)    Intolerances                              13.6   8.87  )-----------( 369      ( 16 Oct 2020 19:46 )             41.6     10-    138  |  103  |  12  ----------------------------<  80  4.1   |  22  |  0.90    Ca    9.7      16 Oct 2020 19:46    TPro  7.4  /  Alb  4.4  /  TBili  0.3  /  DBili  x   /  AST  15  /  ALT  13  /  AlkPhos  81  10-16        T(C): 36.3 (10-17-20 @ 23:35), Max: 36.6 (10-17-20 @ 20:30)  HR: 70 (10-17-20 @ 23:35) (70 - 98)  BP: 104/70 (10-17-20 @ 23:35) (104/70 - 130/85)  RR: 18 (10-17-20 @ 23:35) (18 - 18)  SpO2: 96% (10-17-20 @ 23:35) (94% - 96%)  Wt(kg): --    PE   LLE:  Skin intact; No ecchymosis/soft tissue swelling  Compartments soft; No TTP about hip. No TTP to knee/leg/ankle/foot   ROM intact   Able to SLR   Motor intact bilateral LEs: GS/FHL/EHL, 5/5, TA 4+/5;   Bilateral SILT L2-S1  DP/PT pulses 2+    Contralateral lower extremity and BUE:   No bony TTP; Good ROM w/o pain; Exam Unremarkable    Imaging:  XR demonstrating no acute hip pathology, no arthritic joint changes  MRI: S/p L2-3 lami fusion, mild to moderate degenerative changes, no evidence of cord compression     40y Female with left hip pain, no acute pathology on hip imaging   - Pain control  - WBAT  - PT/OT/OOB, encourage ambulation   - No acute orthopedic intervention

## 2020-10-18 NOTE — DISCHARGE NOTE PROVIDER - NSDCFUADDAPPT_GEN_ALL_CORE_FT
Please follow up with an outpatient pain management doctor.  A list of pain management care providers was provided with your discharge papers. Additionally, please follow up with your Primary Care Provider within 1 week of discharge.      If you do not have a PCP, please call the [address] [facility]. Please follow up with an outpatient pain management doctor.  A list of pain management care providers was provided with your discharge papers. Additionally, please follow up with your Primary Care Provider within 1 week of discharge.      If you do not have a PCP, please call the [address] [place] [address].    Please follow up with Dr. Carreon as needed for orthopedics.    Please follow up with Dr. Kerns with neurosurgery as needed.

## 2020-10-18 NOTE — DISCHARGE NOTE PROVIDER - PROVIDER TOKENS
PROVIDER:[TOKEN:[[id number]]] PROVIDER:[TOKEN:[[id number]]],PROVIDER:[TOKEN:[[id number]]],PROVIDER:[TOKEN:[[id number]]]

## 2020-10-18 NOTE — PROGRESS NOTE ADULT - SUBJECTIVE AND OBJECTIVE BOX
PROGRESS NOTE:     CONTACT INFO:  Asher Emery MD (Resident Physician - PGY-1 - Internal Medicine)  [email or web address]  Pager: [phone] (any site) or [phone] ([facility] only)    Patient is a 40y old  Female who presents with a chief complaint of back pain (17 Oct 2020 01:47)      SUBJECTIVE / OVERNIGHT EVENTS:  No acute events overnight. Patient seen and evaluated at bedside. No fever/chills.  Denies SOB at rest, chest pain, palpitations, abdominal pain, nausea/vomiting    ADDITIONAL REVIEW OF SYSTEMS:    MEDICATIONS  (STANDING):  ALPRAZolam 1 milliGRAM(s) Oral three times a day  DULoxetine 60 milliGRAM(s) Oral two times a day  enoxaparin Injectable 40 milliGRAM(s) SubCutaneous every 12 hours  gabapentin 400 milliGRAM(s) Oral daily  influenza   Vaccine 0.5 milliLiter(s) IntraMuscular once  lamoTRIgine 150 milliGRAM(s) Oral at bedtime  lidocaine   Patch 1 Patch Transdermal daily  morphine  - Injectable 6 milliGRAM(s) IV Push every 6 hours  traZODone 50 milliGRAM(s) Oral at bedtime    MEDICATIONS  (PRN):  acetaminophen   Tablet .. 650 milliGRAM(s) Oral every 6 hours PRN Temp greater or equal to 38C (100.4F), Mild Pain (1 - 3), Moderate Pain (4 - 6)  morphine  - Injectable 4 milliGRAM(s) IV Push every 4 hours PRN Severe Pain (7 - 10)  oxyCODONE    IR 5 milliGRAM(s) Oral every 4 hours PRN Moderate Pain (4 - 6)      CAPILLARY BLOOD GLUCOSE        I&O's Summary    16 Oct 2020 07:01  -  17 Oct 2020 07:00  --------------------------------------------------------  IN: 50 mL / OUT: 0 mL / NET: 50 mL        PHYSICAL EXAM:  Vital Signs Last 24 Hrs  T(C): 36.4 (17 Oct 2020 04:39), Max: 36.7 (16 Oct 2020 18:35)  T(F): 97.5 (17 Oct 2020 04:39), Max: 98 (16 Oct 2020 18:35)  HR: 86 (17 Oct 2020 04:39) (86 - 97)  BP: 115/79 (17 Oct 2020 05:00) (95/61 - 115/79)  BP(mean): --  RR: 18 (17 Oct 2020 04:39) (18 - 20)  SpO2: 94% (17 Oct 2020 04:39) (94% - 99%)    GENERAL: NAD, well-groomed, well-developed, obese  HEAD:  Atraumatic, Normocephalic  EYES: EOMI, PERRLA, conjunctiva and sclera clear  ENMT: Moist mucous membranes; no nasal discharge; FROM of neck  NEURO: AAOx3, CN II-XII intact, 5/5 BUE and RLE strength, 4/5 LLE motor strength limited due to pain, subjective decreased sensation to light touch at LLE; sensation of RLE and remainder of body is grossly intact  MSK: L lumbosacral tenderness to palpation   CHEST/LUNG: Clear to auscultation bilaterally; No rales, no rhonchi, no wheezing  HEART: Regular rate and rhythm; No murmurs, rubs, or gallops  ABDOMEN: Soft, Nontender, Nondistended; dull to percussion  EXTREMITIES:  2+ Peripheral Pulses, No cyanosis or edema  SKIN: warm, dry, intact    LABS:                        13.6   8.87  )-----------( 369      ( 16 Oct 2020 19:46 )             41.6     10-16    138  |  103  |  12  ----------------------------<  80  4.1   |  22  |  0.90    Ca    9.7      16 Oct 2020 19:46    TPro  7.4  /  Alb  4.4  /  TBili  0.3  /  DBili  x   /  AST  15  /  ALT  13  /  AlkPhos  81  10-16                RADIOLOGY & ADDITIONAL TESTS:  Results Reviewed:   Imaging Personally Reviewed:  Electrocardiogram Personally Reviewed:    COORDINATION OF CARE:  Care Discussed with Consultants/Other Providers [Y/N]:  Prior or Outpatient Records Reviewed [Y/N]:   PROGRESS NOTE:     CONTACT INFO:  Asher Emery MD (Resident Physician - PGY-1 - Internal Medicine)  [email or web address]  Pager: [phone] (any site) or [phone] ([facility] only)    Patient is a 40y old  Female who presents with a chief complaint of back pain (17 Oct 2020 01:47)      SUBJECTIVE / OVERNIGHT EVENTS:  No acute events overnight. Patient seen and evaluated at bedside. No fever/chills.  Denies SOB at rest, chest pain, palpitations, abdominal pain, nausea/vomiting    ADDITIONAL REVIEW OF SYSTEMS:    MEDICATIONS  (STANDING):  acetaminophen   Tablet .. 650 milliGRAM(s) Oral every 6 hours  ALPRAZolam 1 milliGRAM(s) Oral three times a day  DULoxetine 60 milliGRAM(s) Oral two times a day  enoxaparin Injectable 40 milliGRAM(s) SubCutaneous every 12 hours  gabapentin 400 milliGRAM(s) Oral at bedtime  HYDROmorphone  Injectable 0.75 milliGRAM(s) IV Push every 6 hours  influenza   Vaccine 0.5 milliLiter(s) IntraMuscular once  lamoTRIgine 150 milliGRAM(s) Oral at bedtime  lidocaine   Patch 1 Patch Transdermal daily  melatonin 3 milliGRAM(s) Oral at bedtime  traZODone 200 milliGRAM(s) Oral at bedtime    MEDICATIONS  (PRN):  HYDROmorphone  Injectable 0.5 milliGRAM(s) IV Push every 4 hours PRN Severe Pain (7 - 10)  oxyCODONE    IR 5 milliGRAM(s) Oral every 4 hours PRN Moderate Pain (4 - 6)      CAPILLARY BLOOD GLUCOSE        I&O's Summary    16 Oct 2020 07:01  -  17 Oct 2020 07:00  --------------------------------------------------------  IN: 50 mL / OUT: 0 mL / NET: 50 mL        PHYSICAL EXAM:  Vital Signs Last 24 Hrs  T(C): 36.7 (18 Oct 2020 04:27), Max: 36.7 (18 Oct 2020 04:27)  T(F): 98 (18 Oct 2020 04:27), Max: 98 (18 Oct 2020 04:27)  HR: 104 (18 Oct 2020 10:45) (70 - 104)  BP: 112/74 (18 Oct 2020 10:45) (104/70 - 130/85)  BP(mean): --  RR: 18 (18 Oct 2020 10:45) (16 - 18)  SpO2: 96% (18 Oct 2020 10:45) (94% - 96%)    GENERAL: NAD, well-groomed, well-developed, obese  HEAD:  Atraumatic, Normocephalic  EYES: EOMI, PERRLA, conjunctiva and sclera clear  ENMT: Moist mucous membranes; no nasal discharge; FROM of neck  NEURO: AAOx3, CN II-XII intact, 5/5 BUE and RLE strength, 4/5 LLE motor strength limited due to pain, subjective decreased sensation to light touch at LLE; sensation of RLE and remainder of body is grossly intact  MSK: Mild L lumbosacral tenderness to palpation   CHEST/LUNG: Clear to auscultation bilaterally; No rales, no rhonchi, no wheezing  HEART: Regular rate and rhythm; No murmurs, rubs, or gallops  ABDOMEN: Soft, Nontender, Nondistended  EXTREMITIES:  2+ Peripheral Pulses, No cyanosis or edema  SKIN: warm, dry, intact    LABS:                        12.4   4.85  )-----------( 299      ( 18 Oct 2020 07:18 )             38.2     18 Oct 2020 07:18    140    |  104    |  14     ----------------------------<  139    3.7     |  24     |  0.80     Ca    9.3        18 Oct 2020 07:18    TPro  6.6    /  Alb  4.0    /  TBili  0.3    /  DBili  x      /  AST  49     /  ALT  59     /  AlkPhos  105    18 Oct 2020 07:18

## 2020-10-18 NOTE — PROGRESS NOTE ADULT - PROBLEM SELECTOR PLAN 2
Continue home dose of alprazolam, trazodone and duloxetine.  Clarify with pharmacy if patient Is still taking prozac or if this had been discontinued.

## 2020-10-18 NOTE — DISCHARGE NOTE PROVIDER - CARE PROVIDER_API CALL
Jimbo Bah  INTERNAL MEDICINE  [address]  [place] [address]  Phone: [phone]  Fax: [phone]  Follow Up Time:    Jimbo Bah  INTERNAL MEDICINE  [address]  [place] [address]  Phone: [phone]  Fax: [phone]  Follow Up Time:     Lewis Carreon  ORTHOPAEDIC SURGERY  [address] [facility] [address]  [place] [address]  Phone: [phone]  Fax: [phone]  Follow Up Time:     Frederick Kerns  NEUROSURGERY  [address] [facility] [address] [facility] [address]  [place] [address]  Phone: [phone]  Fax: [phone]  Follow Up Time:

## 2020-10-18 NOTE — DISCHARGE NOTE PROVIDER - NSDCCPTREATMENT_GEN_ALL_CORE_FT
PRINCIPAL PROCEDURE  Procedure: Magnetic resonance imaging (MRI) of spinal cord  Findings and Treatment: You had an MRI as part of the workup for a cause of your back pain. Your MRI study failed to reveal any specific cause for your pain, but did redemonstrate chronic abnormalities in the lower back. While the MRI did provide definitive cause or treatment for your issues, the images and report may be useful in your ongoing care. Please discuss your imaging study with your outpatient providers. Contact the [facility] Department of Medical Records if you or your doctors require more information or images from your MRI.

## 2020-10-18 NOTE — PROGRESS NOTE ADULT - PROBLEM SELECTOR PLAN 1
The patient complains of worsening L lumbosacral pain which radiates to the groin. She does not have saddle anesthesia or incontinence. She has subjective decreased sensation in LLE, and strength assessment is limited due to pain. Patient had reduction in pain severity from 9 to 7/10 with morphine 4mg IV.   - CT of lumbar spine found hardware in alignment, without periprosthetic fracture or listhesis.   - Xray did not find femoral or pelvic fracture.  - MRI lumbosacral spine w/o acute pathology  - Pt was scheduled to have appt with Dr. Carreon (orthopedics) on Monday; ortho c/s placed, f/u recs  - Lidocaine patch  - Dilaudid standing 0.75mg q6h  - Dilaudid PRN 0.5mg q6h  - Consult pain management in AM  - Neurosurgery c/s and signed off; NTD and f/u OPT  ISTOP reviewed- Reference #: [id number]  10/2/2020-vyvanse 50 mg capsule , 30 tab,30 day supply  10/02/2020- alprazolam 1 mg tablet , quantity 90 tab. 30 day supply The patient complains of worsening L lumbosacral pain which radiates to the groin. She does not have saddle anesthesia or incontinence. She has subjective decreased sensation in LLE, and strength assessment is limited due to pain. Patient had reduction in pain severity from 9 to 7/10 with morphine 4mg IV.   - CT of lumbar spine found hardware in alignment, without periprosthetic fracture or listhesis.   - Xray did not find femoral or pelvic fracture.  - MRI lumbosacral spine w/o acute pathology  - Pt was scheduled to have appt with Dr. Carreon (orthopedics) on Monday; ortho c/s placed, f/u recs  - Tylenol standing  - Lidocaine patch  - Dilaudid standing 0.75mg q6h  - Dilaudid PRN 0.5mg q6h   - Pain management c/s; awaiting recs  - Neurosurgery c/s and signed off; NTD and f/u OPT  - Ortho c/s; PT and OT recommended and c/s for both placed on 10/18    ISTOP reviewed- Reference #: [id number]  10/2/2020-vyvanse 50 mg capsule , 30 tab,30 day supply  10/02/2020- alprazolam 1 mg tablet , quantity 90 tab. 30 day supply

## 2020-10-18 NOTE — DISCHARGE NOTE PROVIDER - NSDCMRMEDTOKEN_GEN_ALL_CORE_FT
ALPRAZolam 1 mg oral tablet: 1 tab(s) orally 3 times a day  DULoxetine 60 mg oral delayed release capsule: 1 cap(s) orally 2 times a day  gabapentin 400 mg oral tablet: 1 tab(s) orally once a day (at bedtime)  Junel 1/20 oral tablet: 1 tab(s) orally once a day  lamoTRIgine 150 mg oral tablet: 1 tab(s) orally once a day (at bedtime)  PROzac: 60 milligram(s) orally once a day  traZODone 50 mg oral tablet: 1 tab(s) orally once a day (at bedtime)  Vyvanse 50 mg oral capsule: 1 cap(s) orally once a day (in the morning)   ALPRAZolam 1 mg oral tablet: 1 tab(s) orally 3 times a day  DULoxetine 60 mg oral delayed release capsule: 1 cap(s) orally 2 times a day  gabapentin 400 mg oral capsule: 1 cap(s) orally 3 times a day  Junel 1/20 oral tablet: 1 tab(s) orally once a day  lamoTRIgine 150 mg oral tablet: 1 tab(s) orally once a day (at bedtime)  oxyCODONE 5 mg oral tablet: 1 tab(s) orally every 4 hours, As Needed -Moderate Pain (4 - 6) MDD:20 mg  PROzac: 40 milligram(s) orally once a day  traZODone 50 mg oral tablet: 1 tab(s) orally once a day (at bedtime)  Tylenol 325 mg oral tablet: 2 tab(s) orally every 6 hours MDD:2600 mg  Vyvanse 50 mg oral capsule: 1 cap(s) orally once a day (in the morning)   ALPRAZolam 1 mg oral tablet: 1 tab(s) orally 3 times a day  DULoxetine 60 mg oral delayed release capsule: 1 cap(s) orally 2 times a day  gabapentin 400 mg oral capsule: 1 cap(s) orally 3 times a day  Junel 1/20 oral tablet: 1 tab(s) orally once a day  lamoTRIgine 150 mg oral tablet: 1 tab(s) orally once a day (at bedtime)  oxyCODONE 5 mg oral tablet: 1 tab(s) orally every 4 hours, As Needed -Moderate Pain (4 - 6) MDD:20 mg  polyethylene glycol 3350 oral powder for reconstitution: 17 gram(s) orally 2 times a day  PROzac: 40 milligram(s) orally once a day  senna oral tablet: 2 tab(s) orally once a day (at bedtime)  traZODone 50 mg oral tablet: 1 tab(s) orally once a day (at bedtime)  Tylenol 325 mg oral tablet: 2 tab(s) orally every 6 hours MDD:2600 mg  Vyvanse 50 mg oral capsule: 1 cap(s) orally once a day (in the morning)   ALPRAZolam 1 mg oral tablet: 1 tab(s) orally 3 times a day  DULoxetine 60 mg oral delayed release capsule: 1 cap(s) orally 2 times a day  gabapentin 400 mg oral capsule: 1 cap(s) orally 3 times a day  Junel 1/20 oral tablet: 1 tab(s) orally once a day  lamoTRIgine 150 mg oral tablet: 1 tab(s) orally once a day (at bedtime)  oxyCODONE 5 mg oral tablet: 1 tab(s) orally every 4 hours, As Needed -Moderate Pain (4 - 6) MDD:20 mg  polyethylene glycol 3350 oral powder for reconstitution: 17 gram(s) orally 2 times a day  PROzac: 40 milligram(s) orally once a day  senna oral tablet: 2 tab(s) orally once a day (at bedtime)  traZODone 50 mg oral tablet: 1 tab(s) orally once a day (at bedtime)  Tylenol 325 mg oral tablet: 2 tab(s) orally every 6 hours PRN MDD:2600 mg  Vyvanse 50 mg oral capsule: 1 cap(s) orally once a day (in the morning)   ALPRAZolam 1 mg oral tablet: 1 tab(s) orally 3 times a day  DULoxetine 60 mg oral delayed release capsule: 1 cap(s) orally 2 times a day  gabapentin 400 mg oral capsule: 1 cap(s) orally 3 times a day  Junel 1/20 oral tablet: 1 tab(s) orally once a day  lamoTRIgine 150 mg oral tablet: 1 tab(s) orally once a day (at bedtime)  oxyCODONE 5 mg oral tablet: 1 tab(s) orally every 4 hours, As Needed -Moderate Pain (4 - 6) MDD:20 mg  polyethylene glycol 3350 oral powder for reconstitution: 17 gram(s) orally 2 times a day  PROzac: 40 milligram(s) orally once a day  senna oral tablet: 2 tab(s) orally once a day (at bedtime)  traZODone: 200 milligram(s) orally once a day (at bedtime)  Tylenol 325 mg oral tablet: 2 tab(s) orally every 6 hours PRN MDD:2600 mg  Vyvanse 50 mg oral capsule: 1 cap(s) orally once a day (in the morning)

## 2020-10-18 NOTE — DISCHARGE NOTE PROVIDER - NSDCCPCAREPLAN_GEN_ALL_CORE_FT
PRINCIPAL DISCHARGE DIAGNOSIS  Diagnosis: Left-sided low back pain with left-sided sciatica, unspecified chronicity  Assessment and Plan of Treatment: You came to the hospital because you were having an intolerable increase of pain with weakness that radiated from your back to your groin and down your left leg. You have been battling these issues despite past surgical interventions with you outpatient neurosurgeon. Your pain has been chronic and the symptoms are consistent with radiculopathy and associated nerve pain. We had you evaluated by neurosurgery and orthopedics who recommended an MRI for continued serial evaluation and referred further care to pain management and both occupational and physical therapists. Your MRI study (see procedures) failed to reveal any specific cause for your pain, but did redemonstrate chronic abnormalities in the lower back. While your pain has improved during admission, complete resolution of pain has not been achieved and will likely continue to pose a significant challenge. Make sure to follow your pain management regimen and follow up with a pain management specialist and neurosurgery following discharge.  RETURN TO THE EMERGENCY DEPARTMENT IMMEDIATELY IF: You have sudden worsening of your back pain, numbness or pain of your inner thighs and buttocks, sudden weakness, loss of mobility, fecal or urinary incotinence, urinary retention, or any other sign or symptom you feel warrants immediate evaluation by a medical professional.

## 2020-10-19 ENCOUNTER — APPOINTMENT (OUTPATIENT)
Dept: ORTHOPEDIC SURGERY | Facility: CLINIC | Age: 40
End: 2020-10-19

## 2020-10-19 LAB
ALBUMIN SERPL ELPH-MCNC: 4 G/DL — SIGNIFICANT CHANGE UP (ref 3.3–5)
ALP SERPL-CCNC: 103 U/L — SIGNIFICANT CHANGE UP (ref 40–120)
ALT FLD-CCNC: 49 U/L — HIGH (ref 10–45)
AMPHET UR-MCNC: NEGATIVE — SIGNIFICANT CHANGE UP
ANION GAP SERPL CALC-SCNC: 9 MMOL/L — SIGNIFICANT CHANGE UP (ref 5–17)
AST SERPL-CCNC: 31 U/L — SIGNIFICANT CHANGE UP (ref 10–40)
BARBITURATES UR SCN-MCNC: NEGATIVE — SIGNIFICANT CHANGE UP
BILIRUB SERPL-MCNC: 0.3 MG/DL — SIGNIFICANT CHANGE UP (ref 0.2–1.2)
BUN SERPL-MCNC: 12 MG/DL — SIGNIFICANT CHANGE UP (ref 7–23)
CALCIUM SERPL-MCNC: 9.2 MG/DL — SIGNIFICANT CHANGE UP (ref 8.4–10.5)
CHLORIDE SERPL-SCNC: 104 MMOL/L — SIGNIFICANT CHANGE UP (ref 96–108)
CO2 SERPL-SCNC: 25 MMOL/L — SIGNIFICANT CHANGE UP (ref 22–31)
COCAINE METAB.OTHER UR-MCNC: NEGATIVE — SIGNIFICANT CHANGE UP
CREAT SERPL-MCNC: 0.82 MG/DL — SIGNIFICANT CHANGE UP (ref 0.5–1.3)
GLUCOSE SERPL-MCNC: 92 MG/DL — SIGNIFICANT CHANGE UP (ref 70–99)
HCT VFR BLD CALC: 37.7 % — SIGNIFICANT CHANGE UP (ref 34.5–45)
HGB BLD-MCNC: 12 G/DL — SIGNIFICANT CHANGE UP (ref 11.5–15.5)
MAGNESIUM SERPL-MCNC: 2 MG/DL — SIGNIFICANT CHANGE UP (ref 1.6–2.6)
MCHC RBC-ENTMCNC: 29.2 PG — SIGNIFICANT CHANGE UP (ref 27–34)
MCHC RBC-ENTMCNC: 31.8 GM/DL — LOW (ref 32–36)
MCV RBC AUTO: 91.7 FL — SIGNIFICANT CHANGE UP (ref 80–100)
METHADONE UR-MCNC: NEGATIVE — SIGNIFICANT CHANGE UP
NRBC # BLD: 0 /100 WBCS — SIGNIFICANT CHANGE UP (ref 0–0)
OPIATES UR-MCNC: NEGATIVE — SIGNIFICANT CHANGE UP
PCP SPEC-MCNC: SIGNIFICANT CHANGE UP
PCP UR-MCNC: NEGATIVE — SIGNIFICANT CHANGE UP
PLATELET # BLD AUTO: 317 K/UL — SIGNIFICANT CHANGE UP (ref 150–400)
POTASSIUM SERPL-MCNC: 3.7 MMOL/L — SIGNIFICANT CHANGE UP (ref 3.5–5.3)
POTASSIUM SERPL-SCNC: 3.7 MMOL/L — SIGNIFICANT CHANGE UP (ref 3.5–5.3)
PROT SERPL-MCNC: 6.5 G/DL — SIGNIFICANT CHANGE UP (ref 6–8.3)
RBC # BLD: 4.11 M/UL — SIGNIFICANT CHANGE UP (ref 3.8–5.2)
RBC # FLD: 12.5 % — SIGNIFICANT CHANGE UP (ref 10.3–14.5)
SODIUM SERPL-SCNC: 138 MMOL/L — SIGNIFICANT CHANGE UP (ref 135–145)
THC UR QL: NEGATIVE — SIGNIFICANT CHANGE UP
WBC # BLD: 6.12 K/UL — SIGNIFICANT CHANGE UP (ref 3.8–10.5)
WBC # FLD AUTO: 6.12 K/UL — SIGNIFICANT CHANGE UP (ref 3.8–10.5)

## 2020-10-19 PROCEDURE — 93975 VASCULAR STUDY: CPT | Mod: 26

## 2020-10-19 PROCEDURE — 99222 1ST HOSP IP/OBS MODERATE 55: CPT

## 2020-10-19 PROCEDURE — 76856 US EXAM PELVIC COMPLETE: CPT | Mod: 26,59

## 2020-10-19 PROCEDURE — 99233 SBSQ HOSP IP/OBS HIGH 50: CPT | Mod: GC

## 2020-10-19 PROCEDURE — 76830 TRANSVAGINAL US NON-OB: CPT | Mod: 26

## 2020-10-19 RX ORDER — SENNA PLUS 8.6 MG/1
2 TABLET ORAL AT BEDTIME
Refills: 0 | Status: DISCONTINUED | OUTPATIENT
Start: 2020-10-19 | End: 2020-10-20

## 2020-10-19 RX ORDER — POLYETHYLENE GLYCOL 3350 17 G/17G
17 POWDER, FOR SOLUTION ORAL
Refills: 0 | Status: DISCONTINUED | OUTPATIENT
Start: 2020-10-19 | End: 2020-10-20

## 2020-10-19 RX ORDER — GABAPENTIN 400 MG/1
400 CAPSULE ORAL THREE TIMES A DAY
Refills: 0 | Status: DISCONTINUED | OUTPATIENT
Start: 2020-10-19 | End: 2020-10-20

## 2020-10-19 RX ADMIN — Medication 200 MILLIGRAM(S): at 22:44

## 2020-10-19 RX ADMIN — POLYETHYLENE GLYCOL 3350 17 GRAM(S): 17 POWDER, FOR SOLUTION ORAL at 12:40

## 2020-10-19 RX ADMIN — ENOXAPARIN SODIUM 40 MILLIGRAM(S): 100 INJECTION SUBCUTANEOUS at 06:41

## 2020-10-19 RX ADMIN — Medication 650 MILLIGRAM(S): at 00:10

## 2020-10-19 RX ADMIN — HYDROMORPHONE HYDROCHLORIDE 0.5 MILLIGRAM(S): 2 INJECTION INTRAMUSCULAR; INTRAVENOUS; SUBCUTANEOUS at 11:09

## 2020-10-19 RX ADMIN — HYDROMORPHONE HYDROCHLORIDE 0.5 MILLIGRAM(S): 2 INJECTION INTRAMUSCULAR; INTRAVENOUS; SUBCUTANEOUS at 17:35

## 2020-10-19 RX ADMIN — LIDOCAINE 1 PATCH: 4 CREAM TOPICAL at 12:40

## 2020-10-19 RX ADMIN — GABAPENTIN 400 MILLIGRAM(S): 400 CAPSULE ORAL at 22:44

## 2020-10-19 RX ADMIN — Medication 650 MILLIGRAM(S): at 18:04

## 2020-10-19 RX ADMIN — SENNA PLUS 2 TABLET(S): 8.6 TABLET ORAL at 22:46

## 2020-10-19 RX ADMIN — Medication 1 MILLIGRAM(S): at 15:57

## 2020-10-19 RX ADMIN — LAMOTRIGINE 150 MILLIGRAM(S): 25 TABLET, ORALLY DISINTEGRATING ORAL at 22:44

## 2020-10-19 RX ADMIN — Medication 1 MILLIGRAM(S): at 22:44

## 2020-10-19 RX ADMIN — POLYETHYLENE GLYCOL 3350 17 GRAM(S): 17 POWDER, FOR SOLUTION ORAL at 22:43

## 2020-10-19 RX ADMIN — HYDROMORPHONE HYDROCHLORIDE 0.75 MILLIGRAM(S): 2 INJECTION INTRAMUSCULAR; INTRAVENOUS; SUBCUTANEOUS at 12:40

## 2020-10-19 RX ADMIN — Medication 1 MILLIGRAM(S): at 06:40

## 2020-10-19 RX ADMIN — Medication 650 MILLIGRAM(S): at 12:41

## 2020-10-19 RX ADMIN — HYDROMORPHONE HYDROCHLORIDE 0.5 MILLIGRAM(S): 2 INJECTION INTRAMUSCULAR; INTRAVENOUS; SUBCUTANEOUS at 15:20

## 2020-10-19 RX ADMIN — HYDROMORPHONE HYDROCHLORIDE 0.5 MILLIGRAM(S): 2 INJECTION INTRAMUSCULAR; INTRAVENOUS; SUBCUTANEOUS at 10:34

## 2020-10-19 RX ADMIN — OXYCODONE HYDROCHLORIDE 5 MILLIGRAM(S): 5 TABLET ORAL at 18:12

## 2020-10-19 RX ADMIN — Medication 3 MILLIGRAM(S): at 22:44

## 2020-10-19 RX ADMIN — DULOXETINE HYDROCHLORIDE 60 MILLIGRAM(S): 30 CAPSULE, DELAYED RELEASE ORAL at 18:05

## 2020-10-19 RX ADMIN — ENOXAPARIN SODIUM 40 MILLIGRAM(S): 100 INJECTION SUBCUTANEOUS at 18:04

## 2020-10-19 RX ADMIN — Medication 650 MILLIGRAM(S): at 06:40

## 2020-10-19 RX ADMIN — DULOXETINE HYDROCHLORIDE 60 MILLIGRAM(S): 30 CAPSULE, DELAYED RELEASE ORAL at 06:41

## 2020-10-19 RX ADMIN — Medication 40 MILLIGRAM(S): at 12:40

## 2020-10-19 NOTE — PHYSICAL THERAPY INITIAL EVALUATION ADULT - PERTINENT HX OF CURRENT PROBLEM, REHAB EVAL
Pt 40y F with PMH of bipolar disorder, depression, GERD, L2-L3 fusion (Nov 2019) with L2 decompression (August 2019) presents to the ED with complaint of L/S pain. Pt has chronic lower back pain, however over the last 3 days, pain has worsened to 8-9/10 severity and radiates to the groin. Pain is worsened with movement, sitting upright, turning in bed.

## 2020-10-19 NOTE — PHYSICAL THERAPY INITIAL EVALUATION ADULT - ADDITIONAL COMMENTS
Pt lives with Fiance and 8yo dtr in a private home (rent main floor and basement); 4 steps to enter  +handrails, 1 flight to go to basement level, pt states she stays on main floor. Pt has been independent with all ADL's up until recently where she will require assist if present (dtr or fiance) on bathing/showering, ADLs, and transfers. Pt states that she does not have any family or friends that can help her out while being at home. Pt is R hand dominant, +glasses for reading, pt is not driving. Pt has standard walker and SAC at home but does not use.

## 2020-10-19 NOTE — DIETITIAN INITIAL EVALUATION ADULT. - ADD RECOMMEND
1) Continue current regular diet. 2) Provide nutrition therapy for wt loss education as pt amenable. 3) BMI >40 kg/m2 alert placed. 4) Continue to trend labs, weight, skin integrity, and intake.

## 2020-10-19 NOTE — DIETITIAN INITIAL EVALUATION ADULT. - ORAL INTAKE PTA/DIET HISTORY
Pt was eating well with no changes in appetite. Pt was not following therapeutic diet; pt was trying to lose wt through decreased carbohydrate intake, eats well-balanced meals. Confirms no known food allergies. Denies Hx of chewing or swallowing issues. Denies micronutrient or nutrient supplement use.

## 2020-10-19 NOTE — PROGRESS NOTE ADULT - PROBLEM SELECTOR PLAN 1
The patient complains of worsening L lumbosacral pain which radiates to the groin. She does not have saddle anesthesia or incontinence. She has subjective decreased sensation in LLE, and strength assessment is limited due to pain. Patient had reduction in pain severity from 9 to 7/10 with morphine 4mg IV.   - CT of lumbar spine found hardware in alignment, without periprosthetic fracture or listhesis.   - Xray did not find femoral or pelvic fracture.  - MRI lumbosacral spine w/o acute pathology  - Pt was scheduled to have appt with Dr. Carreon (orthopedics) on Monday; ortho c/s placed, f/u recs  - Tylenol standing  - Lidocaine patch  - Dilaudid standing 0.75mg q6h  - Dilaudid PRN 0.5mg q6h   - Pain management c/s; awaiting recs  - Neurosurgery c/s and signed off; NTD and f/u OPT  - Ortho c/s; PT and OT recommended and c/s for both placed on 10/18    ISTOP reviewed- Reference #: [id number]  10/2/2020-vyvanse 50 mg capsule , 30 tab,30 day supply  10/02/2020- alprazolam 1 mg tablet , quantity 90 tab. 30 day supply

## 2020-10-19 NOTE — CONSULT NOTE ADULT - ASSESSMENT
41yo female with PMH of bipolar disorder, depression, GERD, L2-L3 fusion (Nov 2019) with L2 decompression (August 2019) who presents to the ED with complaint of back pain. The patient has chronic lower back pain, however over the last 3 days, pain has worsened to 8-9/10 severity and radiates to the groin.  She also endorses decreased sensation in LLE, sensation of weakness of LLE.    She visited Dr. Kerns about 2 weeks ago for concern of lower back pain, and reports she was not offered other surgical intervention but rather advised to see pain management.      Pt with C/O left low back pain and L2 dermatomal pain; does not match her complaints of loss of strength or sensation left L5 and S1.  Lumbar MRI unremarkable.  Agree with Dr Kerns, pt needs outpatient pain management for possible interventional techniques.    Would discontinue IV dilaudid and avoid IV opiates as pt is tolerating a diet.  Continue oxycodone 5 mg Q 4 hours PRN.  Continue lidoderm.  Continue cymbalta 60 mg BID and trazodone 200 mg QHS.  Would increase neurontin to 400 mg Q 8 hours, with plan to titrate up to 600 mg Q 8 hours.    Monitor for sedation and respiratory depression.  Bowel regimen.  OOB as per Medicine team.      Minutes spent on total encounter: 50 minutes      Chronic Pain Service  [phone]   41yo female with PMH of bipolar disorder, depression, GERD, L2-L3 fusion (Nov 2019) with L2 decompression (August 2019) who presents to the ED with complaint of back pain. The patient has chronic lower back pain, however over the last 3 days, pain has worsened to 8-9/10 severity and radiates to the groin.  She also endorses decreased sensation in LLE, sensation of weakness of LLE.    She visited Dr. Kerns about 2 weeks ago for concern of lower back pain, and reports she was not offered other surgical intervention but rather advised to see pain management.      Pt with C/O left low back pain and L2 dermatomal pain; does not match her complaints of loss of strength or sensation left L5 and S1.  Lumbar MRI unremarkable.  Agree with Dr Kerns, pt needs outpatient pain management for possible interventional techniques.  Community pain practices list provided.    Would discontinue IV dilaudid and avoid IV opiates as pt is tolerating a diet.  Continue oxycodone 5 mg Q 4 hours PRN.  Continue lidoderm.  Continue cymbalta 60 mg BID and trazodone 200 mg QHS.  Would increase neurontin to 400 mg Q 8 hours, with plan to titrate up to 600 mg Q 8 hours.    Urine toxicology ordered.  Monitor for sedation and respiratory depression.  Bowel regimen.  OOB as per Medicine team.      Minutes spent on total encounter: 50 minutes      Chronic Pain Service  [phone]

## 2020-10-19 NOTE — OCCUPATIONAL THERAPY INITIAL EVALUATION ADULT - VISUAL ASSESSMENT: SCANNING
LOV 2/13/20  Refilled per protocol   
Received refill request from [facility] for clopidogrel. Advised patient that while Dr. Travis had refilled this medication for her while she was in the hospital, he typically does not manage anticoagulation. Asked patient who typically prescribes this medication - her cardiologist or her PCP. She states that she is seeing her cardiologist in th next few weeks and would prefer that he refills this medication. Told patient that I would route a refill request message to his pool to address. She stated understanding and had not other questions/concerns at this time.    Please address refill request for Clopidogrel 75 mg with the directions take 1 tablet by mouth once daily. Preferred pharmacy is [facility].   
WFL

## 2020-10-19 NOTE — OCCUPATIONAL THERAPY INITIAL EVALUATION ADULT - PRECAUTIONS/LIMITATIONS, REHAB EVAL
She visited Dr. Kerns about 2 weeks ago for concern of lower back pain, and reports she was not offered other surgical intervention but rather advised to see pain management.

## 2020-10-19 NOTE — DIETITIAN INITIAL EVALUATION ADULT. - OTHER INFO
Dosing wt: 250.4 lbs. Reports UBW of 160 lbs, reports 90 lbs unintentional wt gain x9 months in setting of decreased mobility and increased PO intake following spinal surgery.    Pt is eating fair to good with no changes in appetite. Denies recent N/V, or diarrhea. Reports mild constipation at baseline. Last BM 10/16.

## 2020-10-19 NOTE — OCCUPATIONAL THERAPY INITIAL EVALUATION ADULT - PLANNED THERAPY INTERVENTIONS, OT EVAL
strengthening/transfer training/ADL retraining/bed mobility training/balance training/neuromuscular re-education/ROM

## 2020-10-19 NOTE — DIETITIAN INITIAL EVALUATION ADULT. - CHIEF COMPLAINT
The patient is a 40y Female complaining of back pain general. Hx bipolar, depression, LBP s/p L2-3 PLIF 11/2019 w/ Dr. Kerns presents to ED with LBP radiating to L groin.

## 2020-10-19 NOTE — OCCUPATIONAL THERAPY INITIAL EVALUATION ADULT - PERTINENT HX OF CURRENT PROBLEM, REHAB EVAL
41yo F with PMH of bipolar disorder, depression, GERD, L2-L3 fusion (Nov 2019) with L2 decompression (August 2019) who presents to the ED with complaint of back pain. The patient has chronic lower back pain, however over the last 3 days, pain has worsened to 8-9/10 severity and radiates to the groin. Pain is worsened with movement, sitting upright, turning in bed. She also endorses decreased sensation in LLE, sensation of weakness of LLE.

## 2020-10-19 NOTE — PHYSICAL THERAPY INITIAL EVALUATION ADULT - GROSSLY INTACT, SENSORY
Left UE/Pt reports diminished sensation on LLE when compared to RLE/Right LE/Right UE/Left LE/Grossly Intact

## 2020-10-19 NOTE — PHYSICAL THERAPY INITIAL EVALUATION ADULT - STRENGTHENING, PT EVAL
GOAL: Pt will improve MMT grade in LLE 1 full grade in 4 weeks to increase overall functional mobility and strength.

## 2020-10-19 NOTE — CONSULT NOTE ADULT - SUBJECTIVE AND OBJECTIVE BOX
Chief Complaint:  Patient is a 40y old  Female who presents with a chief complaint of back pain     HPI:  This patient is a 39yo lady with PMH of bipolar disorder, depression, GERD, L2-L3 fusion (2019) with L2 decompression (2019) who presents to the ED with complaint of back pain. The patient has chronic lower back pain, however over the last 3 days, pain has worsened to 8-9/10 severity and radiates to the groin. Pain is worsened with movement, sitting upright, turning in bed. She also endorses decreased sensation in LLE, sensation of weakness of LLE. She denies bowel or bladder incontinence. She also denies fever, chills.   She has been taking 5 tablets of motrin about 5 times a day, without pain relief.   She visited Dr. Kerns about 2 weeks ago for concern of lower back pain, and reports she was not offered other surgical intervention but rather advised to see pain management.      Current Pain Score: 10/10    Current out- patient pain regimen: none    Out Patient Pain Management provider: none    [facility] Prescription Monitoring Program: Reference #[id number]    PAST MEDICAL & SURGICAL HISTORY:  Anemia    GERD (gastroesophageal reflux disease)    Depression    ADD (attention deficit disorder)    Bipolar mood disorder    Depression    ADD (attention deficit disorder)    Depression    Endometrial polyp  s/p ablation 2019    Ganglion cyst of wrist, left    Bariatric surgery status  s/p gastric sleeve, then s/p insertion of gastric band which was   removed in  secondary to displacement    S/P  section    S/P cholecystectomy    Delivery by emergency caesarean section      FAMILY HISTORY:  FH: hypothyroidism  father    FH: diabetes mellitus  mother    FH: hypertension  mother and father      SOCIAL HISTORY:  Tobacco Use: former smoker  Alcohol Use: denies  Illcicit Drug Use: denies    Opioid Risk Tool (ORT-OUD) Score: high    Allergies    Ofirmev (Urticaria)  Robaxin (Anaphylaxis)    Intolerances    None known    REVIEW OF SYSTEMS:  CONSTITUTIONAL: No fever, fatigue: + weight gain  NEURO: No headaches, memory loss, tremors, dizziness or blurred vision  RESP: No shortness of breath, cough  CV: No chest pain, palpitations  GI: No abdominal pain, nausea, vomiting, diarrhea, constipation   : No dysuria, bladder incontinence/retention  MSK: No joint pain or swelling; +  back and left lower extremity pain; subjective LLE weakness and decreased sensation; no saddle anesthesia   SKIN: No itching, burning, rashes   PSYCHIATRIC: +depression, + difficulty sleeping      MEDICATIONS  (STANDING):  acetaminophen   Tablet .. 650 milliGRAM(s) Oral every 6 hours  ALPRAZolam 1 milliGRAM(s) Oral three times a day  DULoxetine 60 milliGRAM(s) Oral two times a day  enoxaparin Injectable 40 milliGRAM(s) SubCutaneous every 12 hours  FLUoxetine 40 milliGRAM(s) Oral daily  gabapentin 400 milliGRAM(s) Oral at bedtime  HYDROmorphone  Injectable 0.75 milliGRAM(s) IV Push every 6 hours  influenza   Vaccine 0.5 milliLiter(s) IntraMuscular once  lamoTRIgine 150 milliGRAM(s) Oral at bedtime  lidocaine   Patch 1 Patch Transdermal daily  melatonin 3 milliGRAM(s) Oral at bedtime  polyethylene glycol 3350 17 Gram(s) Oral two times a day  senna 2 Tablet(s) Oral at bedtime  traZODone 200 milliGRAM(s) Oral at bedtime    MEDICATIONS  (PRN):  HYDROmorphone  Injectable 0.5 milliGRAM(s) IV Push every 4 hours PRN Severe Pain (7 - 10)  oxyCODONE    IR 5 milliGRAM(s) Oral every 4 hours PRN Moderate Pain (4 - 6)      PHYSICAL EXAM  GENERAL: seen at bedside; NAD; well developed, morbidly obese; falling asleep during interview  HEENT: head atraumatic, normocephalic; speech clear and tangential  NEURO: A + O X 3; good concentration; Cranial Nerves II- XII intact; sensory exam intact; proprioception intact  RESPIRATORY: lungs clear to auscultation B/L; no rales or rhonchi; chest expansion equal  CARDIAC: regular cardiac rhythm; S1 S2; no JVD  GI: abdomen soft, non distended; + bowel sounds; appetite fair  : voiding  EXTREMITIES/ PV: ZAVALA; 2+ peripheral pulses; no cyanosis, edema or clubbing  MUSCULOSKELETAL: motor strength 5/5 B/L lower extremities except left plantar flexion and dorsi flexion 4/5; gait not assessed  SKIN: no rashes, lesions    PSYCH: affect normal; good eye contact; no signs of depression or anxiety    Vital Signs:  T(C): 36.8 (10-19-20 @ 13:43)  HR: 81 (10-19-20 @ 13:43)  BP: 111/75 (10-19-20 @ 13:43)  RR: 18 (10-19-20 @ 13:43)  SpO2: 95% (10-19-20 @ 13:43)    Pertinent labs/radiology:  10-19    138  |  104  |  12  ----------------------------<  92  3.7   |  25  |  0.82    Ca    9.2      19 Oct 2020 07:15  Mg     2.0     10-19    TPro  6.5  /  Alb  4.0  /  TBili  0.3  /  DBili  x   /  AST  31  /  ALT  49<H>  /  AlkPhos  103  10-19                        12.0   6.12  )-----------( 317      ( 19 Oct 2020 07:14 )             37.7     from: MR Lumbar Spine No Cont (10.17.20 @ 11:24)   EXAM:  MR SPINE LUMBAR                        PROCEDURE DATE:  10/17/2020      IMPRESSION: Postop changes involving the L2-3 level is again seen and unchanged.  Degenerative changes as described above.  No neural compression.

## 2020-10-19 NOTE — PHYSICAL THERAPY INITIAL EVALUATION ADULT - GENERAL OBSERVATIONS, REHAB EVAL
Recieved pt standing in bathroom, +IVL. Pt A&Ox4. Pt amb 15ft back to chair with cg and RW - WBAT LLE. Pt demonstrating inability to fully weight bear on LLE during amb. Recieved pt standing in bathroom, +IVL. Pt A&Ox4. Pt amb 15ft back to chair with cg and RW - WBAT LLE. Pt demonstrating inability to fully weight bear on LLE during amb, placing no weight on heel (all on toes).

## 2020-10-19 NOTE — PROGRESS NOTE ADULT - SUBJECTIVE AND OBJECTIVE BOX
incomplete note CONTACT INFO:  Shanique Arevalokenny   PGY-1 Internal Medicine  [phone]    Patient is a 40y old  Female who presents with a chief complaint of back pain (19 Oct 2020 07:16)      SUBJECTIVE / OVERNIGHT EVENTS:   Patient seen and evaluated at bedside. No fever/chills.  Denies SOB at rest, chest pain, palpitations, abdominal pain, nausea/vomiting.       MEDICATIONS  (STANDING):  acetaminophen   Tablet .. 650 milliGRAM(s) Oral every 6 hours  ALPRAZolam 1 milliGRAM(s) Oral three times a day  DULoxetine 60 milliGRAM(s) Oral two times a day  enoxaparin Injectable 40 milliGRAM(s) SubCutaneous every 12 hours  FLUoxetine 40 milliGRAM(s) Oral daily  gabapentin 400 milliGRAM(s) Oral at bedtime  HYDROmorphone  Injectable 0.75 milliGRAM(s) IV Push every 6 hours  influenza   Vaccine 0.5 milliLiter(s) IntraMuscular once  lamoTRIgine 150 milliGRAM(s) Oral at bedtime  lidocaine   Patch 1 Patch Transdermal daily  melatonin 3 milliGRAM(s) Oral at bedtime  polyethylene glycol 3350 17 Gram(s) Oral two times a day  senna 2 Tablet(s) Oral at bedtime  traZODone 200 milliGRAM(s) Oral at bedtime    MEDICATIONS  (PRN):  HYDROmorphone  Injectable 0.5 milliGRAM(s) IV Push every 4 hours PRN Severe Pain (7 - 10)  oxyCODONE    IR 5 milliGRAM(s) Oral every 4 hours PRN Moderate Pain (4 - 6)      CAPILLARY BLOOD GLUCOSE        I&O's Summary    18 Oct 2020 07:01  -  19 Oct 2020 07:00  --------------------------------------------------------  IN: 900 mL / OUT: 0 mL / NET: 900 mL        PHYSICAL EXAM:  Vital Signs Last 24 Hrs  T(C): 36.8 (19 Oct 2020 13:43), Max: 36.9 (19 Oct 2020 04:14)  T(F): 98.2 (19 Oct 2020 13:43), Max: 98.5 (19 Oct 2020 04:14)  HR: 81 (19 Oct 2020 13:43) (77 - 106)  BP: 111/75 (19 Oct 2020 13:43) (100/68 - 122/66)  BP(mean): --  RR: 18 (19 Oct 2020 13:43) (18 - 18)  SpO2: 95% (19 Oct 2020 13:43) (95% - 97%)    CONSTITUTIONAL: NAD, well-developed  RESPIRATORY: Normal respiratory effort; lungs are clear to auscultation bilaterally  CARDIOVASCULAR: Regular rate and rhythm, normal S1 and S2, no murmur/rub/gallop; No lower extremity edema; Peripheral pulses are 2+ bilaterally  ABDOMEN: Nontender to palpation, normoactive bowel sounds, no rebound/guarding; No hepatosplenomegaly  MUSCLOSKELETAL: no clubbing or cyanosis of digits; no joint swelling or tenderness to palpation  PSYCH: A+O to person, place, and time; affect appropriate    LABS:                        12.0   6.12  )-----------( 317      ( 19 Oct 2020 07:14 )             37.7     10-19    138  |  104  |  12  ----------------------------<  92  3.7   |  25  |  0.82    Ca    9.2      19 Oct 2020 07:15  Mg     2.0     10-19    TPro  6.5  /  Alb  4.0  /  TBili  0.3  /  DBili  x   /  AST  31  /  ALT  49<H>  /  AlkPhos  103  10-19                RADIOLOGY & ADDITIONAL TESTS:  Results Reviewed:   Imaging Personally Reviewed:  Electrocardiogram Personally Reviewed:    COORDINATION OF CARE:  Care Discussed with Consultants/Other Providers [Y/N]:  Prior or Outpatient Records Reviewed [Y/N]:

## 2020-10-19 NOTE — OCCUPATIONAL THERAPY INITIAL EVALUATION ADULT - LIVES WITH, PROFILE
Pt lives with 8yo daughter and fiance in an apt in a pvt home. 4 steps to enter with handrail. No stairs inside. +Tub with curtain. Owns standard walker and straight cane. Fiance assists with tub transfer and LB dressing/significant other

## 2020-10-19 NOTE — OCCUPATIONAL THERAPY INITIAL EVALUATION ADULT - ADDITIONAL COMMENTS
MR lumbar spine 10/17- Postop changes involving the L2-3 level is again seen and unchanged. Degenerative changes as described above.

## 2020-10-19 NOTE — DIETITIAN INITIAL EVALUATION ADULT. - PROBLEM SELECTOR PLAN 1
The patient complains of worsening L lumbosacral pain which radiates to the groin. She does not have saddle anesthesia or incontinence. She has subjective decreased sensation in LLE, and strength assessment is limited due to pain. Patient had reduction in pain severity from 9 to 7/10 with morphine 4mg IV.   I advised the patient to stop taking morphine; the dose she has been consuming places her at risk of gastritis or PUD.   CT of lumbar spine found hardware in alignment, without periprosthetic fracture or listhesis. Xray did not find femoral or pelvic fracture.  Obtain MRI lumbosacral spine  Pt was scheduled to have appt with Dr. Carreon (orthopedics) on Monday.   Start lidocaine patch.  Start morphine 6mg IV q6hg PRN for severe pain.   Consult pain management team in AM.    ISTOP reviewed- Reference #: [id number]  10/2/2020-vyvanse 50 mg capsule , 30 tab,30 day supply  10/02/2020- alprazolam 1 mg tablet , quantity 90 tab. 30 day supply

## 2020-10-19 NOTE — PHYSICAL THERAPY INITIAL EVALUATION ADULT - CRITERIA FOR SKILLED THERAPEUTIC INTERVENTIONS
impairments found/risk reduction/prevention/anticipated equipment needs at discharge/anticipated discharge recommendation/therapy frequency/functional limitations in following categories

## 2020-10-20 ENCOUNTER — TRANSCRIPTION ENCOUNTER (OUTPATIENT)
Age: 40
End: 2020-10-20

## 2020-10-20 VITALS
OXYGEN SATURATION: 95 % | RESPIRATION RATE: 18 BRPM | TEMPERATURE: 99 F | SYSTOLIC BLOOD PRESSURE: 107 MMHG | HEART RATE: 79 BPM | DIASTOLIC BLOOD PRESSURE: 72 MMHG

## 2020-10-20 LAB
ALBUMIN SERPL ELPH-MCNC: 3.9 G/DL — SIGNIFICANT CHANGE UP (ref 3.3–5)
ALP SERPL-CCNC: 101 U/L — SIGNIFICANT CHANGE UP (ref 40–120)
ALT FLD-CCNC: 42 U/L — SIGNIFICANT CHANGE UP (ref 10–45)
ANION GAP SERPL CALC-SCNC: 8 MMOL/L — SIGNIFICANT CHANGE UP (ref 5–17)
AST SERPL-CCNC: 24 U/L — SIGNIFICANT CHANGE UP (ref 10–40)
BENZODIAZ UR-MCNC: POSITIVE
BILIRUB SERPL-MCNC: 0.2 MG/DL — SIGNIFICANT CHANGE UP (ref 0.2–1.2)
BUN SERPL-MCNC: 12 MG/DL — SIGNIFICANT CHANGE UP (ref 7–23)
CALCIUM SERPL-MCNC: 9.4 MG/DL — SIGNIFICANT CHANGE UP (ref 8.4–10.5)
CHLORIDE SERPL-SCNC: 104 MMOL/L — SIGNIFICANT CHANGE UP (ref 96–108)
CO2 SERPL-SCNC: 26 MMOL/L — SIGNIFICANT CHANGE UP (ref 22–31)
CREAT SERPL-MCNC: 0.84 MG/DL — SIGNIFICANT CHANGE UP (ref 0.5–1.3)
GLUCOSE SERPL-MCNC: 107 MG/DL — HIGH (ref 70–99)
HCT VFR BLD CALC: 36.3 % — SIGNIFICANT CHANGE UP (ref 34.5–45)
HGB BLD-MCNC: 12 G/DL — SIGNIFICANT CHANGE UP (ref 11.5–15.5)
MCHC RBC-ENTMCNC: 30.2 PG — SIGNIFICANT CHANGE UP (ref 27–34)
MCHC RBC-ENTMCNC: 33.1 GM/DL — SIGNIFICANT CHANGE UP (ref 32–36)
MCV RBC AUTO: 91.4 FL — SIGNIFICANT CHANGE UP (ref 80–100)
NRBC # BLD: 0 /100 WBCS — SIGNIFICANT CHANGE UP (ref 0–0)
OXYCODONE UR-MCNC: POSITIVE
PLATELET # BLD AUTO: 305 K/UL — SIGNIFICANT CHANGE UP (ref 150–400)
POTASSIUM SERPL-MCNC: 3.4 MMOL/L — LOW (ref 3.5–5.3)
POTASSIUM SERPL-SCNC: 3.4 MMOL/L — LOW (ref 3.5–5.3)
PROT SERPL-MCNC: 6.5 G/DL — SIGNIFICANT CHANGE UP (ref 6–8.3)
RBC # BLD: 3.97 M/UL — SIGNIFICANT CHANGE UP (ref 3.8–5.2)
RBC # FLD: 12.6 % — SIGNIFICANT CHANGE UP (ref 10.3–14.5)
SODIUM SERPL-SCNC: 138 MMOL/L — SIGNIFICANT CHANGE UP (ref 135–145)
WBC # BLD: 6.86 K/UL — SIGNIFICANT CHANGE UP (ref 3.8–10.5)
WBC # FLD AUTO: 6.86 K/UL — SIGNIFICANT CHANGE UP (ref 3.8–10.5)

## 2020-10-20 PROCEDURE — 99239 HOSP IP/OBS DSCHRG MGMT >30: CPT | Mod: GC

## 2020-10-20 RX ORDER — TRAZODONE HCL 50 MG
1 TABLET ORAL
Qty: 0 | Refills: 0 | DISCHARGE

## 2020-10-20 RX ORDER — SENNA PLUS 8.6 MG/1
2 TABLET ORAL
Qty: 14 | Refills: 0
Start: 2020-10-20 | End: 2020-10-26

## 2020-10-20 RX ORDER — POLYETHYLENE GLYCOL 3350 17 G/17G
17 POWDER, FOR SOLUTION ORAL
Qty: 238 | Refills: 0
Start: 2020-10-20 | End: 2020-10-26

## 2020-10-20 RX ORDER — GABAPENTIN 400 MG/1
1 CAPSULE ORAL
Qty: 0 | Refills: 0 | DISCHARGE

## 2020-10-20 RX ORDER — GABAPENTIN 400 MG/1
1 CAPSULE ORAL
Qty: 0 | Refills: 0 | DISCHARGE
Start: 2020-10-20

## 2020-10-20 RX ORDER — ACETAMINOPHEN 500 MG
2 TABLET ORAL
Qty: 240 | Refills: 0
Start: 2020-10-20 | End: 2020-11-18

## 2020-10-20 RX ORDER — FLUOXETINE HCL 10 MG
60 CAPSULE ORAL
Qty: 0 | Refills: 0 | DISCHARGE

## 2020-10-20 RX ORDER — GABAPENTIN 400 MG/1
2 CAPSULE ORAL
Qty: 0 | Refills: 0 | DISCHARGE
Start: 2020-10-20

## 2020-10-20 RX ORDER — OXYCODONE HYDROCHLORIDE 5 MG/1
1 TABLET ORAL
Qty: 30 | Refills: 0
Start: 2020-10-20 | End: 2020-10-24

## 2020-10-20 RX ADMIN — OXYCODONE HYDROCHLORIDE 5 MILLIGRAM(S): 5 TABLET ORAL at 14:22

## 2020-10-20 RX ADMIN — POLYETHYLENE GLYCOL 3350 17 GRAM(S): 17 POWDER, FOR SOLUTION ORAL at 05:45

## 2020-10-20 RX ADMIN — Medication 650 MILLIGRAM(S): at 00:18

## 2020-10-20 RX ADMIN — Medication 650 MILLIGRAM(S): at 05:45

## 2020-10-20 RX ADMIN — DULOXETINE HYDROCHLORIDE 60 MILLIGRAM(S): 30 CAPSULE, DELAYED RELEASE ORAL at 05:46

## 2020-10-20 RX ADMIN — GABAPENTIN 400 MILLIGRAM(S): 400 CAPSULE ORAL at 16:07

## 2020-10-20 RX ADMIN — Medication 1 MILLIGRAM(S): at 16:06

## 2020-10-20 RX ADMIN — ENOXAPARIN SODIUM 40 MILLIGRAM(S): 100 INJECTION SUBCUTANEOUS at 05:45

## 2020-10-20 RX ADMIN — Medication 650 MILLIGRAM(S): at 12:20

## 2020-10-20 RX ADMIN — Medication 1 MILLIGRAM(S): at 05:45

## 2020-10-20 RX ADMIN — GABAPENTIN 400 MILLIGRAM(S): 400 CAPSULE ORAL at 05:45

## 2020-10-20 RX ADMIN — Medication 650 MILLIGRAM(S): at 06:50

## 2020-10-20 RX ADMIN — ENOXAPARIN SODIUM 40 MILLIGRAM(S): 100 INJECTION SUBCUTANEOUS at 17:49

## 2020-10-20 RX ADMIN — Medication 40 MILLIGRAM(S): at 12:19

## 2020-10-20 NOTE — PROGRESS NOTE ADULT - PROBLEM SELECTOR PROBLEM 4
ADD (attention deficit disorder)

## 2020-10-20 NOTE — PROGRESS NOTE ADULT - NUTRITIONAL ASSESSMENT
This patient has been assessed with a concern for Malnutrition and has been determined to have a diagnosis/diagnoses of Morbid obesity/BMI > 40.    This patient is being managed with:   Diet Regular-  Entered: Oct 17 2020  3:59AM

## 2020-10-20 NOTE — PROGRESS NOTE ADULT - PROBLEM SELECTOR PLAN 1
The patient complains of worsening L lumbosacral pain which radiates to the groin. She does not have saddle anesthesia or incontinence. She has subjective decreased sensation in LLE, and strength assessment is limited due to pain. Patient had reduction in pain severity from 9 to 7/10 with morphine 4mg IV.   - CT of lumbar spine found hardware in alignment, without periprosthetic fracture or listhesis.   - Xray did not find femoral or pelvic fracture.  - MRI lumbosacral spine w/o acute pathology  - Pt was scheduled to have appt with Dr. Carreon (orthopedics) on Monday; ortho c/s placed, f/u recs  - Tylenol standing  - Lidocaine patch  - Dilaudid standing 0.75mg q6h  - Dilaudid PRN 0.5mg q6h   - Pain management c/s; awaiting recs  - Neurosurgery c/s and signed off; NTD and f/u OPT  - Ortho c/s; PT and OT recommended and c/s for both placed on 10/18    ISTOP reviewed- Reference #: [id number]  10/2/2020-vyvanse 50 mg capsule , 30 tab,30 day supply  10/02/2020- alprazolam 1 mg tablet , quantity 90 tab. 30 day supply The patient complains of worsening L lumbosacral pain which radiates to the groin. She does not have saddle anesthesia or incontinence. She has subjective decreased sensation in LLE, and strength assessment is limited due to pain. Patient had reduction in pain severity from 9 to 7/10 with morphine 4mg IV.   - CT of lumbar spine found hardware in alignment, without periprosthetic fracture or listhesis.   - Xray did not find femoral or pelvic fracture.  - MRI lumbosacral spine w/o acute pathology  - Pt was scheduled to have appt with Dr. Carreon (orthopedics) on Monday; ortho c/s placed, f/u recs  - Tylenol standing  - Lidocaine patch  - Pain management c/s: appreciate recs   - Neurosurgery c/s and signed off; NTD and f/u OPT  - Ortho c/s; PT and OT recommended and c/s for both placed on 10/18    ISTOP reviewed- Reference #: [id number]  10/2/2020-vyvanse 50 mg capsule , 30 tab,30 day supply  10/02/2020- alprazolam 1 mg tablet , quantity 90 tab. 30 day supply

## 2020-10-20 NOTE — PROGRESS NOTE ADULT - SUBJECTIVE AND OBJECTIVE BOX
**** INCOMPLETE******  PROGRESS NOTE:     CONTACT INFO:  Shanique Arevalokenny   PGY-1 Internal Medicine  [phone]    Patient is a 40y old  Female who presents with a chief complaint of back pain (19 Oct 2020 16:29)      SUBJECTIVE / OVERNIGHT EVENTS:   Patient seen and evaluated at bedside. No fever/chills.  Denies SOB at rest, chest pain, palpitations, abdominal pain, nausea/vomiting      MEDICATIONS  (STANDING):  acetaminophen   Tablet .. 650 milliGRAM(s) Oral every 6 hours  ALPRAZolam 1 milliGRAM(s) Oral three times a day  DULoxetine 60 milliGRAM(s) Oral two times a day  enoxaparin Injectable 40 milliGRAM(s) SubCutaneous every 12 hours  FLUoxetine 40 milliGRAM(s) Oral daily  gabapentin 400 milliGRAM(s) Oral three times a day  influenza   Vaccine 0.5 milliLiter(s) IntraMuscular once  lamoTRIgine 150 milliGRAM(s) Oral at bedtime  lidocaine   Patch 1 Patch Transdermal daily  melatonin 3 milliGRAM(s) Oral at bedtime  polyethylene glycol 3350 17 Gram(s) Oral two times a day  senna 2 Tablet(s) Oral at bedtime  traZODone 200 milliGRAM(s) Oral at bedtime    MEDICATIONS  (PRN):  oxyCODONE    IR 5 milliGRAM(s) Oral every 4 hours PRN Moderate Pain (4 - 6)      CAPILLARY BLOOD GLUCOSE        I&O's Summary    19 Oct 2020 07:01  -  20 Oct 2020 07:00  --------------------------------------------------------  IN: 350 mL / OUT: 0 mL / NET: 350 mL        PHYSICAL EXAM:  Vital Signs Last 24 Hrs  T(C): 36.3 (20 Oct 2020 04:54), Max: 36.8 (19 Oct 2020 13:43)  T(F): 97.4 (20 Oct 2020 04:54), Max: 98.2 (19 Oct 2020 13:43)  HR: 79 (20 Oct 2020 04:54) (79 - 106)  BP: 94/63 (20 Oct 2020 04:54) (94/63 - 121/72)  BP(mean): --  RR: 18 (20 Oct 2020 04:54) (18 - 18)  SpO2: 94% (20 Oct 2020 04:54) (94% - 97%)    CONSTITUTIONAL: NAD, well-developed  RESPIRATORY: Normal respiratory effort; lungs are clear to auscultation bilaterally  CARDIOVASCULAR: Regular rate and rhythm, normal S1 and S2, no murmur/rub/gallop; No lower extremity edema; Peripheral pulses are 2+ bilaterally  ABDOMEN: Nontender to palpation, normoactive bowel sounds, no rebound/guarding; No hepatosplenomegaly  MUSCLOSKELETAL: no clubbing or cyanosis of digits; no joint swelling or tenderness to palpation  PSYCH: A+O to person, place, and time; affect appropriate    LABS:                        12.0   6.12  )-----------( 317      ( 19 Oct 2020 07:14 )             37.7     10-19    138  |  104  |  12  ----------------------------<  92  3.7   |  25  |  0.82    Ca    9.2      19 Oct 2020 07:15  Mg     2.0     10-19    TPro  6.5  /  Alb  4.0  /  TBili  0.3  /  DBili  x   /  AST  31  /  ALT  49<H>  /  AlkPhos  103  10-19                RADIOLOGY & ADDITIONAL TESTS:  Results Reviewed:   Imaging Personally Reviewed:  Electrocardiogram Personally Reviewed:    COORDINATION OF CARE:  Care Discussed with Consultants/Other Providers [Y/N]:  Prior or Outpatient Records Reviewed [Y/N]:   **** INCOMPLETE******  PROGRESS NOTE:     CONTACT INFO:  Shanique Canela   PGY-1 Internal Medicine  [phone]    Patient is a 40y old  Female who presents with a chief complaint of back pain (19 Oct 2020 16:29)      SUBJECTIVE / OVERNIGHT EVENTS:   Patient seen and evaluated at bedside. No fever/chills.  Denies SOB at rest, chest pain, palpitations, abdominal pain, nausea/vomiting.  Pt stated she was feeling better today but still had 5/5 pain in the left leg.       MEDICATIONS  (STANDING):  acetaminophen   Tablet .. 650 milliGRAM(s) Oral every 6 hours  ALPRAZolam 1 milliGRAM(s) Oral three times a day  DULoxetine 60 milliGRAM(s) Oral two times a day  enoxaparin Injectable 40 milliGRAM(s) SubCutaneous every 12 hours  FLUoxetine 40 milliGRAM(s) Oral daily  gabapentin 400 milliGRAM(s) Oral three times a day  influenza   Vaccine 0.5 milliLiter(s) IntraMuscular once  lamoTRIgine 150 milliGRAM(s) Oral at bedtime  lidocaine   Patch 1 Patch Transdermal daily  melatonin 3 milliGRAM(s) Oral at bedtime  polyethylene glycol 3350 17 Gram(s) Oral two times a day  senna 2 Tablet(s) Oral at bedtime  traZODone 200 milliGRAM(s) Oral at bedtime    MEDICATIONS  (PRN):  oxyCODONE    IR 5 milliGRAM(s) Oral every 4 hours PRN Moderate Pain (4 - 6)      CAPILLARY BLOOD GLUCOSE        I&O's Summary    19 Oct 2020 07:01  -  20 Oct 2020 07:00  --------------------------------------------------------  IN: 350 mL / OUT: 0 mL / NET: 350 mL        PHYSICAL EXAM:  Vital Signs Last 24 Hrs  T(C): 36.3 (20 Oct 2020 04:54), Max: 36.8 (19 Oct 2020 13:43)  T(F): 97.4 (20 Oct 2020 04:54), Max: 98.2 (19 Oct 2020 13:43)  HR: 79 (20 Oct 2020 04:54) (79 - 106)  BP: 94/63 (20 Oct 2020 04:54) (94/63 - 121/72)  BP(mean): --  RR: 18 (20 Oct 2020 04:54) (18 - 18)  SpO2: 94% (20 Oct 2020 04:54) (94% - 97%)    CONSTITUTIONAL: NAD, well-developed  RESPIRATORY: Normal respiratory effort; lungs are clear to auscultation bilaterally  CARDIOVASCULAR: Regular rate and rhythm, normal S1 and S2, no murmur/rub/gallop; No lower extremity edema; Peripheral pulses are 2+ bilaterally  ABDOMEN: Nontender to palpation, normoactive bowel sounds, no rebound/guarding; No hepatosplenomegaly  MUSCLOSKELETAL: no clubbing or cyanosis of digits; no joint swelling or tenderness to palpation  PSYCH: A+O to person, place, and time; affect appropriate    LABS:                        12.0   6.12  )-----------( 317      ( 19 Oct 2020 07:14 )             37.7     10-19    138  |  104  |  12  ----------------------------<  92  3.7   |  25  |  0.82    Ca    9.2      19 Oct 2020 07:15  Mg     2.0     10-19    TPro  6.5  /  Alb  4.0  /  TBili  0.3  /  DBili  x   /  AST  31  /  ALT  49<H>  /  AlkPhos  103  10-19                RADIOLOGY & ADDITIONAL TESTS:  Results Reviewed:   Imaging Personally Reviewed:  Electrocardiogram Personally Reviewed:    COORDINATION OF CARE:  Care Discussed with Consultants/Other Providers [Y/N]:  Prior or Outpatient Records Reviewed [Y/N]:   CONTACT INFO:  Shanique Arevalokenny   PGY-1 Internal Medicine  [phone]    Patient is a 40y old  Female who presents with a chief complaint of back pain (19 Oct 2020 16:29)      SUBJECTIVE / OVERNIGHT EVENTS:   Patient seen and evaluated at bedside. No fever/chills.  Denies SOB at rest, chest pain, palpitations, abdominal pain, nausea/vomiting.  Pt stated she was feeling better today but still had 5/10 pain in the left leg.       MEDICATIONS  (STANDING):  acetaminophen   Tablet .. 650 milliGRAM(s) Oral every 6 hours  ALPRAZolam 1 milliGRAM(s) Oral three times a day  DULoxetine 60 milliGRAM(s) Oral two times a day  enoxaparin Injectable 40 milliGRAM(s) SubCutaneous every 12 hours  FLUoxetine 40 milliGRAM(s) Oral daily  gabapentin 400 milliGRAM(s) Oral three times a day  influenza   Vaccine 0.5 milliLiter(s) IntraMuscular once  lamoTRIgine 150 milliGRAM(s) Oral at bedtime  lidocaine   Patch 1 Patch Transdermal daily  melatonin 3 milliGRAM(s) Oral at bedtime  polyethylene glycol 3350 17 Gram(s) Oral two times a day  senna 2 Tablet(s) Oral at bedtime  traZODone 200 milliGRAM(s) Oral at bedtime    MEDICATIONS  (PRN):  oxyCODONE    IR 5 milliGRAM(s) Oral every 4 hours PRN Moderate Pain (4 - 6)      CAPILLARY BLOOD GLUCOSE        I&O's Summary    19 Oct 2020 07:01  -  20 Oct 2020 07:00  --------------------------------------------------------  IN: 350 mL / OUT: 0 mL / NET: 350 mL        PHYSICAL EXAM:  Vital Signs Last 24 Hrs  T(C): 36.3 (20 Oct 2020 04:54), Max: 36.8 (19 Oct 2020 13:43)  T(F): 97.4 (20 Oct 2020 04:54), Max: 98.2 (19 Oct 2020 13:43)  HR: 79 (20 Oct 2020 04:54) (79 - 106)  BP: 94/63 (20 Oct 2020 04:54) (94/63 - 121/72)  BP(mean): --  RR: 18 (20 Oct 2020 04:54) (18 - 18)  SpO2: 94% (20 Oct 2020 04:54) (94% - 97%)    CONSTITUTIONAL: NAD, well-developed  RESPIRATORY: Normal respiratory effort; lungs are clear to auscultation bilaterally  CARDIOVASCULAR: Regular rate and rhythm, normal S1 and S2, no murmur/rub/gallop; No lower extremity edema; Peripheral pulses are 2+ bilaterally  ABDOMEN: Nontender to palpation, normoactive bowel sounds, no rebound/guarding; No hepatosplenomegaly  MUSCLOSKELETAL: no clubbing or cyanosis of digits; no joint swelling or tenderness to palpation  PSYCH: A+O to person, place, and time; affect appropriate  NEURO: 4/5 strength in LLE, subjective decreased sensation in LLE    LABS:                        12.0   6.12  )-----------( 317      ( 19 Oct 2020 07:14 )             37.7     10-19    138  |  104  |  12  ----------------------------<  92  3.7   |  25  |  0.82    Ca    9.2      19 Oct 2020 07:15  Mg     2.0     10-19    TPro  6.5  /  Alb  4.0  /  TBili  0.3  /  DBili  x   /  AST  31  /  ALT  49<H>  /  AlkPhos  103  10-19                RADIOLOGY & ADDITIONAL TESTS:  Results Reviewed:   Imaging Personally Reviewed:  Electrocardiogram Personally Reviewed:    COORDINATION OF CARE:  Care Discussed with Consultants/Other Providers [Y/N]:  Prior or Outpatient Records Reviewed [Y/N]:

## 2020-10-20 NOTE — PROGRESS NOTE ADULT - PROBLEM SELECTOR PLAN 5
Notified patient that she was found to have a 3cm R adnexal cyst.  Check pelvic US to asses cyst.

## 2020-10-20 NOTE — PROGRESS NOTE ADULT - PROBLEM SELECTOR PLAN 3
Continue home dose of lamotrigine

## 2020-10-20 NOTE — CHART NOTE - NSCHARTNOTEFT_GEN_A_CORE
40 F PMHx bipolar disorder, depression, GERD, L2-L3 fusion (Nov 2019) with L2 decompression (August 2019)   Admitted with complaint of back pain, decreased sensation in LLE, sensation of weakness of LLE  Saw Dr. Kerns 2 weeks ago, no surgical intervention but rather advised to see pain management    Pt sleeping soundly, EMR review shows pain controlled on current regimen. Has only taken 2 Oxycodone doses in 24 hours for pain.   Agree with Dr Kerns, pt needs outpatient pain management for possible interventional techniques.  Community pain practices list provided.    Decrease Oxycodone 5 mg to every 6 hours PRN severe pain  Continue Lidoderm.  Continue Cymbalta 60 mg BID and Trazodone 200 mg QHS.  Continue Neurontin 400 mg Q 8 hours  Monitor for sedation and respiratory depression.  Bowel regimen.  OOB as per primary team    Signing off, reconsult as needed      [facility]  [phone]

## 2020-10-20 NOTE — PROGRESS NOTE ADULT - PROBLEM SELECTOR PLAN 6
VTE ppx: lovenox subQ  Activity: fall precaution, OOB with assistance  Diet: regular

## 2020-10-20 NOTE — PROGRESS NOTE ADULT - ATTENDING COMMENTS
Pain could be 2/2 Sacroiliac joint pain given radiation to pelvis and down legs given CT spine findings  Pain is reproducible on exam. MRI read pending  Will make tylenol standing and change Morphine to Dilaudid  Pt has been taking motrin OTC regularly. Will hold further NSAID therapy now for now. Cr at baseline  Will consider trialing short course of Celebrex 100mg BID vs Toradol 30mg Q6 to improve pain control   f/u Nsx rec's
no surgical intervention   pain is a 2 right now  pain management c/s  pt rec home pt  ancipitate dc 1-2 day
pain controlled on po regimen  dc home w home pt  dc time 50 min
Still with low back pain. ? 2/2 Sacroiliac joint pain given radiation to pelvis and down legs  MRI without acute pathology.  Nsx and ortho consulted. No acute surgical intervention given imaging negative for acute abnormalities  Will consult chronic pain   Consider trialing short course of Celebrex 100mg BID vs Toradol 30mg Q6 to improve pain control

## 2020-10-26 ENCOUNTER — APPOINTMENT (OUTPATIENT)
Dept: ORTHOPEDIC SURGERY | Facility: CLINIC | Age: 40
End: 2020-10-26
Payer: MEDICAID

## 2020-10-26 VITALS
BODY MASS INDEX: 47.84 KG/M2 | DIASTOLIC BLOOD PRESSURE: 66 MMHG | SYSTOLIC BLOOD PRESSURE: 121 MMHG | HEIGHT: 62 IN | HEART RATE: 86 BPM | WEIGHT: 260 LBS

## 2020-10-26 PROCEDURE — 99203 OFFICE O/P NEW LOW 30 MIN: CPT

## 2020-10-26 PROCEDURE — 99072 ADDL SUPL MATRL&STAF TM PHE: CPT

## 2020-10-26 NOTE — DISCUSSION/SUMMARY
[de-identified] : We discussed further treatment options.  She appears to have some symptomatology related to her left hip.  I recommended a left hip intra-articular injection.  She will follow-up afterwards.

## 2020-10-26 NOTE — HISTORY OF PRESENT ILLNESS
[de-identified] : Ms. RUPALI CRUZ  is a 40 year old female who presents with back pain.  She has had an L2-3 laminectomy and fusion.  Her main complaint is more left buttock pain with radiation into her groin.  She has difficulty walking.  She cannot put on her socks and shoes.  Normal bowel and bladder control.   Denies any recent fevers, chills, sweats, weight loss, or infection.\par \par The patients past medical history, past surgical history, medications, allergies, and social history were reviewed by me today with the patient and documented accordingly.  In addition, the patient's family history, which is noncontributory to their visit, was also reviewed.\par

## 2020-10-26 NOTE — PHYSICAL EXAM
[Antalgic] : antalgic [de-identified] : Examination of the lumbar spine reveals no midline tenderness palpation, step-offs, or skin lesions.  Healed lumbar incision.  Decreased range of motion with respect to flexion, extension, lateral bending, and rotation. No tenderness to palpation of the sciatic notch. No tenderness palpation of the bilateral greater trochanters.  She does have pain with passive internal and external range of motion of her left hip which reproduces much of her symptoms.  No instability of bilateral lower extremities.  Negative BRITTNI. Negative straight leg raise bilaterally. No bowstring. Negative femoral stretch. 5 out of 5 iliopsoas, hip abductors, hips adductors, quadriceps, hamstrings, gastrocsoleus, tibialis anterior, extensor hallucis longus, peroneals. Grossly intact sensation to light touch bilateral lower extremities. 1+ patellar and Achilles reflexes. Downgoing Babinski. No clonus. Intact proprioception. Palpable pulses. No skin lesion and no edema on the right and left lower extremities. [de-identified] : MRI performed recently in the hospital reveals prior L2-3 fusion.  She does have some increased lateral recess stenosis at L4-5.

## 2020-10-27 ENCOUNTER — OUTPATIENT (OUTPATIENT)
Dept: OUTPATIENT SERVICES | Facility: HOSPITAL | Age: 40
LOS: 1 days | End: 2020-10-27
Payer: MEDICAID

## 2020-10-27 ENCOUNTER — RESULT REVIEW (OUTPATIENT)
Age: 40
End: 2020-10-27

## 2020-10-27 ENCOUNTER — APPOINTMENT (OUTPATIENT)
Dept: RADIOLOGY | Facility: CLINIC | Age: 40
End: 2020-10-27
Payer: MEDICAID

## 2020-10-27 DIAGNOSIS — N84.0 POLYP OF CORPUS UTERI: Chronic | ICD-10-CM

## 2020-10-27 DIAGNOSIS — Z98.89 OTHER SPECIFIED POSTPROCEDURAL STATES: Chronic | ICD-10-CM

## 2020-10-27 DIAGNOSIS — Z98.84 BARIATRIC SURGERY STATUS: Chronic | ICD-10-CM

## 2020-10-27 DIAGNOSIS — M67.432 GANGLION, LEFT WRIST: Chronic | ICD-10-CM

## 2020-10-27 DIAGNOSIS — Z00.8 ENCOUNTER FOR OTHER GENERAL EXAMINATION: ICD-10-CM

## 2020-10-27 DIAGNOSIS — Z90.49 ACQUIRED ABSENCE OF OTHER SPECIFIED PARTS OF DIGESTIVE TRACT: Chronic | ICD-10-CM

## 2020-10-27 PROCEDURE — 27093 INJECTION FOR HIP X-RAY: CPT

## 2020-10-27 PROCEDURE — 73525 CONTRAST X-RAY OF HIP: CPT

## 2020-10-27 PROCEDURE — 73525 CONTRAST X-RAY OF HIP: CPT | Mod: 26,LT

## 2020-10-27 PROCEDURE — 27093 INJECTION FOR HIP X-RAY: CPT | Mod: LT

## 2020-10-29 PROCEDURE — 74176 CT ABD & PELVIS W/O CONTRAST: CPT

## 2020-10-29 PROCEDURE — 97166 OT EVAL MOD COMPLEX 45 MIN: CPT

## 2020-10-29 PROCEDURE — 72148 MRI LUMBAR SPINE W/O DYE: CPT

## 2020-10-29 PROCEDURE — 96361 HYDRATE IV INFUSION ADD-ON: CPT

## 2020-10-29 PROCEDURE — 80053 COMPREHEN METABOLIC PANEL: CPT

## 2020-10-29 PROCEDURE — 85025 COMPLETE CBC W/AUTO DIFF WBC: CPT

## 2020-10-29 PROCEDURE — 86769 SARS-COV-2 COVID-19 ANTIBODY: CPT

## 2020-10-29 PROCEDURE — 96376 TX/PRO/DX INJ SAME DRUG ADON: CPT

## 2020-10-29 PROCEDURE — 80307 DRUG TEST PRSMV CHEM ANLYZR: CPT

## 2020-10-29 PROCEDURE — 73502 X-RAY EXAM HIP UNI 2-3 VIEWS: CPT

## 2020-10-29 PROCEDURE — 93975 VASCULAR STUDY: CPT

## 2020-10-29 PROCEDURE — G0378: CPT

## 2020-10-29 PROCEDURE — 97161 PT EVAL LOW COMPLEX 20 MIN: CPT

## 2020-10-29 PROCEDURE — 83735 ASSAY OF MAGNESIUM: CPT

## 2020-10-29 PROCEDURE — 85027 COMPLETE CBC AUTOMATED: CPT

## 2020-10-29 PROCEDURE — 84702 CHORIONIC GONADOTROPIN TEST: CPT

## 2020-10-29 PROCEDURE — 96374 THER/PROPH/DIAG INJ IV PUSH: CPT

## 2020-10-29 PROCEDURE — 76856 US EXAM PELVIC COMPLETE: CPT

## 2020-10-29 PROCEDURE — 99285 EMERGENCY DEPT VISIT HI MDM: CPT | Mod: 25

## 2020-10-29 PROCEDURE — U0003: CPT

## 2020-10-29 PROCEDURE — 76830 TRANSVAGINAL US NON-OB: CPT

## 2020-11-16 ENCOUNTER — APPOINTMENT (OUTPATIENT)
Dept: ORTHOPEDIC SURGERY | Facility: CLINIC | Age: 40
End: 2020-11-16
Payer: MEDICAID

## 2020-11-16 VITALS — TEMPERATURE: 97.7 F

## 2020-11-16 DIAGNOSIS — M54.5 LOW BACK PAIN: ICD-10-CM

## 2020-11-16 DIAGNOSIS — G89.29 LOW BACK PAIN: ICD-10-CM

## 2020-11-16 PROCEDURE — 99072 ADDL SUPL MATRL&STAF TM PHE: CPT

## 2020-11-16 PROCEDURE — 99214 OFFICE O/P EST MOD 30 MIN: CPT

## 2020-11-16 NOTE — PHYSICAL EXAM
[Antalgic] : antalgic [Cane] : ambulates with cane [de-identified] : Examination of the lumbar spine reveals no midline tenderness palpation, step-offs, or skin lesions.  Healed lumbar incision.  Decreased range of motion with respect to flexion, extension, lateral bending, and rotation. No tenderness to palpation of the sciatic notch. No tenderness palpation of the bilateral greater trochanters.  She does have pain with passive internal and external range of motion of her left hip which reproduces much of her symptoms.  No instability of bilateral lower extremities.  Negative BRITTNI. Negative straight leg raise bilaterally. No bowstring. Negative femoral stretch. 5 out of 5 iliopsoas, hip abductors, hips adductors, quadriceps, hamstrings, gastrocsoleus, tibialis anterior, extensor hallucis longus, peroneals. Grossly intact sensation to light touch bilateral lower extremities. 1+ patellar and Achilles reflexes. Downgoing Babinski. No clonus. Intact proprioception. Palpable pulses. No skin lesion and no edema on the right and left lower extremities. [de-identified] : MRI performed recently in the hospital reveals prior L2-3 fusion.  She does have some increased lateral recess stenosis at L4-5.

## 2020-11-16 NOTE — DISCUSSION/SUMMARY
[de-identified] : We discussed further treatment options.  She would like to be evaluated for SI joint injections as these have helped her in the past.  Referral was given.  She also continue with some Flexeril.  I have also recommended evaluation and treatment with a comprehensive pain physician.  She will let me know of any changes or worsening of her symptoms.

## 2020-11-16 NOTE — HISTORY OF PRESENT ILLNESS
[de-identified] : Patient returns for follow-up today.  She has had an injection into her left hip which gave her 30% relief.

## 2020-12-03 ENCOUNTER — APPOINTMENT (OUTPATIENT)
Dept: ENDOCRINOLOGY | Facility: CLINIC | Age: 40
End: 2020-12-03

## 2020-12-16 ENCOUNTER — APPOINTMENT (OUTPATIENT)
Dept: CT IMAGING | Facility: CLINIC | Age: 40
End: 2020-12-16

## 2021-01-13 ENCOUNTER — APPOINTMENT (OUTPATIENT)
Dept: SPINE | Facility: CLINIC | Age: 41
End: 2021-01-13
Payer: MEDICAID

## 2021-01-13 VITALS
HEART RATE: 94 BPM | DIASTOLIC BLOOD PRESSURE: 91 MMHG | BODY MASS INDEX: 45.06 KG/M2 | TEMPERATURE: 98 F | OXYGEN SATURATION: 94 % | WEIGHT: 254.3 LBS | HEIGHT: 63 IN | SYSTOLIC BLOOD PRESSURE: 130 MMHG

## 2021-01-13 DIAGNOSIS — M51.9 UNSPECIFIED THORACIC, THORACOLUMBAR AND LUMBOSACRAL INTERVERTEBRAL DISC DISORDER: ICD-10-CM

## 2021-01-13 DIAGNOSIS — M51.16 INTERVERTEBRAL DISC DISORDERS WITH RADICULOPATHY, LUMBAR REGION: ICD-10-CM

## 2021-01-13 PROCEDURE — 99213 OFFICE O/P EST LOW 20 MIN: CPT

## 2021-01-13 PROCEDURE — 99072 ADDL SUPL MATRL&STAF TM PHE: CPT

## 2021-01-13 RX ORDER — CYCLOBENZAPRINE HYDROCHLORIDE 10 MG/1
10 TABLET, FILM COATED ORAL 3 TIMES DAILY
Qty: 30 | Refills: 0 | Status: DISCONTINUED | COMMUNITY
Start: 2020-10-26 | End: 2021-01-13

## 2021-01-13 NOTE — REASON FOR VISIT
[Follow-Up: _____] : a [unfilled] follow-up visit [FreeTextEntry1] : She reports that she is feeling better.\par She reports stiffness in lowerback when rising from sitting to standing position\par She reports that she has not been able to see neurologist\par She saw Dr. Carreon and had CELENA which helped with her pain

## 2021-01-13 NOTE — PHYSICAL EXAM
[General Appearance - Alert] : alert [General Appearance - In No Acute Distress] : in no acute distress [Oriented To Time, Place, And Person] : oriented to person, place, and time [Impaired Insight] : insight and judgment were intact [Cranial Nerves Optic (II)] : visual acuity intact bilaterally,  pupils equal round and reactive to light [Cranial Nerves Oculomotor (III)] : extraocular motion intact [Cranial Nerves Trigeminal (V)] : facial sensation intact symmetrically [Cranial Nerves Facial (VII)] : face symmetrical [Cranial Nerves Vestibulocochlear (VIII)] : hearing was intact bilaterally [Cranial Nerves Glossopharyngeal (IX)] : tongue and palate midline [Cranial Nerves Accessory (XI - Cranial And Spinal)] : head turning and shoulder shrug symmetric [Cranial Nerves Hypoglossal (XII)] : there was no tongue deviation with protrusion [Motor Tone] : muscle tone was normal in all four extremities [Motor Strength] : muscle strength was normal in all four extremities [] : no respiratory distress [Respiration, Rhythm And Depth] : normal respiratory rhythm and effort [Heart Rate And Rhythm] : heart rate was normal and rhythm regular [Abnormal Walk] : normal gait [Skin Color & Pigmentation] : normal skin color and pigmentation

## 2021-01-20 ENCOUNTER — APPOINTMENT (OUTPATIENT)
Dept: ENDOCRINOLOGY | Facility: CLINIC | Age: 41
End: 2021-01-20
Payer: MEDICAID

## 2021-01-20 VITALS
WEIGHT: 254.38 LBS | HEIGHT: 63 IN | OXYGEN SATURATION: 97 % | HEART RATE: 88 BPM | SYSTOLIC BLOOD PRESSURE: 132 MMHG | BODY MASS INDEX: 45.07 KG/M2 | TEMPERATURE: 96.6 F | DIASTOLIC BLOOD PRESSURE: 84 MMHG

## 2021-01-20 DIAGNOSIS — Z83.3 FAMILY HISTORY OF DIABETES MELLITUS: ICD-10-CM

## 2021-01-20 DIAGNOSIS — F41.9 ANXIETY DISORDER, UNSPECIFIED: ICD-10-CM

## 2021-01-20 DIAGNOSIS — Z82.49 FAMILY HISTORY OF ISCHEMIC HEART DISEASE AND OTHER DISEASES OF THE CIRCULATORY SYSTEM: ICD-10-CM

## 2021-01-20 DIAGNOSIS — Z83.49 FAMILY HISTORY OF OTHER ENDOCRINE, NUTRITIONAL AND METABOLIC DISEASES: ICD-10-CM

## 2021-01-20 DIAGNOSIS — K80.20 CALCULUS OF GALLBLADDER W/OUT CHOLECYSTITIS W/OUT OBSTRUCTION: ICD-10-CM

## 2021-01-20 LAB
GLUCOSE BLDC GLUCOMTR-MCNC: 88
HBA1C MFR BLD HPLC: 5

## 2021-01-20 PROCEDURE — 82962 GLUCOSE BLOOD TEST: CPT

## 2021-01-20 PROCEDURE — 36415 COLL VENOUS BLD VENIPUNCTURE: CPT

## 2021-01-20 PROCEDURE — 83036 HEMOGLOBIN GLYCOSYLATED A1C: CPT | Mod: QW

## 2021-01-20 PROCEDURE — G0447 BEHAVIOR COUNSEL OBESITY 15M: CPT | Mod: 59

## 2021-01-20 PROCEDURE — 99072 ADDL SUPL MATRL&STAF TM PHE: CPT

## 2021-01-20 PROCEDURE — 99204 OFFICE O/P NEW MOD 45 MIN: CPT | Mod: 25

## 2021-01-20 NOTE — PHYSICAL EXAM
[Alert] : alert [Well Nourished] : well nourished [Obese] : obese [No Acute Distress] : no acute distress [Well Developed] : well developed [Normal Sclera/Conjunctiva] : normal sclera/conjunctiva [EOMI] : extra ocular movement intact [Normal Oropharynx] : the oropharynx was normal [No Proptosis] : no proptosis [Thyroid Not Enlarged] : the thyroid was not enlarged [No Thyroid Nodules] : no palpable thyroid nodules [No Respiratory Distress] : no respiratory distress [No Accessory Muscle Use] : no accessory muscle use [Clear to Auscultation] : lungs were clear to auscultation bilaterally [Normal S1, S2] : normal S1 and S2 [Normal Rate] : heart rate was normal [Regular Rhythm] : with a regular rhythm [No Edema] : no peripheral edema [Pedal Pulses Normal] : the pedal pulses are present [Normal Bowel Sounds] : normal bowel sounds [Not Tender] : non-tender [Not Distended] : not distended [Soft] : abdomen soft [Normal Anterior Cervical Nodes] : no anterior cervical lymphadenopathy [Normal Posterior Cervical Nodes] : no posterior cervical lymphadenopathy [No Spinal Tenderness] : no spinal tenderness [Spine Straight] : spine straight [No Stigmata of Cushings Syndrome] : no stigmata of Cushings Syndrome [Normal Gait] : normal gait [Normal Strength/Tone] : muscle strength and tone were normal [No Rash] : no rash [Normal Reflexes] : deep tendon reflexes were 2+ and symmetric [No Tremors] : no tremors [Oriented x3] : oriented to person, place, and time [Acanthosis Nigricans] : no acanthosis nigricans

## 2021-01-20 NOTE — PAST MEDICAL HISTORY
[Menstruating] : The patient is menstruating [Menarche Age ____] : age at menarche was [unfilled] [Approximately ___] : the LMP was approximately [unfilled] [Regular Cycle Intervals] : have been regular [Total Preg ___] : G[unfilled] [Live Births ___] : P[unfilled]

## 2021-01-20 NOTE — REASON FOR VISIT
[Initial Evaluation] : an initial evaluation [Weight Management/Obesity] : weight management/obesity [Spouse] : spouse

## 2021-01-20 NOTE — HISTORY OF PRESENT ILLNESS
[FreeTextEntry1] : 40 year old female presents for evaluation of weight gain. She reports that she was never heavy growing up. She noticed she started to gain weight and in 2015 she weighed 272 lbs and went for a gastric sleeve. A year later, she had only lost 3 lbs. In 2016, she had a gastric band placed, and lost 100 lbs. Unfortunately, the band slipped off and was removed in 2017. She continued to maintain her weight loss and actually even lost more weight. In 2019, she developed back pain, which causes her to stop working and becoming more sedentary. That is when she began to regain the weight. She reports her diet has not changed, but she has gained 90 lbs in 8 months. She has not seen her PCP in many years, and has not had any physicals for years. She is under the care of psychiatry for Bipolar disorder and ADHD, and is on multiple medications. She is followed by Ortho and [facility] for multiple issues. She states she has been seeing white spots and saw Optho in 9/2020 and told everything is stable. She is also under the care of a hematologist for Von Willebrand disease and iron deficiency anemia, for which she gets infusions. \par She denies any other medical problems. She is requesting workup for hypothyroidism as her father has it, and she is concerned that it is causing her to gain weight. \par

## 2021-01-20 NOTE — REVIEW OF SYSTEMS
[Fatigue] : fatigue [Recent Weight Gain (___ Lbs)] : recent weight gain: [unfilled] lbs [SOB on Exertion] : shortness of breath on exertion [Constipation] : constipation [Joint Pain] : joint pain [Back Pain] : back pain [Hair Loss] : hair loss [Headaches] : headaches [Depression] : depression [Anxiety] : anxiety [Easy Bruising] : a tendency for easy bruising [Negative] : Heme/Lymph [All other systems negative] : All other systems negative [FreeTextEntry2] : over 8 months [FreeTextEntry3] : white spots [de-identified] : Bipolar disorder, ADHD

## 2021-01-20 NOTE — ASSESSMENT
[Importance of Diet and Exercise] : importance of diet and exercise to improve glycemic control, achieve weight loss and improve cardiovascular health [Weight Loss] : weight loss [FreeTextEntry1] : Morbidly obese patient with weight gain. A1C today was 5. We did discuss that she needs a full blood panel for evaluation for potential causes of weight gain such a hypothyroidism, insulin resistance, etc. She will also complete an overnight DST test to r/o Cushings Disease. We also discussed that her current diet is not conducive to weight loss. She does have a bowl of cherrios and tends to snack. She also at times wakes up in the middle of the night to eat. We did discuss that she should try to follow a lower carb diet. We discussed ways she can incorporate exercise into her daily routine. We discussed the importance of weight loss in the management of her comorbidities. We discussed the risks of continued excess weight on long term health. \par All of the patient's and her spouses questions were answered. \par Importance of seeing PCP for annual wellness discussed.\par Will f/u in 2 weeks for treatment plan pending results.

## 2021-01-26 LAB
25(OH)D3 SERPL-MCNC: 22.9 NG/ML
ALBUMIN SERPL ELPH-MCNC: 4.6 G/DL
ALP BLD-CCNC: 107 U/L
ALT SERPL-CCNC: 14 U/L
ANION GAP SERPL CALC-SCNC: 17 MMOL/L
APPEARANCE: CLEAR
AST SERPL-CCNC: 15 U/L
BASOPHILS # BLD AUTO: 0.04 K/UL
BASOPHILS NFR BLD AUTO: 0.7 %
BILIRUB SERPL-MCNC: 0.2 MG/DL
BILIRUBIN URINE: NEGATIVE
BLOOD URINE: NEGATIVE
BUN SERPL-MCNC: 15 MG/DL
CALCIUM SERPL-MCNC: 9.6 MG/DL
CHLORIDE SERPL-SCNC: 103 MMOL/L
CHOLEST SERPL-MCNC: 273 MG/DL
CO2 SERPL-SCNC: 20 MMOL/L
COLOR: YELLOW
CORTIS SERPL-MCNC: 7 UG/DL
CREAT SERPL-MCNC: 0.96 MG/DL
EOSINOPHIL # BLD AUTO: 0.08 K/UL
EOSINOPHIL NFR BLD AUTO: 1.3 %
FERRITIN SERPL-MCNC: 43 NG/ML
FOLATE SERPL-MCNC: 9.2 NG/ML
GLUCOSE QUALITATIVE U: NEGATIVE
GLUCOSE SERPL-MCNC: 76 MG/DL
HCT VFR BLD CALC: 40.1 %
HDLC SERPL-MCNC: 64 MG/DL
HGB BLD-MCNC: 12.5 G/DL
IMM GRANULOCYTES NFR BLD AUTO: 0.3 %
INSULIN SERPL-MCNC: 16.4 UU/ML
IRON SATN MFR SERPL: 19 %
IRON SERPL-MCNC: 77 UG/DL
KETONES URINE: NEGATIVE
LDLC SERPL CALC-MCNC: 169 MG/DL
LEUKOCYTE ESTERASE URINE: NEGATIVE
LYMPHOCYTES # BLD AUTO: 2.03 K/UL
LYMPHOCYTES NFR BLD AUTO: 33.1 %
MAN DIFF?: NORMAL
MCHC RBC-ENTMCNC: 29.3 PG
MCHC RBC-ENTMCNC: 31.2 GM/DL
MCV RBC AUTO: 93.9 FL
MONOCYTES # BLD AUTO: 0.42 K/UL
MONOCYTES NFR BLD AUTO: 6.9 %
NEUTROPHILS # BLD AUTO: 3.54 K/UL
NEUTROPHILS NFR BLD AUTO: 57.7 %
NITRITE URINE: NEGATIVE
NONHDLC SERPL-MCNC: 209 MG/DL
PH URINE: 6.5
PLATELET # BLD AUTO: 453 K/UL
POTASSIUM SERPL-SCNC: 5.1 MMOL/L
PROT SERPL-MCNC: 7.2 G/DL
PROTEIN URINE: NEGATIVE
RBC # BLD: 4.27 M/UL
RBC # FLD: 13.8 %
SAVE SPECIMEN: NORMAL
SAVE SPECIMEN: NORMAL
SODIUM SERPL-SCNC: 140 MMOL/L
SPECIFIC GRAVITY URINE: >=1.03
T4 FREE SERPL-MCNC: 0.8 NG/DL
T4 SERPL-MCNC: 5.7 UG/DL
THYROGLOB AB SERPL-ACNC: <20 IU/ML
THYROPEROXIDASE AB SERPL IA-ACNC: <10 IU/ML
TIBC SERPL-MCNC: 412 UG/DL
TRIGL SERPL-MCNC: 202 MG/DL
TSH SERPL-ACNC: 1.44 UIU/ML
UIBC SERPL-MCNC: 335 UG/DL
UROBILINOGEN URINE: NORMAL
VIT B12 SERPL-MCNC: 345 PG/ML
WBC # FLD AUTO: 6.13 K/UL

## 2021-02-01 ENCOUNTER — OUTPATIENT (OUTPATIENT)
Dept: OUTPATIENT SERVICES | Facility: HOSPITAL | Age: 41
LOS: 1 days | End: 2021-02-01
Payer: MEDICAID

## 2021-02-01 DIAGNOSIS — N84.0 POLYP OF CORPUS UTERI: Chronic | ICD-10-CM

## 2021-02-01 DIAGNOSIS — M67.432 GANGLION, LEFT WRIST: Chronic | ICD-10-CM

## 2021-02-01 DIAGNOSIS — Z98.84 BARIATRIC SURGERY STATUS: Chronic | ICD-10-CM

## 2021-02-01 DIAGNOSIS — Z98.89 OTHER SPECIFIED POSTPROCEDURAL STATES: Chronic | ICD-10-CM

## 2021-02-01 DIAGNOSIS — Z90.49 ACQUIRED ABSENCE OF OTHER SPECIFIED PARTS OF DIGESTIVE TRACT: Chronic | ICD-10-CM

## 2021-02-01 PROCEDURE — H0002: CPT

## 2021-02-05 ENCOUNTER — APPOINTMENT (OUTPATIENT)
Dept: ENDOCRINOLOGY | Facility: CLINIC | Age: 41
End: 2021-02-05
Payer: MEDICAID

## 2021-02-05 VITALS
BODY MASS INDEX: 45 KG/M2 | DIASTOLIC BLOOD PRESSURE: 84 MMHG | TEMPERATURE: 98 F | HEIGHT: 63 IN | OXYGEN SATURATION: 97 % | WEIGHT: 254 LBS | SYSTOLIC BLOOD PRESSURE: 128 MMHG | HEART RATE: 88 BPM

## 2021-02-05 PROCEDURE — 99214 OFFICE O/P EST MOD 30 MIN: CPT | Mod: 25

## 2021-02-05 PROCEDURE — 99072 ADDL SUPL MATRL&STAF TM PHE: CPT

## 2021-02-05 PROCEDURE — G0447 BEHAVIOR COUNSEL OBESITY 15M: CPT | Mod: 59

## 2021-02-07 NOTE — HISTORY OF PRESENT ILLNESS
[FreeTextEntry1] : 40 year old female presents for evaluation of weight gain. She reports that she was never heavy growing up. She noticed she started to gain weight and in 2015 she weighed 272 lbs and went for a gastric sleeve. A year later, she had only lost 3 lbs. In 2016, she had a gastric band placed, and lost 100 lbs. Unfortunately, the band slipped off and was removed in 2017. She continued to maintain her weight loss and actually even lost more weight. In 2019, she developed back pain, which causes her to stop working and becoming more sedentary. That is when she began to regain the weight. She reports her diet has not changed, but she has gained 90 lbs in 8 months. She has not seen her PCP in many years, and has not had any physicals for years. She is under the care of psychiatry for Bipolar disorder and ADHD, and is on multiple medications. She is followed by Ortho and [facility] for multiple issues. She states she has been seeing white spots and saw Optho in 9/2020 and told everything is stable. She is also under the care of a hematologist for Von Willebrand disease and iron deficiency anemia, for which she gets infusions. \par She denies any other medical problems. She is requesting workup for hypothyroidism as her father has it, and she is concerned that it is causing her to gain weight. \par \par 2/5/2021\par She reports that she has made some changes to her diet since her last visit. She has made it an effort to stop eating in the middle of the night. She is frustrated that she was has been unable to lose any weight. Recent labs reviewed and discussed. Did not complete overnight DST.

## 2021-02-07 NOTE — ASSESSMENT
[FreeTextEntry1] : Morbidly obese patient with weight gain with h/o gastric sleeve and failed band. Labs show insulin resistance. We discussed what this means and how it can be contributing to her weight gain. We again reviewed her current diet and how it is not conducive to her weight loss. She tends to snack and finish her daughter's food (waffles, etc). Intermittent fasting discussed. Recommended she read the Obesity Code by Dr. Jordan. I have recommended she f/u with the RD for additional education. We discussed ways she can incorporate exercise into her daily routine. We discussed the importance of weight loss in the management of her comorbidities. We discussed the risks of continued excess weight on long term health. \par Will do a trial of Metformin 500 mg daily and titrate to BID.\par f/u in 1 month [Carbohydrate Consistent Diet] : carbohydrate consistent diet [Importance of Diet and Exercise] : importance of diet and exercise to improve glycemic control, achieve weight loss and improve cardiovascular health [Exercise/Effect on Glucose] : exercise/effect on glucose [Weight Loss] : weight loss [Diabetic Medications] : Risks and benefits of diabetic medications were discussed

## 2021-02-07 NOTE — PHYSICAL EXAM
[Alert] : alert [Well Nourished] : well nourished [Obese] : obese [No Acute Distress] : no acute distress [Well Developed] : well developed [Normal Sclera/Conjunctiva] : normal sclera/conjunctiva [EOMI] : extra ocular movement intact [No Proptosis] : no proptosis [Normal Oropharynx] : the oropharynx was normal [Thyroid Not Enlarged] : the thyroid was not enlarged [No Thyroid Nodules] : no palpable thyroid nodules [No Respiratory Distress] : no respiratory distress [No Accessory Muscle Use] : no accessory muscle use [Clear to Auscultation] : lungs were clear to auscultation bilaterally [Normal S1, S2] : normal S1 and S2 [Normal Rate] : heart rate was normal [Regular Rhythm] : with a regular rhythm [Pedal Pulses Normal] : the pedal pulses are present [No Edema] : no peripheral edema [Normal Bowel Sounds] : normal bowel sounds [Not Tender] : non-tender [Not Distended] : not distended [Soft] : abdomen soft [Normal Anterior Cervical Nodes] : no anterior cervical lymphadenopathy [No Spinal Tenderness] : no spinal tenderness [Spine Straight] : spine straight [No Stigmata of Cushings Syndrome] : no stigmata of Cushings Syndrome [Normal Gait] : normal gait [Normal Strength/Tone] : muscle strength and tone were normal [No Rash] : no rash [Normal Reflexes] : deep tendon reflexes were 2+ and symmetric [No Tremors] : no tremors [Oriented x3] : oriented to person, place, and time [Acanthosis Nigricans] : no acanthosis nigricans

## 2021-03-08 ENCOUNTER — APPOINTMENT (OUTPATIENT)
Dept: ENDOCRINOLOGY | Facility: CLINIC | Age: 41
End: 2021-03-08

## 2021-03-08 DIAGNOSIS — Z71.89 OTHER SPECIFIED COUNSELING: ICD-10-CM

## 2021-03-15 ENCOUNTER — APPOINTMENT (OUTPATIENT)
Dept: ENDOCRINOLOGY | Facility: CLINIC | Age: 41
End: 2021-03-15

## 2021-03-18 NOTE — PATIENT PROFILE ADULT - NSPROREFERSVCHOMEBH_GEN_A_NUR
From: Chey Garcia  To: Kimber Clemens MD  Sent: 3/18/2021 2:59 PM EDT  Subject: Prescription Question    My thyroid medication went to [facility] but should have been sent to [facility]. Can that be fixed, please? 90 days.    Thanks,  Chey   no

## 2021-05-01 PROCEDURE — G9005: CPT

## 2021-06-01 ENCOUNTER — OUTPATIENT (OUTPATIENT)
Dept: OUTPATIENT SERVICES | Facility: HOSPITAL | Age: 41
LOS: 1 days | End: 2021-06-01
Payer: MEDICAID

## 2021-06-01 DIAGNOSIS — Z98.89 OTHER SPECIFIED POSTPROCEDURAL STATES: Chronic | ICD-10-CM

## 2021-06-01 DIAGNOSIS — N84.0 POLYP OF CORPUS UTERI: Chronic | ICD-10-CM

## 2021-06-01 DIAGNOSIS — M67.432 GANGLION, LEFT WRIST: Chronic | ICD-10-CM

## 2021-06-01 DIAGNOSIS — Z98.84 BARIATRIC SURGERY STATUS: Chronic | ICD-10-CM

## 2021-06-01 DIAGNOSIS — Z90.49 ACQUIRED ABSENCE OF OTHER SPECIFIED PARTS OF DIGESTIVE TRACT: Chronic | ICD-10-CM

## 2021-06-17 DIAGNOSIS — Z71.89 OTHER SPECIFIED COUNSELING: ICD-10-CM

## 2021-07-01 PROCEDURE — G9005: CPT

## 2021-07-23 ENCOUNTER — APPOINTMENT (OUTPATIENT)
Dept: ENDOCRINOLOGY | Facility: CLINIC | Age: 41
End: 2021-07-23
Payer: MEDICAID

## 2021-07-23 VITALS
DIASTOLIC BLOOD PRESSURE: 84 MMHG | HEIGHT: 63 IN | OXYGEN SATURATION: 98 % | SYSTOLIC BLOOD PRESSURE: 126 MMHG | WEIGHT: 250.13 LBS | BODY MASS INDEX: 44.32 KG/M2 | HEART RATE: 88 BPM

## 2021-07-23 DIAGNOSIS — L65.9 NONSCARRING HAIR LOSS, UNSPECIFIED: ICD-10-CM

## 2021-07-23 LAB
GLUCOSE BLDC GLUCOMTR-MCNC: 87
HBA1C MFR BLD HPLC: 5

## 2021-07-23 PROCEDURE — 83036 HEMOGLOBIN GLYCOSYLATED A1C: CPT | Mod: QW

## 2021-07-23 PROCEDURE — 99213 OFFICE O/P EST LOW 20 MIN: CPT | Mod: 25

## 2021-07-23 PROCEDURE — 82962 GLUCOSE BLOOD TEST: CPT

## 2021-07-25 NOTE — HISTORY OF PRESENT ILLNESS
[FreeTextEntry1] : 41 year old female presents for follow up of weight gain after 6 months. She reports that she was never heavy growing up. She noticed she started to gain weight and in 2015 she weighed 272 lbs and went for a gastric sleeve. A year later, she had only lost 3 lbs. In 2016, she had a gastric band placed, and lost 100 lbs. Unfortunately, the band slipped off and was removed in 2017. She continued to maintain her weight loss and actually even lost more weight. In 2019, she developed back pain, which causes her to stop working and becoming more sedentary. That is when she began to regain the weight. She reports her diet has not changed, but she has gained 90 lbs in 8 months. She is under the care of psychiatry for Bipolar disorder and ADHD, and is on multiple medications. She is followed by Ortho and [facility] for multiple issues. She states she has been seeing white spots and saw Optho in 9/2020 and told everything is stable. She is also under the care of a hematologist for Von Willebrand disease and iron deficiency anemia, for which she gets infusions. \par She denies any other medical problems. She was unable to follow up due to insurance coverage. She lost 4 lbs since her last visit.\par She has been taking Metformin 500 mg BID. \par

## 2021-07-25 NOTE — PHYSICAL EXAM
[Alert] : alert [Well Nourished] : well nourished [Obese] : obese [No Acute Distress] : no acute distress [Well Developed] : well developed [Normal Sclera/Conjunctiva] : normal sclera/conjunctiva [EOMI] : extra ocular movement intact [No Proptosis] : no proptosis [Normal Oropharynx] : the oropharynx was normal [Thyroid Not Enlarged] : the thyroid was not enlarged [No Thyroid Nodules] : no palpable thyroid nodules [No Respiratory Distress] : no respiratory distress [No Accessory Muscle Use] : no accessory muscle use [Clear to Auscultation] : lungs were clear to auscultation bilaterally [Normal S1, S2] : normal S1 and S2 [Normal Rate] : heart rate was normal [Regular Rhythm] : with a regular rhythm [No Edema] : no peripheral edema [Pedal Pulses Normal] : the pedal pulses are present [Normal Bowel Sounds] : normal bowel sounds [Not Tender] : non-tender [Not Distended] : not distended [Soft] : abdomen soft [Normal Anterior Cervical Nodes] : no anterior cervical lymphadenopathy [No Spinal Tenderness] : no spinal tenderness [Spine Straight] : spine straight [No Stigmata of Cushings Syndrome] : no stigmata of Cushings Syndrome [Normal Gait] : normal gait [Normal Strength/Tone] : muscle strength and tone were normal [No Rash] : no rash [Normal Reflexes] : deep tendon reflexes were 2+ and symmetric [No Tremors] : no tremors [Oriented x3] : oriented to person, place, and time [Acanthosis Nigricans] : no acanthosis nigricans

## 2021-07-25 NOTE — ASSESSMENT
[Carbohydrate Consistent Diet] : carbohydrate consistent diet [Importance of Diet and Exercise] : importance of diet and exercise to improve glycemic control, achieve weight loss and improve cardiovascular health [Exercise/Effect on Glucose] : exercise/effect on glucose [Weight Loss] : weight loss [Diabetic Medications] : Risks and benefits of diabetic medications were discussed [FreeTextEntry1] : Morbidly obese patient with weight gain with h/o gastric sleeve and failed band. Labs show insulin resistance. We discussed what this means and how it can be contributing to her weight gain. We again reviewed her current diet and how it is not conducive to her weight loss. \par She has made changes but continues to struggle with her diet. Intermittent fasting discussed. Recommended she read the Obesity Code by Dr. Jordan. I have recommended she f/u with the RD for additional education. We discussed ways she can incorporate exercise into her daily routine. We discussed the importance of weight loss in the management of her comorbidities. We discussed the risks of continued excess weight on long term health. \par Will do a trial of Rybelsus 3 mg daily. \par \par f/u in 1 month\par Patient to complete labs, will call with the results. Will complete overnight DST.

## 2021-07-28 NOTE — H&P ADULT - PROBLEM SELECTOR PLAN 1
"Chief Complaint   Patient presents with     Physical       Initial /64 (BP Location: Left arm, Patient Position: Chair, Cuff Size: Adult Regular)   Pulse 70   Temp 99  F (37.2  C) (Tympanic)   Resp 18   Wt 78 kg (172 lb)   SpO2 95%   BMI 29.07 kg/m   Estimated body mass index is 29.07 kg/m  as calculated from the following:    Height as of 2/13/20: 1.638 m (5' 4.5\").    Weight as of this encounter: 78 kg (172 lb).  Medication Reconciliation: complete  Yaneli Garcia LPN  " The patient complains of worsening L lumbosacral pain which radiates to the groin. She does not have saddle anesthesia or incontinence. She has subjective decreased sensation in LLE, and strength assessment is limited due to pain. Patient had reduction in pain severity from 9 to 7/10 with morphine 4mg IV.   I advised the patient to stop taking morphine; the dose she has been consuming places her at risk of gastritis or PUD.   CT of lumbar spine found hardware in alignment, without periprosthetic fracture or listhesis. Xray did not find femoral or pelvic fracture.  Obtain MRI lumbosacral spine  Pt was scheduled to have appt with Dr. Carreon (orthopedics) on Monday.   Start lidocaine patch.  Start morphine 6mg IV q6hg PRN for severe pain.   Consult pain management team in AM. The patient complains of worsening L lumbosacral pain which radiates to the groin. She does not have saddle anesthesia or incontinence. She has subjective decreased sensation in LLE, and strength assessment is limited due to pain. Patient had reduction in pain severity from 9 to 7/10 with morphine 4mg IV.   I advised the patient to stop taking morphine; the dose she has been consuming places her at risk of gastritis or PUD.   CT of lumbar spine found hardware in alignment, without periprosthetic fracture or listhesis. Xray did not find femoral or pelvic fracture.  Obtain MRI lumbosacral spine  Pt was scheduled to have appt with Dr. Carreon (orthopedics) on Monday.   Start lidocaine patch.  Start morphine 6mg IV q6hg PRN for severe pain.   Consult pain management team in AM.    ISTOP reviewed- Reference #: [id number]  10/2/2020-vyvanse 50 mg capsule , 30 tab,30 day supply  10/02/2020- alprazolam 1 mg tablet , quantity 90 tab. 30 day supply

## 2021-08-01 ENCOUNTER — OUTPATIENT (OUTPATIENT)
Dept: OUTPATIENT SERVICES | Facility: HOSPITAL | Age: 41
LOS: 1 days | End: 2021-08-01
Payer: MEDICARE

## 2021-08-01 DIAGNOSIS — Z90.49 ACQUIRED ABSENCE OF OTHER SPECIFIED PARTS OF DIGESTIVE TRACT: Chronic | ICD-10-CM

## 2021-08-01 DIAGNOSIS — Z98.84 BARIATRIC SURGERY STATUS: Chronic | ICD-10-CM

## 2021-08-01 DIAGNOSIS — M67.432 GANGLION, LEFT WRIST: Chronic | ICD-10-CM

## 2021-08-01 DIAGNOSIS — Z98.89 OTHER SPECIFIED POSTPROCEDURAL STATES: Chronic | ICD-10-CM

## 2021-08-01 DIAGNOSIS — N84.0 POLYP OF CORPUS UTERI: Chronic | ICD-10-CM

## 2021-08-02 NOTE — ED ADULT TRIAGE NOTE - MEANS OF ARRIVAL
- patient and daughter appear to be interested in TAVR if ultimately needed/offered  - on asa/statin/B-blocker - patient and daughter appear to be interested in TAVR if ultimately needed/offered  - on asa/statin/B-blocker ambulatory

## 2021-08-09 ENCOUNTER — NON-APPOINTMENT (OUTPATIENT)
Age: 41
End: 2021-08-09

## 2021-08-19 ENCOUNTER — TRANSCRIPTION ENCOUNTER (OUTPATIENT)
Age: 41
End: 2021-08-19

## 2021-08-25 DIAGNOSIS — Z71.89 OTHER SPECIFIED COUNSELING: ICD-10-CM

## 2021-08-26 LAB
17OHP SERPL-MCNC: 102 NG/DL
25(OH)D3 SERPL-MCNC: 27.4 NG/ML
ALBUMIN SERPL ELPH-MCNC: 4.7 G/DL
ALP BLD-CCNC: 74 U/L
ALT SERPL-CCNC: 22 U/L
ANION GAP SERPL CALC-SCNC: 12 MMOL/L
AST SERPL-CCNC: 19 U/L
BASOPHILS # BLD AUTO: 0.05 K/UL
BASOPHILS NFR BLD AUTO: 0.7 %
BILIRUB SERPL-MCNC: 0.2 MG/DL
BUN SERPL-MCNC: 16 MG/DL
CALCIUM SERPL-MCNC: 9.4 MG/DL
CHLORIDE SERPL-SCNC: 105 MMOL/L
CHOLEST SERPL-MCNC: 246 MG/DL
CO2 SERPL-SCNC: 25 MMOL/L
CORTIS SERPL-MCNC: 9.3 UG/DL
CREAT SERPL-MCNC: 0.89 MG/DL
DHEA-S SERPL-MCNC: 31.6 UG/DL
EOSINOPHIL # BLD AUTO: 0.08 K/UL
EOSINOPHIL NFR BLD AUTO: 1.2 %
FERRITIN SERPL-MCNC: 15 NG/ML
FOLATE SERPL-MCNC: 7.4 NG/ML
FSH SERPL-MCNC: 7 IU/L
GLUCOSE SERPL-MCNC: 77 MG/DL
HCT VFR BLD CALC: 41.3 %
HDLC SERPL-MCNC: 63 MG/DL
HGB BLD-MCNC: 12.7 G/DL
IMM GRANULOCYTES NFR BLD AUTO: 0.3 %
IRON SATN MFR SERPL: 12 %
IRON SERPL-MCNC: 49 UG/DL
LDLC SERPL CALC-MCNC: 148 MG/DL
LH SERPL-ACNC: 6.6 IU/L
LYMPHOCYTES # BLD AUTO: 2.16 K/UL
LYMPHOCYTES NFR BLD AUTO: 31.3 %
MAN DIFF?: NORMAL
MCHC RBC-ENTMCNC: 29.3 PG
MCHC RBC-ENTMCNC: 30.8 GM/DL
MCV RBC AUTO: 95.2 FL
MONOCYTES # BLD AUTO: 0.49 K/UL
MONOCYTES NFR BLD AUTO: 7.1 %
NEUTROPHILS # BLD AUTO: 4.1 K/UL
NEUTROPHILS NFR BLD AUTO: 59.4 %
NONHDLC SERPL-MCNC: 183 MG/DL
PLATELET # BLD AUTO: 379 K/UL
POTASSIUM SERPL-SCNC: 4.5 MMOL/L
PROLACTIN SERPL-MCNC: 12.1 NG/ML
PROT SERPL-MCNC: 6.7 G/DL
RBC # BLD: 4.34 M/UL
RBC # FLD: 14.1 %
SODIUM SERPL-SCNC: 141 MMOL/L
T4 FREE SERPL-MCNC: 1.1 NG/DL
T4 SERPL-MCNC: 6.9 UG/DL
TESTOST BND SERPL-MCNC: 0.7 PG/ML
TESTOSTERONE TOTAL S: 5 NG/DL
THYROGLOB AB SERPL-ACNC: <20 IU/ML
THYROPEROXIDASE AB SERPL IA-ACNC: <10 IU/ML
TIBC SERPL-MCNC: 414 UG/DL
TRIGL SERPL-MCNC: 176 MG/DL
TSH SERPL-ACNC: 1.77 UIU/ML
UIBC SERPL-MCNC: 365 UG/DL
VIT B12 SERPL-MCNC: 978 PG/ML
WBC # FLD AUTO: 6.9 K/UL

## 2021-10-05 NOTE — DISCHARGE NOTE PROVIDER - NSDCHOSPICE_GEN_A_CORE
Patient would like communication of their results via:        Cell Phone:   Telephone Information:   Mobile [phone]     Okay to leave a message containing results? Yes    Patient presents with urinary burning/urgency/frequency. Symptoms have been present for 1 day.    Patient has taken no OTC med.      RN wearing gloves, face shield,  mask, gown.  Patient wearing  mask.      No

## 2021-10-21 ENCOUNTER — APPOINTMENT (OUTPATIENT)
Dept: ENDOCRINOLOGY | Facility: CLINIC | Age: 41
End: 2021-10-21
Payer: MEDICAID

## 2021-10-21 VITALS
SYSTOLIC BLOOD PRESSURE: 126 MMHG | HEIGHT: 63 IN | DIASTOLIC BLOOD PRESSURE: 84 MMHG | BODY MASS INDEX: 43.41 KG/M2 | HEART RATE: 86 BPM | OXYGEN SATURATION: 98 % | WEIGHT: 245 LBS

## 2021-10-21 DIAGNOSIS — E03.9 HYPOTHYROIDISM, UNSPECIFIED: ICD-10-CM

## 2021-10-21 DIAGNOSIS — R53.83 OTHER FATIGUE: ICD-10-CM

## 2021-10-21 LAB
GLUCOSE BLDC GLUCOMTR-MCNC: 76
HBA1C MFR BLD HPLC: 4.8

## 2021-10-21 PROCEDURE — 82962 GLUCOSE BLOOD TEST: CPT

## 2021-10-21 PROCEDURE — 99215 OFFICE O/P EST HI 40 MIN: CPT | Mod: 25

## 2021-10-21 PROCEDURE — 83036 HEMOGLOBIN GLYCOSYLATED A1C: CPT | Mod: QW

## 2021-10-21 NOTE — ASSESSMENT
[FreeTextEntry1] : Morbidly obese patient with weight gain with h/o gastric sleeve and failed band. Today she is very emotional and distressed by her fatigue. Her  agrees that he feels it has gotten out of hand. Will check labs for causes of severe fatigue however, i do agree she needs to go for a sleep study regardless. Advised to s/w Psychiatrist to discuss night time eating, as she is on multiple medications that can potentially cause this.  \par DHEAS was mildly low on recent labs however, explained that it is unlikely that she has Adrenal insufficiency. Explained that morning cortisol level was normal. \par Patient was given positive reinforcement for weight loss, as she had previously been stuck. Overall she is down 9 lbs. \par She had questions about starting Levothyroxine for weight loss. Explained this is not a weight loss medication. Explained that her last labs showed wnl TFTs and negative Thyroid Ab, and there is no clinical indication for this.\par \par Patient to complete labs, will call with the results. \par Emotional support provided.\par \par f/u in 6 weeks [Carbohydrate Consistent Diet] : carbohydrate consistent diet [Importance of Diet and Exercise] : importance of diet and exercise to improve glycemic control, achieve weight loss and improve cardiovascular health [Exercise/Effect on Glucose] : exercise/effect on glucose [Weight Loss] : weight loss [Diabetic Medications] : Risks and benefits of diabetic medications were discussed

## 2021-10-21 NOTE — HISTORY OF PRESENT ILLNESS
[FreeTextEntry1] : 41 year old female presents for follow up of weight gain after 3 months. She reports that she was never heavy growing up. She noticed she started to gain weight and in 2015 she weighed 272 lbs and went for a gastric sleeve. A year later, she had only lost 3 lbs. In 2016, she had a gastric band placed, and lost 100 lbs. Unfortunately, the band slipped off and was removed in 2017. She continued to maintain her weight loss and actually even lost more weight. In 2019, she developed back pain, which causes her to stop working and becoming more sedentary. That is when she began to regain the weight. She reports her diet has not changed, but she has gained 90 lbs in 8 months. She is under the care of psychiatry for Bipolar disorder and ADHD, and is on multiple medications. She is followed by Ortho and [facility] for multiple issues. She states she has been seeing white spots and saw Optho in 9/2020 and told everything is stable. She is also under the care of a hematologist for Von Willebrand disease and iron deficiency anemia, for which she gets infusions, but has not recently had any. \par She denies any other medical problems. She was unable to follow up due to insurance coverage. She lost 5 lbs since her last visit.\par She has been taking Metformin 500 mg BID. She did try Rybelsus but stopped due to some fatigue. However, she reports that since stopping the Rybelsus the fatigue has persisted and has even worsened. She reports that it is very severe and she is so tired she falls asleep all the time. She states she wakes up tired. She has also been waking up at night to eat. She finds her self sleep eating cookies, chips, etc. This does not happen every night but most nights. Both her and her  are concerned. She also had an episode of loss of balance. She spoke with her psyciatrist and they recommended she go for a sleep study as she possibly has FARHEEN.\par

## 2021-10-21 NOTE — PHYSICAL EXAM

## 2021-10-21 NOTE — REASON FOR VISIT
[Follow - Up] : a follow-up visit [Weight Management/Obesity] : weight management/obesity [Spouse] : spouse

## 2021-10-28 ENCOUNTER — APPOINTMENT (OUTPATIENT)
Dept: ENDOCRINOLOGY | Facility: CLINIC | Age: 41
End: 2021-10-28

## 2021-11-03 ENCOUNTER — APPOINTMENT (OUTPATIENT)
Dept: SPINE | Facility: CLINIC | Age: 41
End: 2021-11-03

## 2021-12-01 PROCEDURE — G9005: CPT

## 2021-12-14 ENCOUNTER — APPOINTMENT (OUTPATIENT)
Dept: ENDOCRINOLOGY | Facility: CLINIC | Age: 41
End: 2021-12-14

## 2022-02-17 ENCOUNTER — RX RENEWAL (OUTPATIENT)
Age: 42
End: 2022-02-17

## 2022-03-22 NOTE — CONSULT NOTE ADULT - ASSESSMENT
Left message for patient zolpidem #[id number] [place], [place].  Any question call office.    Dry socket #[id number]

## 2022-04-09 NOTE — ED PROVIDER NOTE - NS HIV RISK FACTOR YES
"Subjective   45-year-old male with past medical history of COPD/asthma presents to the ER due to concerns for not feeling well.  Patient noted he had his Coumadin adjusted and noted concerns for that being the source of his symptoms.  Patient noted mild shortness of breath and wheezing.  Denied chest pain.  Denied fever or chills.  Denied headache or vision changes.  Denied obvious aggravating or alleviating factors.  Vitals stable          Review of Systems   Constitutional: Positive for fatigue.   Respiratory: Positive for shortness of breath and wheezing.    All other systems reviewed and are negative.      Past Medical History:   Diagnosis Date   • Anxiety    • Asthma    • Back pain    • Chronic anticoagulation 2/2/2020   • COPD (chronic obstructive pulmonary disease) (HCC)    • Emphysema lung (HCC)    • Gastritis    • GERD (gastroesophageal reflux disease)    • Headache    • Heart valve disease 2018    valve replac., prosthetic valve   • Hx of aortic valve replacement, mechanical 11/28/2018   • Hyperglycemia 2/2/2020   • Hypertension    • Migraine    • Substance abuse (HCC)        Allergies   Allergen Reactions   • Aspirin Anaphylaxis     Patient said, \"it chokes him up.\" patient said, \"I got really short of breath and started to have an asthma attack.\"       Past Surgical History:   Procedure Laterality Date   • CARDIAC CATHETERIZATION N/A 10/23/2017    Procedure: Left Heart Cath;  Surgeon: Rodolfo Núñez MD;  Location:  [facility];  Service:    • CARDIAC CATHETERIZATION N/A 9/20/2021    Procedure: Left Heart Cath;  Surgeon: Delvin Carson MD;  Location:  [facility];  Service: Cardiovascular;  Laterality: N/A;   • CARDIAC VALVE REPLACEMENT  2018    prosthetic valve   • DENTAL PROCEDURE     • LIVER SURGERY      tumor removed from liver   • OTHER SURGICAL HISTORY      \"tumor removed from heart when 2-3 months old\"   • THORACIC AORTIC ANEURYSM " ASCENDING/ARCH REPAIR N/A 1/17/2018    Procedure: MEDIAN STERNOTOMY, AORTIC VALVE CONDUIT, BENTAL, TRANSESOPHAGEAL ECHOCARDIOGRAM WITH ANESTHESIA;  Surgeon: Aaron Lucas MD;  Location: [facility];  Service:        Family History   Problem Relation Age of Onset   • COPD Mother        Social History     Socioeconomic History   • Marital status: Single   • Number of children: 0   Tobacco Use   • Smoking status: Former Smoker     Packs/day: 1.00     Years: 4.00     Pack years: 4.00   • Smokeless tobacco: Current User     Types: Snuff   Vaping Use   • Vaping Use: Never used   Substance and Sexual Activity   • Alcohol use: No     Comment: occassional    • Drug use: No   • Sexual activity: Defer           Objective   Physical Exam  Constitutional:       General: He is not in acute distress.     Appearance: He is well-developed. He is not ill-appearing.   HENT:      Head: Normocephalic and atraumatic.   Eyes:      Extraocular Movements: Extraocular movements intact.      Pupils: Pupils are equal, round, and reactive to light.   Neck:      Vascular: No JVD.   Cardiovascular:      Rate and Rhythm: Normal rate and regular rhythm.      Heart sounds: Normal heart sounds. No murmur heard.  Pulmonary:      Effort: No tachypnea, accessory muscle usage or respiratory distress.      Breath sounds: No stridor. Examination of the right-upper field reveals wheezing. Examination of the left-upper field reveals wheezing. Examination of the left-middle field reveals wheezing. Wheezing present. No decreased breath sounds, rhonchi or rales.   Chest:      Chest wall: No deformity, tenderness or crepitus.   Abdominal:      General: Bowel sounds are normal.      Palpations: Abdomen is soft.      Tenderness: There is no abdominal tenderness. There is no guarding or rebound.   Musculoskeletal:         General: Normal range of motion.      Cervical back: Normal range of motion and neck supple.      Right lower leg: No tenderness. No edema.       Left lower leg: No tenderness. No edema.   Lymphadenopathy:      Cervical: No cervical adenopathy.   Skin:     General: Skin is warm and dry.      Coloration: Skin is not cyanotic.      Findings: No ecchymosis or erythema.   Neurological:      General: No focal deficit present.      Mental Status: He is alert and oriented to person, place, and time.      Cranial Nerves: No cranial nerve deficit.      Motor: No weakness.   Psychiatric:         Mood and Affect: Mood normal. Mood is not anxious.         Behavior: Behavior normal. Behavior is not agitated.         Procedures           ED Course  ED Course as of 04/09/22 1033   Sat Apr 09, 2022   1032 CBC and CCP unremarkable.  Covid testing negative.  Coagulation studies unremarkable.  Chest x-ray unremarkable.  Asthma/COPD exacerbation likely.  Patient will be discharged on doxycycline and prednisone.  Work up and results were discussed throughly with the patient.  The patient will be discharged for further monitoring and management with their PCP.  Red flags, warning signs, worsening symptoms, and when to return to the ER discussed with and understood by the patient.  Patient will follow up with their PCP in a timely manner.  Vitals stable at discharge. [SF]      ED Course User Index  [SF] Ricardo Vegas,                                                  [facility]    Final diagnoses:   COPD exacerbation (HCC)       ED Disposition  ED Disposition     ED Disposition   Discharge    Condition   Stable    Comment   --             Macho Jose MD  [address] [facility] [address]  [phone]    In 1 week      [facility] Emergency Department  [address] [place] [id number] [phone]    If symptoms worsen         Medication List      New Prescriptions    doxycycline 100 MG capsule  Commonly known as: MONODOX  Take 1 capsule by mouth 2 (Two) Times a Day.     predniSONE 50 MG tablet  Commonly known as: DELTASONE  Take 1 tablet by mouth  Daily.           Where to Get Your Medications      These medications were sent to [facility] - [place], [place] - [address] [place] [address] - [phone]  - [phone] [address] [address]    Phone: [phone]   · doxycycline 100 MG capsule  · predniSONE 50 MG tablet          Ricardo Vegas DO  04/09/22 1033     Unable to consent due to medical condition

## 2022-04-17 NOTE — PRE-OP CHECKLIST - SITE MARKED BY ANESTHESIOLOGIST
Triage Note: Patient arrives to ER complaining of drug problem. Patient states she is here to seek help for detox. Patient states she has been using fentanyl for the past 3 months and would like to come off the medication. When asked when the last time she used, patient states \"right before I came in. I'm not going to lie I've been pounding 8-balls. \" Has been to rehab in the past for drug use. Patient is calm and cooperative during triage, able to answer some questions but states she cannot remember her allergies, states, \"I'm high right now. \" n/a

## 2022-05-28 NOTE — OCCUPATIONAL THERAPY INITIAL EVALUATION ADULT - BED MOBILITY LIMITATIONS, REHAB EVAL
Pt. Found on room air. Will continue to monitor and assess.      decreased ability to use legs for bridging/pushing
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
## 2022-06-20 ENCOUNTER — INPATIENT (INPATIENT)
Facility: HOSPITAL | Age: 42
LOS: 1 days | Discharge: HOME | DRG: 552 | End: 2022-06-22
Attending: INTERNAL MEDICINE | Admitting: HOSPITALIST
Payer: MEDICARE

## 2022-06-20 VITALS
TEMPERATURE: 99 F | RESPIRATION RATE: 20 BRPM | WEIGHT: 270.07 LBS | OXYGEN SATURATION: 99 % | HEIGHT: 62 IN | HEART RATE: 91 BPM

## 2022-06-20 DIAGNOSIS — Z90.49 ACQUIRED ABSENCE OF OTHER SPECIFIED PARTS OF DIGESTIVE TRACT: Chronic | ICD-10-CM

## 2022-06-20 DIAGNOSIS — N84.0 POLYP OF CORPUS UTERI: Chronic | ICD-10-CM

## 2022-06-20 DIAGNOSIS — Z98.89 OTHER SPECIFIED POSTPROCEDURAL STATES: Chronic | ICD-10-CM

## 2022-06-20 DIAGNOSIS — M67.432 GANGLION, LEFT WRIST: Chronic | ICD-10-CM

## 2022-06-20 DIAGNOSIS — Z98.84 BARIATRIC SURGERY STATUS: Chronic | ICD-10-CM

## 2022-06-20 PROCEDURE — 99285 EMERGENCY DEPT VISIT HI MDM: CPT

## 2022-06-20 RX ORDER — MORPHINE SULFATE 50 MG/1
4 CAPSULE, EXTENDED RELEASE ORAL ONCE
Refills: 0 | Status: DISCONTINUED | OUTPATIENT
Start: 2022-06-20 | End: 2022-06-20

## 2022-06-20 RX ORDER — LIDOCAINE 4 G/100G
1 CREAM TOPICAL ONCE
Refills: 0 | Status: COMPLETED | OUTPATIENT
Start: 2022-06-20 | End: 2022-06-20

## 2022-06-20 RX ORDER — IBUPROFEN 200 MG
600 TABLET ORAL ONCE
Refills: 0 | Status: COMPLETED | OUTPATIENT
Start: 2022-06-20 | End: 2022-06-20

## 2022-06-20 RX ORDER — OXYCODONE HYDROCHLORIDE 5 MG/1
5 TABLET ORAL ONCE
Refills: 0 | Status: DISCONTINUED | OUTPATIENT
Start: 2022-06-20 | End: 2022-06-20

## 2022-06-20 RX ADMIN — Medication 600 MILLIGRAM(S): at 22:00

## 2022-06-20 RX ADMIN — LIDOCAINE 1 PATCH: 4 CREAM TOPICAL at 22:00

## 2022-06-20 RX ADMIN — OXYCODONE HYDROCHLORIDE 5 MILLIGRAM(S): 5 TABLET ORAL at 22:00

## 2022-06-20 NOTE — ED ADULT NURSE NOTE - NSIMPLEMENTINTERV_GEN_ALL_ED
Implemented All Universal Safety Interventions:  [place] to call system. Call bell, personal items and telephone within reach. Instruct patient to call for assistance. Room bathroom lighting operational. Non-slip footwear when patient is off stretcher. Physically safe environment: no spills, clutter or unnecessary equipment. Stretcher in lowest position, wheels locked, appropriate side rails in place.

## 2022-06-20 NOTE — ED ADULT NURSE NOTE - CHIEF COMPLAINT
Date & Time: 2/3/2020, 10:44 AM  Patient: Misha Brooks  Encounter Provider(s):    Isabella George MD       To Whom It May Concern:    Flower Carrizales was seen and treated in our department on 2/3/2020. He should not return to school until 2/5/2020.
The patient is a 42y Female complaining of back pain general.

## 2022-06-20 NOTE — ED PROVIDER NOTE - NS ED ATTENDING STATEMENT MOD
This was a shared visit with the ILIANA. I reviewed and verified the documentation and independently performed the documented:

## 2022-06-20 NOTE — ED PROVIDER NOTE - PROGRESS NOTE DETAILS
Patient reassessed. Pain intractable to oral medication. Will get labs and give IV medications. Plan for admission due to intractable pain. Will reassess. Tere Smith PA-C Pt was able to ambulate to bathroom but continues to have intractable pain. Pt is on Gabapentin 300 QID, Cymbalata, and Alleve outpatient. Pt has not seen PCP since 2017 (has been going to urgent cares for med refills) and has not seen Dr. Yanez (spine) since 2018 as he stopped taking her insurance. Pt reports limited relief with Oxycodone and Flexeril. Cannot take steroids due to psych reaction. Pt will need admission for pain control and PT/OT. ANASTASIA. Pt was able to ambulate to bathroom but continues to have intractable pain. Morphine helpful but last 1-2 hours. Pt is on Gabapentin 300 QID, Cymbalata, and Alleve outpatient. Pt has not seen PCP since 2017 (has been going to urgent cares for med refills) and has not seen Dr. Yanez (spine) since 2018 as he stopped taking her insurance. Pt reports limited relief with Oxycodone and Flexeril. Cannot take steroids due to psych reaction. Pt will need admission for pain control and PT/OT. ANASTASIA.

## 2022-06-20 NOTE — ED PROVIDER NOTE - OBJECTIVE STATEMENT
43 y/o female with PMHx of depression, anxiety, bipolar disorder, chronic low back pain presents to the ED complaining of back pain x 3 days. Patient states that she was sitting in a recliner on Saturday. She suddenly got up and felt a "pulling" sensation to the lower back like she pulled a muscle. She then stated that she had began to have pain to the left lower back which radiated over her buttocks and into the groin. States that since onset pain has gotten progressively worse. She states that she has been taking Alleve without any relief. Last dose was early this AM. Denies any inciting injury or trauma. Denies any recent illness, headache, fever, chills, chest pain, SOB, cough, abdominal pain, n/v/d. No hx of IVDA, incontinence of urine or stool, or saddle anesthesia.

## 2022-06-20 NOTE — ED ADULT NURSE NOTE - OBJECTIVE STATEMENT
42 y.o female, A&Ox3, PMH depression, bipolar, anemia, gerd, back surgery, pt presents to ED c/o back pain. pt states on Saturday she was getting up from her recliner chair where pt states she felt like she pulled a muscle in her back. pt states the pain of the back progressed over the last day, pt states she has been unable to sleep due to the back pain. pt state the pain is in her lower back that radiates to her left side and left groin. pt states she has been taking alleve with minimal relief. pt denies numbness and tingling, chest pain, sob, N/V/D, fevers, chills.

## 2022-06-20 NOTE — ED PROVIDER NOTE - NSICDXFAMILYHX_GEN_ALL_CORE_FT
FAMILY HISTORY:  FH: diabetes mellitus, mother  FH: hypertension, mother and father  FH: hypothyroidism, father

## 2022-06-20 NOTE — ED PROVIDER NOTE - ATTENDING APP SHARED VISIT CONTRIBUTION OF CARE
Attending MD Martinez:   I personally have seen and examined this patient.  Physician assistant note reviewed and agree on plan of care and except where noted.  See below for details.     Seen in [place]    42F with PMH/PSH including anxiety, depression, bipolar disorder, anemia (previously given Fe infusions), lumbar disc herniation, lumbar disc rupture s/p laminectomy L2-L3, discectomy, in situ fusion (8/26/19, Dr. Kerns) s/p lumbar fusion (Dr. Kerns 11/20/19), chronic back pain presents to the ED with lower back pain, buttock  pain.  Reports that she was trying to get up from her recliner chair on Saturday when she had sudden onset severe "pulling" pain.  Reports that the pain is localized to the mid lower back, down middle of buttock and radiates around into R groin.  Denies numbness, weakness or tingling in extremities.  Denies loss of urinary or bowel continence. Reports that she had continence and weakness issues at time of first back surgery 2019 and this did not feel like that.  Reports that she tried both Ibuprofen and Aleve without improvement of symptoms.  Denies history of malignancy, chronic steroid use, epidural injections, AC use, AAA. Denies chest pain, shortness of breath, abdominal pain, nausea, vomiting, diarrhea, urinary complaints. Denies fevers, chills. Patient reports that she would like to be seen by Spine team as her surgeon, Dr. Kerns, no longer takes her insurance or by Dr. Carreon's team as her mother follows with Dr. Carreon.  Patient requesting XR.  Patient concerned that back pain is related to anemia as she has not gotten an infusion since 2017 and "my body doesn't absorb iron orally".      Exam:   General: NAD  HENT: head NCAT, airway patent  Eyes: anicteric, no conjunctival injection   Lungs: lungs CTAB   Cardiac: +S1S2, no obvious m/r/g  GI: abdomen soft with +BS, NT, exam limited secondary to body habitus  MSK: ranging neck and extremities freely, no tenderness to midline palpation, +tenderness to paraspinal left lower lumbar area extending to buttock, no calf tenderness, swelling, erythema or warmth  Neuro: moving all extremities spontaneously with 5/5 strength no saddle anesthesia, sensory grossly intact, no gross neuro deficits, patient ambulating independently in the Emergency Department   Psych: tearful, answering appropriately    A/P: 42F with acute on chronic back pain, will give pain control, explained if pain not well controlled by oral agents, would need to try IV medications.  History and clinical picture not consistent with cord compression, cauda equina.  Will reassess

## 2022-06-20 NOTE — ED PROVIDER NOTE - PHYSICAL EXAMINATION
CONSTITUTIONAL: Patient is awake, alert and oriented x 3. Obese female, tearful due to pain;   HEAD: NCAT  ENT: Airway patent, Nasal mucosa clear  NECK: Supple,  LUNGS: CTA B/L,   HEART: RRR.+S1S2  ABDOMEN: Soft, non-tender to palpation throughout all four quadrants,   MSK: No edema or calf tenderness b/l, FROM upper and lower ext b/l, (-) midline spine/neck ttp, (+) left lumbar paraspinal ttp;   SKIN: No rash or lesions  NEURO: No focal deficits, Sensation intact; ambulatory in ED

## 2022-06-21 DIAGNOSIS — M54.9 DORSALGIA, UNSPECIFIED: ICD-10-CM

## 2022-06-21 DIAGNOSIS — F98.8 OTHER SPECIFIED BEHAVIORAL AND EMOTIONAL DISORDERS WITH ONSET USUALLY OCCURRING IN CHILDHOOD AND ADOLESCENCE: ICD-10-CM

## 2022-06-21 DIAGNOSIS — F41.9 ANXIETY DISORDER, UNSPECIFIED: ICD-10-CM

## 2022-06-21 DIAGNOSIS — R00.2 PALPITATIONS: ICD-10-CM

## 2022-06-21 DIAGNOSIS — M54.50 LOW BACK PAIN, UNSPECIFIED: ICD-10-CM

## 2022-06-21 LAB
ALBUMIN SERPL ELPH-MCNC: 4.5 G/DL — SIGNIFICANT CHANGE UP (ref 3.3–5)
ALP SERPL-CCNC: 73 U/L — SIGNIFICANT CHANGE UP (ref 40–120)
ALT FLD-CCNC: 14 U/L — SIGNIFICANT CHANGE UP (ref 10–45)
ANION GAP SERPL CALC-SCNC: 10 MMOL/L — SIGNIFICANT CHANGE UP (ref 5–17)
AST SERPL-CCNC: 13 U/L — SIGNIFICANT CHANGE UP (ref 10–40)
BASOPHILS # BLD AUTO: 0.07 K/UL — SIGNIFICANT CHANGE UP (ref 0–0.2)
BASOPHILS NFR BLD AUTO: 0.6 % — SIGNIFICANT CHANGE UP (ref 0–2)
BILIRUB SERPL-MCNC: 0.2 MG/DL — SIGNIFICANT CHANGE UP (ref 0.2–1.2)
BUN SERPL-MCNC: 15 MG/DL — SIGNIFICANT CHANGE UP (ref 7–23)
CALCIUM SERPL-MCNC: 9.4 MG/DL — SIGNIFICANT CHANGE UP (ref 8.4–10.5)
CHLORIDE SERPL-SCNC: 103 MMOL/L — SIGNIFICANT CHANGE UP (ref 96–108)
CO2 SERPL-SCNC: 24 MMOL/L — SIGNIFICANT CHANGE UP (ref 22–31)
CREAT SERPL-MCNC: 0.77 MG/DL — SIGNIFICANT CHANGE UP (ref 0.5–1.3)
EGFR: 99 ML/MIN/1.73M2 — SIGNIFICANT CHANGE UP
EOSINOPHIL # BLD AUTO: 0.16 K/UL — SIGNIFICANT CHANGE UP (ref 0–0.5)
EOSINOPHIL NFR BLD AUTO: 1.4 % — SIGNIFICANT CHANGE UP (ref 0–6)
GLUCOSE SERPL-MCNC: 79 MG/DL — SIGNIFICANT CHANGE UP (ref 70–99)
HCT VFR BLD CALC: 35.3 % — SIGNIFICANT CHANGE UP (ref 34.5–45)
HGB BLD-MCNC: 11.2 G/DL — LOW (ref 11.5–15.5)
IMM GRANULOCYTES NFR BLD AUTO: 0.5 % — SIGNIFICANT CHANGE UP (ref 0–1.5)
LYMPHOCYTES # BLD AUTO: 3.5 K/UL — HIGH (ref 1–3.3)
LYMPHOCYTES # BLD AUTO: 31.5 % — SIGNIFICANT CHANGE UP (ref 13–44)
MCHC RBC-ENTMCNC: 27.3 PG — SIGNIFICANT CHANGE UP (ref 27–34)
MCHC RBC-ENTMCNC: 31.7 GM/DL — LOW (ref 32–36)
MCV RBC AUTO: 86.1 FL — SIGNIFICANT CHANGE UP (ref 80–100)
MONOCYTES # BLD AUTO: 0.67 K/UL — SIGNIFICANT CHANGE UP (ref 0–0.9)
MONOCYTES NFR BLD AUTO: 6 % — SIGNIFICANT CHANGE UP (ref 2–14)
NEUTROPHILS # BLD AUTO: 6.65 K/UL — SIGNIFICANT CHANGE UP (ref 1.8–7.4)
NEUTROPHILS NFR BLD AUTO: 60 % — SIGNIFICANT CHANGE UP (ref 43–77)
NRBC # BLD: 0 /100 WBCS — SIGNIFICANT CHANGE UP (ref 0–0)
PLATELET # BLD AUTO: 391 K/UL — SIGNIFICANT CHANGE UP (ref 150–400)
POTASSIUM SERPL-MCNC: 3.8 MMOL/L — SIGNIFICANT CHANGE UP (ref 3.5–5.3)
POTASSIUM SERPL-SCNC: 3.8 MMOL/L — SIGNIFICANT CHANGE UP (ref 3.5–5.3)
PROT SERPL-MCNC: 7.4 G/DL — SIGNIFICANT CHANGE UP (ref 6–8.3)
RBC # BLD: 4.1 M/UL — SIGNIFICANT CHANGE UP (ref 3.8–5.2)
RBC # FLD: 15.9 % — HIGH (ref 10.3–14.5)
SARS-COV-2 RNA SPEC QL NAA+PROBE: SIGNIFICANT CHANGE UP
SODIUM SERPL-SCNC: 137 MMOL/L — SIGNIFICANT CHANGE UP (ref 135–145)
WBC # BLD: 11.1 K/UL — HIGH (ref 3.8–10.5)
WBC # FLD AUTO: 11.1 K/UL — HIGH (ref 3.8–10.5)

## 2022-06-21 RX ORDER — TRAZODONE HCL 50 MG
250 TABLET ORAL DAILY
Refills: 0 | Status: DISCONTINUED | OUTPATIENT
Start: 2022-06-21 | End: 2022-06-22

## 2022-06-21 RX ORDER — DEXTROAMPHETAMINE SACCHARATE, AMPHETAMINE ASPARTATE, DEXTROAMPHETAMINE SULFATE AND AMPHETAMINE SULFATE 1.875; 1.875; 1.875; 1.875 MG/1; MG/1; MG/1; MG/1
10 TABLET ORAL DAILY
Refills: 0 | Status: DISCONTINUED | OUTPATIENT
Start: 2022-06-21 | End: 2022-06-22

## 2022-06-21 RX ORDER — ENOXAPARIN SODIUM 100 MG/ML
40 INJECTION SUBCUTANEOUS EVERY 24 HOURS
Refills: 0 | Status: DISCONTINUED | OUTPATIENT
Start: 2022-06-21 | End: 2022-06-22

## 2022-06-21 RX ORDER — GABAPENTIN 400 MG/1
600 CAPSULE ORAL
Refills: 0 | Status: DISCONTINUED | OUTPATIENT
Start: 2022-06-21 | End: 2022-06-22

## 2022-06-21 RX ORDER — SODIUM CHLORIDE 9 MG/ML
1000 INJECTION INTRAMUSCULAR; INTRAVENOUS; SUBCUTANEOUS ONCE
Refills: 0 | Status: COMPLETED | OUTPATIENT
Start: 2022-06-21 | End: 2022-06-21

## 2022-06-21 RX ORDER — DULOXETINE HYDROCHLORIDE 30 MG/1
120 CAPSULE, DELAYED RELEASE ORAL DAILY
Refills: 0 | Status: DISCONTINUED | OUTPATIENT
Start: 2022-06-21 | End: 2022-06-22

## 2022-06-21 RX ORDER — NORETHINDRONE AND ETHINYL ESTRADIOL 0.4-0.035
1 KIT ORAL
Qty: 0 | Refills: 0 | DISCHARGE

## 2022-06-21 RX ORDER — LISDEXAMFETAMINE DIMESYLATE 70 MG/1
1 CAPSULE ORAL
Qty: 0 | Refills: 0 | DISCHARGE

## 2022-06-21 RX ORDER — HYDROMORPHONE HYDROCHLORIDE 2 MG/ML
0.5 INJECTION INTRAMUSCULAR; INTRAVENOUS; SUBCUTANEOUS ONCE
Refills: 0 | Status: DISCONTINUED | OUTPATIENT
Start: 2022-06-21 | End: 2022-06-21

## 2022-06-21 RX ORDER — LAMOTRIGINE 25 MG/1
1 TABLET, ORALLY DISINTEGRATING ORAL
Qty: 0 | Refills: 0 | DISCHARGE

## 2022-06-21 RX ORDER — LAMOTRIGINE 25 MG/1
400 TABLET, ORALLY DISINTEGRATING ORAL AT BEDTIME
Refills: 0 | Status: DISCONTINUED | OUTPATIENT
Start: 2022-06-21 | End: 2022-06-22

## 2022-06-21 RX ORDER — CLONAZEPAM 1 MG
1 TABLET ORAL
Refills: 0 | Status: DISCONTINUED | OUTPATIENT
Start: 2022-06-21 | End: 2022-06-22

## 2022-06-21 RX ORDER — DULOXETINE HYDROCHLORIDE 30 MG/1
20 CAPSULE, DELAYED RELEASE ORAL DAILY
Refills: 0 | Status: DISCONTINUED | OUTPATIENT
Start: 2022-06-21 | End: 2022-06-21

## 2022-06-21 RX ORDER — FLUOXETINE HCL 10 MG
40 CAPSULE ORAL
Qty: 0 | Refills: 0 | DISCHARGE

## 2022-06-21 RX ORDER — FLUOXETINE HCL 10 MG
80 CAPSULE ORAL DAILY
Refills: 0 | Status: DISCONTINUED | OUTPATIENT
Start: 2022-06-21 | End: 2022-06-22

## 2022-06-21 RX ORDER — TRAMADOL HYDROCHLORIDE 50 MG/1
25 TABLET ORAL THREE TIMES A DAY
Refills: 0 | Status: DISCONTINUED | OUTPATIENT
Start: 2022-06-21 | End: 2022-06-22

## 2022-06-21 RX ORDER — DIPHENHYDRAMINE HCL 50 MG
25 CAPSULE ORAL EVERY 4 HOURS
Refills: 0 | Status: DISCONTINUED | OUTPATIENT
Start: 2022-06-21 | End: 2022-06-22

## 2022-06-21 RX ORDER — VENLAFAXINE HCL 75 MG
300 CAPSULE, EXT RELEASE 24 HR ORAL AT BEDTIME
Refills: 0 | Status: DISCONTINUED | OUTPATIENT
Start: 2022-06-21 | End: 2022-06-22

## 2022-06-21 RX ORDER — HYDROMORPHONE HYDROCHLORIDE 2 MG/ML
1 INJECTION INTRAMUSCULAR; INTRAVENOUS; SUBCUTANEOUS EVERY 6 HOURS
Refills: 0 | Status: DISCONTINUED | OUTPATIENT
Start: 2022-06-21 | End: 2022-06-22

## 2022-06-21 RX ORDER — INFLUENZA VIRUS VACCINE 15; 15; 15; 15 UG/.5ML; UG/.5ML; UG/.5ML; UG/.5ML
0.5 SUSPENSION INTRAMUSCULAR ONCE
Refills: 0 | Status: DISCONTINUED | OUTPATIENT
Start: 2022-06-21 | End: 2022-06-22

## 2022-06-21 RX ORDER — TRAZODONE HCL 50 MG
200 TABLET ORAL
Qty: 0 | Refills: 0 | DISCHARGE

## 2022-06-21 RX ORDER — MORPHINE SULFATE 50 MG/1
4 CAPSULE, EXTENDED RELEASE ORAL ONCE
Refills: 0 | Status: DISCONTINUED | OUTPATIENT
Start: 2022-06-21 | End: 2022-06-21

## 2022-06-21 RX ORDER — DULOXETINE HYDROCHLORIDE 30 MG/1
1 CAPSULE, DELAYED RELEASE ORAL
Qty: 0 | Refills: 0 | DISCHARGE

## 2022-06-21 RX ORDER — ALPRAZOLAM 0.25 MG
1 TABLET ORAL
Qty: 0 | Refills: 0 | DISCHARGE

## 2022-06-21 RX ORDER — KETOROLAC TROMETHAMINE 30 MG/ML
15 SYRINGE (ML) INJECTION ONCE
Refills: 0 | Status: DISCONTINUED | OUTPATIENT
Start: 2022-06-21 | End: 2022-06-21

## 2022-06-21 RX ADMIN — HYDROMORPHONE HYDROCHLORIDE 0.5 MILLIGRAM(S): 2 INJECTION INTRAMUSCULAR; INTRAVENOUS; SUBCUTANEOUS at 09:06

## 2022-06-21 RX ADMIN — HYDROMORPHONE HYDROCHLORIDE 1 MILLIGRAM(S): 2 INJECTION INTRAMUSCULAR; INTRAVENOUS; SUBCUTANEOUS at 13:49

## 2022-06-21 RX ADMIN — MORPHINE SULFATE 4 MILLIGRAM(S): 50 CAPSULE, EXTENDED RELEASE ORAL at 01:57

## 2022-06-21 RX ADMIN — Medication 25 MILLIGRAM(S): at 17:52

## 2022-06-21 RX ADMIN — HYDROMORPHONE HYDROCHLORIDE 1 MILLIGRAM(S): 2 INJECTION INTRAMUSCULAR; INTRAVENOUS; SUBCUTANEOUS at 14:20

## 2022-06-21 RX ADMIN — Medication 1 MILLIGRAM(S): at 23:24

## 2022-06-21 RX ADMIN — HYDROMORPHONE HYDROCHLORIDE 1 MILLIGRAM(S): 2 INJECTION INTRAMUSCULAR; INTRAVENOUS; SUBCUTANEOUS at 20:31

## 2022-06-21 RX ADMIN — Medication 25 MILLIGRAM(S): at 23:15

## 2022-06-21 RX ADMIN — Medication 15 MILLIGRAM(S): at 04:26

## 2022-06-21 RX ADMIN — LIDOCAINE 1 PATCH: 4 CREAM TOPICAL at 07:30

## 2022-06-21 RX ADMIN — LIDOCAINE 1 PATCH: 4 CREAM TOPICAL at 11:00

## 2022-06-21 RX ADMIN — HYDROMORPHONE HYDROCHLORIDE 0.5 MILLIGRAM(S): 2 INJECTION INTRAMUSCULAR; INTRAVENOUS; SUBCUTANEOUS at 07:30

## 2022-06-21 RX ADMIN — SODIUM CHLORIDE 1000 MILLILITER(S): 9 INJECTION INTRAMUSCULAR; INTRAVENOUS; SUBCUTANEOUS at 01:57

## 2022-06-21 RX ADMIN — Medication 15 MILLIGRAM(S): at 07:30

## 2022-06-21 RX ADMIN — HYDROMORPHONE HYDROCHLORIDE 1 MILLIGRAM(S): 2 INJECTION INTRAMUSCULAR; INTRAVENOUS; SUBCUTANEOUS at 21:01

## 2022-06-21 RX ADMIN — TRAMADOL HYDROCHLORIDE 25 MILLIGRAM(S): 50 TABLET ORAL at 18:46

## 2022-06-21 RX ADMIN — HYDROMORPHONE HYDROCHLORIDE 0.5 MILLIGRAM(S): 2 INJECTION INTRAMUSCULAR; INTRAVENOUS; SUBCUTANEOUS at 08:37

## 2022-06-21 RX ADMIN — TRAMADOL HYDROCHLORIDE 25 MILLIGRAM(S): 50 TABLET ORAL at 17:52

## 2022-06-21 RX ADMIN — MORPHINE SULFATE 4 MILLIGRAM(S): 50 CAPSULE, EXTENDED RELEASE ORAL at 00:10

## 2022-06-21 RX ADMIN — HYDROMORPHONE HYDROCHLORIDE 0.5 MILLIGRAM(S): 2 INJECTION INTRAMUSCULAR; INTRAVENOUS; SUBCUTANEOUS at 04:27

## 2022-06-21 NOTE — CONSULT NOTE ADULT - SUBJECTIVE AND OBJECTIVE BOX
Neurology Consult    Reason for Consult: Patient is a 42y old  Female who presents with a chief complaint of back pain (2022 11:13)      HPI:  43 y/o obese  female with depression, anxiety, bipolar disorder, chronic low back pain (was seeing Dr. Kerns), ADD, GERD, anemia s/p bariatric sx presents to the ED complaining of back pain x 3 days. Patient states that she was sitting in a recliner on Saturday. She suddenly got up and felt a "pulling" sensation to the lower back like she pulled a muscle. She then stated that she had began to have pain to the left lower back which radiated over her buttocks and into the groin. States that since onset pain has gotten progressively worse. She states that she has been taking Alleve without any relief. Last dose was early this AM. Denies any inciting injury or trauma. Denies any recent illness, headache, fever, chills, chest pain, SOB, cough, abdominal pain, n/v/d. No hx of IVDA, incontinence of urine or stool, or saddle anesthesia. (2022 11:13)       PAST MEDICAL & SURGICAL HISTORY:  Depression      ADD (attention deficit disorder)      Depression      Bipolar mood disorder      ADD (attention deficit disorder)      Depression      GERD (gastroesophageal reflux disease)      Anemia      Delivery by emergency caesarean section      S/P cholecystectomy      S/P  section      Bariatric surgery status  s/p gastric sleeve, then s/p insertion of gastric band which was   removed in  secondary to displacement      Ganglion cyst of wrist, left      Endometrial polyp  s/p ablation 2019          Allergies: Allergies    Ofirmev (Urticaria)  Robaxin (Anaphylaxis)    Intolerances        Social History: Denies toxic habits including tobacco, ETOH or illicit drugs.    Family History: FAMILY HISTORY:  FH: hypertension  mother and father    FH: diabetes mellitus  mother    FH: hypothyroidism  father    . No family history of strokes    Medications: MEDICATIONS  (STANDING):  amphetamine/dextroamphetamine 10 milliGRAM(s) Oral daily  clonazePAM  Tablet 1 milliGRAM(s) Oral four times a day  DULoxetine 120 milliGRAM(s) Oral daily  enoxaparin Injectable 40 milliGRAM(s) SubCutaneous every 24 hours  FLUoxetine 80 milliGRAM(s) Oral daily  gabapentin 600 milliGRAM(s) Oral two times a day  lamoTRIgine 400 milliGRAM(s) Oral at bedtime  traZODone 250 milliGRAM(s) Oral daily  venlafaxine XR. 300 milliGRAM(s) Oral at bedtime    MEDICATIONS  (PRN):  traMADol 25 milliGRAM(s) Oral three times a day PRN Moderate Pain (4 - 6)      Review of Systems:  CONSTITUTIONAL:  No weight loss, fever, chills, weakness or fatigue.  HEENT:  Eyes:  No visual loss, blurred vision, double vision or yellow sclera. Ears, Nose, Throat:  No hearing loss, sneezing, congestion, runny nose or sore throat.  SKIN:  No rash or itching.  CARDIOVASCULAR:  No chest pain, chest pressure or chest discomfort. No palpitations or edema.  RESPIRATORY:  No shortness of breath, cough or sputum.  GASTROINTESTINAL:  No anorexia, nausea, vomiting or diarrhea. No abdominal pain or blood.  GENITOURINARY:  No burning on urination or incontinence   NEUROLOGICAL:  No headache, dizziness, syncope, paralysis, ataxia, numbness or tingling in the extremities. No change in bowel or bladder control. no limb weakness. no vision changes.   MUSCULOSKELETAL:  No muscle, back pain, joint pain or stiffness.  HEMATOLOGIC:  No anemia, bleeding or bruising.  LYMPHATICS:  No enlarged nodes. No history of splenectomy.  PSYCHIATRIC:  No history of depression or anxiety.  ENDOCRINOLOGIC:  No reports of sweating, cold or heat intolerance. No polyuria or polydipsia.      Vitals:  Vital Signs Last 24 Hrs  T(C): 36.8 (2022 11:07), Max: 37.2 (2022 19:24)  T(F): 98.2 (2022 11:07), Max: 98.9 (2022 19:24)  HR: 72 (2022 11:07) (72 - 91)  BP: 104/69 (2022 11:07) (93/63 - 124/85)  BP(mean): 98 (2022 05:52) (71 - 98)  RR: 18 (2022 11:07) (18 - 20)  SpO2: 99% (2022 11:07) (96% - 99%)    General Exam:   General Appearance: Appropriately dressed and in no acute distress       Head: Normocephalic, atraumatic and no dysmorphic features  Ear, Nose, and Throat: Moist mucous membranes  CVS: S1S2+  Resp: No SOB, no wheeze or rhonchi  GI: soft NT/ND  Extremities: No edema or cyanosis  Skin: No bruises or rashes     Neurological Exam:  Mental Status: Awake, alert and oriented x 3.  Able to follow simple and complex verbal commands. Able to name and repeat. fluent speech. No obvious aphasia or dysarthria noted.   Cranial Nerves: PERRL, EOMI, VFFC, sensation V1-V3 intact,  no obvious facial asymmetry, equal elevation of palate, scm/trap 5/5, tongue is midline on protrusion. no obvious papilledema on fundoscopic exam. hearing is grossly intact.   Motor: Normal bulk, tone and strength throughout. Fine finger movements were intact and symmetric. no tremors or drift noted.    Sensation: Intact to light touch and pinprick throughout. no right/left confusion. no extinction to tactile on DSS. Romberg was negative.   Reflexes: 1+ throughout at biceps, brachioradialis, triceps, patellars and ankles bilaterally and equal. No clonus. R toe and L toe were both downgoing.  Coordination: No dysmetria on FNF or HKS  Gait: Narrow based and steady. Able to tandem. no limitations in gait.     Data/Labs/Imaging which I personally reviewed.     Labs:     CBC Full  -  ( 2022 00:46 )  WBC Count : 11.10 K/uL  RBC Count : 4.10 M/uL  Hemoglobin : 11.2 g/dL  Hematocrit : 35.3 %  Platelet Count - Automated : 391 K/uL  Mean Cell Volume : 86.1 fl  Mean Cell Hemoglobin : 27.3 pg  Mean Cell Hemoglobin Concentration : 31.7 gm/dL  Auto Neutrophil # : 6.65 K/uL  Auto Lymphocyte # : 3.50 K/uL  Auto Monocyte # : 0.67 K/uL  Auto Eosinophil # : 0.16 K/uL  Auto Basophil # : 0.07 K/uL  Auto Neutrophil % : 60.0 %  Auto Lymphocyte % : 31.5 %  Auto Monocyte % : 6.0 %  Auto Eosinophil % : 1.4 %  Auto Basophil % : 0.6 %        137  |  103  |  15  ----------------------------<  79  3.8   |  24  |  0.77    Ca    9.4      2022 00:46    TPro  7.4  /  Alb  4.5  /  TBili  0.2  /  DBili  x   /  AST  13  /  ALT  14  /  AlkPhos  73  06-21    LIVER FUNCTIONS - ( 2022 00:46 )  Alb: 4.5 g/dL / Pro: 7.4 g/dL / ALK PHOS: 73 U/L / ALT: 14 U/L / AST: 13 U/L / GGT: x                    Neurology Consult    Reason for Consult: Patient is a 42y old  Female who presents with a chief complaint of back pain (2022 11:13)      HPI:  41 y/o obese  female with depression, anxiety, bipolar disorder, chronic low back pain (was seeing Dr. Kerns), ADD, GERD, anemia s/p bariatric sx presents to the ED complaining of back pain x 3 days. Patient states that she was sitting in a recliner on Saturday. She suddenly got up and felt a "pulling" sensation to the lower back like she pulled a muscle. She then stated that she had began to have pain to the left lower back which radiated over her buttocks and into the groin. States that since onset pain has gotten progressively worse. She states that she has been taking Alleve without any relief. Last dose was early this AM. Denies any inciting injury or trauma. Denies any recent illness, headache, fever, chills, chest pain, SOB, cough, abdominal pain, n/v/d. No hx of IVDA, incontinence of urine or stool, or saddle anesthesia. (2022 11:13)       PAST MEDICAL & SURGICAL HISTORY:  Depression      ADD (attention deficit disorder)      Depression      Bipolar mood disorder      ADD (attention deficit disorder)      Depression      GERD (gastroesophageal reflux disease)      Anemia      Delivery by emergency caesarean section      S/P cholecystectomy      S/P  section      Bariatric surgery status  s/p gastric sleeve, then s/p insertion of gastric band which was   removed in  secondary to displacement      Ganglion cyst of wrist, left      Endometrial polyp  s/p ablation 2019          Allergies: Allergies    Ofirmev (Urticaria)  Robaxin (Anaphylaxis)    Intolerances        Social History: Denies toxic habits including tobacco, ETOH or illicit drugs.    Family History: FAMILY HISTORY:  FH: hypertension  mother and father    FH: diabetes mellitus  mother    FH: hypothyroidism  father    . No family history of strokes    Medications: MEDICATIONS  (STANDING):  amphetamine/dextroamphetamine 10 milliGRAM(s) Oral daily  clonazePAM  Tablet 1 milliGRAM(s) Oral four times a day  DULoxetine 120 milliGRAM(s) Oral daily  enoxaparin Injectable 40 milliGRAM(s) SubCutaneous every 24 hours  FLUoxetine 80 milliGRAM(s) Oral daily  gabapentin 600 milliGRAM(s) Oral two times a day  lamoTRIgine 400 milliGRAM(s) Oral at bedtime  traZODone 250 milliGRAM(s) Oral daily  venlafaxine XR. 300 milliGRAM(s) Oral at bedtime    MEDICATIONS  (PRN):  traMADol 25 milliGRAM(s) Oral three times a day PRN Moderate Pain (4 - 6)      Review of Systems:  CONSTITUTIONAL:  No weight loss, fever, chills, weakness or fatigue.  HEENT:  Eyes:  No visual loss, blurred vision, double vision or yellow sclera. Ears, Nose, Throat:  No hearing loss, sneezing, congestion, runny nose or sore throat.  SKIN:  No rash or itching.  CARDIOVASCULAR:  No chest pain, chest pressure or chest discomfort. No palpitations or edema.  RESPIRATORY:  No shortness of breath, cough or sputum.  GASTROINTESTINAL:  No anorexia, nausea, vomiting or diarrhea. No abdominal pain or blood.  GENITOURINARY:  No burning on urination or incontinence   NEUROLOGICAL:  No headache, dizziness, syncope, paralysis, ataxia, numbness or tingling in the extremities. No change in bowel or bladder control. no limb weakness. no vision changes.   MUSCULOSKELETAL: + Back pain   HEMATOLOGIC:  No anemia, bleeding or bruising.  LYMPHATICS:  No enlarged nodes. No history of splenectomy.  PSYCHIATRIC:  No history of depression or anxiety.  ENDOCRINOLOGIC:  No reports of sweating, cold or heat intolerance. No polyuria or polydipsia.      Vitals:  Vital Signs Last 24 Hrs  T(C): 36.8 (2022 11:07), Max: 37.2 (2022 19:24)  T(F): 98.2 (2022 11:07), Max: 98.9 (2022 19:24)  HR: 72 (2022 11:07) (72 - 91)  BP: 104/69 (2022 11:07) (93/63 - 124/85)  BP(mean): 98 (2022 05:52) (71 - 98)  RR: 18 (2022 11:07) (18 - 20)  SpO2: 99% (2022 11:07) (96% - 99%)    General Exam:   General Appearance: Appropriately dressed and in no acute distress       Head: Normocephalic, atraumatic and no dysmorphic features  Ear, Nose, and Throat: Moist mucous membranes  CVS: S1S2+  Resp: No SOB, no wheeze or rhonchi  GI: soft NT/ND  Extremities: No edema or cyanosis  Skin: No bruises or rashes     Neurological Exam:  Mental Status: Awake, alert and oriented x 3.  Able to follow simple and complex verbal commands. Able to name and repeat. fluent speech. No obvious aphasia or dysarthria noted.   Cranial Nerves: PERRL, EOMI, VFFC, sensation V1-V3 intact,  no obvious facial asymmetry, equal elevation of palate, scm/trap 5/5, tongue is midline on protrusion. no obvious papilledema on fundoscopic exam. hearing is grossly intact.   Motor: Normal bulk, tone and strength throughout uppers.  bilateral lowers at least 4/5. some pain limitation L>R. + SLR   Sensation: Intact to light touch and pinprick throughout. no right/left confusion. no extinction to tactile on DSS.    Reflexes: 1+ throughout at biceps, brachioradialis, triceps, patellars and ankles bilaterally and equal. No clonus. R toe and L toe were both downgoing.  Coordination: No dysmetria on FNF   Gait: deferred in ED     Data/Labs/Imaging which I personally reviewed.     Labs:     CBC Full  -  ( 2022 00:46 )  WBC Count : 11.10 K/uL  RBC Count : 4.10 M/uL  Hemoglobin : 11.2 g/dL  Hematocrit : 35.3 %  Platelet Count - Automated : 391 K/uL  Mean Cell Volume : 86.1 fl  Mean Cell Hemoglobin : 27.3 pg  Mean Cell Hemoglobin Concentration : 31.7 gm/dL  Auto Neutrophil # : 6.65 K/uL  Auto Lymphocyte # : 3.50 K/uL  Auto Monocyte # : 0.67 K/uL  Auto Eosinophil # : 0.16 K/uL  Auto Basophil # : 0.07 K/uL  Auto Neutrophil % : 60.0 %  Auto Lymphocyte % : 31.5 %  Auto Monocyte % : 6.0 %  Auto Eosinophil % : 1.4 %  Auto Basophil % : 0.6 %        137  |  103  |  15  ----------------------------<  79  3.8   |  24  |  0.77    Ca    9.4      2022 00:46    TPro  7.4  /  Alb  4.5  /  TBili  0.2  /  DBili  x   /  AST  13  /  ALT  14  /  AlkPhos  73  06-21    LIVER FUNCTIONS - ( 2022 00:46 )  Alb: 4.5 g/dL / Pro: 7.4 g/dL / ALK PHOS: 73 U/L / ALT: 14 U/L / AST: 13 U/L / GGT: x             < from: MR Lumbar Spine No Cont (10.17.20 @ 11:24) >    EXAM:  MR SPINE LUMBAR                            PROCEDURE DATE:  10/17/2020            INTERPRETATION:  Clinical indications: Left lower extremity radiculopathy, back pain.    MRI of the lumbar spine was performed using sagittal T1-T2 and STIR sequence. Axial T1 and T2-weighted sequences performed as well.    This exam is compared with prior MRI lumbar spine performed on May 16, 2020.    Loss of the normal lumbar lordosis is again seen.    Postop changes compatible with bilateral laminectomy is seen at the L2-3 level with posterior spinal fusion identified as well. The metallic hardware appears adequately placed and unchanged when compared with the prior exam.    Disc desiccation is seen involving the L2-3 L3-4 and L4-5 levels which are secondary to degenerative changes.    T12-L1: Normal    L1-2: Normal    L2-3: Normal    L3-4: Disc bulge and bilateral hypertrophic facet changes are seen. Mild narrowing of the spinal canal.    L4-5: Disc bulge and bilateral hypertrophic facet joint changes are again are seen. Mild to moderate narrowing of the spinal canal.    L5-S1: Bilateral hypertrophic facet changes are seen with disc bulge. No significant compromise of the spinal canal is seen.    The conus ends at L1 and appears normal    Evaluation of the paraspinal soft tissues appear normal    IMPRESSION: Postop changes involving the L2-3 level is again seen and unchanged.    Degenerative changes as described above.                  BEAR AKBAR M.D., ATTENDING RADIOLOGIST  This document has been electronically signed. Oct 17 2020 11:32AM    < end of copied text >

## 2022-06-21 NOTE — H&P ADULT - NSHPPHYSICALEXAM_GEN_ALL_CORE
General: WN/WD NAD  PERRLA  Neurology: A&Ox3, nonfocal, ZAVALA x 4  Respiratory: CTA B/L  CV: RRR, S1S2, no murmurs, rubs or gallops  Abdominal: Soft, NT, ND +BS, Last BM  Extremities: No edema, + peripheral pulses  Skin Normal

## 2022-06-21 NOTE — PATIENT PROFILE ADULT - FUNCTIONAL SCREEN CURRENT LEVEL: COMMUNICATION, MLM
Called and was able to speak with patient. Patient was driving to his talk therapy, DBT group at [facility] to see Lindy Damon. He attends this every Thursday. CHW asked to call back since he was driving but patient declined. Patient states he is glad CHW called and says he is sorry for not communicating the best lately. Patient went into great detail about his recent insurance change and PTSD episode at [facility] on Father's Day. Patient is coming in tomorrow to see PCP and would like to meet with CHW to continue discussions. CHW will add patient tentatively on 6/28/19 when patient arrives.     Patient would like to put his housing on hold right now.   0 = understands/communicates without difficulty

## 2022-06-21 NOTE — CONSULT NOTE ADULT - ASSESSMENT
43 y/o female with PMHx of depression, anxiety, bipolar disorder, chronic low back pain presents to the ED complaining of back pain x 3 days.

## 2022-06-21 NOTE — H&P ADULT - ASSESSMENT
43 y/o female with PMHx of depression, anxiety, bipolar disorder, chronic low back pain presents to the ED complaining of back pain x 3 days. Patient states that she was sitting in a recliner on Saturday. She suddenly got up and felt a "pulling" sensation to the lower back like she pulled a muscle. She then stated that she had began to have pain to the left lower back which radiated over her buttocks and into the groin. States that since onset pain has gotten progressively worse. She states that she has been taking Alleve without any relief. Last dose was early this AM. Denies any inciting injury or trauma. Denies any recent illness, headache, fever, chills, chest pain, SOB, cough, abdominal pain, n/v/d. No hx of IVDA, incontinence of urine or stool, or saddle anesthesia.    1 Back pain  - likely muskuskeletal  - pain control  - neuro called     2 Bipolar, depression, anxiety  - on a lot of meds at high doses  - called psych to evaluate    Lovenox for DVT prophylaxis

## 2022-06-21 NOTE — ED ADULT NURSE REASSESSMENT NOTE - NS ED NURSE REASSESS COMMENT FT1
MD Ludwig  aware of low BP, ok to give pain meds.
Patient admitted to Medicine. Admission band applied to patient. Pending bed assignment, patient updated on plan of care.
RN contacted VIRGEN Iniguez, pt requesting pain medications , waiting for orders, also pt refused Psychiatric medications , VIRGEN Kaplan made aware
RN contacted VIRGEN Kaplan (admitting team), waiting pain medication orders
RN contacted VIRGEN Kaplan , waiting for pain medication orders
RN contacted [facility]IR[facility] Kaplan pt requesting pain medications, waiting for orders
received report from TAMMIE Horn. pt is comfortable resting in stretcher with side rails up for safety. pt is A&Ox3, VSS, NAD noted at this time. pt reports her pain level has improved since receiving medication and states she is feeling better. pt states she wants to walk to the bathroom herself, pt displays she is able to ambulate independently to bathroom.
Report received from MICHAEL Gaitan V  Pt AAOx4, NAD, resp nonlabored, skin warm/dry, resting comfortably in bed . Pt c/o lower back pain (9/10) . Pt denies headache, dizziness, chest pain, palpitations, SOB, abd pain, n/v/d, urinary symptoms, fevers, chills, weakness at this time. Pt awaiting for bed  . Safety maintained with call bell within reach.

## 2022-06-21 NOTE — H&P ADULT - NSHPLABSRESULTS_GEN_ALL_CORE
Lab Results:  CBC  CBC Full  -  ( 21 Jun 2022 00:46 )  WBC Count : 11.10 K/uL  RBC Count : 4.10 M/uL  Hemoglobin : 11.2 g/dL  Hematocrit : 35.3 %  Platelet Count - Automated : 391 K/uL  Mean Cell Volume : 86.1 fl  Mean Cell Hemoglobin : 27.3 pg  Mean Cell Hemoglobin Concentration : 31.7 gm/dL  Auto Neutrophil # : 6.65 K/uL  Auto Lymphocyte # : 3.50 K/uL  Auto Monocyte # : 0.67 K/uL  Auto Eosinophil # : 0.16 K/uL  Auto Basophil # : 0.07 K/uL  Auto Neutrophil % : 60.0 %  Auto Lymphocyte % : 31.5 %  Auto Monocyte % : 6.0 %  Auto Eosinophil % : 1.4 %  Auto Basophil % : 0.6 %    .		Differential:	[] Automated		[] Manual  Chemistry                        11.2   11.10 )-----------( 391      ( 21 Jun 2022 00:46 )             35.3     06-21    137  |  103  |  15  ----------------------------<  79  3.8   |  24  |  0.77    Ca    9.4      21 Jun 2022 00:46    TPro  7.4  /  Alb  4.5  /  TBili  0.2  /  DBili  x   /  AST  13  /  ALT  14  /  AlkPhos  73  06-21    LIVER FUNCTIONS - ( 21 Jun 2022 00:46 )  Alb: 4.5 g/dL / Pro: 7.4 g/dL / ALK PHOS: 73 U/L / ALT: 14 U/L / AST: 13 U/L / GGT: x                     MICROBIOLOGY/CULTURES:      RADIOLOGY RESULTS: reviewed

## 2022-06-21 NOTE — CONSULT NOTE ADULT - SUBJECTIVE AND OBJECTIVE BOX
CHIEF COMPLAINT: Back Pain    HISTORY OF PRESENT ILLNESS: 43 y/o female with PMHx of depression, anxiety, bipolar disorder, chronic low back pain presents to the ED complaining of back pain x 3 days. Patient states that she was sitting in a recliner on Saturday. She suddenly got up and felt a "pulling" sensation to the lower back like she pulled a muscle. She then stated that she had began to have pain to the left lower back which radiated over her buttocks and into the groin. States that since onset pain has gotten progressively worse. She states that she has been taking Alleve without any relief. Last dose was early this AM. Denies any inciting injury or trauma. Denies any recent illness, headache, fever, chills, chest pain, SOB, cough, abdominal pain, n/v/d. No hx of IVDA, incontinence of urine or stool, or saddle anesthesia.    PAST MEDICAL & SURGICAL HISTORY:  Depression    ADD (attention deficit disorder)    Depression    Bipolar mood disorder    ADD (attention deficit disorder)    Depression    GERD (gastroesophageal reflux disease)    Anemia    Delivery by emergency caesarean section    S/P cholecystectomy    S/P  section    Bariatric surgery status  s/p gastric sleeve, then s/p insertion of gastric band which was   removed in 2017 secondary to displacement    Ganglion cyst of wrist, left    Endometrial polyp  s/p ablation 2019    MEDICATIONS:  enoxaparin Injectable 40 milliGRAM(s) SubCutaneous every 24 hours    amphetamine/dextroamphetamine 10 milliGRAM(s) Oral daily  clonazePAM  Tablet 1 milliGRAM(s) Oral four times a day  DULoxetine 120 milliGRAM(s) Oral daily  FLUoxetine 80 milliGRAM(s) Oral daily  gabapentin 600 milliGRAM(s) Oral two times a day  lamoTRIgine 400 milliGRAM(s) Oral at bedtime  traMADol 25 milliGRAM(s) Oral three times a day PRN  traZODone 250 milliGRAM(s) Oral daily  venlafaxine XR. 300 milliGRAM(s) Oral at bedtime    FAMILY HISTORY:  FH: hypertension  mother and father    FH: diabetes mellitus  mother    FH: hypothyroidism  father    SOCIAL HISTORY:    [ ] Non-smoker  [ ] Smoker  [ ] Alcohol    Allergies    Ofirmev (Urticaria)  Robaxin (Anaphylaxis)    Intolerances    REVIEW OF SYSTEMS:  CONSTITUTIONAL: No fever, weight loss, + fatigue  EYES: No eye pain, visual disturbances, or discharge  ENMT:  No difficulty hearing, tinnitus, vertigo; No sinus or throat pain  NECK: No pain or stiffness  RESPIRATORY: No cough, wheezing, chills or hemoptysis; No Shortness of Breath  CARDIOVASCULAR: No chest pain, palpitations, passing out, dizziness, or leg swelling  GASTROINTESTINAL: No abdominal or epigastric pain. No nausea, vomiting, or hematemesis; No diarrhea or constipation. No melena or hematochezia.  GENITOURINARY: No dysuria, frequency, hematuria, or incontinence  NEUROLOGICAL: No headaches, memory loss, loss of strength, numbness, or tremors  SKIN: No itching, burning, rashes, or lesions   LYMPH Nodes: No enlarged glands  ENDOCRINE: No heat or cold intolerance; No hair loss  MUSCULOSKELETAL: No joint pain or swelling; No muscle pain, + back pain, No extremity pain  PSYCHIATRIC: No depression, anxiety, mood swings, or difficulty sleeping  HEME/LYMPH: No easy bruising, or bleeding gums  ALLERY AND IMMUNOLOGIC: No hives or eczema	    [ ] All others negative	  [ ] Unable to obtain    PHYSICAL EXAM:  T(C): 36.8 (22 @ 11:07), Max: 37.2 (22 @ 19:24)  HR: 72 (22 @ 11:07) (72 - 91)  BP: 104/69 (22 @ 11:07) (93/63 - 124/85)  RR: 18 (22 @ 11:07) (18 - 20)  SpO2: 99% (22 @ 11:07) (96% - 99%)  Wt(kg): --  I&O's Summary    Appearance: NAD  HEENT: Normal oral mucosa, PERRL, EOMI	  Lymphatic: No lymphadenopathy  Cardiovascular: Normal S1 S2, No JVD, No murmurs, No edema  Respiratory: Lungs clear to auscultation	  Psychiatry: A & O x 3, Mood & affect appropriate  Gastrointestinal:  Soft, Non-tender, + BS	  Skin: No rashes, No ecchymoses, No cyanosis	  Neurologic: Non-focal  Extremities: Normal range of motion, No clubbing, cyanosis or edema  Vascular: Peripheral pulses palpable 2+ bilaterally    TELEMETRY: 	    ECG:  	  RADIOLOGY:    OTHER: 	  	  LABS:	 	    CARDIAC MARKERS:                      11.2   11.10 )-----------( 391      ( 2022 00:46 )             35.3     06-21    137  |  103  |  15  ----------------------------<  79  3.8   |  24  |  0.77    Ca    9.4      2022 00:46    TPro  7.4  /  Alb  4.5  /  TBili  0.2  /  DBili  x   /  AST  13  /  ALT  14  /  AlkPhos  73  06-    proBNP:   Lipid Profile:   HgA1c:   TSH:

## 2022-06-21 NOTE — PROVIDER CONTACT NOTE (MEDICATION) - ASSESSMENT
patient aox3, patient educated on the importance of taking her PO medications especially for her psych history; patient states she does not want to take the medications in our hospital at our time; educated patient that we are able to reschedule the medications at the time that she takes at home, however patient adamantly refused

## 2022-06-21 NOTE — PROVIDER CONTACT NOTE (MEDICATION) - ACTION/TREATMENT ORDERED:
PA notified, continue to educate patient on importance of meds, however patient continues to refuse.

## 2022-06-21 NOTE — CONSULT NOTE ADULT - PROBLEM SELECTOR RECOMMENDATION 2
c/w home meds  psych eval frequently experiences palpitations    - with some chest pain  check ekg  send trops x1 frequently experiences palpitations    - with some chest pain    - likely 2/2 anxiety  check ekg  send trops x1

## 2022-06-21 NOTE — CONSULT NOTE ADULT - ASSESSMENT
43 y/o obese  female with depression, anxiety, bipolar disorder, chronic low back pain (was seeing Dr. Kerns), ADD, GERD, anemia s/p bariatric sx presents to the ED complaining of back pain x 3 days mostly on L radiating down leg. no incontience, no falls.  patient is emotional in ED.  41 y/o obese  female with depression, anxiety, bipolar disorder, chronic low back pain (was seeing Dr. Kerns) s/p L2/3 fusion , ADD, GERD, anemia s/p bariatric sx presents to the ED complaining of back pain x 3 days mostly on L radiating down leg. no incontinence no falls.  patient is emotional in ED.   + SLR on L  most recent MRI L spine 10/2020  with degenerative changes and post op changes L2/3     Impression: Acute on Chronic LBP  - pain management; some drug seeking behavior in ED.  requesting dilaudid with tramadal  - consider trial of steroids if no objection decadron 4q6 x 24hrs then medrol dose pack   - psych eval.  on adderall 10, cymbalta 120, clonazepam 1mg QID, prozac 80 , lamotrigine 400 QHS, trazadone 250 daily, venlafaxine 300mg QHS; concern for polypharmacy, consider psych eval for furhter management.  patient emotional in ED   - for pain already on cymbalta 120 daily and gabapentin 600mg BID   - PT/OT  - can hold off MRI L spine for now.  acute on chronic pain. no neuro changes   - can call neurosurgery. has seen Dr. Kerns in past for L2/3 fusion   - check FS, glucose control <180  - GI/DVT ppx  - Counseling on diet, exercise, and medication adherence was done  - Counseling on smoking cessation and alcohol consumption offered when appropriate.  - Pain assessed and judicious use of narcotics when appropriate was discussed.    - Stroke education given when appropriate.  - Importance of fall prevention discussed.   - Differential diagnosis and plan of care discussed with patient and/or family and primary team  - Thank you for allowing me to participate in the care of this patient. Call with questions.   Tommy Sanchez MD  Vascular Neurology  Office: [phone]

## 2022-06-22 ENCOUNTER — TRANSCRIPTION ENCOUNTER (OUTPATIENT)
Age: 42
End: 2022-06-22

## 2022-06-22 VITALS
OXYGEN SATURATION: 95 % | SYSTOLIC BLOOD PRESSURE: 135 MMHG | DIASTOLIC BLOOD PRESSURE: 79 MMHG | HEART RATE: 75 BPM | TEMPERATURE: 99 F | RESPIRATION RATE: 18 BRPM

## 2022-06-22 PROCEDURE — 99285 EMERGENCY DEPT VISIT HI MDM: CPT | Mod: 25

## 2022-06-22 PROCEDURE — 96374 THER/PROPH/DIAG INJ IV PUSH: CPT

## 2022-06-22 PROCEDURE — 80053 COMPREHEN METABOLIC PANEL: CPT

## 2022-06-22 PROCEDURE — 85025 COMPLETE CBC W/AUTO DIFF WBC: CPT

## 2022-06-22 PROCEDURE — 97161 PT EVAL LOW COMPLEX 20 MIN: CPT

## 2022-06-22 PROCEDURE — 87635 SARS-COV-2 COVID-19 AMP PRB: CPT

## 2022-06-22 PROCEDURE — 96376 TX/PRO/DX INJ SAME DRUG ADON: CPT

## 2022-06-22 RX ADMIN — DULOXETINE HYDROCHLORIDE 120 MILLIGRAM(S): 30 CAPSULE, DELAYED RELEASE ORAL at 13:02

## 2022-06-22 RX ADMIN — HYDROMORPHONE HYDROCHLORIDE 1 MILLIGRAM(S): 2 INJECTION INTRAMUSCULAR; INTRAVENOUS; SUBCUTANEOUS at 02:33

## 2022-06-22 RX ADMIN — Medication 80 MILLIGRAM(S): at 13:02

## 2022-06-22 RX ADMIN — HYDROMORPHONE HYDROCHLORIDE 1 MILLIGRAM(S): 2 INJECTION INTRAMUSCULAR; INTRAVENOUS; SUBCUTANEOUS at 03:03

## 2022-06-22 NOTE — PHYSICAL THERAPY INITIAL EVALUATION ADULT - DISCHARGE DISPOSITION, PT EVAL
pt endorses anxiety going to outpatient PT, benefits of going to PT reviewed. Pt prefers home PT/home w/ outpatient services

## 2022-06-22 NOTE — PHYSICAL THERAPY INITIAL EVALUATION ADULT - PRECAUTIONS/LIMITATIONS, REHAB EVAL
CONT: She states that she has been taking Alleve without any relief. Last dose was early this AM. Denies any inciting injury or trauma. Denies any recent illness, headache, fever, chills, chest pain, SOB, cough, abdominal pain, n/v/d. No hx of IVDA, incontinence of urine or stool, or saddle anesthesia. Per neurosurgery, MRI not ordered due to acute on chronic pain with no new neuro change. CONT: She states that she has been taking Alleve without any relief. Last dose was early this AM. Denies any inciting injury or trauma. Denies any recent illness, headache, fever, chills, chest pain, SOB, cough, abdominal pain, n/v/d. No hx of IVDA, incontinence of urine or stool, or saddle anesthesia. Per neurosurgery, MRI not ordered due to acute on chronic pain with no new neuro change./no known precautions/limitations

## 2022-06-22 NOTE — DISCHARGE NOTE PROVIDER - CARE PROVIDER_API CALL
Lorenzo Pate  [facility] MEDICINE  [address] [place] [address]  Phone: [phone]  Fax: [phone]  Follow Up Time:

## 2022-06-22 NOTE — PROGRESS NOTE ADULT - PROBLEM SELECTOR PLAN 2
frequently experiences palpitations    - with some chest pain    - likely 2/2 anxiety  check ekg  check trops

## 2022-06-22 NOTE — DISCHARGE NOTE NURSING/CASE MANAGEMENT/SOCIAL WORK - PATIENT PORTAL LINK FT
You can access the FollowMyHealth Patient Portal offered by [facility] by registering at the following website: [email or web address]. By joining [facility]’s FollowMyHealth portal, you will also be able to view your health information using other applications (apps) compatible with our system.

## 2022-06-22 NOTE — PHYSICAL THERAPY INITIAL EVALUATION ADULT - PERTINENT HX OF CURRENT PROBLEM, REHAB EVAL
41 yo F with PMHx of depression, anxiety, bipolar disorder, chronic LBP presents to the ED c/o progressively worsening back pain x 3 days. Patient states that she was sitting in a recliner on Saturday. She suddenly got up and felt a "pulling" sensation to the lower back like she pulled a muscle. She then stated that she had began to have pain to the left lower back radiating to her buttocks and into groin.

## 2022-06-22 NOTE — DISCHARGE NOTE NURSING/CASE MANAGEMENT/SOCIAL WORK - NSDCPEFALRISK_GEN_ALL_CORE
For information on Fall & Injury Prevention, visit: [email or web address] OR  [email or web address] OR  https://www.cdc.gov/steadi/patient.html

## 2022-06-22 NOTE — PROGRESS NOTE ADULT - SUBJECTIVE AND OBJECTIVE BOX
Neurology Progress Note    S: Patient seen and examined. No new events overnight. patient denied CP, SOB, HA; c/o back pain. refusing psych meds     Medication:  amphetamine/dextroamphetamine 10 milliGRAM(s) Oral daily  clonazePAM  Tablet 1 milliGRAM(s) Oral four times a day  DULoxetine 120 milliGRAM(s) Oral daily  enoxaparin Injectable 40 milliGRAM(s) SubCutaneous every 24 hours  FLUoxetine 80 milliGRAM(s) Oral daily  gabapentin 600 milliGRAM(s) Oral two times a day  influenza   Vaccine 0.5 milliLiter(s) IntraMuscular once  lamoTRIgine 400 milliGRAM(s) Oral at bedtime  traMADol 25 milliGRAM(s) Oral three times a day PRN  traZODone 250 milliGRAM(s) Oral daily  venlafaxine XR. 300 milliGRAM(s) Oral at bedtime      Vitals:  Vital Signs Last 24 Hrs  T(C): 37.3 (22 Jun 2022 16:57), Max: 37.3 (22 Jun 2022 16:57)  T(F): 99.1 (22 Jun 2022 16:57), Max: 99.1 (22 Jun 2022 16:57)  HR: 75 (22 Jun 2022 16:57) (73 - 84)  BP: 135/79 (22 Jun 2022 16:57) (97/62 - 135/79)  BP(mean): --  RR: 18 (22 Jun 2022 16:57) (18 - 18)  SpO2: 95% (22 Jun 2022 16:57) (95% - 100%)    General Exam:   General Appearance: Appropriately dressed and in no acute distress       Head: Normocephalic, atraumatic and no dysmorphic features  Ear, Nose, and Throat: Moist mucous membranes  CVS: S1S2+  Resp: No SOB, no wheeze or rhonchi  Abd: soft NTND  Extremities: No edema, no cyanosis  Skin: No bruises, no rashes     Neurological Exam:  Mental Status: Awake, alert and oriented x 3.  Able to follow simple and complex verbal commands. Able to name and repeat. fluent speech. No obvious aphasia or dysarthria noted.   Cranial Nerves: PERRL, EOMI, VFFC, sensation V1-V3 intact,  no obvious facial asymmetry, equal elevation of palate, scm/trap 5/5, tongue is midline on protrusion. no obvious papilledema on fundoscopic exam. hearing is grossly intact.   Motor: Normal bulk, tone and strength throughout uppers.  bilateral lowers at least 4/5. some pain limitation L>R. + SLR   Sensation: Intact to light touch and pinprick throughout. no right/left confusion. no extinction to tactile on DSS.    Reflexes: 1+ throughout at biceps, brachioradialis, triceps, patellars and ankles bilaterally and equal. No clonus. R toe and L toe were both downgoing.  Coordination: No dysmetria on FNF   Gait: deferred  I personally reviewed the below data/images/labs:      CBC Full  -  ( 21 Jun 2022 00:46 )  WBC Count : 11.10 K/uL  RBC Count : 4.10 M/uL  Hemoglobin : 11.2 g/dL  Hematocrit : 35.3 %  Platelet Count - Automated : 391 K/uL  Mean Cell Volume : 86.1 fl  Mean Cell Hemoglobin : 27.3 pg  Mean Cell Hemoglobin Concentration : 31.7 gm/dL  Auto Neutrophil # : 6.65 K/uL  Auto Lymphocyte # : 3.50 K/uL  Auto Monocyte # : 0.67 K/uL  Auto Eosinophil # : 0.16 K/uL  Auto Basophil # : 0.07 K/uL  Auto Neutrophil % : 60.0 %  Auto Lymphocyte % : 31.5 %  Auto Monocyte % : 6.0 %  Auto Eosinophil % : 1.4 %  Auto Basophil % : 0.6 %    06-21    137  |  103  |  15  ----------------------------<  79  3.8   |  24  |  0.77    Ca    9.4      21 Jun 2022 00:46    TPro  7.4  /  Alb  4.5  /  TBili  0.2  /  DBili  x   /  AST  13  /  ALT  14  /  AlkPhos  73  06-21    LIVER FUNCTIONS - ( 21 Jun 2022 00:46 )  Alb: 4.5 g/dL / Pro: 7.4 g/dL / ALK PHOS: 73 U/L / ALT: 14 U/L / AST: 13 U/L / GGT: x               < from: MR Lumbar Spine No Cont (10.17.20 @ 11:24) >    EXAM:  MR SPINE LUMBAR                            PROCEDURE DATE:  10/17/2020            INTERPRETATION:  Clinical indications: Left lower extremity radiculopathy, back pain.    MRI of the lumbar spine was performed using sagittal T1-T2 and STIR sequence. Axial T1 and T2-weighted sequences performed as well.    This exam is compared with prior MRI lumbar spine performed on May 16, 2020.    Loss of the normal lumbar lordosis is again seen.    Postop changes compatible with bilateral laminectomy is seen at the L2-3 level with posterior spinal fusion identified as well. The metallic hardware appears adequately placed and unchanged when compared with the prior exam.    Disc desiccation is seen involving the L2-3 L3-4 and L4-5 levels which are secondary to degenerative changes.    T12-L1: Normal    L1-2: Normal    L2-3: Normal    L3-4: Disc bulge and bilateral hypertrophic facet changes are seen. Mild narrowing of the spinal canal.    L4-5: Disc bulge and bilateral hypertrophic facet joint changes are again are seen. Mild to moderate narrowing of the spinal canal.    L5-S1: Bilateral hypertrophic facet changes are seen with disc bulge. No significant compromise of the spinal canal is seen.    The conus ends at L1 and appears normal    Evaluation of the paraspinal soft tissues appear normal    IMPRESSION: Postop changes involving the L2-3 level is again seen and unchanged.    Degenerative changes as described above.            
Patient is a 42y old  Female who presents with a chief complaint of back pain (22 Jun 2022 13:01)    Date of servie : 06-22-22 @ 16:11  INTERVAL HPI/OVERNIGHT EVENTS:  T(C): 37.1 (06-22-22 @ 11:50), Max: 37.1 (06-22-22 @ 11:50)  HR: 84 (06-22-22 @ 11:50) (73 - 90)  BP: 100/65 (06-22-22 @ 11:50) (97/62 - 100/65)  RR: 18 (06-22-22 @ 11:50) (18 - 18)  SpO2: 95% (06-22-22 @ 11:50) (95% - 100%)  Wt(kg): --  I&O's Summary    22 Jun 2022 07:01  -  22 Jun 2022 16:11  --------------------------------------------------------  IN: 480 mL / OUT: 0 mL / NET: 480 mL        LABS:                        11.2   11.10 )-----------( 391      ( 21 Jun 2022 00:46 )             35.3     06-21    137  |  103  |  15  ----------------------------<  79  3.8   |  24  |  0.77    Ca    9.4      21 Jun 2022 00:46    TPro  7.4  /  Alb  4.5  /  TBili  0.2  /  DBili  x   /  AST  13  /  ALT  14  /  AlkPhos  73  06-21        CAPILLARY BLOOD GLUCOSE                MEDICATIONS  (STANDING):  amphetamine/dextroamphetamine 10 milliGRAM(s) Oral daily  clonazePAM  Tablet 1 milliGRAM(s) Oral four times a day  DULoxetine 120 milliGRAM(s) Oral daily  enoxaparin Injectable 40 milliGRAM(s) SubCutaneous every 24 hours  FLUoxetine 80 milliGRAM(s) Oral daily  gabapentin 600 milliGRAM(s) Oral two times a day  influenza   Vaccine 0.5 milliLiter(s) IntraMuscular once  lamoTRIgine 400 milliGRAM(s) Oral at bedtime  traZODone 250 milliGRAM(s) Oral daily  venlafaxine XR. 300 milliGRAM(s) Oral at bedtime    MEDICATIONS  (PRN):  traMADol 25 milliGRAM(s) Oral three times a day PRN Moderate Pain (4 - 6)          PHYSICAL EXAM:  GENERAL: NAD, well-groomed, well-developed  HEAD:  Atraumatic, Normocephalic  CHEST/LUNG: Clear to percussion bilaterally; No rales, rhonchi, wheezing, or rubs  HEART: Regular rate and rhythm; No murmurs, rubs, or gallops  ABDOMEN: Soft, Nontender, Nondistended; Bowel sounds present  EXTREMITIES:  2+ Peripheral Pulses, No clubbing, cyanosis, or edema  LYMPH: No lymphadenopathy noted  SKIN: No rashes or lesions    Care Discussed with Consultants/Other Providers [ ] YES  [ ] NO
Subjective: Patient seen and examined. No new events except as noted.     REVIEW OF SYSTEMS:    CONSTITUTIONAL: + weakness, fevers or chills  EYES/ENT: No visual changes;  No vertigo or throat pain   NECK: No pain or stiffness  RESPIRATORY: No cough, wheezing, hemoptysis; No shortness of breath  CARDIOVASCULAR: No chest pain or palpitations  GASTROINTESTINAL: No abdominal or epigastric pain. No nausea, vomiting, or hematemesis; No diarrhea or constipation. No melena or hematochezia.  GENITOURINARY: No dysuria, frequency or hematuria  NEUROLOGICAL: No numbness or weakness  SKIN: No itching, burning, rashes, or lesions   All other review of systems is negative unless indicated above.    MEDICATIONS:  MEDICATIONS  (STANDING):  amphetamine/dextroamphetamine 10 milliGRAM(s) Oral daily  clonazePAM  Tablet 1 milliGRAM(s) Oral four times a day  DULoxetine 120 milliGRAM(s) Oral daily  enoxaparin Injectable 40 milliGRAM(s) SubCutaneous every 24 hours  FLUoxetine 80 milliGRAM(s) Oral daily  gabapentin 600 milliGRAM(s) Oral two times a day  influenza   Vaccine 0.5 milliLiter(s) IntraMuscular once  lamoTRIgine 400 milliGRAM(s) Oral at bedtime  traZODone 250 milliGRAM(s) Oral daily  venlafaxine XR. 300 milliGRAM(s) Oral at bedtime    PHYSICAL EXAM:  T(C): 36.7 (06-22-22 @ 05:25), Max: 36.7 (06-21-22 @ 19:33)  HR: 77 (06-22-22 @ 05:25) (73 - 90)  BP: 97/62 (06-22-22 @ 05:25) (97/62 - 114/63)  RR: 18 (06-22-22 @ 05:25) (18 - 18)  SpO2: 96% (06-22-22 @ 05:25) (96% - 100%)  Wt(kg): --  I&O's Summary    Appearance: NAD, obese  HEENT: Normal oral mucosa, PERRL, EOMI	  Lymphatic: No lymphadenopathy , no edema  Cardiovascular: Normal S1 S2, No JVD, No murmurs , Peripheral pulses palpable 2+ bilaterally  Respiratory: Lungs clear to auscultation, normal effort 	  Gastrointestinal:  Soft, Non-tender, + BS	  Skin: No rashes, No ecchymoses, No cyanosis, warm to touch  Musculoskeletal: Normal range of motion, normal strength  Psychiatry:  Mood & affect appropriate  Ext: No edema    LABS:    CARDIAC MARKERS:                      11.2   11.10 )-----------( 391      ( 21 Jun 2022 00:46 )             35.3     06-21    137  |  103  |  15  ----------------------------<  79  3.8   |  24  |  0.77    Ca    9.4      21 Jun 2022 00:46    TPro  7.4  /  Alb  4.5  /  TBili  0.2  /  DBili  x   /  AST  13  /  ALT  14  /  AlkPhos  73  06-21    proBNP:   Lipid Profile:   HgA1c:   TSH:     TELEMETRY: 	    ECG:  	  RADIOLOGY:   DIAGNOSTIC TESTING:  [ ] Echocardiogram:  [ ]  Catheterization:  [ ] Stress Test:    OTHER:

## 2022-06-22 NOTE — DISCHARGE NOTE PROVIDER - NSDCMRMEDTOKEN_GEN_ALL_CORE_FT
Adderall 10 mg oral tablet: 1 tab(s) orally once a day  Aleve Back and Muscle Pain 220 mg oral tablet: 2 tab(s) orally once a day  DULoxetine 60 mg oral delayed release capsule: 2 cap(s) orally once a day in the AM  Effexor  mg oral capsule, extended release: 2 cap(s) orally once a day (at bedtime)  FLUoxetine 20 mg oral capsule: 4 cap(s) orally once a day  gabapentin 300 mg oral capsule: 2 cap(s) orally 2 times a day  KlonoPIN 1 mg oral tablet: 1 tab(s) orally 4 times a day  lamoTRIgine 200 mg oral tablet: 2 tab(s) orally once a day at bedtime  traZODone: 250 milligram(s) orally once a day (at bedtime)  Vyvanse 70 mg oral capsule: 1 cap(s) orally once a day (in the morning)

## 2022-06-22 NOTE — PHYSICAL THERAPY INITIAL EVALUATION ADULT - ADDITIONAL COMMENTS
pt lives in private home, 3 steps to enter +HR, states she will be alone. Prior to admission, pt was I with all functional mobility and ADLs without AD. +.

## 2022-06-22 NOTE — PROGRESS NOTE ADULT - ASSESSMENT
41 y/o obese  female with depression, anxiety, bipolar disorder, chronic low back pain (was seeing Dr. Kerns) s/p L2/3 fusion , ADD, GERD, anemia s/p bariatric sx presents to the ED complaining of back pain x 3 days mostly on L radiating down leg. no incontinence no falls.  patient is emotional in ED.   + SLR on L  most recent MRI L spine 10/2020  with degenerative changes and post op changes L2/3     Impression: Acute on Chronic LBP  - pain management; some drug seeking behavior in ED.  requesting dilaudid with tramadal  - consider trial of steroids if no objection decadron 4q6 x 24hrs then medrol dose pack   - psych eval.  on adderall 10, cymbalta 120, clonazepam 1mg QID, prozac 80 , lamotrigine 400 QHS, trazadone 250 daily, venlafaxine 300mg QHS; concern for polypharmacy, consider psych eval for furhter management.  patient emotional in ED   - for pain already on cymbalta 120 daily and gabapentin 600mg BID   - PT/OT  - can hold off MRI L spine for now.  acute on chronic pain. no neuro changes   - can call neurosurgery. has seen Dr. Kerns in past for L2/3 fusion   - check FS, glucose control <180  - GI/DVT ppx  - Counseling on diet, exercise, and medication adherence was done  - Counseling on smoking cessation and alcohol consumption offered when appropriate.  - Pain assessed and judicious use of narcotics when appropriate was discussed.    - Stroke education given when appropriate.  - Importance of fall prevention discussed.   - Differential diagnosis and plan of care discussed with patient and/or family and primary team  - Thank you for allowing me to participate in the care of this patient. Call with questions.   - d/c plan   Tommy Sanchez MD  Vascular Neurology  Office: [phone]     
41 y/o female with PMHx of depression, anxiety, bipolar disorder, chronic low back pain presents to the ED complaining of back pain x 3 days. 
43 y/o female with PMHx of depression, anxiety, bipolar disorder, chronic low back pain presents to the ED complaining of back pain x 3 days. Patient states that she was sitting in a recliner on Saturday. She suddenly got up and felt a "pulling" sensation to the lower back like she pulled a muscle. She then stated that she had began to have pain to the left lower back which radiated over her buttocks and into the groin. States that since onset pain has gotten progressively worse. She states that she has been taking Alleve without any relief. Last dose was early this AM. Denies any inciting injury or trauma. Denies any recent illness, headache, fever, chills, chest pain, SOB, cough, abdominal pain, n/v/d. No hx of IVDA, incontinence of urine or stool, or saddle anesthesia.    1 Back pain  - likely muskuskeletal  - pain control  - neuro called     2 Bipolar, depression, anxiety  - on a lot of meds at high doses  - called psych to evaluate    Lovenox for DVT prophylaxis

## 2022-06-22 NOTE — DISCHARGE NOTE PROVIDER - NSDCCPCAREPLAN_GEN_ALL_CORE_FT
PRINCIPAL DISCHARGE DIAGNOSIS  Diagnosis: Lumbago  Assessment and Plan of Treatment: continieu with home medications        SECONDARY DISCHARGE DIAGNOSES  Diagnosis: Anxiety and depression  Assessment and Plan of Treatment: continue with home medications     PRINCIPAL DISCHARGE DIAGNOSIS  Diagnosis: Lumbago  Assessment and Plan of Treatment: continue with home medications        SECONDARY DISCHARGE DIAGNOSES  Diagnosis: Anxiety and depression  Assessment and Plan of Treatment: continue with home medications

## 2022-07-21 ENCOUNTER — NON-APPOINTMENT (OUTPATIENT)
Age: 42
End: 2022-07-21

## 2022-07-22 ENCOUNTER — APPOINTMENT (OUTPATIENT)
Dept: ENDOCRINOLOGY | Facility: CLINIC | Age: 42
End: 2022-07-22

## 2022-07-22 PROCEDURE — 99213 OFFICE O/P EST LOW 20 MIN: CPT | Mod: 95

## 2022-07-25 NOTE — HISTORY OF PRESENT ILLNESS
[Home] : at home, [unfilled] , at the time of the visit. [Medical Office: ([facility])___] : at the medical office located in  [Verbal consent obtained from patient] : the patient, [unfilled] [FreeTextEntry1] : This is a 43 yo female with bipolar, ADHD who presents for follow up of weight gain. \par \par She was last seen in endocrinology clinic in Oct 2021 and lost to follow up since then. She has h/o gastric sleeve surgery and lost 100lbs. Unfortunately, band slipped off and it was then removed in 2017, and has noticed some fluctuations in her weight. She is following with psychiatry for bipolar and ADHD and no new medications are noted today.\par \par She has been taking Metformin 500mg po BID but notes she ran out of medication. She was also on Rybelsus however stopped due to fatigue and nuasea. \par She is worried today because she has noticed yellowing of her hands since past few days. She notes she was recently admitted in hospital and was told reportedly that she has a normal bilirubin. Pt stated she will fax over labs from hospitalization to us. She has not seen gastroenterologist for this. She denies itching, rash, she denies jaundiced skin elsewhere on her body and denies yellowing of her eyes.\par \par At last endocrine visit in October 2021, she was informed she did not likely have adrenal insufficiency as morning cortisol level was normal. She was also advised at last visit that levothyroxine is not used as a weight loss medication if her TFTs are normal and noted to have negaitve thyroid antibodies.

## 2022-07-25 NOTE — ASSESSMENT
[Diabetic Medications] : Risks and benefits of diabetic medications were discussed [FreeTextEntry1] : Patient follows up for telehealth visit for weight management, has been lost to follow up with endocrinology since October 2021\par She has h/o gastric sleeve surgery in 2015 and lost 100lbs. She had gastric band placed in 2016. Unfortunately, band slipped off and it was then removed in 2017, and has noticed some fluctuations in her weight. \par She has been taking Metformin 500mg po BID but she ran out of medication.\par Previously on Rybelsus but stopped due to noted intolerance due to fatigue and nausea. \par She is following with psychiatry for bipolar and ADHD and no new medications are noted today.\par Given positive reinforcement regarding weight loss, emotional support was provided during this encounter\par Refilled Metformin 500mg po BID\par \par Due to complaint of jaundice on her hands, will do CMP including Tbili and Dbili, discussed w/ patient further workup and potential referral to GI, pending results\par Of note, she stated her bilirubin levels were "normal" during recent hospitalization, but no records to review today\par Advised to send records over to this office.\par \par Answered all questions today; patient verbalized understanding of the above\par RTC in 3 months\par \par \par

## 2022-07-26 DIAGNOSIS — E55.9 VITAMIN D DEFICIENCY, UNSPECIFIED: ICD-10-CM

## 2022-08-08 ENCOUNTER — LABORATORY RESULT (OUTPATIENT)
Age: 42
End: 2022-08-08

## 2022-08-08 ENCOUNTER — APPOINTMENT (OUTPATIENT)
Dept: ORTHOPEDIC SURGERY | Facility: CLINIC | Age: 42
End: 2022-08-08

## 2022-08-08 VITALS — HEIGHT: 63 IN | WEIGHT: 250 LBS | BODY MASS INDEX: 44.3 KG/M2

## 2022-08-08 PROCEDURE — 72100 X-RAY EXAM L-S SPINE 2/3 VWS: CPT

## 2022-08-08 PROCEDURE — 99214 OFFICE O/P EST MOD 30 MIN: CPT

## 2022-08-08 RX ORDER — CYCLOBENZAPRINE HYDROCHLORIDE 10 MG/1
10 TABLET, FILM COATED ORAL 3 TIMES DAILY
Qty: 30 | Refills: 0 | Status: ACTIVE | COMMUNITY
Start: 2022-08-08 | End: 1900-01-01

## 2022-08-13 ENCOUNTER — INPATIENT (INPATIENT)
Facility: HOSPITAL | Age: 42
LOS: 3 days | Discharge: HOME | DRG: 373 | End: 2022-08-17
Attending: SURGERY | Admitting: SURGERY
Payer: MEDICARE

## 2022-08-13 VITALS
RESPIRATION RATE: 18 BRPM | HEART RATE: 103 BPM | OXYGEN SATURATION: 99 % | HEIGHT: 62 IN | TEMPERATURE: 97 F | DIASTOLIC BLOOD PRESSURE: 86 MMHG | SYSTOLIC BLOOD PRESSURE: 126 MMHG

## 2022-08-13 DIAGNOSIS — Z98.89 OTHER SPECIFIED POSTPROCEDURAL STATES: Chronic | ICD-10-CM

## 2022-08-13 DIAGNOSIS — Z98.84 BARIATRIC SURGERY STATUS: Chronic | ICD-10-CM

## 2022-08-13 DIAGNOSIS — K37 UNSPECIFIED APPENDICITIS: ICD-10-CM

## 2022-08-13 DIAGNOSIS — F98.8 OTHER SPECIFIED BEHAVIORAL AND EMOTIONAL DISORDERS WITH ONSET USUALLY OCCURRING IN CHILDHOOD AND ADOLESCENCE: ICD-10-CM

## 2022-08-13 DIAGNOSIS — Z90.49 ACQUIRED ABSENCE OF OTHER SPECIFIED PARTS OF DIGESTIVE TRACT: Chronic | ICD-10-CM

## 2022-08-13 DIAGNOSIS — K35.32 ACUTE APPENDICITIS WITH PERFORATION, LOCALIZED PERITONITIS, AND GANGRENE, WITHOUT ABSCESS: ICD-10-CM

## 2022-08-13 DIAGNOSIS — M67.432 GANGLION, LEFT WRIST: Chronic | ICD-10-CM

## 2022-08-13 DIAGNOSIS — N84.0 POLYP OF CORPUS UTERI: Chronic | ICD-10-CM

## 2022-08-13 LAB
ALBUMIN SERPL ELPH-MCNC: 3.4 G/DL — SIGNIFICANT CHANGE UP (ref 3.3–5)
ALP SERPL-CCNC: 173 U/L — HIGH (ref 40–120)
ALT FLD-CCNC: 47 U/L — SIGNIFICANT CHANGE UP (ref 12–78)
ANION GAP SERPL CALC-SCNC: 6 MMOL/L — SIGNIFICANT CHANGE UP (ref 5–17)
ANION GAP SERPL CALC-SCNC: 9 MMOL/L — SIGNIFICANT CHANGE UP (ref 5–17)
AST SERPL-CCNC: 36 U/L — SIGNIFICANT CHANGE UP (ref 15–37)
BASOPHILS # BLD AUTO: 0 K/UL — SIGNIFICANT CHANGE UP (ref 0–0.2)
BASOPHILS NFR BLD AUTO: 0 % — SIGNIFICANT CHANGE UP (ref 0–2)
BILIRUB SERPL-MCNC: 0.6 MG/DL — SIGNIFICANT CHANGE UP (ref 0.2–1.2)
BLD GP AB SCN SERPL QL: SIGNIFICANT CHANGE UP
BUN SERPL-MCNC: 12 MG/DL — SIGNIFICANT CHANGE UP (ref 7–23)
BUN SERPL-MCNC: 7 MG/DL — SIGNIFICANT CHANGE UP (ref 7–23)
CALCIUM SERPL-MCNC: 8.7 MG/DL — SIGNIFICANT CHANGE UP (ref 8.5–10.1)
CALCIUM SERPL-MCNC: 9.1 MG/DL — SIGNIFICANT CHANGE UP (ref 8.5–10.1)
CHLORIDE SERPL-SCNC: 104 MMOL/L — SIGNIFICANT CHANGE UP (ref 96–108)
CHLORIDE SERPL-SCNC: 107 MMOL/L — SIGNIFICANT CHANGE UP (ref 96–108)
CO2 SERPL-SCNC: 23 MMOL/L — SIGNIFICANT CHANGE UP (ref 22–31)
CO2 SERPL-SCNC: 26 MMOL/L — SIGNIFICANT CHANGE UP (ref 22–31)
CREAT SERPL-MCNC: 0.58 MG/DL — SIGNIFICANT CHANGE UP (ref 0.5–1.3)
CREAT SERPL-MCNC: 0.71 MG/DL — SIGNIFICANT CHANGE UP (ref 0.5–1.3)
EGFR: 109 ML/MIN/1.73M2 — SIGNIFICANT CHANGE UP
EGFR: 116 ML/MIN/1.73M2 — SIGNIFICANT CHANGE UP
EOSINOPHIL # BLD AUTO: 0.24 K/UL — SIGNIFICANT CHANGE UP (ref 0–0.5)
EOSINOPHIL NFR BLD AUTO: 1 % — SIGNIFICANT CHANGE UP (ref 0–6)
GLUCOSE SERPL-MCNC: 105 MG/DL — HIGH (ref 70–99)
GLUCOSE SERPL-MCNC: 93 MG/DL — SIGNIFICANT CHANGE UP (ref 70–99)
HCG SERPL-ACNC: <1 MIU/ML — SIGNIFICANT CHANGE UP
HCT VFR BLD CALC: 32.7 % — LOW (ref 34.5–45)
HCT VFR BLD CALC: 34.1 % — LOW (ref 34.5–45)
HGB BLD-MCNC: 10.6 G/DL — LOW (ref 11.5–15.5)
HGB BLD-MCNC: 10.6 G/DL — LOW (ref 11.5–15.5)
LACTATE SERPL-SCNC: 0.7 MMOL/L — SIGNIFICANT CHANGE UP (ref 0.7–2)
LACTATE SERPL-SCNC: 0.9 MMOL/L — SIGNIFICANT CHANGE UP (ref 0.7–2)
LYMPHOCYTES # BLD AUTO: 1.47 K/UL — SIGNIFICANT CHANGE UP (ref 1–3.3)
LYMPHOCYTES # BLD AUTO: 6 % — LOW (ref 13–44)
MCHC RBC-ENTMCNC: 27 PG — SIGNIFICANT CHANGE UP (ref 27–34)
MCHC RBC-ENTMCNC: 27.6 PG — SIGNIFICANT CHANGE UP (ref 27–34)
MCHC RBC-ENTMCNC: 31.1 GM/DL — LOW (ref 32–36)
MCHC RBC-ENTMCNC: 32.4 GM/DL — SIGNIFICANT CHANGE UP (ref 32–36)
MCV RBC AUTO: 85.2 FL — SIGNIFICANT CHANGE UP (ref 80–100)
MCV RBC AUTO: 86.8 FL — SIGNIFICANT CHANGE UP (ref 80–100)
MONOCYTES # BLD AUTO: 2.2 K/UL — HIGH (ref 0–0.9)
MONOCYTES NFR BLD AUTO: 9 % — SIGNIFICANT CHANGE UP (ref 2–14)
NEUTROPHILS # BLD AUTO: 20.53 K/UL — HIGH (ref 1.8–7.4)
NEUTROPHILS NFR BLD AUTO: 83 % — HIGH (ref 43–77)
NRBC # BLD: 0 /100 WBCS — SIGNIFICANT CHANGE UP (ref 0–0)
NRBC # BLD: SIGNIFICANT CHANGE UP /100 WBCS (ref 0–0)
PLATELET # BLD AUTO: 389 K/UL — SIGNIFICANT CHANGE UP (ref 150–400)
PLATELET # BLD AUTO: 402 K/UL — HIGH (ref 150–400)
POTASSIUM SERPL-MCNC: 3.6 MMOL/L — SIGNIFICANT CHANGE UP (ref 3.5–5.3)
POTASSIUM SERPL-MCNC: 4 MMOL/L — SIGNIFICANT CHANGE UP (ref 3.5–5.3)
POTASSIUM SERPL-SCNC: 3.6 MMOL/L — SIGNIFICANT CHANGE UP (ref 3.5–5.3)
POTASSIUM SERPL-SCNC: 4 MMOL/L — SIGNIFICANT CHANGE UP (ref 3.5–5.3)
PROT SERPL-MCNC: 7.7 G/DL — SIGNIFICANT CHANGE UP (ref 6–8.3)
RBC # BLD: 3.84 M/UL — SIGNIFICANT CHANGE UP (ref 3.8–5.2)
RBC # BLD: 3.93 M/UL — SIGNIFICANT CHANGE UP (ref 3.8–5.2)
RBC # FLD: 15.5 % — HIGH (ref 10.3–14.5)
RBC # FLD: 15.5 % — HIGH (ref 10.3–14.5)
SARS-COV-2 RNA SPEC QL NAA+PROBE: SIGNIFICANT CHANGE UP
SODIUM SERPL-SCNC: 136 MMOL/L — SIGNIFICANT CHANGE UP (ref 135–145)
SODIUM SERPL-SCNC: 139 MMOL/L — SIGNIFICANT CHANGE UP (ref 135–145)
WBC # BLD: 23.11 K/UL — HIGH (ref 3.8–10.5)
WBC # BLD: 24.44 K/UL — HIGH (ref 3.8–10.5)
WBC # FLD AUTO: 23.11 K/UL — HIGH (ref 3.8–10.5)
WBC # FLD AUTO: 24.44 K/UL — HIGH (ref 3.8–10.5)

## 2022-08-13 PROCEDURE — 74177 CT ABD & PELVIS W/CONTRAST: CPT | Mod: 26,MA

## 2022-08-13 PROCEDURE — 99285 EMERGENCY DEPT VISIT HI MDM: CPT

## 2022-08-13 RX ORDER — SODIUM CHLORIDE 9 MG/ML
1000 INJECTION INTRAMUSCULAR; INTRAVENOUS; SUBCUTANEOUS ONCE
Refills: 0 | Status: COMPLETED | OUTPATIENT
Start: 2022-08-13 | End: 2022-08-13

## 2022-08-13 RX ORDER — DEXTROAMPHETAMINE SACCHARATE, AMPHETAMINE ASPARTATE, DEXTROAMPHETAMINE SULFATE AND AMPHETAMINE SULFATE 1.875; 1.875; 1.875; 1.875 MG/1; MG/1; MG/1; MG/1
30 TABLET ORAL
Refills: 0 | Status: DISCONTINUED | OUTPATIENT
Start: 2022-08-13 | End: 2022-08-17

## 2022-08-13 RX ORDER — PIPERACILLIN AND TAZOBACTAM 4; .5 G/20ML; G/20ML
3.38 INJECTION, POWDER, LYOPHILIZED, FOR SOLUTION INTRAVENOUS ONCE
Refills: 0 | Status: COMPLETED | OUTPATIENT
Start: 2022-08-13 | End: 2022-08-13

## 2022-08-13 RX ORDER — SODIUM CHLORIDE 9 MG/ML
1000 INJECTION, SOLUTION INTRAVENOUS
Refills: 0 | Status: DISCONTINUED | OUTPATIENT
Start: 2022-08-13 | End: 2022-08-17

## 2022-08-13 RX ORDER — FLUOXETINE HCL 10 MG
80 CAPSULE ORAL DAILY
Refills: 0 | Status: DISCONTINUED | OUTPATIENT
Start: 2022-08-13 | End: 2022-08-16

## 2022-08-13 RX ORDER — CLONAZEPAM 1 MG
1 TABLET ORAL
Refills: 0 | Status: DISCONTINUED | OUTPATIENT
Start: 2022-08-13 | End: 2022-08-17

## 2022-08-13 RX ORDER — HYDROMORPHONE HYDROCHLORIDE 2 MG/ML
1 INJECTION INTRAMUSCULAR; INTRAVENOUS; SUBCUTANEOUS ONCE
Refills: 0 | Status: DISCONTINUED | OUTPATIENT
Start: 2022-08-13 | End: 2022-08-13

## 2022-08-13 RX ORDER — PIPERACILLIN AND TAZOBACTAM 4; .5 G/20ML; G/20ML
3.38 INJECTION, POWDER, LYOPHILIZED, FOR SOLUTION INTRAVENOUS EVERY 8 HOURS
Refills: 0 | Status: DISCONTINUED | OUTPATIENT
Start: 2022-08-13 | End: 2022-08-16

## 2022-08-13 RX ORDER — HYDROMORPHONE HYDROCHLORIDE 2 MG/ML
0.5 INJECTION INTRAMUSCULAR; INTRAVENOUS; SUBCUTANEOUS EVERY 4 HOURS
Refills: 0 | Status: DISCONTINUED | OUTPATIENT
Start: 2022-08-13 | End: 2022-08-13

## 2022-08-13 RX ORDER — DULOXETINE HYDROCHLORIDE 30 MG/1
120 CAPSULE, DELAYED RELEASE ORAL DAILY
Refills: 0 | Status: DISCONTINUED | OUTPATIENT
Start: 2022-08-13 | End: 2022-08-16

## 2022-08-13 RX ORDER — HYDROMORPHONE HYDROCHLORIDE 2 MG/ML
1.5 INJECTION INTRAMUSCULAR; INTRAVENOUS; SUBCUTANEOUS
Refills: 0 | Status: DISCONTINUED | OUTPATIENT
Start: 2022-08-13 | End: 2022-08-17

## 2022-08-13 RX ORDER — VENLAFAXINE HCL 75 MG
300 CAPSULE, EXT RELEASE 24 HR ORAL DAILY
Refills: 0 | Status: DISCONTINUED | OUTPATIENT
Start: 2022-08-13 | End: 2022-08-16

## 2022-08-13 RX ORDER — ONDANSETRON 8 MG/1
4 TABLET, FILM COATED ORAL ONCE
Refills: 0 | Status: COMPLETED | OUTPATIENT
Start: 2022-08-13 | End: 2022-08-13

## 2022-08-13 RX ORDER — HYDROMORPHONE HYDROCHLORIDE 2 MG/ML
1 INJECTION INTRAMUSCULAR; INTRAVENOUS; SUBCUTANEOUS
Refills: 0 | Status: DISCONTINUED | OUTPATIENT
Start: 2022-08-13 | End: 2022-08-13

## 2022-08-13 RX ORDER — SODIUM CHLORIDE 9 MG/ML
1000 INJECTION, SOLUTION INTRAVENOUS
Refills: 0 | Status: DISCONTINUED | OUTPATIENT
Start: 2022-08-13 | End: 2022-08-13

## 2022-08-13 RX ORDER — TRAZODONE HCL 50 MG
200 TABLET ORAL AT BEDTIME
Refills: 0 | Status: DISCONTINUED | OUTPATIENT
Start: 2022-08-13 | End: 2022-08-17

## 2022-08-13 RX ORDER — HYDROMORPHONE HYDROCHLORIDE 2 MG/ML
0.5 INJECTION INTRAMUSCULAR; INTRAVENOUS; SUBCUTANEOUS ONCE
Refills: 0 | Status: DISCONTINUED | OUTPATIENT
Start: 2022-08-13 | End: 2022-08-13

## 2022-08-13 RX ORDER — ONDANSETRON 8 MG/1
4 TABLET, FILM COATED ORAL EVERY 6 HOURS
Refills: 0 | Status: DISCONTINUED | OUTPATIENT
Start: 2022-08-13 | End: 2022-08-17

## 2022-08-13 RX ORDER — PANTOPRAZOLE SODIUM 20 MG/1
40 TABLET, DELAYED RELEASE ORAL DAILY
Refills: 0 | Status: DISCONTINUED | OUTPATIENT
Start: 2022-08-13 | End: 2022-08-17

## 2022-08-13 RX ORDER — LAMOTRIGINE 25 MG/1
400 TABLET, ORALLY DISINTEGRATING ORAL AT BEDTIME
Refills: 0 | Status: DISCONTINUED | OUTPATIENT
Start: 2022-08-13 | End: 2022-08-17

## 2022-08-13 RX ADMIN — HYDROMORPHONE HYDROCHLORIDE 1 MILLIGRAM(S): 2 INJECTION INTRAMUSCULAR; INTRAVENOUS; SUBCUTANEOUS at 05:38

## 2022-08-13 RX ADMIN — HYDROMORPHONE HYDROCHLORIDE 1.5 MILLIGRAM(S): 2 INJECTION INTRAMUSCULAR; INTRAVENOUS; SUBCUTANEOUS at 17:26

## 2022-08-13 RX ADMIN — HYDROMORPHONE HYDROCHLORIDE 0.5 MILLIGRAM(S): 2 INJECTION INTRAMUSCULAR; INTRAVENOUS; SUBCUTANEOUS at 01:23

## 2022-08-13 RX ADMIN — HYDROMORPHONE HYDROCHLORIDE 0.5 MILLIGRAM(S): 2 INJECTION INTRAMUSCULAR; INTRAVENOUS; SUBCUTANEOUS at 03:13

## 2022-08-13 RX ADMIN — SODIUM CHLORIDE 1000 MILLILITER(S): 9 INJECTION INTRAMUSCULAR; INTRAVENOUS; SUBCUTANEOUS at 02:24

## 2022-08-13 RX ADMIN — HYDROMORPHONE HYDROCHLORIDE 1 MILLIGRAM(S): 2 INJECTION INTRAMUSCULAR; INTRAVENOUS; SUBCUTANEOUS at 13:53

## 2022-08-13 RX ADMIN — SODIUM CHLORIDE 1000 MILLILITER(S): 9 INJECTION INTRAMUSCULAR; INTRAVENOUS; SUBCUTANEOUS at 01:23

## 2022-08-13 RX ADMIN — PIPERACILLIN AND TAZOBACTAM 25 GRAM(S): 4; .5 INJECTION, POWDER, LYOPHILIZED, FOR SOLUTION INTRAVENOUS at 10:18

## 2022-08-13 RX ADMIN — SODIUM CHLORIDE 1000 MILLILITER(S): 9 INJECTION INTRAMUSCULAR; INTRAVENOUS; SUBCUTANEOUS at 02:30

## 2022-08-13 RX ADMIN — PIPERACILLIN AND TAZOBACTAM 3.38 GRAM(S): 4; .5 INJECTION, POWDER, LYOPHILIZED, FOR SOLUTION INTRAVENOUS at 00:03

## 2022-08-13 RX ADMIN — HYDROMORPHONE HYDROCHLORIDE 0.5 MILLIGRAM(S): 2 INJECTION INTRAMUSCULAR; INTRAVENOUS; SUBCUTANEOUS at 02:58

## 2022-08-13 RX ADMIN — HYDROMORPHONE HYDROCHLORIDE 0.5 MILLIGRAM(S): 2 INJECTION INTRAMUSCULAR; INTRAVENOUS; SUBCUTANEOUS at 09:20

## 2022-08-13 RX ADMIN — SODIUM CHLORIDE 125 MILLILITER(S): 9 INJECTION, SOLUTION INTRAVENOUS at 13:40

## 2022-08-13 RX ADMIN — PIPERACILLIN AND TAZOBACTAM 200 GRAM(S): 4; .5 INJECTION, POWDER, LYOPHILIZED, FOR SOLUTION INTRAVENOUS at 02:26

## 2022-08-13 RX ADMIN — ONDANSETRON 4 MILLIGRAM(S): 8 TABLET, FILM COATED ORAL at 13:06

## 2022-08-13 RX ADMIN — HYDROMORPHONE HYDROCHLORIDE 1 MILLIGRAM(S): 2 INJECTION INTRAMUSCULAR; INTRAVENOUS; SUBCUTANEOUS at 10:17

## 2022-08-13 RX ADMIN — ONDANSETRON 4 MILLIGRAM(S): 8 TABLET, FILM COATED ORAL at 01:23

## 2022-08-13 RX ADMIN — HYDROMORPHONE HYDROCHLORIDE 1.5 MILLIGRAM(S): 2 INJECTION INTRAMUSCULAR; INTRAVENOUS; SUBCUTANEOUS at 17:41

## 2022-08-13 RX ADMIN — HYDROMORPHONE HYDROCHLORIDE 1.5 MILLIGRAM(S): 2 INJECTION INTRAMUSCULAR; INTRAVENOUS; SUBCUTANEOUS at 22:10

## 2022-08-13 RX ADMIN — HYDROMORPHONE HYDROCHLORIDE 1.5 MILLIGRAM(S): 2 INJECTION INTRAMUSCULAR; INTRAVENOUS; SUBCUTANEOUS at 21:48

## 2022-08-13 RX ADMIN — PANTOPRAZOLE SODIUM 40 MILLIGRAM(S): 20 TABLET, DELAYED RELEASE ORAL at 11:45

## 2022-08-13 RX ADMIN — HYDROMORPHONE HYDROCHLORIDE 1 MILLIGRAM(S): 2 INJECTION INTRAMUSCULAR; INTRAVENOUS; SUBCUTANEOUS at 06:08

## 2022-08-13 RX ADMIN — SODIUM CHLORIDE 100 MILLILITER(S): 9 INJECTION, SOLUTION INTRAVENOUS at 10:17

## 2022-08-13 RX ADMIN — HYDROMORPHONE HYDROCHLORIDE 1 MILLIGRAM(S): 2 INJECTION INTRAMUSCULAR; INTRAVENOUS; SUBCUTANEOUS at 14:08

## 2022-08-13 RX ADMIN — PIPERACILLIN AND TAZOBACTAM 25 GRAM(S): 4; .5 INJECTION, POWDER, LYOPHILIZED, FOR SOLUTION INTRAVENOUS at 17:27

## 2022-08-13 NOTE — H&P ADULT - PROBLEM SELECTOR PLAN 1
Admit  Pain control, supportive care  OOB, Ambulate as tolerated  NPO, IV fluids  IV Zosyn  Trend labs  Conservative management with abx, no OR at this time due to extensive inflammation seen on CT scan. Spoke with patient regarding plan, risks/benefits explained, all questions answered.   Discussed with Dr. Powell

## 2022-08-13 NOTE — ED PROVIDER NOTE - NS ED MD DISPO ADMITTING SERVICE
MED Please continue with the amoxicillin every 12 hours   Please follow up with your pediatrician    Please return if there is any vomiting, inability to keep the medication down  Please return if the pain/muffled hearing gets worse, if you notice any red/swelling of his ear    Please continue all other medications as prescribed     Ear Infection in Children    WHAT YOU NEED TO KNOW:    An ear infection is also called otitis media. Your child may have an ear infection in one or both ears. Your child may get an ear infection when his or her eustachian tubes become swollen or blocked. Eustachian tubes drain fluid away from the middle ear. Your child may have a buildup of fluid and pressure in his or her ear when he or she has an ear infection. The ear may become infected by germs. The germs grow easily in fluid trapped behind the eardrum.     DISCHARGE INSTRUCTIONS:    Seek care immediately if:    You see blood or pus draining from your child's ear.    Your child seems confused or cannot stay awake.    Your child has a stiff neck, headache, and a fever.    Contact your child's healthcare provider if:     Your child has a fever.    Your child is still not eating or drinking 24 hours after he or she takes medicine.    Your child has pain behind his or her ear or when you move the earlobe.    Your child's ear is sticking out from his or her head.    Your child still has signs and symptoms of an ear infection 48 hours after he or she takes medicine.    You have questions or concerns about your child's condition or care.    Medicines:    Medicines may be given to decrease your child's pain or fever, or to treat an infection caused by bacteria.    Do not give aspirin to children under 18 years of age. Your child could develop Reye syndrome if he takes aspirin. Reye syndrome can cause life-threatening brain and liver damage. Check your child's medicine labels for aspirin, salicylates, or oil of wintergreen.    Give your child's medicine as directed. Contact your child's healthcare provider if you think the medicine is not working as expected. Tell him or her if your child is allergic to any medicine. Keep a current list of the medicines, vitamins, and herbs your child takes. Include the amounts, and when, how, and why they are taken. Bring the list or the medicines in their containers to follow-up visits. Carry your child's medicine list with you in case of an emergency.    Care for your child at home:    Prop your older child's head and chest up while he or she sleeps. This may decrease ear pressure and pain. Ask your child's healthcare provider how to safely prop your child's head and chest up.      Have your child lie with his or her infected ear facing down to allow fluid to drain from the ear.    Use ice or heat to help decrease your child's ear pain. Ask which of these is best for your child, and use as directed.    Ask about ways to keep water out of your child's ears when he or she bathes or swims. Please continue with the amoxicillin every 12 hours for the full course (total 7 days, 14 doses including the ED dose)     Please continue all other medications as prescribed (azithromycin, prednisone, albuterol, budesonide)     Please follow up with your pediatrician    Please return if there is any vomiting, inability to keep the medication down  Please return if the pain/muffled hearing gets worse, if you notice any red/swelling of his ear    Ear Infection in Children    WHAT YOU NEED TO KNOW:    An ear infection is also called otitis media. Your child may have an ear infection in one or both ears. Your child may get an ear infection when his or her eustachian tubes become swollen or blocked. Eustachian tubes drain fluid away from the middle ear. Your child may have a buildup of fluid and pressure in his or her ear when he or she has an ear infection. The ear may become infected by germs. The germs grow easily in fluid trapped behind the eardrum.     DISCHARGE INSTRUCTIONS:    Seek care immediately if:    You see blood or pus draining from your child's ear.    Your child seems confused or cannot stay awake.    Your child has a stiff neck, headache, and a fever.    Contact your child's healthcare provider if:     Your child has a fever.    Your child is still not eating or drinking 24 hours after he or she takes medicine.    Your child has pain behind his or her ear or when you move the earlobe.    Your child's ear is sticking out from his or her head.    Your child still has signs and symptoms of an ear infection 48 hours after he or she takes medicine.    You have questions or concerns about your child's condition or care.    Medicines:    Medicines may be given to decrease your child's pain or fever, or to treat an infection caused by bacteria.    Do not give aspirin to children under 18 years of age. Your child could develop Reye syndrome if he takes aspirin. Reye syndrome can cause life-threatening brain and liver damage. Check your child's medicine labels for aspirin, salicylates, or oil of wintergreen.    Give your child's medicine as directed. Contact your child's healthcare provider if you think the medicine is not working as expected. Tell him or her if your child is allergic to any medicine. Keep a current list of the medicines, vitamins, and herbs your child takes. Include the amounts, and when, how, and why they are taken. Bring the list or the medicines in their containers to follow-up visits. Carry your child's medicine list with you in case of an emergency.    Care for your child at home:    Prop your older child's head and chest up while he or she sleeps. This may decrease ear pressure and pain. Ask your child's healthcare provider how to safely prop your child's head and chest up.      Have your child lie with his or her infected ear facing down to allow fluid to drain from the ear.    Use ice or heat to help decrease your child's ear pain. Ask which of these is best for your child, and use as directed.    Ask about ways to keep water out of your child's ears when he or she bathes or swims.

## 2022-08-13 NOTE — ED PROVIDER NOTE - CONSTITUTIONAL, MLM
Well appearing, awake, alert, oriented to person, place, time/situation and in severe distress. normal...

## 2022-08-13 NOTE — BH CONSULTATION LIAISON ASSESSMENT NOTE - NSBHCONSULTRECOMMENDOTHER_PSY_A_CORE FT
continue Adderall 10mg PO qd PRN (Pt may take it any time of the day), Vyvanse 70mg PO at 8am, Cymbalta 120mg PO qd, clonazepam 1mg PO PRN up to 4x/day, Prozac 80mg po qd , lamotrigine 400mg PO QHS, trazodone 250mg PO qhs (HOLD for sedation)  - Patient has chronic pain which has been treated with analgesics/opiates for years, thus her tolerance to opiates is higher than average. The lowest effective dose , frequency of agents will thus have to be adjusted to achieve successful pain control. Try Dilaudid 1.5mg IVP q3hrs PRN at this time   - Patient has capacity to make her medical decisions  Vyvanse non-formulary and not available at : theraputic exchange is Adderall 30mg PO qd (unless Patient can bring in her home supply of Vyvanse and use that while admitted at )  - continue Cymbalta 120mg PO qd, clonazepam 1mg PO PRN up to 4x/day, Prozac 80mg po qd , lamotrigine 400mg PO QHS  - REDUCE trazodone 250mg PO qhs to 200mg PO qhs (HOLD for sedation) to accommodate for sedation from pain agents at this time   - Patient has chronic pain which has been treated with analgesics/opiates for years, thus her tolerance to opiates is higher than average. The lowest effective dose , frequency of agents will thus have to be adjusted to achieve successful pain control. Try Dilaudid 1.5mg IVP q3hrs PRN at this time   - Patient has capacity to make her medical decisions

## 2022-08-13 NOTE — H&P ADULT - NSHPLABSRESULTS_GEN_ALL_CORE
LABS:                          10.6   23.11 )-----------( 402      ( 13 Aug 2022 01:02 )             32.7   08-13    136  |  104  |  12  ----------------------------<  93  3.6   |  23  |  0.71    Ca    9.1      13 Aug 2022 01:02    TPro  7.7  /  Alb  3.4  /  TBili  0.6  /  DBili  x   /  AST  36  /  ALT  47  /  AlkPhos  173<H>  08-13      RADIOLOGY:    < from: CT Abdomen and Pelvis w/ IV Cont (08.13.22 @ 02:54) >  ACC: [id number] EXAM:  CT ABDOMEN AND PELVIS IC                        PROCEDURE DATE:  08/13/2022    INTERPRETATION:  CLINICAL INFORMATION: Right lower quadrant pain.   Evaluate for appendicitis.  COMPARISON: CT of the abdomen and pelvis 10/16/2020  CONTRAST/COMPLICATIONS:  IV Contrast: Omnipaque 350  90 cc administered   10 cc discarded  Oral Contrast: NONE  Complications: None reported at time of study completion  PROCEDURE:  CT of the Abdomen and Pelvis was performed.  Sagittal and coronal reformats were performed.    FINDINGS:  LOWER CHEST: Within normal limits.  LIVER: Within normal limits.  BILE DUCTS: Normal caliber.  GALLBLADDER: Cholecystectomy.  SPLEEN: Within normal limits.  PANCREAS: Within normal limits.  ADRENALS: Within normal limits.  KIDNEYS/URETERS: Within normal limits.  BLADDER: Within normal limits.  REPRODUCTIVE ORGANS: Uterus and left adnexa are unremarkable. Hyperemia   in the right adnexa, likely reactive to the inflammatory changes.  BOWEL: Sleeve gastrectomy. No bowel obstruction. Appendix is markedly   dilated to 2.5 cm near the base has moderate surrounding inflammatory   changes and few foci of extraluminal air.  PERITONEUM: No small volume of complex fluid tracing from the right lower   quadrant into the pelvis. No drainable collection identified at this   time. Few foci of periappendiceal extraluminal air. No large amount of   free air. Thickened appearance of the distal ileum, likely reactive. No   colonic wall thickening.  VESSELS: Within normal limits.  RETROPERITONEUM/LYMPH NODES: No small right lower quadrant lymph nodes,   possibly reactive. Largest lymph node measures 1.7 cm  ABDOMINAL WALL: Within normal limits.  BONES: Posterior spinal decompressive surgery at L2/L3with   intervertebral disc spacer. Hardware is intact. No acute osseous   abnormality.    IMPRESSION:  Acute perforated appendicitis with few foci of adjacent extraluminal air.   Moderate phlegmonous changes and small volume complex free fluid without   evidence of drainable abscess at this time. Reactive terminal ileal wall   thickening.    --- End of Report ---    AVANI AGUILAR MD; Attending Radiologist  This document has been electronically signed. Aug 13 2022  3:06AM  < end of copied text >

## 2022-08-13 NOTE — BH CONSULTATION LIAISON ASSESSMENT NOTE - NSBHCHARTREVIEWVS_PSY_A_CORE FT
Vital Signs Last 24 Hrs  T(C): 37.1 (13 Aug 2022 11:34), Max: 37.8 (13 Aug 2022 05:35)  T(F): 98.7 (13 Aug 2022 11:34), Max: 100 (13 Aug 2022 05:35)  HR: 92 (13 Aug 2022 11:34) (92 - 103)  BP: 95/61 (13 Aug 2022 11:34) (95/61 - 126/86)  BP(mean): --  RR: 18 (13 Aug 2022 11:34) (16 - 19)  SpO2: 92% (13 Aug 2022 11:34) (92% - 99%)    Parameters below as of 13 Aug 2022 11:34  Patient On (Oxygen Delivery Method): room air

## 2022-08-13 NOTE — BH CONSULTATION LIAISON ASSESSMENT NOTE - SUMMARY
Patient presenting at psychiatric baseline; mood sxs well controlled with current psychiatric regimen Pt has been on for years without any changes to dose or frequency recently. Has capacity to make her medical decisions at this time.

## 2022-08-13 NOTE — ED PROVIDER NOTE - CLINICAL SUMMARY MEDICAL DECISION MAKING FREE TEXT BOX
severe RLQ pain... CT reporting ruptured appendix , IVF AB analgesia and surgical consult, admission

## 2022-08-13 NOTE — BH CONSULTATION LIAISON ASSESSMENT NOTE - HPI (INCLUDE ILLNESS QUALITY, SEVERITY, DURATION, TIMING, CONTEXT, MODIFYING FACTORS, ASSOCIATED SIGNS AND SYMPTOMS)
43 y/o obese  female with depression, anxiety, bipolar disorder, chronic low back pain (was seeing Dr. Kerns), ADD, GERD, anemia s/p bariatric sx ; chronic low back pain s/p bilateral laminectomy,  L2-3 level with posterior spinal fusion & Disc desiccation is seen involving the L2-3 L3-4 and L4-5 levels which are secondary to degenerative changes.  on adderall 10, cymbalta 120, clonazepam 1mg QID, prozac 80 , lamotrigine 400 QHS, trazadone 250 daily, venlafaxine 300mg QHS    10/19/2020 Pain  Management consult recs from [facility]: Continue oxycodone 5 mg Q 4 hours PRN, lidoderm, cymbalta 60 mg BID and trazodone 200 mg QHS, increase neurontin to 400 mg Q 8 hours, with plan to titrate up to 600 mg Q 8 hours.    ISTOP reference [id number]  RX filled 8/8/22 clonazepam 1mg PO QID, 120 tabs for 30 days Jake Brand  RX filled 8/6/22 Vyvanse 70mg PO qd 30 day supply same prescriber  RX filled 7/25/22 dextroamp-amphethamine 10mg PO qd 30day supply same prescriber (Adderall)   - has jus on this regimen since 2021  41 y/o obese  female with depression, anxiety, bipolar disorder, chronic low back pain (was seeing Dr. Kerns), ADD, GERD, anemia s/p bariatric sx ; chronic low back pain s/p bilateral laminectomy,  L2-3 level with posterior spinal fusion & Disc desiccation is seen involving the L2-3 L3-4 and L4-5 levels which are secondary to degenerative changes, currently prescribed Adderall 10mg PO qd, Vyvanse 70mg PO qd, Cymbalta 120, clonazepam 1mg QID, Prozac 80mg po qd , lamotrigine 400 QHS, trazadone 250 daily, venlafaxine 300mg QHS by Psychiatric NP Jake Brand in [place],     10/19/2020 Pain  Management consult recs from [facility]: Continue oxycodone 5 mg Q 4 hours PRN, lidoderm, cymbalta 60 mg BID and trazodone 200 mg QHS, increase neurontin to 400 mg Q 8 hours, with plan to titrate up to 600 mg Q 8 hours.    ISTOP reference [id number]  RX filled 8/8/22 clonazepam 1mg PO QID, 120 tabs for 30 days Jake Brand  RX filled 8/6/22 Vyvanse 70mg PO qd 30 day supply same prescriber  RX filled 7/25/22 dextroamp-amphethamine 10mg PO qd 30day supply same prescriber (Adderall)   - has jus on this regimen since 2021  41 y/o  female with psychiatric hx of depression, anxiety, bipolar disorder, ADD, PMH of GERD, anemia, Bariatric surgery s/p gastric sleeve, then s/p insertion of gastric band which was removed in 2017 secondary to displacement; hx of anemia, cholecystectomy, chronic low back pain s/p bilateral laminectomy,  L2-3 level with posterior spinal fusion & Disc desiccation is seen involving the L2-3 L3-4 and L4-5 levels which are secondary to degenerative changes, currently prescribed Adderall 10mg PO qd, Vyvanse 70mg PO qd, Cymbalta 120, clonazepam 1mg QID, Prozac 80mg po qd , lamotrigine 400 QHS, trazodone 250mg PO qhs, venlafaxine 300mg QHS by Psychiatric NP Jake Brand in [place], reports to be medication compliant, ISTOP checked and one prescriber confirmed, who presents from home c/o abdominal pain. CT scan showing acute perforated appendicitis, WBC 23. Consult called by Surgical Service for medication management as Patient is on numerous psychiatric agents.     CHART HISTORY: 10/19/2020 Pain  Management consult recs from [facility]: Continue oxycodone 5 mg Q 4 hours PRN, lidoderm, cymbalta 60 mg BID and trazodone 200 mg QHS, increase neurontin to 400 mg Q 8 hours, with plan to titrate up to 600 mg Q 8 hours.    ISTOP reference [id number]  RX filled 8/8/22 clonazepam 1mg PO QID, 120 tabs for 30 days Jake Brand  RX filled 8/6/22 Vyvanse 70mg PO qd 30 day supply same prescriber  RX filled 7/25/22 dextroamp-amphethamine 10mg PO qd 30day supply same prescriber (Adderall)   - has jus on this regimen since 2021  41 y/o  female with psychiatric hx of depression, anxiety, bipolar disorder, ADD, currently prescribed Adderall 10mg PO qd PRN, Vyvanse 70mg PO qd, Cymbalta 120, clonazepam 1mg QID PRN, Prozac 80mg po qd , lamotrigine 400 QHS, trazodone 250mg PO qhs, venlafaxine 300mg QHS by Psychiatric NP Jake Brand in [place] x 5 years, has been attending [facility] for > 20 years, 2 prior remote psychiatric admissions ([facility] [place]; last one 5 yrs prior), reports to be medication and treatment compliant, ISTOP checked and one prescriber confirmed, PMH of GERD, anemia, Bariatric surgery s/p gastric sleeve, then s/p insertion of gastric band which was removed in 2017 secondary to displacement; hx of anemia, cholecystectomy, chronic low back pain s/p bilateral laminectomy,  L2-3 level with posterior spinal fusion & Disc desiccation is seen involving the L2-3 L3-4 and L4-5 levels which are secondary to degenerative changes, who presents from home c/o abdominal pain. CT scan showing acute perforated appendicitis, WBC 23. Consult called by Surgical Service for medication management as Patient is on numerous psychiatric agents.     EXAM: calm, cooperative, engages fully, some physical discomfort noted - shifting body around to be harriet  comfortable position. c/o semi-controlled pain. Otherwise has been psychiatrically stable, mild mood changes during menses/period irregularity. Denies and does not manifest any symptoms of hypomania/dilip/psychosis/major depression/ anxiety/panic. Denies any active or passive suicidal or homicidal ideation. Names protective factors (nereida; family; hope for future). Endorses medication compliance. Denies adverse medication side effects.     Mother at bedside helping Patient with timeline to psych treatment, admissions etc. Mother involved in Pt's care; no acute safety concerns elicited.     CHART HISTORY: 10/19/2020 Pain  Management consult recs from [facility]: Continue oxycodone 5 mg Q 4 hours PRN, lidoderm, cymbalta 60 mg BID and trazodone 200 mg QHS, increase neurontin to 400 mg Q 8 hours, with plan to titrate up to 600 mg Q 8 hours.    ISTOP reference [id number]  RX filled 8/8/22 clonazepam 1mg PO QID, 120 tabs for 30 days Jake Brand  RX filled 8/6/22 Vyvanse 70mg PO qd 30 day supply same prescriber  RX filled 7/25/22 dextroamp-amphethamine 10mg PO qd 30day supply same prescriber (Adderall)   - has jus on this regimen since 2021

## 2022-08-13 NOTE — BH CONSULTATION LIAISON ASSESSMENT NOTE - NSBHCHARTREVIEWLAB_PSY_A_CORE FT
08-13    139  |  107  |  7   ----------------------------<  105<H>  4.0   |  26  |  0.58    Ca    8.7      13 Aug 2022 09:25    TPro  7.7  /  Alb  3.4  /  TBili  0.6  /  DBili  x   /  AST  36  /  ALT  47  /  AlkPhos  173<H>  08-13

## 2022-08-13 NOTE — H&P ADULT - ASSESSMENT
41 y/o F with PMHx ADD, depression, bipolar disorder, s/p  x 2, s/p cholecystectomy, s/p gastric sleeve and removal, presents to [facility] ER with abdominal pain x 4 days; CT scan showing acute perforated appendicitis, WBC 23 43 y/o F with PMHx ADD, depression, bipolar disorder, s/p  x 2, s/p cholecystectomy, s/p gastric band and removal, presents to [facility] ER with abdominal pain x 4 days; CT scan showing acute perforated appendicitis, WBC 23 41 y/o F with PMHx ADD, depression, bipolar disorder, s/p  x 2, s/p cholecystectomy, s/p gastric band and removal, s/p gastric sleeve presents to [facility] ER with abdominal pain x 4 days; CT scan showing acute perforated appendicitis, WBC 23 41 y/o F with PMHx ADD, depression, obesity, bipolar disorder, s/p  x 2, s/p cholecystectomy, s/p gastric band and removal, s/p gastric sleeve presents to [facility] ER with abdominal pain x 4 days; CT scan showing acute perforated appendicitis, WBC 23

## 2022-08-13 NOTE — H&P ADULT - HISTORY OF PRESENT ILLNESS
43 y/o F with PMHx ADD, depression, bipolar disorder, s/p  x 2, s/p cholecystectomy, s/p gastric sleeve and removal, presents to [facility] ER with abdominal pain x 4 days. Pain described as severe, diffuse, worse in RLQ. Associated with some nausea and vomiting. Reports Tmax 100.3 at home. Admits to lack of appetite and decreased PO intake. Patient states she struggles with constipation. Denies chills, chest pain, shortness of breath, diarrhea, urinary complaints.  43 y/o F with PMHx ADD, depression, bipolar disorder, s/p  x 2, s/p cholecystectomy, s/p gastric band and removal, presents to [facility] ER with abdominal pain x 4 days. Pain described as severe, diffuse, worse in RLQ. Associated with some nausea and vomiting. Reports Tmax 100.3 at home. Admits to lack of appetite and decreased PO intake. Patient states she struggles with constipation. Denies chills, chest pain, shortness of breath, diarrhea, urinary complaints.  41 y/o F with PMHx ADD, depression, bipolar disorder, s/p  x 2, s/p cholecystectomy, s/p gastric band and removal, s/p gastric sleeve presents to [facility] ER with abdominal pain x 4 days. Pain described as severe, diffuse, worse in RLQ. Associated with some nausea and vomiting. Reports Tmax 100.3 at home. Admits to lack of appetite and decreased PO intake. Patient states she struggles with constipation. Denies chills, chest pain, shortness of breath, diarrhea, urinary complaints.  41 y/o F with PMHx ADD, depression, bipolar disorder, s/p  x 2, s/p cholecystectomy, s/p Band over sleeve with subsequent removal of band,  presents to [facility] ER with abdominal pain x 4 days. Pain described as severe, diffuse, worse in RLQ. Associated with some nausea and vomiting. Reports Tmax 100.3 at home. Admits to lack of appetite and decreased PO intake. Patient states she struggles with constipation. Denies chills, chest pain, shortness of breath, diarrhea, urinary complaints.

## 2022-08-13 NOTE — BH CONSULTATION LIAISON ASSESSMENT NOTE - CURRENT MEDICATION
MEDICATIONS  (STANDING):  lactated ringers. 1000 milliLiter(s) (100 mL/Hr) IV Continuous <Continuous>  pantoprazole  Injectable 40 milliGRAM(s) IV Push daily  piperacillin/tazobactam IVPB.. 3.375 Gram(s) IV Intermittent every 8 hours    MEDICATIONS  (PRN):  HYDROmorphone  Injectable 1 milliGRAM(s) IV Push every 3 hours PRN Severe Pain (7 - 10)  ondansetron Injectable 4 milliGRAM(s) IV Push every 6 hours PRN Nausea and/or Vomiting

## 2022-08-13 NOTE — ED ADULT NURSE REASSESSMENT NOTE - GENITOURINARY ASSESSMENT
- - - The patient lives at [facility]. She has lived there for 6 years. Before that, she lived alone in [place]. She was very active with volunteer work before moving to assisted living.

## 2022-08-13 NOTE — BH CONSULTATION LIAISON ASSESSMENT NOTE - RISK ASSESSMENT
Chronic risk factors: mood disorder, psychiatric hx; medical issues impacting everyday functioning; chronic pain. Protective factors: young; female gender; medication and treatment compliant; no history of recent psychiatric hospitalizations, no recent suicidality; no known hx of aggression/violence; no legal issues; motivated for help; articulate; strong family support; stable domicile; access to health services. No acute risk factors identified

## 2022-08-13 NOTE — H&P ADULT - NSHPSOCIALHISTORY_GEN_ALL_CORE
Denies smoking or history of smoking. Admits to ETOH use occasionally. Denies recreational drug use.

## 2022-08-13 NOTE — ED ADULT NURSE REASSESSMENT NOTE - NS ED NURSE REASSESS COMMENT FT1
Pt received in bed in report alert and oriented and resting in bed. Pt admitted and kept NPO. Pt awaiting bed placement and eval by Sherry Kidd. No sign of distress noted and nursing care ongoing and safety maintained.

## 2022-08-13 NOTE — H&P ADULT - NSHPPHYSICALEXAM_GEN_ALL_CORE
GENERAL: NAD, resting comfortably in bed  HEAD:  Atraumatic, Normocephalic  NECK: Supple  CHEST/LUNG: CTA B/L. Good inspiratory effort.  HEART: RRR. +S1/S2.  ABDOMEN: Previously healed surgical scars noted. Soft, nondistended. Diffuse tenderness throughout abdomen, exquisite in RLQ. +Rebound tenderness.  EXTREMITIES:  No calf tenderness or edema b/l.  PSYCH: AAOx3. Appropriate affect.   NEUROLOGY: non-focal  SKIN: No rashes or lesions

## 2022-08-13 NOTE — H&P ADULT - NSHPREVIEWOFSYSTEMS_GEN_ALL_CORE
CONSTITUTIONAL: No weakness, fevers, chills  EYES/ENT: No visual changes, vertigo, throat pain   NECK: No pain, stiffness  RESPIRATORY: No cough, wheezing, hemoptysis,  shortness of breath  CARDIOVASCULAR: No chest pain, palpitations  GASTROINTESTINAL: SEE HPI  GENITOURINARY: No dysuria, frequency, hematuria  NEUROLOGICAL: No numbness, weakness  SKIN: No itching, burning, rashes, lesions   All other review of systems is negative unless indicated above.

## 2022-08-13 NOTE — PATIENT PROFILE ADULT - FALL HARM RISK - UNIVERSAL INTERVENTIONS
Bed in lowest position, wheels locked, appropriate side rails in place/Call bell, personal items and telephone in reach/Instruct patient to call for assistance before getting out of bed or chair/Non-slip footwear when patient is out of bed/[place] to call system/Physically safe environment - no spills, clutter or unnecessary equipment/Purposeful Proactive Rounding/Room/bathroom lighting operational, light cord in reach

## 2022-08-13 NOTE — BH CONSULTATION LIAISON ASSESSMENT NOTE - NSBHCHARTREVIEWINVESTIGATE_PSY_A_CORE FT
CT Abdomen and Pelvis w/ IV Cont (08.13.22) Acute perforated appendicitis with few foci of adjacent extraluminal air.  Moderate phlegmonous changes and small volume complex free fluid without  evidence of drainable abscess at this time. Reactive terminal ileal wall thickening.

## 2022-08-13 NOTE — ED PROVIDER NOTE - OBJECTIVE STATEMENT
43 y/o female RLQ pain and nausea   H/O GB surgery, gastric sleeve, gastric band, removal of gastric  band 43 y/o female RLQ pain, nausea and vomiting x 1 day   H/O GB surgery, gastric sleeve, gastric band, removal of gastric  band 43 y/o female RLQ pain, nausea and vomiting x 1 day. no headache, no chest pain, no SOB, no palpitations , no urinary symptoms, no GI  bleeding. no neuro changes. H/O GB surgery, gastric sleeve, gastric band, removal of gastric  band

## 2022-08-13 NOTE — BH CONSULTATION LIAISON ASSESSMENT NOTE - NSBHCONSULTFOLLOWAFTERCARE_PSY_A_CORE FT
has been attending [facility] for mental health services for > 20 yrs; Patient is set to return there after discharge

## 2022-08-14 LAB
ANION GAP SERPL CALC-SCNC: 6 MMOL/L — SIGNIFICANT CHANGE UP (ref 5–17)
BUN SERPL-MCNC: 8 MG/DL — SIGNIFICANT CHANGE UP (ref 7–23)
CALCIUM SERPL-MCNC: 8.6 MG/DL — SIGNIFICANT CHANGE UP (ref 8.5–10.1)
CHLORIDE SERPL-SCNC: 106 MMOL/L — SIGNIFICANT CHANGE UP (ref 96–108)
CO2 SERPL-SCNC: 27 MMOL/L — SIGNIFICANT CHANGE UP (ref 22–31)
CREAT SERPL-MCNC: 0.48 MG/DL — LOW (ref 0.5–1.3)
CULTURE RESULTS: SIGNIFICANT CHANGE UP
EGFR: 121 ML/MIN/1.73M2 — SIGNIFICANT CHANGE UP
GLUCOSE SERPL-MCNC: 84 MG/DL — SIGNIFICANT CHANGE UP (ref 70–99)
HCT VFR BLD CALC: 29.5 % — LOW (ref 34.5–45)
HGB BLD-MCNC: 9.1 G/DL — LOW (ref 11.5–15.5)
MAGNESIUM SERPL-MCNC: 2.1 MG/DL — SIGNIFICANT CHANGE UP (ref 1.6–2.6)
MCHC RBC-ENTMCNC: 27 PG — SIGNIFICANT CHANGE UP (ref 27–34)
MCHC RBC-ENTMCNC: 30.8 GM/DL — LOW (ref 32–36)
MCV RBC AUTO: 87.5 FL — SIGNIFICANT CHANGE UP (ref 80–100)
NRBC # BLD: 0 /100 WBCS — SIGNIFICANT CHANGE UP (ref 0–0)
PHOSPHATE SERPL-MCNC: 2.8 MG/DL — SIGNIFICANT CHANGE UP (ref 2.5–4.5)
PLATELET # BLD AUTO: 362 K/UL — SIGNIFICANT CHANGE UP (ref 150–400)
POTASSIUM SERPL-MCNC: 3.7 MMOL/L — SIGNIFICANT CHANGE UP (ref 3.5–5.3)
POTASSIUM SERPL-SCNC: 3.7 MMOL/L — SIGNIFICANT CHANGE UP (ref 3.5–5.3)
RBC # BLD: 3.37 M/UL — LOW (ref 3.8–5.2)
RBC # FLD: 15.6 % — HIGH (ref 10.3–14.5)
SODIUM SERPL-SCNC: 139 MMOL/L — SIGNIFICANT CHANGE UP (ref 135–145)
SPECIMEN SOURCE: SIGNIFICANT CHANGE UP
WBC # BLD: 14.83 K/UL — HIGH (ref 3.8–10.5)
WBC # FLD AUTO: 14.83 K/UL — HIGH (ref 3.8–10.5)

## 2022-08-14 PROCEDURE — 99223 1ST HOSP IP/OBS HIGH 75: CPT

## 2022-08-14 RX ORDER — ENOXAPARIN SODIUM 100 MG/ML
40 INJECTION SUBCUTANEOUS EVERY 24 HOURS
Refills: 0 | Status: DISCONTINUED | OUTPATIENT
Start: 2022-08-14 | End: 2022-08-17

## 2022-08-14 RX ADMIN — HYDROMORPHONE HYDROCHLORIDE 1.5 MILLIGRAM(S): 2 INJECTION INTRAMUSCULAR; INTRAVENOUS; SUBCUTANEOUS at 03:20

## 2022-08-14 RX ADMIN — HYDROMORPHONE HYDROCHLORIDE 1.5 MILLIGRAM(S): 2 INJECTION INTRAMUSCULAR; INTRAVENOUS; SUBCUTANEOUS at 14:21

## 2022-08-14 RX ADMIN — PANTOPRAZOLE SODIUM 40 MILLIGRAM(S): 20 TABLET, DELAYED RELEASE ORAL at 12:27

## 2022-08-14 RX ADMIN — HYDROMORPHONE HYDROCHLORIDE 1.5 MILLIGRAM(S): 2 INJECTION INTRAMUSCULAR; INTRAVENOUS; SUBCUTANEOUS at 07:37

## 2022-08-14 RX ADMIN — HYDROMORPHONE HYDROCHLORIDE 1.5 MILLIGRAM(S): 2 INJECTION INTRAMUSCULAR; INTRAVENOUS; SUBCUTANEOUS at 19:04

## 2022-08-14 RX ADMIN — HYDROMORPHONE HYDROCHLORIDE 1.5 MILLIGRAM(S): 2 INJECTION INTRAMUSCULAR; INTRAVENOUS; SUBCUTANEOUS at 14:06

## 2022-08-14 RX ADMIN — PIPERACILLIN AND TAZOBACTAM 25 GRAM(S): 4; .5 INJECTION, POWDER, LYOPHILIZED, FOR SOLUTION INTRAVENOUS at 02:53

## 2022-08-14 RX ADMIN — Medication 1 MILLIGRAM(S): at 10:26

## 2022-08-14 RX ADMIN — LAMOTRIGINE 400 MILLIGRAM(S): 25 TABLET, ORALLY DISINTEGRATING ORAL at 22:23

## 2022-08-14 RX ADMIN — PIPERACILLIN AND TAZOBACTAM 25 GRAM(S): 4; .5 INJECTION, POWDER, LYOPHILIZED, FOR SOLUTION INTRAVENOUS at 10:26

## 2022-08-14 RX ADMIN — HYDROMORPHONE HYDROCHLORIDE 1.5 MILLIGRAM(S): 2 INJECTION INTRAMUSCULAR; INTRAVENOUS; SUBCUTANEOUS at 02:53

## 2022-08-14 RX ADMIN — Medication 200 MILLIGRAM(S): at 22:24

## 2022-08-14 RX ADMIN — HYDROMORPHONE HYDROCHLORIDE 1.5 MILLIGRAM(S): 2 INJECTION INTRAMUSCULAR; INTRAVENOUS; SUBCUTANEOUS at 18:34

## 2022-08-14 RX ADMIN — PIPERACILLIN AND TAZOBACTAM 25 GRAM(S): 4; .5 INJECTION, POWDER, LYOPHILIZED, FOR SOLUTION INTRAVENOUS at 17:37

## 2022-08-14 RX ADMIN — HYDROMORPHONE HYDROCHLORIDE 1.5 MILLIGRAM(S): 2 INJECTION INTRAMUSCULAR; INTRAVENOUS; SUBCUTANEOUS at 08:07

## 2022-08-14 NOTE — BH CONSULTATION LIAISON PROGRESS NOTE - NSBHATTESTBILLINGAW_PSY_A_CORE
[id number] Inpatient care - low complexity - 15 minutes [id number] Inpatient care - moderate complexity - 25 minutes

## 2022-08-14 NOTE — BH CONSULTATION LIAISON PROGRESS NOTE - NSBHASSESSMENTFT_PSY_ALL_CORE
43 y/o  female with psychiatric hx of depression, anxiety, bipolar disorder, ADD, currently prescribed Adderall 10mg PO qd PRN, Vyvanse 70mg PO qd, Cymbalta 120, clonazepam 1mg QID PRN, Prozac 80mg po qd , lamotrigine 400 QHS, trazodone 250mg PO qhs, venlafaxine 300mg QHS by Psychiatric NP Jake Brand in [place] x 5 years, has been attending [facility] for > 20 years, 2 prior remote psychiatric admissions ([facility]; last one 5 yrs prior), reports to be medication and treatment compliant, ISTOP checked and one prescriber confirmed, PMH of GERD, anemia, Bariatric surgery s/p gastric sleeve, then s/p insertion of gastric band which was removed in 2017 secondary to displacement; hx of anemia, cholecystectomy, chronic low back pain s/p bilateral laminectomy,  L2-3 level with posterior spinal fusion & Disc desiccation is seen involving the L2-3 L3-4 and L4-5 levels which are secondary to degenerative changes, who presents from home c/o abdominal pain. CT scan showing acute perforated appendicitis, WBC 23. Consult called by Surgical Service for medication management as Patient is on numerous psychiatric agents. Patient was seen by Telepsychiatry - Patient presenting at psychiatric baseline; mood sxs well controlled with current psychiatric regimen Pt has been on for years without any changes to dose or frequency recently. Recommendations:  - Vyvanse non-formulary and not available at : theraputic exchange is Adderall 30mg PO qd (unless Patient can bring in her home supply of Vyvanse and use that while admitted at )  - continue Cymbalta 120mg PO qd, clonazepam 1mg PO PRN up to 4x/day, Prozac 80mg po qd , lamotrigine 400mg PO QHS  - REDUCE trazodone 250mg PO qhs to 200mg PO qhs (HOLD for sedation) to accommodate for sedation from pain agents at this time   - Patient has chronic pain which has been treated with analgesics/opiates for years, thus her tolerance to opiates is higher than average. The lowest effective dose , frequency of agents will thus have to be adjusted to achieve successful pain control. Try Dilaudid 1.5mg IVP q3hrs PRN at this time   - Patient has capacity to make her medical decisions         43 y/o  female with psychiatric hx of depression, anxiety, bipolar disorder, ADD, currently prescribed Adderall 10mg PO qd PRN, Vyvanse 70mg PO qd, Cymbalta 120, clonazepam 1mg QID PRN, Prozac 80mg po qd , lamotrigine 400 QHS, trazodone 250mg PO qhs, venlafaxine 300mg QHS by Psychiatric NP Jake Brand in [place] x 5 years, has been attending [facility] for > 20 years, 2 prior remote psychiatric admissions ([facility] [place]; last one 5 yrs prior), reports to be medication and treatment compliant, ISTOP checked and one prescriber confirmed, PMH of GERD, anemia, Bariatric surgery s/p gastric sleeve, then s/p insertion of gastric band which was removed in 2017 secondary to displacement; hx of anemia, cholecystectomy, chronic low back pain s/p bilateral laminectomy,  L2-3 level with posterior spinal fusion & Disc desiccation is seen involving the L2-3 L3-4 and L4-5 levels which are secondary to degenerative changes, who presents from home c/o abdominal pain. CT scan showing acute perforated appendicitis, WBC 23. Consult called by Surgical Service for medication management as Patient is on numerous psychiatric agents. Patient was seen by Telepsychiatry - Patient presenting at psychiatric baseline; mood sxs well controlled with current psychiatric regimen Pt has been on for years without any changes to dose or frequency recently. Recommendations:  - Vyvanse non-formulary and not available at : theraputic exchange is Adderall 30mg PO qd (unless Patient can bring in her home supply of Vyvanse and use that while admitted at )  - continue Cymbalta 120mg PO qd, clonazepam 1mg PO PRN up to 4x/day, Prozac 80mg po qd , lamotrigine 400mg PO QHS  - REDUCE trazodone 250mg PO qhs to 200mg PO qhs (HOLD for sedation) to accommodate for sedation from pain agents at this time   - Patient has chronic pain which has been treated with analgesics/opiates for years, thus her tolerance to opiates is higher than average. The lowest effective dose , frequency of agents will thus have to be adjusted to achieve successful pain control. Try Dilaudid 1.5mg IVP q3hrs PRN at this time   - Patient has capacity to make her medical decisions including leaving AMA

## 2022-08-14 NOTE — BH CONSULTATION LIAISON PROGRESS NOTE - NSBHCONSULTRECOMMENDOTHER_PSY_A_CORE FT
Vyvanse non-formulary and not available at : theraputic exchange is Adderall 30mg PO qd (unless Patient can bring in her home supply of Vyvanse and use that while admitted at )  - continue Cymbalta 120mg PO qd, clonazepam 1mg PO PRN up to 4x/day, Prozac 80mg po qd, lamotrigine 400mg PO QHS  - REDUCE trazodone 250mg PO qhs to 200mg PO qhs (HOLD for sedation) to accommodate for sedation from pain agents at this time   - Patient has chronic pain which has been treated with analgesics/opiates for years, thus her tolerance to opiates is higher than average. The lowest effective dose , frequency of agents will thus have to be adjusted to achieve successful pain control. Try Dilaudid 1.5mg IVP q3hrs PRN at this time   - Patient has capacity to make her medical decisions

## 2022-08-14 NOTE — BH CONSULTATION LIAISON PROGRESS NOTE - NSBHFUPINTERVALHXFT_PSY_A_CORE
As per surgery notes, Patient is not passing flatus, no BM yet. IVP PRN Dilaudid was increased to 1.5mg dose with improved pain control. Patient has been calm  cooperative, medication and treatment compliant without any adverse medication side effects reported. No manifestation of off baseline / acute psychiatric symptoms.  As per surgery notes, Patient is not passing flatus, no BM yet. IVP PRN Dilaudid was increased to 1.5mg dose with improved pain control. Patient has been calm  cooperative, medication and treatment compliant without any adverse medication side effects reported; tendency to complain about different aspects of care. No manifestation of off baseline / acute psychiatric symptoms. EXAM: engages fully, frustrated about having to stay in the hospital to "wait and see;" thinking about leaving AMA. Reports that she is hungry and would like to eat.

## 2022-08-14 NOTE — CONSULT NOTE ADULT - SUBJECTIVE AND OBJECTIVE BOX
Patient is a 42y old  Female who presents with a chief complaint of Appendicitis (14 Aug 2022 09:01)      FROM ADMISSION H+P:   HPI:  43 y/o F with PMHx ADD, depression, bipolar disorder, s/p  x 2, s/p cholecystectomy, s/p Band over sleeve with subsequent removal of band,  presents to [facility] ER with abdominal pain x 4 days. Pain described as severe, diffuse, worse in RLQ. Associated with some nausea and vomiting. Reports Tmax 100.3 at home. Admits to lack of appetite and decreased PO intake. Patient states she struggles with constipation. Denies chills, chest pain, shortness of breath, diarrhea, urinary complaints.  (13 Aug 2022 05:22)      ----  INTERVAL HPI/OVERNIGHT EVENTS: Pt seen and evaluated at the bedside. No acute overnight events occurred.     ----  PAST MEDICAL & SURGICAL HISTORY:  Depression      ADD (attention deficit disorder)      Depression      Bipolar mood disorder      ADD (attention deficit disorder)      Depression      GERD (gastroesophageal reflux disease)      Anemia      Delivery by emergency caesarean section      S/P cholecystectomy      S/P  section      Bariatric surgery status  s/p gastric sleeve, then s/p insertion of gastric band which was   removed in 2017 secondary to displacement      Ganglion cyst of wrist, left      Endometrial polyp  s/p ablation 2019          ----  Home medications:    ----  FAMILY HISTORY:  FH: hypertension  mother and father    FH: diabetes mellitus  mother    FH: hypothyroidism  father        ----  Allergies    Ofirmev (Urticaria)  Robaxin (Anaphylaxis)    Intolerances        ----  Social History:  - etoh:  - tobacco:  - recreational drug use:  - occupation:  - living circumstances:  - ambulation status:    ----  REVIEW OF SYSTEMS:  CONSTITUTIONAL: denies fever, chills, fatigue, weakness  HEENT: denies blurred vision, sore throat  SKIN: denies new lesions, rash  CARDIOVASCULAR: denies chest pain, chest pressure, palpitations  RESPIRATORY: denies shortness of breath, sputum production  GASTROINTESTINAL: denies nausea, vomiting, diarrhea, abdominal pain  GENITOURINARY: denies dysuria, discharge  NEUROLOGICAL: denies numbness, headache, focal weakness  MUSCULOSKELETAL: denies new joint pain, muscle aches  HEMATOLOGIC: denies gross bleeding, bruising  LYMPHATICS: denies enlarged lymph nodes, extremity swelling  PSYCHIATRIC: denies recent changes in anxiety, depression  ENDOCRINOLOGIC: denies sweating, cold or heat intolerance    ----  PHYSICAL EXAM:  GENERAL: patient appears well, no acute distress, appropriately interactive  EYES: sclera clear, no exudates  ENMT: oropharynx clear without erythema, moist mucous membranes  NECK: supple, soft, no thyromegaly noted  LUNGS: good air entry bilaterally, clear to auscultation, symmetric breath sounds, no wheezing or rhonchi appreciated  HEART: soft S1/S2, regular rate and rhythm, no murmurs noted, no noted edema to b/l LE  GASTROINTESTINAL: abdomen is soft, nontender, nondistended, normoactive bowel sounds, no palpable masses  INTEGUMENT: good skin turgor, appropriate for ethnicity, appears well perfused, no jaundice noted  MUSCULOSKELETAL: no clubbing or cyanosis, no obvious deformity  NEUROLOGIC: awake, alert, oriented x3, good muscle tone in 4 extremities, no obvious sensory deficits  PSYCHIATRIC: mood is good, affect is congruent with mood, linear and logical thought process  HEME/LYMPH: no palpable supraclavicular nodules, no obvious ecchymosis     T(C): 36.8 (22 @ 12:35), Max: 37.2 (22 @ 05:04)  HR: 90 (22 @ 14:00) (84 - 90)  BP: 109/73 (22 @ 14:00) (92/62 - 113/77)  RR: 18 (22 @ 12:35) (18 - 18)  SpO2: 91% (22 @ 12:35) (90% - 99%)  Wt(kg): --    ----  I&O's Summary    13 Aug 2022 07:01  -  14 Aug 2022 07:00  --------------------------------------------------------  IN: 1500 mL / OUT: 300 mL / NET: 1200 mL        LABS:                        9.1    14.83 )-----------( 362      ( 14 Aug 2022 07:15 )             29.5     0814    139  |  106  |  8   ----------------------------<  84  3.7   |  27  |  0.48<L>    Ca    8.6      14 Aug 2022 07:15  Phos  2.8       Mg     2.1         TPro  7.7  /  Alb  3.4  /  TBili  0.6  /  DBili  x   /  AST  36  /  ALT  47  /  AlkPhos  173<H>          CAPILLARY BLOOD GLUCOSE           @ 02:44   >=3 organisms. Probable collection contamination.  --  --   @ 01:45   No growth to date.  --  --   @ 01:35   No growth to date.  --  --            ----  Personally reviewed:  Vital sign trends: [  ] yes    [  ] no     [  ] n/a  Laboratory results: [  ] yes    [  ] no     [  ] n/a  Radiology results: [  ] yes    [  ] no     [  ] n/a  Culture results: [  ] yes    [  ] no     [  ] n/a  Consultant recommendations: [  ] yes    [  ] no     [  ] n/a         Patient is a 42y old  Female who presents with a chief complaint of Appendicitis (14 Aug 2022 09:01)      FROM ADMISSION H+P:   HPI:  41 y/o F with PMHx ADD, depression, bipolar disorder, s/p  x 2, s/p cholecystectomy, s/p Band over sleeve with subsequent removal of band,  presents to [facility] ER with abdominal pain x 4 days. Pain described as severe, diffuse, worse in RLQ. Associated with some nausea and vomiting. Reports Tmax 100.3 at home. Admits to lack of appetite and decreased PO intake. Patient states she struggles with constipation. Denies chills, chest pain, shortness of breath, diarrhea, urinary complaints.  (13 Aug 2022 05:22)      ----  INTERVAL HPI/OVERNIGHT EVENTS: Pt seen and evaluated at the bedside. No acute overnight events occurred.   She continues to have RLQ abdominal pain. No nausea or vomiting.     She has a hx of Pre Diabetes she states. Denies past hx of TIA/CVA/CHF/MI/CAD.     ----  PAST MEDICAL & SURGICAL HISTORY:  Depression      ADD (attention deficit disorder)      Depression      Bipolar mood disorder      ADD (attention deficit disorder)      Depression      GERD (gastroesophageal reflux disease)      Anemia      Delivery by emergency caesarean section      S/P cholecystectomy      S/P  section      Bariatric surgery status  s/p gastric sleeve, then s/p insertion of gastric band which was   removed in 2017 secondary to displacement      Ganglion cyst of wrist, left      Endometrial polyp  s/p ablation 2019          ----  Home medications:  see outpatient med rec  ----  FAMILY HISTORY:  FH: hypertension  mother and father    FH: diabetes mellitus  mother    FH: hypothyroidism  father        ----  Allergies    Ofirmev (Urticaria)  Robaxin (Anaphylaxis)    Intolerances        ----  Social History:  - etoh: denies  - tobacco: denies  - recreational drug use:denies  uses a vaping pen.       ----  REVIEW OF SYSTEMS:  CONSTITUTIONAL: denies fever, chills, fatigue, weakness  HEENT: denies blurred vision, sore throat  SKIN: denies new lesions, rash  CARDIOVASCULAR: denies chest pain, chest pressure, palpitations  RESPIRATORY: denies shortness of breath, sputum production  GASTROINTESTINAL: denies nausea, vomiting, diarrhea, abdominal pain  GENITOURINARY: denies dysuria, discharge  NEUROLOGICAL: denies numbness, headache, focal weakness  MUSCULOSKELETAL: denies new joint pain, muscle aches  HEMATOLOGIC: denies gross bleeding, bruising  LYMPHATICS: denies enlarged lymph nodes, extremity swelling  PSYCHIATRIC: denies recent changes in anxiety, depression  ENDOCRINOLOGIC: denies sweating, cold or heat intolerance    ----  PHYSICAL EXAM:  GENERAL: patient appears well, no acute distress, appropriately interactive  EYES: sclera clear, no exudates  ENMT: oropharynx clear without erythema, moist mucous membranes  NECK: supple, soft, no thyromegaly noted  LUNGS: good air entry bilaterally, clear to auscultation, symmetric breath sounds, no wheezing or rhonchi appreciated  HEART: soft S1/S2, regular rate and rhythm, no murmurs noted, no noted edema to b/l LE  GASTROINTESTINAL: abdomen is soft, nontender, nondistended, normoactive bowel sounds, no palpable masses  INTEGUMENT: good skin turgor, appropriate for ethnicity, appears well perfused, no jaundice noted  MUSCULOSKELETAL: no clubbing or cyanosis, no obvious deformity  NEUROLOGIC: awake, alert, oriented x3, good muscle tone in 4 extremities, no obvious sensory deficits  PSYCHIATRIC: mood is good, affect is congruent with mood, linear and logical thought process  HEME/LYMPH: no palpable supraclavicular nodules, no obvious ecchymosis     T(C): 36.8 (22 @ 12:35), Max: 37.2 (22 @ 05:04)  HR: 90 (22 @ 14:00) (84 - 90)  BP: 109/73 (22 @ 14:00) (92/62 - 113/77)  RR: 18 (22 @ 12:35) (18 - 18)  SpO2: 91% (22 @ 12:35) (90% - 99%)  Wt(kg): --    ----  I&O's Summary    13 Aug 2022 07:01  -  14 Aug 2022 07:00  --------------------------------------------------------  IN: 1500 mL / OUT: 300 mL / NET: 1200 mL        LABS:                        9.1    14.83 )-----------( 362      ( 14 Aug 2022 07:15 )             29.5         139  |  106  |  8   ----------------------------<  84  3.7   |  27  |  0.48<L>    Ca    8.6      14 Aug 2022 07:15  Phos  2.8       Mg     2.1         TPro  7.7  /  Alb  3.4  /  TBili  0.6  /  DBili  x   /  AST  36  /  ALT  47  /  AlkPhos  173<H>          CAPILLARY BLOOD GLUCOSE           @ 02:44   >=3 organisms. Probable collection contamination.  --  --   @ 01:45   No growth to date.  --  --   @ 01:35   No growth to date.  --  --            ----  Personally reviewed:  Vital sign trends: [  ] yes    [  ] no     [  ] n/a  Laboratory results: [  ] yes    [  ] no     [  ] n/a  Radiology results: [  ] yes    [  ] no     [  ] n/a  Culture results: [  ] yes    [  ] no     [  ] n/a  Consultant recommendations: [  ] yes    [  ] no     [  ] n/a         Patient is a 42y old  Female who presents with a chief complaint of Appendicitis (14 Aug 2022 09:01)      FROM ADMISSION H+P:   HPI:  41 y/o F with PMHx ADD, depression, bipolar disorder, s/p  x 2, s/p cholecystectomy, s/p Band over sleeve with subsequent removal of band,  presents to [facility] ER with abdominal pain x 4 days. Pain described as severe, diffuse, worse in RLQ. Associated with some nausea and vomiting. Reports Tmax 100.3 at home. Admits to lack of appetite and decreased PO intake. Patient states she struggles with constipation. Denies chills, chest pain, shortness of breath, diarrhea, urinary complaints.  (13 Aug 2022 05:22)      ----  INTERVAL HPI/OVERNIGHT EVENTS: Pt seen and evaluated at the bedside. No acute overnight events occurred.   She continues to have RLQ abdominal pain. No nausea or vomiting.     She has a hx of Pre Diabetes she states. Denies past hx of TIA/CVA/CHF/MI/CAD.     ----  PAST MEDICAL & SURGICAL HISTORY:  Depression      ADD (attention deficit disorder)      Depression      Bipolar mood disorder      ADD (attention deficit disorder)      Depression      GERD (gastroesophageal reflux disease)      Anemia      Delivery by emergency caesarean section      S/P cholecystectomy      S/P  section      Bariatric surgery status  s/p gastric sleeve, then s/p insertion of gastric band which was   removed in 2017 secondary to displacement      Ganglion cyst of wrist, left      Endometrial polyp  s/p ablation 2019          ----  Home medications:  see outpatient med rec  ----  FAMILY HISTORY:  FH: hypertension  mother and father    FH: diabetes mellitus  mother    FH: hypothyroidism  father        ----  Allergies    Ofirmev (Urticaria)  Robaxin (Anaphylaxis)    Surgical hx: spinal surgery      ----  Social History:  - etoh: denies  - tobacco: denies  - recreational drug use:denies  uses a vaping pen.       ----  REVIEW OF SYSTEMS:  CONSTITUTIONAL: denies fever, chills, fatigue, weakness  HEENT: denies blurred vision, sore throat  SKIN: denies new lesions, rash  CARDIOVASCULAR: denies chest pain, chest pressure, palpitations  RESPIRATORY: denies shortness of breath, sputum production  GASTROINTESTINAL: denies nausea, vomiting, diarrhea, admits abdominal pain  GENITOURINARY: denies dysuria, discharge  NEUROLOGICAL: denies numbness, headache, focal weakness  MUSCULOSKELETAL: denies new joint pain, muscle aches  HEMATOLOGIC: denies gross bleeding, bruising  LYMPHATICS: denies enlarged lymph nodes, extremity swelling  PSYCHIATRIC: denies recent changes in anxiety, depression  ENDOCRINOLOGIC: denies sweating, cold or heat intolerance    ----  PHYSICAL EXAM:  GENERAL: patient appears well, no acute distress, appropriately interactive  EYES: sclera clear, no exudates  ENMT: oropharynx clear without erythema, moist mucous membranes  NECK: supple, soft, no thyromegaly noted  LUNGS: good air entry bilaterally, clear to auscultation, symmetric breath sounds, no wheezing or rhonchi appreciated  HEART: soft S1/S2, regular rate and rhythm, no murmurs noted, no noted edema to b/l LE  GASTROINTESTINAL: abdomen is soft, TTP RLQ nondistended, normoactive bowel sounds, no palpable masses  INTEGUMENT: good skin turgor, appropriate for ethnicity, appears well perfused, no jaundice noted  MUSCULOSKELETAL: no clubbing or cyanosis, no obvious deformity  NEUROLOGIC: awake, alert, oriented x3, good muscle tone in 4 extremities, no obvious sensory deficits  PSYCHIATRIC: mood is good, affect is congruent with mood, linear and logical thought process  HEME/LYMPH: no palpable supraclavicular nodules, no obvious ecchymosis     T(C): 36.8 (22 @ 12:35), Max: 37.2 (22 @ 05:04)  HR: 90 (22 @ 14:00) (84 - 90)  BP: 109/73 (22 @ 14:00) (92/62 - 113/77)  RR: 18 (22 @ 12:35) (18 - 18)  SpO2: 91% (22 @ 12:35) (90% - 99%)  Wt(kg): --    ----  I&O's Summary    13 Aug 2022 07:01  -  14 Aug 2022 07:00  --------------------------------------------------------  IN: 1500 mL / OUT: 300 mL / NET: 1200 mL        LABS:                        9.1    14.83 )-----------( 362      ( 14 Aug 2022 07:15 )             29.5         139  |  106  |  8   ----------------------------<  84  3.7   |  27  |  0.48<L>    Ca    8.6      14 Aug 2022 07:15  Phos  2.8       Mg     2.1         TPro  7.7  /  Alb  3.4  /  TBili  0.6  /  DBili  x   /  AST  36  /  ALT  47  /  AlkPhos  173<H>          CAPILLARY BLOOD GLUCOSE           @ 02:44   >=3 organisms. Probable collection contamination.  --  --   @ 01:45   No growth to date.  --  --   @ 01:35   No growth to date.  --  --

## 2022-08-15 DIAGNOSIS — R73.03 PREDIABETES: ICD-10-CM

## 2022-08-15 DIAGNOSIS — D64.9 ANEMIA, UNSPECIFIED: ICD-10-CM

## 2022-08-15 DIAGNOSIS — F31.9 BIPOLAR DISORDER, UNSPECIFIED: ICD-10-CM

## 2022-08-15 DIAGNOSIS — Z29.9 ENCOUNTER FOR PROPHYLACTIC MEASURES, UNSPECIFIED: ICD-10-CM

## 2022-08-15 LAB
A1C WITH ESTIMATED AVERAGE GLUCOSE RESULT: 5.2 % — SIGNIFICANT CHANGE UP (ref 4–5.6)
ANION GAP SERPL CALC-SCNC: 7 MMOL/L — SIGNIFICANT CHANGE UP (ref 5–17)
BASOPHILS # BLD AUTO: 0.04 K/UL — SIGNIFICANT CHANGE UP (ref 0–0.2)
BASOPHILS NFR BLD AUTO: 0.4 % — SIGNIFICANT CHANGE UP (ref 0–2)
BUN SERPL-MCNC: 5 MG/DL — LOW (ref 7–23)
CALCIUM SERPL-MCNC: 8.6 MG/DL — SIGNIFICANT CHANGE UP (ref 8.5–10.1)
CHLORIDE SERPL-SCNC: 109 MMOL/L — HIGH (ref 96–108)
CO2 SERPL-SCNC: 26 MMOL/L — SIGNIFICANT CHANGE UP (ref 22–31)
CREAT SERPL-MCNC: 0.52 MG/DL — SIGNIFICANT CHANGE UP (ref 0.5–1.3)
EGFR: 119 ML/MIN/1.73M2 — SIGNIFICANT CHANGE UP
EOSINOPHIL # BLD AUTO: 0.11 K/UL — SIGNIFICANT CHANGE UP (ref 0–0.5)
EOSINOPHIL NFR BLD AUTO: 1.2 % — SIGNIFICANT CHANGE UP (ref 0–6)
ESTIMATED AVERAGE GLUCOSE: 103 MG/DL — SIGNIFICANT CHANGE UP (ref 68–114)
GLUCOSE SERPL-MCNC: 76 MG/DL — SIGNIFICANT CHANGE UP (ref 70–99)
HCT VFR BLD CALC: 28.8 % — LOW (ref 34.5–45)
HGB BLD-MCNC: 8.8 G/DL — LOW (ref 11.5–15.5)
IMM GRANULOCYTES NFR BLD AUTO: 0.6 % — SIGNIFICANT CHANGE UP (ref 0–1.5)
LYMPHOCYTES # BLD AUTO: 1.96 K/UL — SIGNIFICANT CHANGE UP (ref 1–3.3)
LYMPHOCYTES # BLD AUTO: 20.9 % — SIGNIFICANT CHANGE UP (ref 13–44)
MCHC RBC-ENTMCNC: 27.1 PG — SIGNIFICANT CHANGE UP (ref 27–34)
MCHC RBC-ENTMCNC: 30.6 GM/DL — LOW (ref 32–36)
MCV RBC AUTO: 88.6 FL — SIGNIFICANT CHANGE UP (ref 80–100)
MONOCYTES # BLD AUTO: 0.76 K/UL — SIGNIFICANT CHANGE UP (ref 0–0.9)
MONOCYTES NFR BLD AUTO: 8.1 % — SIGNIFICANT CHANGE UP (ref 2–14)
NEUTROPHILS # BLD AUTO: 6.43 K/UL — SIGNIFICANT CHANGE UP (ref 1.8–7.4)
NEUTROPHILS NFR BLD AUTO: 68.8 % — SIGNIFICANT CHANGE UP (ref 43–77)
NRBC # BLD: 0 /100 WBCS — SIGNIFICANT CHANGE UP (ref 0–0)
PLATELET # BLD AUTO: 368 K/UL — SIGNIFICANT CHANGE UP (ref 150–400)
POTASSIUM SERPL-MCNC: 3.5 MMOL/L — SIGNIFICANT CHANGE UP (ref 3.5–5.3)
POTASSIUM SERPL-SCNC: 3.5 MMOL/L — SIGNIFICANT CHANGE UP (ref 3.5–5.3)
RBC # BLD: 3.25 M/UL — LOW (ref 3.8–5.2)
RBC # FLD: 15.5 % — HIGH (ref 10.3–14.5)
SODIUM SERPL-SCNC: 142 MMOL/L — SIGNIFICANT CHANGE UP (ref 135–145)
WBC # BLD: 9.36 K/UL — SIGNIFICANT CHANGE UP (ref 3.8–10.5)
WBC # FLD AUTO: 9.36 K/UL — SIGNIFICANT CHANGE UP (ref 3.8–10.5)

## 2022-08-15 PROCEDURE — 99232 SBSQ HOSP IP/OBS MODERATE 35: CPT

## 2022-08-15 PROCEDURE — 99231 SBSQ HOSP IP/OBS SF/LOW 25: CPT

## 2022-08-15 RX ADMIN — LAMOTRIGINE 400 MILLIGRAM(S): 25 TABLET, ORALLY DISINTEGRATING ORAL at 21:41

## 2022-08-15 RX ADMIN — Medication 300 MILLIGRAM(S): at 10:58

## 2022-08-15 RX ADMIN — DEXTROAMPHETAMINE SACCHARATE, AMPHETAMINE ASPARTATE, DEXTROAMPHETAMINE SULFATE AND AMPHETAMINE SULFATE 30 MILLIGRAM(S): 1.875; 1.875; 1.875; 1.875 TABLET ORAL at 11:03

## 2022-08-15 RX ADMIN — Medication 80 MILLIGRAM(S): at 10:58

## 2022-08-15 RX ADMIN — PIPERACILLIN AND TAZOBACTAM 25 GRAM(S): 4; .5 INJECTION, POWDER, LYOPHILIZED, FOR SOLUTION INTRAVENOUS at 01:14

## 2022-08-15 RX ADMIN — ENOXAPARIN SODIUM 40 MILLIGRAM(S): 100 INJECTION SUBCUTANEOUS at 05:33

## 2022-08-15 RX ADMIN — HYDROMORPHONE HYDROCHLORIDE 1.5 MILLIGRAM(S): 2 INJECTION INTRAMUSCULAR; INTRAVENOUS; SUBCUTANEOUS at 21:15

## 2022-08-15 RX ADMIN — HYDROMORPHONE HYDROCHLORIDE 1.5 MILLIGRAM(S): 2 INJECTION INTRAMUSCULAR; INTRAVENOUS; SUBCUTANEOUS at 01:21

## 2022-08-15 RX ADMIN — PIPERACILLIN AND TAZOBACTAM 25 GRAM(S): 4; .5 INJECTION, POWDER, LYOPHILIZED, FOR SOLUTION INTRAVENOUS at 10:57

## 2022-08-15 RX ADMIN — HYDROMORPHONE HYDROCHLORIDE 1.5 MILLIGRAM(S): 2 INJECTION INTRAMUSCULAR; INTRAVENOUS; SUBCUTANEOUS at 13:52

## 2022-08-15 RX ADMIN — HYDROMORPHONE HYDROCHLORIDE 1.5 MILLIGRAM(S): 2 INJECTION INTRAMUSCULAR; INTRAVENOUS; SUBCUTANEOUS at 14:30

## 2022-08-15 RX ADMIN — PIPERACILLIN AND TAZOBACTAM 25 GRAM(S): 4; .5 INJECTION, POWDER, LYOPHILIZED, FOR SOLUTION INTRAVENOUS at 17:32

## 2022-08-15 RX ADMIN — HYDROMORPHONE HYDROCHLORIDE 1.5 MILLIGRAM(S): 2 INJECTION INTRAMUSCULAR; INTRAVENOUS; SUBCUTANEOUS at 01:50

## 2022-08-15 RX ADMIN — HYDROMORPHONE HYDROCHLORIDE 1.5 MILLIGRAM(S): 2 INJECTION INTRAMUSCULAR; INTRAVENOUS; SUBCUTANEOUS at 08:00

## 2022-08-15 RX ADMIN — DULOXETINE HYDROCHLORIDE 120 MILLIGRAM(S): 30 CAPSULE, DELAYED RELEASE ORAL at 10:58

## 2022-08-15 RX ADMIN — HYDROMORPHONE HYDROCHLORIDE 1.5 MILLIGRAM(S): 2 INJECTION INTRAMUSCULAR; INTRAVENOUS; SUBCUTANEOUS at 07:33

## 2022-08-15 RX ADMIN — Medication 200 MILLIGRAM(S): at 21:40

## 2022-08-15 RX ADMIN — HYDROMORPHONE HYDROCHLORIDE 1.5 MILLIGRAM(S): 2 INJECTION INTRAMUSCULAR; INTRAVENOUS; SUBCUTANEOUS at 18:15

## 2022-08-15 RX ADMIN — HYDROMORPHONE HYDROCHLORIDE 1.5 MILLIGRAM(S): 2 INJECTION INTRAMUSCULAR; INTRAVENOUS; SUBCUTANEOUS at 20:52

## 2022-08-15 RX ADMIN — PANTOPRAZOLE SODIUM 40 MILLIGRAM(S): 20 TABLET, DELAYED RELEASE ORAL at 10:59

## 2022-08-15 RX ADMIN — HYDROMORPHONE HYDROCHLORIDE 1.5 MILLIGRAM(S): 2 INJECTION INTRAMUSCULAR; INTRAVENOUS; SUBCUTANEOUS at 17:42

## 2022-08-15 NOTE — ASSESSMENT
[FreeTextEntry1] : Patient is in severe intractable pain and is unable to participate in any conservative treatment like physical therapy.

## 2022-08-15 NOTE — BH CONSULTATION LIAISON PROGRESS NOTE - CURRENT MEDICATION
MEDICATIONS  (STANDING):  amphetamine/dextroamphetamine 30 milliGRAM(s) Oral <User Schedule>  dextrose 5% + sodium chloride 0.45%. 1000 milliLiter(s) (65 mL/Hr) IV Continuous <Continuous>  DULoxetine 120 milliGRAM(s) Oral daily  enoxaparin Injectable 40 milliGRAM(s) SubCutaneous every 24 hours  FLUoxetine 80 milliGRAM(s) Oral daily  lamoTRIgine 400 milliGRAM(s) Oral at bedtime  pantoprazole  Injectable 40 milliGRAM(s) IV Push daily  piperacillin/tazobactam IVPB.. 3.375 Gram(s) IV Intermittent every 8 hours  traZODone 200 milliGRAM(s) Oral at bedtime  venlafaxine XR. 300 milliGRAM(s) Oral daily    MEDICATIONS  (PRN):  clonazePAM  Tablet 1 milliGRAM(s) Oral four times a day PRN anxiety / jiterriness/restlessness  HYDROmorphone  Injectable 1.5 milliGRAM(s) IV Push every 3 hours PRN Severe Pain (7 - 10)  ondansetron Injectable 4 milliGRAM(s) IV Push every 6 hours PRN Nausea and/or Vomiting  
MEDICATIONS  (STANDING):  amphetamine/dextroamphetamine 30 milliGRAM(s) Oral <User Schedule>  dextrose 5% + sodium chloride 0.45%. 1000 milliLiter(s) (125 mL/Hr) IV Continuous <Continuous>  DULoxetine 120 milliGRAM(s) Oral daily  FLUoxetine 80 milliGRAM(s) Oral daily  lamoTRIgine 400 milliGRAM(s) Oral at bedtime  pantoprazole  Injectable 40 milliGRAM(s) IV Push daily  piperacillin/tazobactam IVPB.. 3.375 Gram(s) IV Intermittent every 8 hours  traZODone 200 milliGRAM(s) Oral at bedtime  venlafaxine XR. 300 milliGRAM(s) Oral daily    MEDICATIONS  (PRN):  clonazePAM  Tablet 1 milliGRAM(s) Oral four times a day PRN anxiety / jiterriness/restlessness  HYDROmorphone  Injectable 1.5 milliGRAM(s) IV Push every 3 hours PRN Severe Pain (7 - 10)  ondansetron Injectable 4 milliGRAM(s) IV Push every 6 hours PRN Nausea and/or Vomiting

## 2022-08-15 NOTE — BH CONSULTATION LIAISON PROGRESS NOTE - NSBHCHARTREVIEWVS_PSY_A_CORE FT
Vital Signs Last 24 Hrs  T(C): 36.7 (15 Aug 2022 12:29), Max: 37 (14 Aug 2022 20:53)  T(F): 98 (15 Aug 2022 12:29), Max: 98.6 (14 Aug 2022 20:53)  HR: 79 (15 Aug 2022 12:29) (79 - 91)  BP: 122/74 (15 Aug 2022 12:29) (98/64 - 122/74)  BP(mean): --  RR: 20 (15 Aug 2022 12:29) (18 - 20)  SpO2: 91% (15 Aug 2022 12:29) (91% - 96%)    Parameters below as of 15 Aug 2022 12:29  Patient On (Oxygen Delivery Method): room air    
Vital Signs Last 24 Hrs  T(C): 37.2 (14 Aug 2022 05:04), Max: 37.2 (14 Aug 2022 05:04)  T(F): 99 (14 Aug 2022 05:04), Max: 99 (14 Aug 2022 05:04)  HR: 87 (14 Aug 2022 05:04) (84 - 92)  BP: 92/62 (14 Aug 2022 05:04) (92/62 - 113/77)  BP(mean): --  RR: 18 (14 Aug 2022 05:04) (18 - 18)  SpO2: 90% (14 Aug 2022 05:04) (90% - 99%)    Parameters below as of 14 Aug 2022 05:04  Patient On (Oxygen Delivery Method): room air

## 2022-08-15 NOTE — PHYSICAL EXAM
[Antalgic] : antalgic [Cane] : ambulates with cane [de-identified] : Examination of the lumbar spine reveals no midline tenderness palpation, step-offs, or skin lesions.  Healed lumbar incision.  Decreased range of motion with respect to flexion, extension, lateral bending, and rotation. No tenderness to palpation of the sciatic notch. No tenderness palpation of the bilateral greater trochanters.  She does have pain with passive internal and external range of motion of her left hip which reproduces much of her symptoms.  No instability of bilateral lower extremities.  Negative BRITTNI. Negative straight leg raise bilaterally. No bowstring. Negative femoral stretch. 5 out of 5 iliopsoas, hip abductors, hips adductors, quadriceps, hamstrings, gastrocsoleus, tibialis anterior, extensor hallucis longus, peroneals. Grossly intact sensation to light touch bilateral lower extremities. 1+ patellar and Achilles reflexes. Downgoing Babinski. No clonus. Intact proprioception. Palpable pulses. No skin lesion and no edema on the right and left lower extremities. [de-identified] : MRI performed recently in the hospital reveals prior L2-3 fusion.  She does have some increased lateral recess stenosis at L4-5.\par \par AP lateral lumbar x-rays taken today reveals instrumented fusion L2-3.  Spondylotic changes below.  No aggressive lesions.

## 2022-08-15 NOTE — BH CONSULTATION LIAISON PROGRESS NOTE - NSICDXBHSECONDARYDX_PSY_ALL_CORE
Attention deficit disorder (ADD) in adult   F98.8  
Attention deficit disorder (ADD) in adult   F98.8

## 2022-08-15 NOTE — BH CONSULTATION LIAISON PROGRESS NOTE - NSBHASSESSMENTFT_PSY_ALL_CORE
41 y/o  female with psychiatric hx of depression, anxiety, bipolar disorder, ADD, currently prescribed Adderall 10mg PO qd PRN, Vyvanse 70mg PO qd, Cymbalta 120, clonazepam 1mg QID PRN, Prozac 80mg po qd , lamotrigine 400 QHS, trazodone 250mg PO qhs, venlafaxine 300mg QHS by Psychiatric NP Jake Brand in [place] x 5 years, has been attending [facility] for > 20 years, 2 prior remote psychiatric admissions ([facility] [place]; last one 5 yrs prior), reports to be medication and treatment compliant, ISTOP checked and one prescriber confirmed, PMH of GERD, anemia, Bariatric surgery s/p gastric sleeve, then s/p insertion of gastric band which was removed in 2017 secondary to displacement; hx of anemia, cholecystectomy, chronic low back pain s/p bilateral laminectomy,  L2-3 level with posterior spinal fusion & Disc desiccation is seen involving the L2-3 L3-4 and L4-5 levels which are secondary to degenerative changes, who presents from home c/o abdominal pain. CT scan showing acute perforated appendicitis, WBC 23. Consult called by Surgical Service for medication management as Patient is on numerous psychiatric agents. Patient was seen by Telepsychiatry - Patient presenting at psychiatric baseline; mood sxs well controlled with current psychiatric regimen Pt has been on for years without any changes to dose or frequency recently. Recommendations:  - Vyvanse non-formulary and not available at : theraputic exchange is Adderall 30mg PO qd (unless Patient can bring in her home supply of Vyvanse and use that while admitted at )  - continue Cymbalta 120mg PO qd, clonazepam 1mg PO PRN up to 4x/day, Prozac 80mg po qd , lamotrigine 400mg PO QHS  - REDUCE trazodone 250mg PO qhs to 200mg PO qhs (HOLD for sedation) to accommodate for sedation from pain agents at this time   - Patient has chronic pain which has been treated with analgesics/opiates for years, thus her tolerance to opiates is higher than average. The lowest effective dose , frequency of agents will thus have to be adjusted to achieve successful pain control. Try Dilaudid 1.5mg IVP q3hrs PRN at this time   - Patient has capacity to make her medical decisions including leaving AMA

## 2022-08-15 NOTE — HISTORY OF PRESENT ILLNESS
[de-identified] : Ms. RUPALI CRUZ  is a 42 year old female who presents to the office for a follow-up visit.  She has 2 weeks of acute low back pain.  She went to the ER and was admitted for 3 days for pain management. She is now taking aleve for symptom control.  She is having difficulty sitting and sleeping. \par  11 yo F w/ h/o anxiety who presents for foreign body in vagina. Patient tripped over stuff on the floor in her room yesterday at 10 PM and then started complaining of pain in her vagina. Discomfort w/ walking and sitting. Mom last night did an external inspection of her genitalia and noticed that the labia majora was tender to touch and noticed that there was a white color corkscrew like foreign body in her vagina and patient screamed with pain. She tried to use a tweezer to take out the foreign body but failed. Given clonazepam 0.25 mg for the inspection last night. Also received clonazepam 0.25 mg x 1 prior to coming to ED and baclofen 5 mg x 1 for pain this morning. No fever, abd pain, vaginal discharge, pelvic bleeding, n/v, dysuria or hematuria. LMP 1 week ago, no vaginal spotting. Normal po intake and UOP.   PMH/PSH: negative  FH/SH: non-contributory, except as noted in the HPI  Allergies: No known drug allergies  Immunizations: penicillin   Medications: No chronic home medications  HEADSS: Denies sexual activity, illicit substance use, anxiety/depression, SI/HI. In 6th grade. No bullying. Feels safe at home. Denies inserting any foreign body in her vagina. Does not use tampons. Has seen therapist in the past but no psychiatrist.

## 2022-08-15 NOTE — BH CONSULTATION LIAISON PROGRESS NOTE - NSBHCHARTREVIEWLAB_PSY_A_CORE FT
08-14    139  |  106  |  8   ----------------------------<  84  3.7   |  27  |  0.48<L>    Ca    8.6      14 Aug 2022 07:15  Phos  2.8     08-14  Mg     2.1     08-14    TPro  7.7  /  Alb  3.4  /  TBili  0.6  /  DBili  x   /  AST  36  /  ALT  47  /  AlkPhos  173<H>  08-13  
                      8.8    9.36  )-----------( 368      ( 15 Aug 2022 06:35 )             28.8   08-15    142  |  109<H>  |  5<L>  ----------------------------<  76  3.5   |  26  |  0.52    Ca    8.6      15 Aug 2022 06:35  Phos  2.8     08-14  Mg     2.1     08-14

## 2022-08-15 NOTE — BH CONSULTATION LIAISON PROGRESS NOTE - NSBHCONSULTFOLLOWAFTERCARE_PSY_A_CORE FT
has been attending [facility] for mental health services for > 20 yrs; Patient is set to return there after discharge. On discharge, Patient resumes home medication of Vyvanse 70mg PO qd and trazodone 250mg PO qhs dose
Patient has been attending [facility] for mental health services for > 20 yrs; Patient is set to return there after discharge. On discharge, Patient resumes home medication of Vyvanse 70mg PO qd and trazodone 250mg PO qhs dose

## 2022-08-15 NOTE — DISCUSSION/SUMMARY
[de-identified] : We reviewed her imaging.  We discussed further treatment options.  Given her worsening intractable symptoms we have discussed the role of MRI.  Unfortunately she cannot be on steroids.  She will use some cyclobenzaprine for pain relief.  Follow-up after the MRI.

## 2022-08-15 NOTE — BH CONSULTATION LIAISON PROGRESS NOTE - NSBHFUPINTERVALHXFT_PSY_A_CORE
Patient seen, evaluated and chart reviewed. Patient has been calm, cooperative, medication and treatment compliant without any adverse medication side effects reported. No manifestation of off baseline / acute psychiatric symptoms. Currently in good mood, future-oriented, reports no symptoms of psychosis, dilip or depression. "I would like to leave as soon as I can."

## 2022-08-15 NOTE — CONSULT NOTE ADULT - SUBJECTIVE AND OBJECTIVE BOX
[facility], Division of Infectious Diseases  JENNIFER Burr, BOOGIE Robbins, BHAVANA [facility]  [phone]    RUPALI CRUZ  42y, Female  [id number]    HPI--  HPI:  43 y/o F with PMHx ADD, depression, bipolar disorder, s/p  x 2, s/p cholecystectomy, s/p Band over sleeve with subsequent removal of band,  presents to [facility] ER with abdominal pain x 4 days. Pain described as severe, diffuse, worse in RLQ. Associated with some nausea and vomiting. Reports Tmax 100.3 at home. Admits to lack of appetite and decreased PO intake. Patient states she struggles with constipation. Denies chills, chest pain, shortness of breath, diarrhea, urinary complaints.  (13 Aug 2022 05:22)    Pt seen and examined at bedside  Sleeping, did not awake for interview      Active Medications--  amphetamine/dextroamphetamine 30 milliGRAM(s) Oral <User Schedule>  clonazePAM  Tablet 1 milliGRAM(s) Oral four times a day PRN  dextrose 5% + sodium chloride 0.45%. 1000 milliLiter(s) IV Continuous <Continuous>  DULoxetine 120 milliGRAM(s) Oral daily  enoxaparin Injectable 40 milliGRAM(s) SubCutaneous every 24 hours  FLUoxetine 80 milliGRAM(s) Oral daily  HYDROmorphone  Injectable 1.5 milliGRAM(s) IV Push every 3 hours PRN  lamoTRIgine 400 milliGRAM(s) Oral at bedtime  ondansetron Injectable 4 milliGRAM(s) IV Push every 6 hours PRN  pantoprazole  Injectable 40 milliGRAM(s) IV Push daily  piperacillin/tazobactam IVPB.. 3.375 Gram(s) IV Intermittent every 8 hours  traZODone 200 milliGRAM(s) Oral at bedtime  venlafaxine XR. 300 milliGRAM(s) Oral daily    Antimicrobials:   piperacillin/tazobactam IVPB.. 3.375 Gram(s) IV Intermittent every 8 hours    Immunologic:     ROS: unable to obtain    Allergies: Ofirmev (Urticaria)  Robaxin (Anaphylaxis)    PMH -- Depression    ADD (attention deficit disorder)    Depression    Bipolar mood disorder    Attention deficit disorder (ADD)    ADD (attention deficit disorder)    Bipolar affect, depressed    Depression    GERD (gastroesophageal reflux disease)    Anemia      PSH -- S/P cholecystectomy    H/O bariatric surgery    Delivery by emergency caesarean section    Fitting and adjustment of gastric lap band    Status post cholecystectomy    S/P cholecystectomy    S/P  section    Bariatric surgery status    Ganglion cyst of wrist, left    Endometrial polyp      FH -- No pertinent family history in first degree relatives    FH: hypertension    FH: diabetes mellitus    FH: hypothyroidism      Social History --  EtOH: denies   Tobacco: denies   Drug Use: denies     Travel/Environmental/Occupational History:    Physical Exam--  Vital Signs Last 24 Hrs  T(F): 98 (15 Aug 2022 12:29), Max: 98.6 (14 Aug 2022 20:53)  HR: 79 (15 Aug 2022 12:29) (79 - 91)  BP: 122/74 (15 Aug 2022 12:29) (98/64 - 122/74)  RR: 20 (15 Aug 2022 12:29) (18 - 20)  SpO2: 91% (15 Aug 2022 12:29) (91% - 96%)  General: nontoxic-appearing, no acute distress, sleeping comfortably  HEENT: NC/AT      Laboratory & Imaging Data--  CBC:                       8.8    9.36  )-----------( 368      ( 15 Aug 2022 06:35 )             28.8     CMP: 08-15    142  |  109<H>  |  5<L>  ----------------------------<  76  3.5   |  26  |  0.52    Ca    8.6      15 Aug 2022 06:35  Phos  2.8       Mg     2.1                 Microbiology: reviewed    Culture - Urine (collected 22 @ 02:44)  Source: Clean Catch Clean Catch (Midstream)  Final Report (22 @ 15:15):    >=3 organisms. Probable collection contamination.    Culture - Blood (collected 22 @ 01:45)  Source: .Blood Blood-Peripheral  Preliminary Report (22 @ 05:01):    No growth to date.    Culture - Blood (collected 22 @ 01:35)  Source: .Blood Blood-Peripheral  Preliminary Report (22 @ 05:01):    No growth to date.        Radiology: reviewed  < from: CT Abdomen and Pelvis w/ IV Cont (22 @ 02:54) >    ACC: [id number] EXAM:  CT ABDOMEN AND PELVIS IC                          PROCEDURE DATE:  2022          INTERPRETATION:  CLINICAL INFORMATION: Right lower quadrant pain.   Evaluate for appendicitis.    COMPARISON: CT of the abdomen and pelvis 10/16/2020    CONTRAST/COMPLICATIONS:  IV Contrast: Omnipaque 350  90 cc administered   10 cc discarded  Oral Contrast: NONE  Complications: None reported at time of study completion    PROCEDURE:  CT of the Abdomen and Pelvis was performed.  Sagittal and coronal reformats were performed.    FINDINGS:  LOWER CHEST: Within normal limits.    LIVER: Within normal limits.  BILE DUCTS: Normal caliber.  GALLBLADDER: Cholecystectomy.  SPLEEN: Within normal limits.  PANCREAS: Within normal limits.  ADRENALS: Within normal limits.  KIDNEYS/URETERS: Within normal limits.    BLADDER: Within normal limits.  REPRODUCTIVE ORGANS: Uterus and left adnexa are unremarkable. Hyperemia   in the right adnexa, likely reactive to the inflammatory changes.    BOWEL: Sleeve gastrectomy. No bowel obstruction. Appendix is markedly   dilated to 2.5 cm near the base has moderate surrounding inflammatory   changes and few foci of extraluminal air.  PERITONEUM: No small volume of complex fluid tracing from the right lower   quadrant into the pelvis. No drainable collection identified at this   time. Few foci of periappendiceal extraluminal air. No large amount of   free air. Thickened appearance of the distal ileum, likely reactive. No   colonic wall thickening.  VESSELS: Within normal limits.  RETROPERITONEUM/LYMPH NODES: No small right lower quadrant lymph nodes,   possibly reactive. Largest lymph node measures 1.7 cm  ABDOMINAL WALL: Within normal limits.  BONES: Posterior spinal decompressive surgery at L2/L3with   intervertebral disc spacer. Hardware is intact. No acute osseous   abnormality.    IMPRESSION:  Acute perforated appendicitis with few foci of adjacent extraluminal air.   Moderate phlegmonous changes and small volume complex free fluid without   evidence of drainable abscess at this time. Reactive terminal ileal wall   thickening.    --- End of Report ---            AVANI AGUILAR MD; Attending Radiologist  This document has been electronically signed. Aug 13 2022  3:06AM    < end of copied text >

## 2022-08-15 NOTE — CONSULT NOTE ADULT - ASSESSMENT
The patient is a 42-year-old woman with PMH of Bipolar Disorder, ADHD, and Pre-diabetes (on Metformin) who p/w abdominal pain, found to have perforated appendicitis.    Perforated appendicitis  - CT Scan w/ Acute perforated appendicitis with few foci of adjacent extraluminal air.   Moderate phlegmonous changes and small volume complex free fluid without   evidence of drainable abscess at this time. Reactive terminal ileal wall   thickening.  Plan:   F/u BCx  Continue Zosyn.  Appreciate surgery recs  Supportive care per primary team    Infectious Diseases will continue to follow. Please call with any questions.   Jessica Robbins M.D.  [facility], Division of Infectious Diseases [phone]  
43 yo F presenting with abdominal pain found to have perforated appendicitis. PMH Obesity, ADHD, Bipolar Disorder, possible Pre-diabetes    Perforated appendicitis: mgmt per surgical team  -continue ZOsyn  -keep NPO  -monitor for fevers and trend WBC count  -CT shows Acute perforated appendicitis with few foci of adjacent extraluminal air.   Moderate phlegmonous changes and small volume complex free fluid without   evidence of drainable abscess at this time. Reactive terminal ileal wall   thickening. Also has lymphadenopathy likely reactive. Continue to monitor    BiPolar disorder:   -telepsych input appreciated  -continue Effexor and lamictal    Anemia: no signs or symptoms of active bleeding. Continue to monitor hgb.     Pre-Diabetes: check A1c in AM.  -blood glucose in AM is at goal.     DVT ppx: SCD's

## 2022-08-16 LAB
ANION GAP SERPL CALC-SCNC: 6 MMOL/L — SIGNIFICANT CHANGE UP (ref 5–17)
BUN SERPL-MCNC: 3 MG/DL — LOW (ref 7–23)
CALCIUM SERPL-MCNC: 9.2 MG/DL — SIGNIFICANT CHANGE UP (ref 8.5–10.1)
CHLORIDE SERPL-SCNC: 106 MMOL/L — SIGNIFICANT CHANGE UP (ref 96–108)
CO2 SERPL-SCNC: 29 MMOL/L — SIGNIFICANT CHANGE UP (ref 22–31)
CREAT SERPL-MCNC: 0.56 MG/DL — SIGNIFICANT CHANGE UP (ref 0.5–1.3)
EGFR: 117 ML/MIN/1.73M2 — SIGNIFICANT CHANGE UP
GLUCOSE SERPL-MCNC: 74 MG/DL — SIGNIFICANT CHANGE UP (ref 70–99)
HCT VFR BLD CALC: 28.9 % — LOW (ref 34.5–45)
HGB BLD-MCNC: 9 G/DL — LOW (ref 11.5–15.5)
MCHC RBC-ENTMCNC: 27.3 PG — SIGNIFICANT CHANGE UP (ref 27–34)
MCHC RBC-ENTMCNC: 31.1 GM/DL — LOW (ref 32–36)
MCV RBC AUTO: 87.6 FL — SIGNIFICANT CHANGE UP (ref 80–100)
NRBC # BLD: 0 /100 WBCS — SIGNIFICANT CHANGE UP (ref 0–0)
PLATELET # BLD AUTO: 437 K/UL — HIGH (ref 150–400)
POTASSIUM SERPL-MCNC: 3.2 MMOL/L — LOW (ref 3.5–5.3)
POTASSIUM SERPL-SCNC: 3.2 MMOL/L — LOW (ref 3.5–5.3)
RBC # BLD: 3.3 M/UL — LOW (ref 3.8–5.2)
RBC # FLD: 15.4 % — HIGH (ref 10.3–14.5)
SODIUM SERPL-SCNC: 141 MMOL/L — SIGNIFICANT CHANGE UP (ref 135–145)
WBC # BLD: 9.08 K/UL — SIGNIFICANT CHANGE UP (ref 3.8–10.5)
WBC # FLD AUTO: 9.08 K/UL — SIGNIFICANT CHANGE UP (ref 3.8–10.5)

## 2022-08-16 PROCEDURE — 99232 SBSQ HOSP IP/OBS MODERATE 35: CPT

## 2022-08-16 PROCEDURE — 99231 SBSQ HOSP IP/OBS SF/LOW 25: CPT

## 2022-08-16 RX ORDER — CEFTRIAXONE 500 MG/1
1000 INJECTION, POWDER, FOR SOLUTION INTRAMUSCULAR; INTRAVENOUS
Refills: 0 | Status: DISCONTINUED | OUTPATIENT
Start: 2022-08-16 | End: 2022-08-16

## 2022-08-16 RX ORDER — FLUOXETINE HCL 10 MG
80 CAPSULE ORAL DAILY
Refills: 0 | Status: DISCONTINUED | OUTPATIENT
Start: 2022-08-17 | End: 2022-08-17

## 2022-08-16 RX ORDER — HYDROMORPHONE HYDROCHLORIDE 2 MG/ML
4 INJECTION INTRAMUSCULAR; INTRAVENOUS; SUBCUTANEOUS EVERY 4 HOURS
Refills: 0 | Status: DISCONTINUED | OUTPATIENT
Start: 2022-08-16 | End: 2022-08-17

## 2022-08-16 RX ORDER — VENLAFAXINE HCL 75 MG
300 CAPSULE, EXT RELEASE 24 HR ORAL DAILY
Refills: 0 | Status: DISCONTINUED | OUTPATIENT
Start: 2022-08-17 | End: 2022-08-17

## 2022-08-16 RX ORDER — HYDROMORPHONE HYDROCHLORIDE 2 MG/ML
2 INJECTION INTRAMUSCULAR; INTRAVENOUS; SUBCUTANEOUS EVERY 4 HOURS
Refills: 0 | Status: DISCONTINUED | OUTPATIENT
Start: 2022-08-16 | End: 2022-08-17

## 2022-08-16 RX ORDER — CEFTRIAXONE 500 MG/1
1000 INJECTION, POWDER, FOR SOLUTION INTRAMUSCULAR; INTRAVENOUS ONCE
Refills: 0 | Status: DISCONTINUED | OUTPATIENT
Start: 2022-08-16 | End: 2022-08-16

## 2022-08-16 RX ORDER — DULOXETINE HYDROCHLORIDE 30 MG/1
120 CAPSULE, DELAYED RELEASE ORAL DAILY
Refills: 0 | Status: DISCONTINUED | OUTPATIENT
Start: 2022-08-17 | End: 2022-08-17

## 2022-08-16 RX ORDER — PIPERACILLIN AND TAZOBACTAM 4; .5 G/20ML; G/20ML
3.38 INJECTION, POWDER, LYOPHILIZED, FOR SOLUTION INTRAVENOUS EVERY 8 HOURS
Refills: 0 | Status: DISCONTINUED | OUTPATIENT
Start: 2022-08-16 | End: 2022-08-17

## 2022-08-16 RX ORDER — POTASSIUM CHLORIDE 20 MEQ
40 PACKET (EA) ORAL EVERY 4 HOURS
Refills: 0 | Status: COMPLETED | OUTPATIENT
Start: 2022-08-16 | End: 2022-08-16

## 2022-08-16 RX ORDER — METRONIDAZOLE 500 MG
500 TABLET ORAL THREE TIMES A DAY
Refills: 0 | Status: DISCONTINUED | OUTPATIENT
Start: 2022-08-16 | End: 2022-08-17

## 2022-08-16 RX ADMIN — Medication 40 MILLIEQUIVALENT(S): at 18:10

## 2022-08-16 RX ADMIN — HYDROMORPHONE HYDROCHLORIDE 1.5 MILLIGRAM(S): 2 INJECTION INTRAMUSCULAR; INTRAVENOUS; SUBCUTANEOUS at 01:02

## 2022-08-16 RX ADMIN — Medication 500 MILLIGRAM(S): at 18:09

## 2022-08-16 RX ADMIN — PIPERACILLIN AND TAZOBACTAM 25 GRAM(S): 4; .5 INJECTION, POWDER, LYOPHILIZED, FOR SOLUTION INTRAVENOUS at 14:44

## 2022-08-16 RX ADMIN — PIPERACILLIN AND TAZOBACTAM 25 GRAM(S): 4; .5 INJECTION, POWDER, LYOPHILIZED, FOR SOLUTION INTRAVENOUS at 01:19

## 2022-08-16 RX ADMIN — Medication 40 MILLIEQUIVALENT(S): at 21:57

## 2022-08-16 RX ADMIN — HYDROMORPHONE HYDROCHLORIDE 4 MILLIGRAM(S): 2 INJECTION INTRAMUSCULAR; INTRAVENOUS; SUBCUTANEOUS at 08:57

## 2022-08-16 RX ADMIN — LAMOTRIGINE 400 MILLIGRAM(S): 25 TABLET, ORALLY DISINTEGRATING ORAL at 21:57

## 2022-08-16 RX ADMIN — HYDROMORPHONE HYDROCHLORIDE 4 MILLIGRAM(S): 2 INJECTION INTRAMUSCULAR; INTRAVENOUS; SUBCUTANEOUS at 14:00

## 2022-08-16 RX ADMIN — Medication 500 MILLIGRAM(S): at 23:13

## 2022-08-16 RX ADMIN — HYDROMORPHONE HYDROCHLORIDE 4 MILLIGRAM(S): 2 INJECTION INTRAMUSCULAR; INTRAVENOUS; SUBCUTANEOUS at 09:16

## 2022-08-16 RX ADMIN — SODIUM CHLORIDE 65 MILLILITER(S): 9 INJECTION, SOLUTION INTRAVENOUS at 19:00

## 2022-08-16 RX ADMIN — Medication 200 MILLIGRAM(S): at 21:57

## 2022-08-16 RX ADMIN — ENOXAPARIN SODIUM 40 MILLIGRAM(S): 100 INJECTION SUBCUTANEOUS at 05:45

## 2022-08-16 RX ADMIN — PANTOPRAZOLE SODIUM 40 MILLIGRAM(S): 20 TABLET, DELAYED RELEASE ORAL at 18:09

## 2022-08-16 RX ADMIN — PIPERACILLIN AND TAZOBACTAM 25 GRAM(S): 4; .5 INJECTION, POWDER, LYOPHILIZED, FOR SOLUTION INTRAVENOUS at 21:57

## 2022-08-16 RX ADMIN — Medication 40 MILLIEQUIVALENT(S): at 13:38

## 2022-08-16 RX ADMIN — HYDROMORPHONE HYDROCHLORIDE 4 MILLIGRAM(S): 2 INJECTION INTRAMUSCULAR; INTRAVENOUS; SUBCUTANEOUS at 23:53

## 2022-08-16 RX ADMIN — HYDROMORPHONE HYDROCHLORIDE 1.5 MILLIGRAM(S): 2 INJECTION INTRAMUSCULAR; INTRAVENOUS; SUBCUTANEOUS at 01:32

## 2022-08-16 RX ADMIN — HYDROMORPHONE HYDROCHLORIDE 4 MILLIGRAM(S): 2 INJECTION INTRAMUSCULAR; INTRAVENOUS; SUBCUTANEOUS at 23:13

## 2022-08-16 RX ADMIN — HYDROMORPHONE HYDROCHLORIDE 4 MILLIGRAM(S): 2 INJECTION INTRAMUSCULAR; INTRAVENOUS; SUBCUTANEOUS at 18:33

## 2022-08-16 RX ADMIN — HYDROMORPHONE HYDROCHLORIDE 4 MILLIGRAM(S): 2 INJECTION INTRAMUSCULAR; INTRAVENOUS; SUBCUTANEOUS at 18:10

## 2022-08-16 RX ADMIN — HYDROMORPHONE HYDROCHLORIDE 4 MILLIGRAM(S): 2 INJECTION INTRAMUSCULAR; INTRAVENOUS; SUBCUTANEOUS at 13:38

## 2022-08-16 NOTE — PROGRESS NOTE ADULT - PROBLEM SELECTOR PLAN 2
Continue current meds.  Psychiatry follow-up appreciated.  Patient has decision making capacity per psychiatry.
Continue current meds.  Patient refused Prozac, Effexor, Cymbalta this morning.  States that she would not refuse if given before 9AM as she was taking at home.  Case d/w surgery PA, will change timing of medications.

## 2022-08-16 NOTE — PROGRESS NOTE ADULT - PROBLEM SELECTOR PLAN 3
No overt bleeding.  Hemoglobin level stable.  Could pursue anemia work-up after acute issues resolved, possibly as outpatient.
No overt bleeding.  Would pursue anemia work-up after acute issues resolved.  Could likely be addressed as outpatient.

## 2022-08-17 ENCOUNTER — TRANSCRIPTION ENCOUNTER (OUTPATIENT)
Age: 42
End: 2022-08-17

## 2022-08-17 VITALS
RESPIRATION RATE: 20 BRPM | TEMPERATURE: 99 F | OXYGEN SATURATION: 95 % | HEART RATE: 79 BPM | SYSTOLIC BLOOD PRESSURE: 124 MMHG | DIASTOLIC BLOOD PRESSURE: 79 MMHG

## 2022-08-17 LAB
25(OH)D3 SERPL-MCNC: 24.9 NG/ML
ALBUMIN SERPL ELPH-MCNC: 4.4 G/DL
ALP BLD-CCNC: 77 U/L
ALT SERPL-CCNC: 12 U/L
AMYLASE/CREAT SERPL: 50 U/L
ANION GAP SERPL CALC-SCNC: 13 MMOL/L
ANION GAP SERPL CALC-SCNC: 4 MMOL/L — LOW (ref 5–17)
APPEARANCE: ABNORMAL
AST SERPL-CCNC: 13 U/L
BASOPHILS # BLD AUTO: 0.07 K/UL
BASOPHILS NFR BLD AUTO: 0.6 %
BILIRUB SERPL-MCNC: 0.2 MG/DL
BILIRUBIN URINE: ABNORMAL
BLOOD URINE: NEGATIVE
BUN SERPL-MCNC: 18 MG/DL
BUN SERPL-MCNC: 4 MG/DL — LOW (ref 7–23)
CALCIUM SERPL-MCNC: 9 MG/DL — SIGNIFICANT CHANGE UP (ref 8.5–10.1)
CALCIUM SERPL-MCNC: 9.6 MG/DL
CHLORIDE SERPL-SCNC: 103 MMOL/L
CHLORIDE SERPL-SCNC: 110 MMOL/L — HIGH (ref 96–108)
CHOLEST SERPL-MCNC: 233 MG/DL
CO2 SERPL-SCNC: 25 MMOL/L
CO2 SERPL-SCNC: 30 MMOL/L — SIGNIFICANT CHANGE UP (ref 22–31)
COLOR: YELLOW
CORTIS SERPL-MCNC: 9.7 UG/DL
CREAT SERPL-MCNC: 0.68 MG/DL — SIGNIFICANT CHANGE UP (ref 0.5–1.3)
CREAT SERPL-MCNC: 0.81 MG/DL
DHEA-S SERPL-MCNC: 32.4 UG/DL
EGFR: 111 ML/MIN/1.73M2 — SIGNIFICANT CHANGE UP
EGFR: 93 ML/MIN/1.73M2
EOSINOPHIL # BLD AUTO: 0.11 K/UL
EOSINOPHIL NFR BLD AUTO: 1 %
FERRITIN SERPL-MCNC: 10 NG/ML
FOLATE SERPL-MCNC: 8.3 NG/ML
FSH SERPL-MCNC: 6.2 IU/L
GLUCOSE QUALITATIVE U: NEGATIVE
GLUCOSE SERPL-MCNC: 75 MG/DL
GLUCOSE SERPL-MCNC: 82 MG/DL — SIGNIFICANT CHANGE UP (ref 70–99)
HCT VFR BLD CALC: 28.7 % — LOW (ref 34.5–45)
HCT VFR BLD CALC: 36.7 %
HDLC SERPL-MCNC: 66 MG/DL
HGB BLD-MCNC: 11.4 G/DL
HGB BLD-MCNC: 8.8 G/DL — LOW (ref 11.5–15.5)
IMM GRANULOCYTES NFR BLD AUTO: 0.4 %
INSULIN SERPL-MCNC: 8.3 UU/ML
IRON SATN MFR SERPL: 16 %
IRON SERPL-MCNC: 72 UG/DL
KETONES URINE: NORMAL
LDLC SERPL CALC-MCNC: 133 MG/DL
LEUKOCYTE ESTERASE URINE: NEGATIVE
LH SERPL-ACNC: 7.3 IU/L
LPL SERPL-CCNC: 20 U/L
LYMPHOCYTES # BLD AUTO: 2.3 K/UL
LYMPHOCYTES NFR BLD AUTO: 20.5 %
MAGNESIUM SERPL-MCNC: 2.4 MG/DL — SIGNIFICANT CHANGE UP (ref 1.6–2.6)
MAN DIFF?: NORMAL
MCHC RBC-ENTMCNC: 26.7 PG — LOW (ref 27–34)
MCHC RBC-ENTMCNC: 27.3 PG
MCHC RBC-ENTMCNC: 30.7 GM/DL — LOW (ref 32–36)
MCHC RBC-ENTMCNC: 31.1 GM/DL
MCV RBC AUTO: 87 FL — SIGNIFICANT CHANGE UP (ref 80–100)
MCV RBC AUTO: 88 FL
MONOCYTES # BLD AUTO: 0.88 K/UL
MONOCYTES NFR BLD AUTO: 7.9 %
NEUTROPHILS # BLD AUTO: 7.79 K/UL
NEUTROPHILS NFR BLD AUTO: 69.6 %
NITRITE URINE: NEGATIVE
NONHDLC SERPL-MCNC: 167 MG/DL
NRBC # BLD: 0 /100 WBCS — SIGNIFICANT CHANGE UP (ref 0–0)
PH URINE: 7
PHOSPHATE SERPL-MCNC: 3.4 MG/DL — SIGNIFICANT CHANGE UP (ref 2.5–4.5)
PLATELET # BLD AUTO: 416 K/UL — HIGH (ref 150–400)
PLATELET # BLD AUTO: 427 K/UL
POTASSIUM SERPL-MCNC: 4.2 MMOL/L — SIGNIFICANT CHANGE UP (ref 3.5–5.3)
POTASSIUM SERPL-SCNC: 4.2 MMOL/L — SIGNIFICANT CHANGE UP (ref 3.5–5.3)
POTASSIUM SERPL-SCNC: 4.3 MMOL/L
PROLACTIN SERPL-MCNC: 15.3 NG/ML
PROT SERPL-MCNC: 6.8 G/DL
PROTEIN URINE: ABNORMAL
RBC # BLD: 3.3 M/UL — LOW (ref 3.8–5.2)
RBC # BLD: 4.17 M/UL
RBC # FLD: 15.5 % — HIGH (ref 10.3–14.5)
RBC # FLD: 16 %
SODIUM SERPL-SCNC: 141 MMOL/L
SODIUM SERPL-SCNC: 144 MMOL/L — SIGNIFICANT CHANGE UP (ref 135–145)
SPECIFIC GRAVITY URINE: 1.04
T4 FREE SERPL-MCNC: 1 NG/DL
TESTOST FREE SERPL-MCNC: 0.3 PG/ML
TESTOST SERPL-MCNC: <2.5 NG/DL
TIBC SERPL-MCNC: 448 UG/DL
TRIGL SERPL-MCNC: 171 MG/DL
TSH SERPL-ACNC: 4.36 UIU/ML
UIBC SERPL-MCNC: 376 UG/DL
UROBILINOGEN URINE: ABNORMAL
VIT B12 SERPL-MCNC: 598 PG/ML
WBC # BLD: 7.2 K/UL — SIGNIFICANT CHANGE UP (ref 3.8–10.5)
WBC # FLD AUTO: 11.2 K/UL
WBC # FLD AUTO: 7.2 K/UL — SIGNIFICANT CHANGE UP (ref 3.8–10.5)

## 2022-08-17 PROCEDURE — 86900 BLOOD TYPING SEROLOGIC ABO: CPT

## 2022-08-17 PROCEDURE — 83605 ASSAY OF LACTIC ACID: CPT

## 2022-08-17 PROCEDURE — 80048 BASIC METABOLIC PNL TOTAL CA: CPT

## 2022-08-17 PROCEDURE — 74177 CT ABD & PELVIS W/CONTRAST: CPT | Mod: MA

## 2022-08-17 PROCEDURE — 99285 EMERGENCY DEPT VISIT HI MDM: CPT

## 2022-08-17 PROCEDURE — 85025 COMPLETE CBC W/AUTO DIFF WBC: CPT

## 2022-08-17 PROCEDURE — 87086 URINE CULTURE/COLONY COUNT: CPT

## 2022-08-17 PROCEDURE — 85027 COMPLETE CBC AUTOMATED: CPT

## 2022-08-17 PROCEDURE — 86850 RBC ANTIBODY SCREEN: CPT

## 2022-08-17 PROCEDURE — 87635 SARS-COV-2 COVID-19 AMP PRB: CPT

## 2022-08-17 PROCEDURE — 83036 HEMOGLOBIN GLYCOSYLATED A1C: CPT

## 2022-08-17 PROCEDURE — 87040 BLOOD CULTURE FOR BACTERIA: CPT

## 2022-08-17 PROCEDURE — 96374 THER/PROPH/DIAG INJ IV PUSH: CPT

## 2022-08-17 PROCEDURE — 83735 ASSAY OF MAGNESIUM: CPT

## 2022-08-17 PROCEDURE — 96376 TX/PRO/DX INJ SAME DRUG ADON: CPT

## 2022-08-17 PROCEDURE — 84702 CHORIONIC GONADOTROPIN TEST: CPT

## 2022-08-17 PROCEDURE — 36415 COLL VENOUS BLD VENIPUNCTURE: CPT

## 2022-08-17 PROCEDURE — 80053 COMPREHEN METABOLIC PANEL: CPT

## 2022-08-17 PROCEDURE — 86901 BLOOD TYPING SEROLOGIC RH(D): CPT

## 2022-08-17 PROCEDURE — 96375 TX/PRO/DX INJ NEW DRUG ADDON: CPT

## 2022-08-17 PROCEDURE — 84100 ASSAY OF PHOSPHORUS: CPT

## 2022-08-17 RX ORDER — METRONIDAZOLE 500 MG
1 TABLET ORAL
Qty: 36 | Refills: 0
Start: 2022-08-17 | End: 2022-08-28

## 2022-08-17 RX ORDER — POLYETHYLENE GLYCOL 3350 17 G/17G
17 POWDER, FOR SOLUTION ORAL ONCE
Refills: 0 | Status: DISCONTINUED | OUTPATIENT
Start: 2022-08-17 | End: 2022-08-17

## 2022-08-17 RX ORDER — CEFPODOXIME PROXETIL 100 MG
1 TABLET ORAL
Qty: 24 | Refills: 0
Start: 2022-08-17 | End: 2022-08-28

## 2022-08-17 RX ORDER — CEFUROXIME AXETIL 250 MG
1 TABLET ORAL
Qty: 24 | Refills: 0
Start: 2022-08-17 | End: 2022-08-28

## 2022-08-17 RX ADMIN — ENOXAPARIN SODIUM 40 MILLIGRAM(S): 100 INJECTION SUBCUTANEOUS at 05:25

## 2022-08-17 RX ADMIN — Medication 500 MILLIGRAM(S): at 05:24

## 2022-08-17 RX ADMIN — PIPERACILLIN AND TAZOBACTAM 25 GRAM(S): 4; .5 INJECTION, POWDER, LYOPHILIZED, FOR SOLUTION INTRAVENOUS at 06:16

## 2022-08-17 RX ADMIN — Medication 80 MILLIGRAM(S): at 08:11

## 2022-08-17 RX ADMIN — DULOXETINE HYDROCHLORIDE 120 MILLIGRAM(S): 30 CAPSULE, DELAYED RELEASE ORAL at 08:11

## 2022-08-17 RX ADMIN — Medication 300 MILLIGRAM(S): at 08:11

## 2022-08-17 NOTE — DISCHARGE NOTE PROVIDER - HOSPITAL COURSE
HPI:  41 y/o F with PMHx ADD, depression, bipolar disorder, s/p  x 2, s/p cholecystectomy, s/p Band over sleeve with subsequent removal of band,  [facility] with abdominal pain x 4 days. Pain described as severe, diffuse, worse in RLQ. Associated with some nausea and vomiting. Reports Tmax 100.3 at home. Admits to lack of appetite and decreased PO intake. Patient states she struggles with constipation. Denies chills, chest pain, shortness of breath, diarrhea, urinary complaints.  (13 Aug 2022 05:22)    In ER, WBC elevated, CT scan showing perforated appendicitis. Patient admitted to surgical service. Started on IV antibiotics and kept NPO. Diet slowly advanced as tolerated. Pain well controlled. ID consulted for antibiotic recommendations. Psych consulted for extensive history. Patient cleared for discharge with PO antibiotics on hospital day 4.    DISPO: Follow up with Dr. Powell as an outpatient, possible interval appendectomy.

## 2022-08-17 NOTE — DISCHARGE NOTE PROVIDER - CARE PROVIDER_API CALL
Roberto Powell (MD)  Surgery  [address] [place] [address]  Phone: [phone]  Fax: [phone]  Follow Up Time:

## 2022-08-17 NOTE — PROGRESS NOTE ADULT - PROVIDER SPECIALTY LIST ADULT
Infectious Disease
Infectious Disease
Surgery
Internal Medicine
Internal Medicine

## 2022-08-17 NOTE — SBIRT NOTE ADULT - NSSBIRTDRGILLEGACT_GEN_A_CORE
Nurse from dr valentine office,neurologist  gladys calling  Received referrel for patient for fibromyalgia    He does not see patient with that condition    Recommended, either rheumatologist or pain management    No

## 2022-08-17 NOTE — DISCHARGE NOTE NURSING/CASE MANAGEMENT/SOCIAL WORK - NSDCPEFALRISK_GEN_ALL_CORE
For information on Fall & Injury Prevention, visit: [email or web address] OR  [email or web address] OR  https://www.cdc.gov/steadi/patient.html

## 2022-08-17 NOTE — DISCHARGE NOTE NURSING/CASE MANAGEMENT/SOCIAL WORK - PATIENT PORTAL LINK FT
You can access the FollowMyHealth Patient Portal offered by [facility] by registering at the following website: [email or web address]. By joining [employer]’s FollowMyHealth portal, you will also be able to view your health information using other applications (apps) compatible with our system.

## 2022-08-17 NOTE — DISCHARGE NOTE PROVIDER - NSDCFUSCHEDAPPT_GEN_ALL_CORE_FT
[facility]  ENDOCRIN [address] [facility]  Scheduled Appointment: 09/02/2022

## 2022-08-17 NOTE — SBIRT NOTE ADULT - NSSBIRTDRGNOACTINTDET_GEN_A_CORE
pt reports very occasional marijuana use, never has been a problem, feels she can stop at any time. No needs identified and pt reports she knows there are resources if she needs.

## 2022-08-17 NOTE — PROGRESS NOTE ADULT - SUBJECTIVE AND OBJECTIVE BOX
Hospital day: 1    42y Female admitted with Appendicitis      Patient seen and examined bedside resting comfortably.  No overnight events.  Currently NPO.  Has been ambulating.  Not passing flatus, no BM.  Home medications have been resumed, pain medication increased per psych.  States pain is lessened.  Feels mildly improved.  Reviewed hospital course and conservative treatment.      T(F): 99 (08-14-22 @ 05:04), Max: 99 (08-13-22 @ 10:13)  HR: 87 (08-14-22 @ 05:04) (84 - 98)  BP: 92/62 (08-14-22 @ 05:04) (92/62 - 113/77)  RR: 18 (08-14-22 @ 05:04) (16 - 18)  SpO2: 90% (08-14-22 @ 05:04) (90% - 99%)  Wt(kg): --  CAPILLARY BLOOD GLUCOSE          PHYSICAL EXAM:  General: NAD  Neuro:  Alert & oriented x 3  CV: +S1+S2 regular rate and rhythm  Lung: clear to ausculation bilaterally  Abdomen: BS+ Soft, ND, Mildly tender to palpation in R>L quadrants  Extremities: no pedal edema or calf tenderness noted       LABS:                        10.6   24.44 )-----------( 389      ( 13 Aug 2022 09:25 )             34.1     08-13    139  |  107  |  7   ----------------------------<  105<H>  4.0   |  26  |  0.58    Ca    8.7      13 Aug 2022 09:25    TPro  7.7  /  Alb  3.4  /  TBili  0.6  /  DBili  x   /  AST  36  /  ALT  47  /  AlkPhos  173<H>  08-13      I&O's Detail    13 Aug 2022 07:01  -  14 Aug 2022 07:00  --------------------------------------------------------  IN:    dextrose 5% + sodium chloride 0.45%: 1500 mL  Total IN: 1500 mL    OUT:    Voided (mL): 300 mL  Total OUT: 300 mL    Total NET: 1200 mL            RADIOLOGY:    
pt seen  states pain worse today then yesterday  ICU Vital Signs Last 24 Hrs  T(C): 36.7 (16 Aug 2022 04:53), Max: 36.7 (15 Aug 2022 12:29)  T(F): 98.1 (16 Aug 2022 04:53), Max: 98.1 (15 Aug 2022 20:05)  HR: 80 (16 Aug 2022 04:53) (76 - 80)  BP: 98/65 (16 Aug 2022 04:53) (93/63 - 122/74)  BP(mean): --  ABP: --  ABP(mean): --  RR: 18 (16 Aug 2022 04:53) (18 - 20)  SpO2: 91% (16 Aug 2022 04:53) (91% - 91%)    O2 Parameters below as of 16 Aug 2022 04:53  Patient On (Oxygen Delivery Method): room air        gen-NAD  resp-clear  abd-soft ND, mild RLQ Tenderness                          9.0    9.08  )-----------( 437      ( 16 Aug 2022 07:40 )             28.9   
[facility], Division of Infectious Diseases  JENNIFER Burr Y. Patel, S. Shah, G. [facility]  [phone]    Name: RUPALI CRUZ  Age: 42y  Gender: Female  MRN: [id number]    Interval History:  Patient seen and examined at bedside  No acute overnight events. Afebrile  Tolerating diet  No complaints  Notes reviewed    Antibiotics:  metroNIDAZOLE    Tablet 500 milliGRAM(s) Oral three times a day  piperacillin/tazobactam IVPB.. 3.375 Gram(s) IV Intermittent every 8 hours      Medications:  amphetamine/dextroamphetamine 30 milliGRAM(s) Oral <User Schedule>  clonazePAM  Tablet 1 milliGRAM(s) Oral four times a day PRN  dextrose 5% + sodium chloride 0.45%. 1000 milliLiter(s) IV Continuous <Continuous>  DULoxetine 120 milliGRAM(s) Oral daily  enoxaparin Injectable 40 milliGRAM(s) SubCutaneous every 24 hours  FLUoxetine 80 milliGRAM(s) Oral daily  lamoTRIgine 400 milliGRAM(s) Oral at bedtime  metroNIDAZOLE    Tablet 500 milliGRAM(s) Oral three times a day  ondansetron Injectable 4 milliGRAM(s) IV Push every 6 hours PRN  pantoprazole  Injectable 40 milliGRAM(s) IV Push daily  piperacillin/tazobactam IVPB.. 3.375 Gram(s) IV Intermittent every 8 hours  polyethylene glycol 3350 17 Gram(s) Oral once  traZODone 200 milliGRAM(s) Oral at bedtime  venlafaxine XR. 300 milliGRAM(s) Oral daily      Review of Systems:    Review of systems otherwise negative except as previously noted.    Allergies: Ofirmev (Urticaria)  Robaxin (Anaphylaxis)    For details regarding the patient's past medical history, social history, family history, and other miscellaneous elements, please refer the initial infectious diseases consultation and/or the admitting history and physical examination for this admission.    Objective:  Vitals:   T(C): 37.1 (08-17-22 @ 12:11), Max: 37.1 (08-17-22 @ 12:11)  HR: 79 (08-17-22 @ 12:11) (78 - 81)  BP: 124/79 (08-17-22 @ 12:11) (104/68 - 124/79)  RR: 20 (08-17-22 @ 12:11) (18 - 20)  SpO2: 95% (08-17-22 @ 12:11) (90% - 95%)    Physical Examination:  General: no acute distress  HEENT: NC/AT, EOMI,  Cardio: S1, S2 heard, RRR, no murmurs  Resp: breath sounds heard bilaterally, no rales, wheezes or rhonchi  Abd: soft, NT, ND  Ext: no edema or cyanosis  Skin: warm, dry, no visible rash      Laboratory Studies:  CBC:                       8.8    7.20  )-----------( 416      ( 17 Aug 2022 06:15 )             28.7     CMP: 08-17    144  |  110<H>  |  4<L>  ----------------------------<  82  4.2   |  30  |  0.68    Ca    9.0      17 Aug 2022 06:15  Phos  3.4     08-17  Mg     2.4     08-17            Microbiology: reviewed    Culture - Urine (collected 08-13-22 @ 02:44)  Source: Clean Catch Clean Catch (Midstream)  Final Report (08-14-22 @ 15:15):    >=3 organisms. Probable collection contamination.    Culture - Blood (collected 08-13-22 @ 01:45)  Source: .Blood Blood-Peripheral  Preliminary Report (08-14-22 @ 05:01):    No growth to date.    Culture - Blood (collected 08-13-22 @ 01:35)  Source: .Blood Blood-Peripheral  Preliminary Report (08-14-22 @ 05:01):    No growth to date.        Radiology: reviewed      
[facility], Division of Infectious Diseases  JENNIFER Burr Y. Patel, S. Shah, G. [facility]  [phone]    Name: RUPALI CRUZ  Age: 42y  Gender: Female  MRN: [id number]    Interval History:  Patient seen and examined at bedside this morning  No acute overnight events. Afebrile  Feeling abdominal pain   Notes reviewed    Antibiotics:  piperacillin/tazobactam IVPB.. 3.375 Gram(s) IV Intermittent every 8 hours      Medications:  amphetamine/dextroamphetamine 30 milliGRAM(s) Oral <User Schedule>  clonazePAM  Tablet 1 milliGRAM(s) Oral four times a day PRN  dextrose 5% + sodium chloride 0.45%. 1000 milliLiter(s) IV Continuous <Continuous>  DULoxetine 120 milliGRAM(s) Oral daily  enoxaparin Injectable 40 milliGRAM(s) SubCutaneous every 24 hours  FLUoxetine 80 milliGRAM(s) Oral daily  HYDROmorphone   Tablet 4 milliGRAM(s) Oral every 4 hours PRN  HYDROmorphone   Tablet 2 milliGRAM(s) Oral every 4 hours PRN  HYDROmorphone  Injectable 1.5 milliGRAM(s) IV Push every 3 hours PRN  lamoTRIgine 400 milliGRAM(s) Oral at bedtime  ondansetron Injectable 4 milliGRAM(s) IV Push every 6 hours PRN  pantoprazole  Injectable 40 milliGRAM(s) IV Push daily  piperacillin/tazobactam IVPB.. 3.375 Gram(s) IV Intermittent every 8 hours  potassium chloride    Tablet ER 40 milliEquivalent(s) Oral every 4 hours  traZODone 200 milliGRAM(s) Oral at bedtime  venlafaxine XR. 300 milliGRAM(s) Oral daily      Review of Systems:  Review of systems otherwise negative except as previously noted.    Allergies: Ofirmev (Urticaria)  Robaxin (Anaphylaxis)    For details regarding the patient's past medical history, social history, family history, and other miscellaneous elements, please refer the initial infectious diseases consultation and/or the admitting history and physical examination for this admission.    Objective:  Vitals:   T(C): 36.8 (08-16-22 @ 12:54), Max: 36.8 (08-16-22 @ 12:54)  HR: 87 (08-16-22 @ 12:54) (76 - 87)  BP: 102/65 (08-16-22 @ 12:54) (93/63 - 102/65)  RR: 20 (08-16-22 @ 12:54) (18 - 20)  SpO2: 93% (08-16-22 @ 12:54) (91% - 93%)    Physical Examination:  General: no acute distress, obese  HEENT: NC/AT, EOMI,  Cardio: S1, S2 heard, RRR, no murmurs  Resp: breath sounds heard bilaterally, no rales, wheezes or rhonchi  Abd: soft, NT, ND  Neuro: no obvious focal deficits  Ext: no edema or cyanosis  Skin: warm, dry, no visible rash      Laboratory Studies:  CBC:                       9.0    9.08  )-----------( 437      ( 16 Aug 2022 07:40 )             28.9     CMP: 08-16    141  |  106  |  3<L>  ----------------------------<  74  3.2<L>   |  29  |  0.56    Ca    9.2      16 Aug 2022 07:40            Microbiology: reviewed    Culture - Urine (collected 08-13-22 @ 02:44)  Source: Clean Catch Clean Catch (Midstream)  Final Report (08-14-22 @ 15:15):    >=3 organisms. Probable collection contamination.    Culture - Blood (collected 08-13-22 @ 01:45)  Source: .Blood Blood-Peripheral  Preliminary Report (08-14-22 @ 05:01):    No growth to date.    Culture - Blood (collected 08-13-22 @ 01:35)  Source: .Blood Blood-Peripheral  Preliminary Report (08-14-22 @ 05:01):    No growth to date.        Radiology: reviewed      
pt seen  feeling better  still pain  feels weak  ICU Vital Signs Last 24 Hrs  T(C): 36.8 (15 Aug 2022 04:54), Max: 37 (14 Aug 2022 20:53)  T(F): 98.3 (15 Aug 2022 04:54), Max: 98.6 (14 Aug 2022 20:53)  HR: 86 (15 Aug 2022 04:54) (84 - 91)  BP: 98/64 (15 Aug 2022 04:54) (98/64 - 110/75)  BP(mean): --  ABP: --  ABP(mean): --  RR: 18 (15 Aug 2022 04:54) (18 - 18)  SpO2: 92% (15 Aug 2022 04:54) (91% - 96%)    O2 Parameters below as of 15 Aug 2022 04:54  Patient On (Oxygen Delivery Method): room air        gen-NAD  resp-clear  abd-soft, ND, rlq tenderness(improving)                          8.8    9.36  )-----------( 368      ( 15 Aug 2022 06:35 )             28.8   
SUBJECTIVE:  Patient seen and examined at bedside. No overnight events. Patient states she is feeling better and pain is improving,. Tolerating full liquid diet, reports she is hungry and would like to eat solid food.     Vital Signs Last 24 Hrs  T(C): 36.9 (17 Aug 2022 04:15), Max: 36.9 (17 Aug 2022 04:15)  T(F): 98.4 (17 Aug 2022 04:15), Max: 98.4 (17 Aug 2022 04:15)  HR: 81 (17 Aug 2022 04:15) (78 - 87)  BP: 118/80 (17 Aug 2022 04:15) (102/65 - 118/80)  BP(mean): --  RR: 20 (17 Aug 2022 04:15) (18 - 20)  SpO2: 90% (17 Aug 2022 04:15) (90% - 93%)    Parameters below as of 17 Aug 2022 04:15  Patient On (Oxygen Delivery Method): room air    PHYSICAL EXAM:  GENERAL: NAD, resting comfortably in bed  HEAD:  Atraumatic, Normocephalic  ABDOMEN: Soft, nondistended. Mild tenderness RLQ. +BS  NEUROLOGY: A&O x 3    amphetamine/dextroamphetamine 30 milliGRAM(s) Oral <User Schedule>  clonazePAM  Tablet 1 milliGRAM(s) Oral four times a day PRN  dextrose 5% + sodium chloride 0.45%. 1000 milliLiter(s) IV Continuous <Continuous>  DULoxetine 120 milliGRAM(s) Oral daily  enoxaparin Injectable 40 milliGRAM(s) SubCutaneous every 24 hours  FLUoxetine 80 milliGRAM(s) Oral daily  HYDROmorphone   Tablet 4 milliGRAM(s) Oral every 4 hours PRN  HYDROmorphone   Tablet 2 milliGRAM(s) Oral every 4 hours PRN  HYDROmorphone  Injectable 1.5 milliGRAM(s) IV Push every 3 hours PRN  lamoTRIgine 400 milliGRAM(s) Oral at bedtime  metroNIDAZOLE    Tablet 500 milliGRAM(s) Oral three times a day  ondansetron Injectable 4 milliGRAM(s) IV Push every 6 hours PRN  pantoprazole  Injectable 40 milliGRAM(s) IV Push daily  piperacillin/tazobactam IVPB.. 3.375 Gram(s) IV Intermittent every 8 hours  traZODone 200 milliGRAM(s) Oral at bedtime  venlafaxine XR. 300 milliGRAM(s) Oral daily      LABS:                        8.8    7.20  )-----------( 416      ( 17 Aug 2022 06:15 )             28.7     Chem panel pending 
Shree Price MD  [phone]    SUBJECTIVE:  Resting in bed.  Abdominal pain markedly improved from admission.  Tolerating clear liquid diet.  Last BM yesterday evening.  No SOB on RA.  NAD.    MEDICATIONS  (STANDING):  amphetamine/dextroamphetamine 30 milliGRAM(s) Oral <User Schedule>  dextrose 5% + sodium chloride 0.45%. 1000 milliLiter(s) (65 mL/Hr) IV Continuous <Continuous>  DULoxetine 120 milliGRAM(s) Oral daily  enoxaparin Injectable 40 milliGRAM(s) SubCutaneous every 24 hours  FLUoxetine 80 milliGRAM(s) Oral daily  lamoTRIgine 400 milliGRAM(s) Oral at bedtime  pantoprazole  Injectable 40 milliGRAM(s) IV Push daily  piperacillin/tazobactam IVPB.. 3.375 Gram(s) IV Intermittent every 8 hours  traZODone 200 milliGRAM(s) Oral at bedtime  venlafaxine XR. 300 milliGRAM(s) Oral daily    MEDICATIONS  (PRN):  clonazePAM  Tablet 1 milliGRAM(s) Oral four times a day PRN anxiety / jiterriness/restlessness  HYDROmorphone  Injectable 1.5 milliGRAM(s) IV Push every 3 hours PRN Severe Pain (7 - 10)  ondansetron Injectable 4 milliGRAM(s) IV Push every 6 hours PRN Nausea and/or Vomiting      Vital Signs Last 24 Hrs  T(C): 36.7 (15 Aug 2022 12:29), Max: 37 (14 Aug 2022 20:53)  T(F): 98 (15 Aug 2022 12:29), Max: 98.6 (14 Aug 2022 20:53)  HR: 79 (15 Aug 2022 12:29) (79 - 91)  BP: 122/74 (15 Aug 2022 12:29) (98/64 - 122/74)  BP(mean): --  RR: 20 (15 Aug 2022 12:29) (18 - 20)  SpO2: 91% (15 Aug 2022 12:29) (91% - 96%)    Parameters below as of 15 Aug 2022 12:29  Patient On (Oxygen Delivery Method): room air        CAPILLARY BLOOD GLUCOSE        I&O's Summary      PHYSICAL EXAM:  GENERAL: Looks stated age, NAD  CARDIOVASCULAR: Normal S1, S2  PULMONARY: Lungs clear to auscultation bilaterally. No wheezes/rales/rhonchi  GI: Abdomen non-distended, soft, mild RLQ TTP.  Bowel sounds present  MSK/Ext:  No leg edema.  No calf tenderness bilaterally  PSYCH: Normal Affect.      LABS:                        8.8    9.36  )-----------( 368      ( 15 Aug 2022 06:35 )             28.8     08-15    142  |  109<H>  |  5<L>  ----------------------------<  76  3.5   |  26  |  0.52    Ca    8.6      15 Aug 2022 06:35  Phos  2.8     08-14  Mg     2.1     08-14                  RADIOLOGY & ADDITIONAL TESTS:      
Shree Price MD  [phone]    SUBJECTIVE:  Resting in bed.  Still with some RLQ abdominal pain.  No SOB, pulse ox 95% on RA.  NAD.    MEDICATIONS  (STANDING):  amphetamine/dextroamphetamine 30 milliGRAM(s) Oral <User Schedule>  dextrose 5% + sodium chloride 0.45%. 1000 milliLiter(s) (65 mL/Hr) IV Continuous <Continuous>  DULoxetine 120 milliGRAM(s) Oral daily  enoxaparin Injectable 40 milliGRAM(s) SubCutaneous every 24 hours  FLUoxetine 80 milliGRAM(s) Oral daily  lamoTRIgine 400 milliGRAM(s) Oral at bedtime  pantoprazole  Injectable 40 milliGRAM(s) IV Push daily  piperacillin/tazobactam IVPB.. 3.375 Gram(s) IV Intermittent every 8 hours  potassium chloride    Tablet ER 40 milliEquivalent(s) Oral every 4 hours  traZODone 200 milliGRAM(s) Oral at bedtime  venlafaxine XR. 300 milliGRAM(s) Oral daily    MEDICATIONS  (PRN):  clonazePAM  Tablet 1 milliGRAM(s) Oral four times a day PRN anxiety / jiterriness/restlessness  HYDROmorphone   Tablet 4 milliGRAM(s) Oral every 4 hours PRN Severe Pain (7 - 10)  HYDROmorphone   Tablet 2 milliGRAM(s) Oral every 4 hours PRN Moderate Pain (4 - 6)  HYDROmorphone  Injectable 1.5 milliGRAM(s) IV Push every 3 hours PRN Severe Pain (7 - 10)  ondansetron Injectable 4 milliGRAM(s) IV Push every 6 hours PRN Nausea and/or Vomiting      Vital Signs Last 24 Hrs  T(C): 36.8 (16 Aug 2022 12:54), Max: 36.8 (16 Aug 2022 12:54)  T(F): 98.2 (16 Aug 2022 12:54), Max: 98.2 (16 Aug 2022 12:54)  HR: 87 (16 Aug 2022 12:54) (76 - 87)  BP: 102/65 (16 Aug 2022 12:54) (93/63 - 102/65)  BP(mean): --  RR: 20 (16 Aug 2022 12:54) (18 - 20)  SpO2: 93% (16 Aug 2022 12:54) (91% - 93%)    Parameters below as of 16 Aug 2022 12:54  Patient On (Oxygen Delivery Method): room air        CAPILLARY BLOOD GLUCOSE        I&O's Summary      PHYSICAL EXAM:  GENERAL: Looks stated age, NAD  CARDIOVASCULAR: Normal S1, S2  PULMONARY: Lungs clear to auscultation bilaterally. No wheezes/rales/rhonchi  GI: Abdomen non-distended, soft, mild RLQ TTP.  Bowel sounds present  MSK/Ext:  No leg edema.  No calf tenderness bilaterally  PSYCH: Normal Affect.      LABS:                        9.0    9.08  )-----------( 437      ( 16 Aug 2022 07:40 )             28.9     08-16    141  |  106  |  3<L>  ----------------------------<  74  3.2<L>   |  29  |  0.56    Ca    9.2      16 Aug 2022 07:40                  RADIOLOGY & ADDITIONAL TESTS:

## 2022-08-17 NOTE — PROGRESS NOTE ADULT - ASSESSMENT
42y Female with perforated appendicitis with phlegmon
The patient is a 42-year-old woman with PMH of Bipolar Disorder, ADHD, and Pre-diabetes (on Metformin) who p/w abdominal pain, found to have perforated appendicitis.    Perforated appendicitis  - CT Scan w/ Acute perforated appendicitis with few foci of adjacent extraluminal air.   Moderate phlegmonous changes and small volume complex free fluid without   evidence of drainable abscess at this time. Reactive terminal ileal wall   thickening.  - Cx NGTD  Plan:   Continue Zosyn while inpatient  Switch to ceftin 500mg BID and flagyl 500mg TID to complete course until 8/28  Appreciate surgery recs  Supportive care per primary team    D/w primary team    Please call with any questions.   Jessica Robbins M.D.  [facility], Division of Infectious Diseases [phone]  
The patient is a 42-year-old woman with PMH of Bipolar Disorder, ADHD, and Pre-diabetes (on Metformin) who p/w abdominal pain, found to have perforated appendicitis.    Perforated appendicitis  - CT Scan w/ Acute perforated appendicitis with few foci of adjacent extraluminal air.   Moderate phlegmonous changes and small volume complex free fluid without   evidence of drainable abscess at this time. Reactive terminal ileal wall   thickening.  - Cx NGTD  Plan:   Continue Zosyn.  Appreciate surgery recs  Supportive care per primary team    Infectious Diseases will continue to follow. Please call with any questions.   Jessica Robbins M.D.  [facility], Division of Infectious Diseases [phone]  
appendicitis with phlegmon       cont abx       may have clears
41 yo female with extensive pmhx here for perforated appendix as seen on CT scan.  Currently NPO, gi fxn has not returned at this time.  Voiding. Getting hot and cold flashes. 
The patient is a 42-year-old woman with PMH of Bipolar Disorder, ADHD, and Pre-diabetes (on Metformin) who p/w abdominal pain, found to have perforated appendicitis.
appendicitis with phlegmon.  IV access lost   cont abx    miralax, fulls    iv attempt
The patient is a 42-year-old woman with PMH of Bipolar Disorder, ADHD, and Pre-diabetes (on Metformin) who p/w abdominal pain, found to have perforated appendicitis.

## 2022-08-17 NOTE — PROGRESS NOTE ADULT - PROBLEM SELECTOR PLAN 1
Perforated appendicitis with phlegmon.  Continue IV Zosyn.  Case d/w Dr. Robbins, if unable to get IV access soon, IM Ceftriaxone and PO Flagyl may be a short term alternative.  Above d/w Surgery PA, attempting to get IV access.  Surgery to advance patient top full liquid diet.
Continue current medication  Continue on Zosyn  Ambulation/OOB  Awaiting Am labs.   IV fluids  analgesia prn  anti emetics prn  To discuss with Dr. Powell.
Pain control, supportive care   Ambulation, OOB  Full liquid diet, possible advancement later today  Continue abx  AM labs  Possible repeat CT scan tomorrow  D/c planning for later this week with PO abx as per ID  Will discuss with Dr. Powell
Perforated appendicitis with phlegmon.  Continue Zosyn.  Clear liquid diet per surgery.

## 2022-08-17 NOTE — SBIRT NOTE ADULT - NSSBIRTDRGMORETHAN_GEN_A_CORE
Patient was called to be informed.  No answer, detailed message left per pt request was left per pt request. No

## 2022-08-26 ENCOUNTER — NON-APPOINTMENT (OUTPATIENT)
Age: 42
End: 2022-08-26

## 2022-09-15 ENCOUNTER — APPOINTMENT (OUTPATIENT)
Dept: ENDOCRINOLOGY | Facility: CLINIC | Age: 42
End: 2022-09-15

## 2022-09-15 VITALS
BODY MASS INDEX: 43.94 KG/M2 | WEIGHT: 248 LBS | DIASTOLIC BLOOD PRESSURE: 80 MMHG | SYSTOLIC BLOOD PRESSURE: 110 MMHG | OXYGEN SATURATION: 95 % | HEIGHT: 63 IN | HEART RATE: 83 BPM

## 2022-09-15 DIAGNOSIS — E88.81 METABOLIC SYNDROME: ICD-10-CM

## 2022-09-15 DIAGNOSIS — E66.9 OBESITY, UNSPECIFIED: ICD-10-CM

## 2022-09-15 PROCEDURE — 36415 COLL VENOUS BLD VENIPUNCTURE: CPT

## 2022-09-15 PROCEDURE — 99213 OFFICE O/P EST LOW 20 MIN: CPT | Mod: 25

## 2022-09-15 RX ORDER — ALPRAZOLAM 0.5 MG/1
0.5 TABLET ORAL
Refills: 0 | Status: DISCONTINUED | COMMUNITY
End: 2022-09-15

## 2022-09-15 NOTE — ASSESSMENT
[Diabetic Medications] : Risks and benefits of diabetic medications were discussed [FreeTextEntry1] : Patient follows up for metabolic syndrome, weight management\par BMI noted 43.9 today\par She has h/o gastric sleeve surgery in 2015 and lost 100lbs. She had gastric band placed in 2016. Unfortunately, band slipped off and it was then removed in 2017, and has noticed some fluctuations in her weight. \par Previously on Rybelsus but stopped due to noted intolerance due to fatigue and nausea. \par She is following with psychiatry for bipolar and ADHD and no new medications are noted today.\par Given positive reinforcement regarding weight loss, emotional support was provided during this encounter\par Increase metformin to 1000 mg po BID. Will prescribe for Ozempic to [facility] again.\par Patient interested in following at medical weight management center [facility], will send referral.\par Bloodwork was collected in office today, will f/u results accordingly.\par \par Answered all questions today; patient verbalized understanding of the above\par RTC in 3 months\par \par \par

## 2022-09-15 NOTE — PHYSICAL EXAM
[Alert] : alert [No Acute Distress] : no acute distress [Well Developed] : well developed [Normal Voice/Communication] : normal voice communication [Normal Sclera/Conjunctiva] : normal sclera/conjunctiva [EOMI] : extra ocular movement intact [No Proptosis] : no proptosis [No Lid Lag] : no lid lag [No LAD] : no lymphadenopathy [No Thyroid Nodules] : no palpable thyroid nodules [No Respiratory Distress] : no respiratory distress [No Accessory Muscle Use] : no accessory muscle use [No Edema] : no peripheral edema [No Stigmata of Cushings Syndrome] : no stigmata of Cushings Syndrome [Normal Gait] : normal gait [No Involuntary Movements] : no involuntary movements were seen [No Tremors] : no tremors [Oriented x3] : oriented to person, place, and time [Normal Insight/Judgement] : insight and judgment were intact

## 2022-09-16 LAB
CHOLEST SERPL-MCNC: 251 MG/DL
ESTIMATED AVERAGE GLUCOSE: 103 MG/DL
HBA1C MFR BLD HPLC: 5.2 %
HDLC SERPL-MCNC: 57 MG/DL
LDLC SERPL CALC-MCNC: 169 MG/DL
NONHDLC SERPL-MCNC: 194 MG/DL
T4 FREE SERPL-MCNC: 1.2 NG/DL
TRIGL SERPL-MCNC: 128 MG/DL
TSH SERPL-ACNC: 2.32 UIU/ML

## 2022-10-20 ENCOUNTER — APPOINTMENT (OUTPATIENT)
Dept: MRI IMAGING | Facility: CLINIC | Age: 42
End: 2022-10-20

## 2022-10-20 PROCEDURE — 72148 MRI LUMBAR SPINE W/O DYE: CPT

## 2022-12-07 NOTE — HISTORY OF PRESENT ILLNESS
December 7, 2022      [facility]  [address], [address]  [place] [address]  Phone: [phone]  Fax: [phone]       Patient: Alix Lovett   YOB: 2006  Date of Visit: 12/07/2022    To Whom It May Concern:    Hernán Lovett  was at [facility] on 12/07/2022. The patient may return to work/school in 1-2 days as long as she is fever free and symptoms improve with no restrictions. If you have any questions or concerns, or if I can be of further assistance, please do not hesitate to contact me.    Sincerely,    Nilam Kinney PA-C      [FreeTextEntry1] : This is a 41 yo female with anxiety, depression, bipolar, ADHD who presents for follow up of weight gain. \par \par She was last seen in endocrinology as telehealth visit in July 2022 at which time was continued on metformin, noted intolerance to Rybelsus due to fatigue and nausea, and prescribed Ozempic.  Patient notes today however that Ozempic is covered by insurance, however it is not yet in stock at her pharmacy.  She is not sure when she will get it.  She has h/o gastric sleeve surgery and lost 100lbs. Unfortunately, band slipped off and it was then removed in 2017, and has noticed some fluctuations in her weight. She is following with psychiatry for bipolar and ADHD and no new medications are noted today.\par \par She has been taking Metformin 500mg po BID. of note, patient was recently in hospital due to "burst appendix" she notes it was inflamed and was seen by general surgeon however, she was not recommended for surgery.  She states she was given antibiotics and was advised to do CT scan by general surgeon.  She notes she still needs to do CT scan.  Since then she can be nauseous at times, and does not have much appetite for food, however she does have craving for sweets such as cookies, piece of cake at lunch, etc.  Due to history of back surgery, she notes limitations with physical activity.  She mentions she recently went walking with her daughter at the mall and walked from one end to to another, however walking on her way back did make her feel very tired.  She notes she does not walk at home or outside home because she feels it does not help with her underlying anxiety and feels everyone may be looking at her. \par \par She is currently taking Klonopin 1mg 4x/day prn, Cymbalta 60mg po BID and Effexor, Lamictal, Prozac 80mg daily, MVI , and cranberry supplements.\par Noted both her mother and father had history of diabetes.  Noted her grandmother had history of heart surgery, likely CAD

## 2023-03-29 ENCOUNTER — APPOINTMENT (OUTPATIENT)
Dept: ORTHOPEDIC SURGERY | Facility: CLINIC | Age: 43
End: 2023-03-29
Payer: MEDICAID

## 2023-03-29 VITALS
DIASTOLIC BLOOD PRESSURE: 72 MMHG | OXYGEN SATURATION: 98 % | TEMPERATURE: 97.2 F | HEART RATE: 68 BPM | SYSTOLIC BLOOD PRESSURE: 108 MMHG | HEIGHT: 63 IN | BODY MASS INDEX: 37.56 KG/M2 | WEIGHT: 212 LBS

## 2023-03-29 DIAGNOSIS — M48.07 SPINAL STENOSIS, LUMBOSACRAL REGION: ICD-10-CM

## 2023-03-29 DIAGNOSIS — M54.16 RADICULOPATHY, LUMBAR REGION: ICD-10-CM

## 2023-03-29 PROCEDURE — 99214 OFFICE O/P EST MOD 30 MIN: CPT

## 2023-03-29 NOTE — HISTORY OF PRESENT ILLNESS
[de-identified] : Ms. RUPALI CRUZ  is a 42 year old female who presents to the office for a follow-up visit.   She is complaining of low back pain that can radiate into either buttock.  She takes nsaids as needed.  She is here to review her MRI results. \par

## 2023-03-29 NOTE — PHYSICAL EXAM
[Antalgic] : antalgic [Cane] : ambulates with cane [de-identified] : Examination of the lumbar spine reveals no midline tenderness palpation, step-offs, or skin lesions.  Healed lumbar incision.  Decreased range of motion with respect to flexion, extension, lateral bending, and rotation. No tenderness to palpation of the sciatic notch. No tenderness palpation of the bilateral greater trochanters.  She does have pain with passive internal and external range of motion of her left hip which reproduces much of her symptoms.  No instability of bilateral lower extremities.  Negative BRITTNI. Negative straight leg raise bilaterally. No bowstring. Negative femoral stretch. 5 out of 5 iliopsoas, hip abductors, hips adductors, quadriceps, hamstrings, gastrocsoleus, tibialis anterior, extensor hallucis longus, peroneals. Grossly intact sensation to light touch bilateral lower extremities. 1+ patellar and Achilles reflexes. Downgoing Babinski. No clonus. Intact proprioception. Palpable pulses. No skin lesion and no edema on the right and left lower extremities. [de-identified] : Review of her updated lumbar MRI reveals prior fusion at L2-3.  She does have adjacent level stenosis at L3-4 and L4-5.\par \par AP lateral lumbar x-rays taken today reveals instrumented fusion L2-3.  Spondylotic changes below.  No aggressive lesions.

## 2023-03-29 NOTE — DISCUSSION/SUMMARY
[de-identified] : We discussed further treatment options both nonsurgical and surgical.  At this point she would like to evaluate for epidural injections.  Referral was given.  Follow-up afterwards or sooner with any changes or worsening of her symptoms.

## 2023-03-30 NOTE — H&P ADULT - CLICK TO LAUNCH ORM
Impression: Puckering of macula, left eye: H35.372. 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 In general, lamellar holes usually do not cause significant visual symptoms. However, in symptomatic cases, vitrectomy can sometimes lead to visual improvement. The patient is doing well with their current vision, and thus we will observe the lamellar hole for now. We did discuss the risks and benefits of observation vs. vitrectomy surgery. The patient will call if they experience decreased vision or increased metamorphopsia. .

## 2023-04-10 NOTE — PROGRESS NOTE ADULT - PROBLEM/PLAN-1
Patient seen  today and her systolic number was over 160 with her heart rate 50 bpm.  Advised patient to keep blood pressure log.  Please advise.    Last Office Visit 2-9-23  Next Office Visit 8-14-23  
DISPLAY PLAN FREE TEXT

## 2023-05-08 NOTE — ED ADULT NURSE NOTE - NSFALLRSKASSESSDT_ED_ALL_ED
16-Oct-2020 18:54 Infliximab Counseling:  I discussed with the patient the risks of infliximab including but not limited to myelosuppression, immunosuppression, autoimmune hepatitis, demyelinating diseases, lymphoma, and serious infections.  The patient understands that monitoring is required including a PPD at baseline and must alert us or the primary physician if symptoms of infection or other concerning signs are noted.

## 2023-05-18 ENCOUNTER — APPOINTMENT (OUTPATIENT)
Dept: ENDOCRINOLOGY | Facility: CLINIC | Age: 43
End: 2023-05-18

## 2023-05-18 ENCOUNTER — EMERGENCY (EMERGENCY)
Facility: HOSPITAL | Age: 43
LOS: 0 days | Discharge: HOME | End: 2023-05-18
Attending: STUDENT IN AN ORGANIZED HEALTH CARE EDUCATION/TRAINING PROGRAM
Payer: MEDICAID

## 2023-05-18 VITALS
TEMPERATURE: 98 F | OXYGEN SATURATION: 98 % | HEART RATE: 82 BPM | SYSTOLIC BLOOD PRESSURE: 123 MMHG | RESPIRATION RATE: 18 BRPM | DIASTOLIC BLOOD PRESSURE: 81 MMHG

## 2023-05-18 VITALS
TEMPERATURE: 98 F | HEART RATE: 80 BPM | OXYGEN SATURATION: 98 % | WEIGHT: 203.93 LBS | HEIGHT: 63 IN | SYSTOLIC BLOOD PRESSURE: 100 MMHG | DIASTOLIC BLOOD PRESSURE: 56 MMHG | RESPIRATION RATE: 18 BRPM

## 2023-05-18 DIAGNOSIS — N39.0 URINARY TRACT INFECTION, SITE NOT SPECIFIED: ICD-10-CM

## 2023-05-18 DIAGNOSIS — N84.0 POLYP OF CORPUS UTERI: Chronic | ICD-10-CM

## 2023-05-18 DIAGNOSIS — Z98.890 OTHER SPECIFIED POSTPROCEDURAL STATES: ICD-10-CM

## 2023-05-18 DIAGNOSIS — Z90.49 ACQUIRED ABSENCE OF OTHER SPECIFIED PARTS OF DIGESTIVE TRACT: Chronic | ICD-10-CM

## 2023-05-18 DIAGNOSIS — F98.8 OTHER SPECIFIED BEHAVIORAL AND EMOTIONAL DISORDERS WITH ONSET USUALLY OCCURRING IN CHILDHOOD AND ADOLESCENCE: ICD-10-CM

## 2023-05-18 DIAGNOSIS — R11.2 NAUSEA WITH VOMITING, UNSPECIFIED: ICD-10-CM

## 2023-05-18 DIAGNOSIS — F31.9 BIPOLAR DISORDER, UNSPECIFIED: ICD-10-CM

## 2023-05-18 DIAGNOSIS — R10.31 RIGHT LOWER QUADRANT PAIN: ICD-10-CM

## 2023-05-18 DIAGNOSIS — Z88.6 ALLERGY STATUS TO ANALGESIC AGENT: ICD-10-CM

## 2023-05-18 DIAGNOSIS — Z98.89 OTHER SPECIFIED POSTPROCEDURAL STATES: Chronic | ICD-10-CM

## 2023-05-18 DIAGNOSIS — Z86.2 PERSONAL HISTORY OF DISEASES OF THE BLOOD AND BLOOD-FORMING ORGANS AND CERTAIN DISORDERS INVOLVING THE IMMUNE MECHANISM: ICD-10-CM

## 2023-05-18 DIAGNOSIS — M67.432 GANGLION, LEFT WRIST: Chronic | ICD-10-CM

## 2023-05-18 DIAGNOSIS — Z98.84 BARIATRIC SURGERY STATUS: Chronic | ICD-10-CM

## 2023-05-18 DIAGNOSIS — Z87.19 PERSONAL HISTORY OF OTHER DISEASES OF THE DIGESTIVE SYSTEM: ICD-10-CM

## 2023-05-18 DIAGNOSIS — Z88.8 ALLERGY STATUS TO OTHER DRUGS, MEDICAMENTS AND BIOLOGICAL SUBSTANCES: ICD-10-CM

## 2023-05-18 DIAGNOSIS — Z90.49 ACQUIRED ABSENCE OF OTHER SPECIFIED PARTS OF DIGESTIVE TRACT: ICD-10-CM

## 2023-05-18 LAB
ALBUMIN SERPL ELPH-MCNC: 3.7 G/DL — SIGNIFICANT CHANGE UP (ref 3.3–5)
ALP SERPL-CCNC: 59 U/L — SIGNIFICANT CHANGE UP (ref 40–120)
ALT FLD-CCNC: 26 U/L — SIGNIFICANT CHANGE UP (ref 12–78)
ANION GAP SERPL CALC-SCNC: 4 MMOL/L — LOW (ref 5–17)
APPEARANCE UR: ABNORMAL
AST SERPL-CCNC: 14 U/L — LOW (ref 15–37)
BACTERIA # UR AUTO: ABNORMAL
BASOPHILS # BLD AUTO: 0.04 K/UL — SIGNIFICANT CHANGE UP (ref 0–0.2)
BASOPHILS NFR BLD AUTO: 0.6 % — SIGNIFICANT CHANGE UP (ref 0–2)
BILIRUB SERPL-MCNC: 0.3 MG/DL — SIGNIFICANT CHANGE UP (ref 0.2–1.2)
BILIRUB UR-MCNC: ABNORMAL
BUN SERPL-MCNC: 11 MG/DL — SIGNIFICANT CHANGE UP (ref 7–23)
CALCIUM SERPL-MCNC: 8.9 MG/DL — SIGNIFICANT CHANGE UP (ref 8.5–10.1)
CHLORIDE SERPL-SCNC: 106 MMOL/L — SIGNIFICANT CHANGE UP (ref 96–108)
CO2 SERPL-SCNC: 28 MMOL/L — SIGNIFICANT CHANGE UP (ref 22–31)
COD CRY URNS QL: ABNORMAL
COLOR SPEC: YELLOW — SIGNIFICANT CHANGE UP
CREAT SERPL-MCNC: 0.79 MG/DL — SIGNIFICANT CHANGE UP (ref 0.5–1.3)
DIFF PNL FLD: NEGATIVE — SIGNIFICANT CHANGE UP
EGFR: 95 ML/MIN/1.73M2 — SIGNIFICANT CHANGE UP
EOSINOPHIL # BLD AUTO: 0.06 K/UL — SIGNIFICANT CHANGE UP (ref 0–0.5)
EOSINOPHIL NFR BLD AUTO: 0.8 % — SIGNIFICANT CHANGE UP (ref 0–6)
EPI CELLS # UR: ABNORMAL
GLUCOSE SERPL-MCNC: 72 MG/DL — SIGNIFICANT CHANGE UP (ref 70–99)
GLUCOSE UR QL: NEGATIVE MG/DL — SIGNIFICANT CHANGE UP
HCG SERPL-ACNC: <1 MIU/ML — SIGNIFICANT CHANGE UP
HCT VFR BLD CALC: 39.3 % — SIGNIFICANT CHANGE UP (ref 34.5–45)
HGB BLD-MCNC: 12.7 G/DL — SIGNIFICANT CHANGE UP (ref 11.5–15.5)
IMM GRANULOCYTES NFR BLD AUTO: 0.1 % — SIGNIFICANT CHANGE UP (ref 0–0.9)
KETONES UR-MCNC: ABNORMAL
LACTATE SERPL-SCNC: 0.4 MMOL/L — LOW (ref 0.7–2)
LEUKOCYTE ESTERASE UR-ACNC: ABNORMAL
LIDOCAIN IGE QN: 142 U/L — SIGNIFICANT CHANGE UP (ref 73–393)
LYMPHOCYTES # BLD AUTO: 2.82 K/UL — SIGNIFICANT CHANGE UP (ref 1–3.3)
LYMPHOCYTES # BLD AUTO: 38.9 % — SIGNIFICANT CHANGE UP (ref 13–44)
MCHC RBC-ENTMCNC: 29.3 PG — SIGNIFICANT CHANGE UP (ref 27–34)
MCHC RBC-ENTMCNC: 32.3 G/DL — SIGNIFICANT CHANGE UP (ref 32–36)
MCV RBC AUTO: 90.6 FL — SIGNIFICANT CHANGE UP (ref 80–100)
MONOCYTES # BLD AUTO: 0.49 K/UL — SIGNIFICANT CHANGE UP (ref 0–0.9)
MONOCYTES NFR BLD AUTO: 6.8 % — SIGNIFICANT CHANGE UP (ref 2–14)
NEUTROPHILS # BLD AUTO: 3.83 K/UL — SIGNIFICANT CHANGE UP (ref 1.8–7.4)
NEUTROPHILS NFR BLD AUTO: 52.8 % — SIGNIFICANT CHANGE UP (ref 43–77)
NITRITE UR-MCNC: NEGATIVE — SIGNIFICANT CHANGE UP
NRBC # BLD: 0 /100 WBCS — SIGNIFICANT CHANGE UP (ref 0–0)
PH UR: 6 — SIGNIFICANT CHANGE UP (ref 5–8)
PLATELET # BLD AUTO: 363 K/UL — SIGNIFICANT CHANGE UP (ref 150–400)
POTASSIUM SERPL-MCNC: 4 MMOL/L — SIGNIFICANT CHANGE UP (ref 3.5–5.3)
POTASSIUM SERPL-SCNC: 4 MMOL/L — SIGNIFICANT CHANGE UP (ref 3.5–5.3)
PROT SERPL-MCNC: 7.2 GM/DL — SIGNIFICANT CHANGE UP (ref 6–8.3)
PROT UR-MCNC: 30 MG/DL
RBC # BLD: 4.34 M/UL — SIGNIFICANT CHANGE UP (ref 3.8–5.2)
RBC # FLD: 14.2 % — SIGNIFICANT CHANGE UP (ref 10.3–14.5)
RBC CASTS # UR COMP ASSIST: SIGNIFICANT CHANGE UP /HPF (ref 0–4)
SODIUM SERPL-SCNC: 138 MMOL/L — SIGNIFICANT CHANGE UP (ref 135–145)
SP GR SPEC: 1.02 — SIGNIFICANT CHANGE UP (ref 1.01–1.02)
UROBILINOGEN FLD QL: NEGATIVE MG/DL — SIGNIFICANT CHANGE UP
WBC # BLD: 7.25 K/UL — SIGNIFICANT CHANGE UP (ref 3.8–10.5)
WBC # FLD AUTO: 7.25 K/UL — SIGNIFICANT CHANGE UP (ref 3.8–10.5)
WBC UR QL: SIGNIFICANT CHANGE UP

## 2023-05-18 PROCEDURE — 99285 EMERGENCY DEPT VISIT HI MDM: CPT | Mod: FS

## 2023-05-18 PROCEDURE — 74177 CT ABD & PELVIS W/CONTRAST: CPT | Mod: 26,MA

## 2023-05-18 RX ORDER — MORPHINE SULFATE 50 MG/1
4 CAPSULE, EXTENDED RELEASE ORAL ONCE
Refills: 0 | Status: DISCONTINUED | OUTPATIENT
Start: 2023-05-18 | End: 2023-05-18

## 2023-05-18 RX ORDER — NITROFURANTOIN MACROCRYSTAL 50 MG
1 CAPSULE ORAL
Qty: 10 | Refills: 0
Start: 2023-05-18 | End: 2023-05-22

## 2023-05-18 RX ORDER — OXYCODONE HYDROCHLORIDE 5 MG/1
5 TABLET ORAL ONCE
Refills: 0 | Status: DISCONTINUED | OUTPATIENT
Start: 2023-05-18 | End: 2023-05-18

## 2023-05-18 RX ORDER — SODIUM CHLORIDE 9 MG/ML
1000 INJECTION INTRAMUSCULAR; INTRAVENOUS; SUBCUTANEOUS ONCE
Refills: 0 | Status: COMPLETED | OUTPATIENT
Start: 2023-05-18 | End: 2023-05-18

## 2023-05-18 RX ORDER — LISDEXAMFETAMINE DIMESYLATE 70 MG/1
1 CAPSULE ORAL
Qty: 0 | Refills: 0 | DISCHARGE

## 2023-05-18 RX ORDER — ONDANSETRON 8 MG/1
4 TABLET, FILM COATED ORAL ONCE
Refills: 0 | Status: COMPLETED | OUTPATIENT
Start: 2023-05-18 | End: 2023-05-18

## 2023-05-18 RX ORDER — DEXTROAMPHETAMINE SACCHARATE, AMPHETAMINE ASPARTATE, DEXTROAMPHETAMINE SULFATE AND AMPHETAMINE SULFATE 1.875; 1.875; 1.875; 1.875 MG/1; MG/1; MG/1; MG/1
1 TABLET ORAL
Qty: 0 | Refills: 0 | DISCHARGE

## 2023-05-18 RX ORDER — TRAZODONE HCL 50 MG
250 TABLET ORAL
Qty: 0 | Refills: 0 | DISCHARGE

## 2023-05-18 RX ORDER — LAMOTRIGINE 25 MG/1
2 TABLET, ORALLY DISINTEGRATING ORAL
Qty: 0 | Refills: 0 | DISCHARGE

## 2023-05-18 RX ORDER — NITROFURANTOIN MACROCRYSTAL 50 MG
100 CAPSULE ORAL ONCE
Refills: 0 | Status: COMPLETED | OUTPATIENT
Start: 2023-05-18 | End: 2023-05-18

## 2023-05-18 RX ADMIN — SODIUM CHLORIDE 1000 MILLILITER(S): 9 INJECTION INTRAMUSCULAR; INTRAVENOUS; SUBCUTANEOUS at 20:00

## 2023-05-18 RX ADMIN — OXYCODONE HYDROCHLORIDE 5 MILLIGRAM(S): 5 TABLET ORAL at 23:32

## 2023-05-18 RX ADMIN — Medication 100 MILLIGRAM(S): at 23:37

## 2023-05-18 RX ADMIN — MORPHINE SULFATE 4 MILLIGRAM(S): 50 CAPSULE, EXTENDED RELEASE ORAL at 23:25

## 2023-05-18 RX ADMIN — MORPHINE SULFATE 4 MILLIGRAM(S): 50 CAPSULE, EXTENDED RELEASE ORAL at 22:25

## 2023-05-18 RX ADMIN — MORPHINE SULFATE 4 MILLIGRAM(S): 50 CAPSULE, EXTENDED RELEASE ORAL at 19:53

## 2023-05-18 RX ADMIN — MORPHINE SULFATE 4 MILLIGRAM(S): 50 CAPSULE, EXTENDED RELEASE ORAL at 21:54

## 2023-05-18 RX ADMIN — SODIUM CHLORIDE 1000 MILLILITER(S): 9 INJECTION INTRAMUSCULAR; INTRAVENOUS; SUBCUTANEOUS at 19:53

## 2023-05-18 RX ADMIN — ONDANSETRON 4 MILLIGRAM(S): 8 TABLET, FILM COATED ORAL at 19:53

## 2023-05-18 NOTE — ED PROVIDER NOTE - PROGRESS NOTE DETAILS
CT shows no evidence of acute appendicitis. Labs reviewed and shows evidence of possible acute UTI. Will medically treat with Macrobid.  Patient instructed to follow-up with primary care doctor.  Patient stable on discharge.

## 2023-05-18 NOTE — ED PROVIDER NOTE - PHYSICAL EXAMINATION
VITAL SIGNS: I have reviewed nursing notes and confirm.  CONSTITUTIONAL: Well-developed; well-nourished; in no acute distress.  SKIN: Skin is warm and dry, no acute rash.  HEAD: Normocephalic; atraumatic.  NECK: Supple; non tender.  CARD: S1, S2 normal; no murmurs, gallops, or rubs. Regular rate and rhythm.  RESP: No wheezes, rales or rhonchi.  ABD: Soft; non-distended; +RLQ ttp; no rebound or guarding.  EXT: Normal ROM. No clubbing, cyanosis or edema.  NEURO: Alert, oriented. Grossly unremarkable. ZAVALA, normal tone, no gross motor or sensory changes. Fluent speech.   PSYCH: Cooperative, appropriate. Mood and affect wnl.

## 2023-05-18 NOTE — ED PROVIDER NOTE - NS ED ROS FT
+nausea, vomiting, abdominal pain  Denies fevers, chills, urinary symptoms, chest pain, palpitations, shortness of breath, dyspnea on exertion, syncope/near syncope, cough/URI symptoms, headache, weakness, numbness, focal deficits, visual changes, gait or balance changes, dizziness

## 2023-05-18 NOTE — ED PROVIDER NOTE - ATTENDING APP SHARED VISIT CONTRIBUTION OF CARE
I performed a history and physical examination of the patient and discussed his management with the PA.  I reviewed the PA's note and agree with the documented findings and plan of care.

## 2023-05-18 NOTE — ED ADULT NURSE NOTE - NSFALLUNIVINTERV_ED_ALL_ED
Bed/Stretcher in lowest position, wheels locked, appropriate side rails in place/Call bell, personal items and telephone in reach/Instruct patient to call for assistance before getting out of bed/chair/stretcher/Non-slip footwear applied when patient is off stretcher/[place] to call system/Physically safe environment - no spills, clutter or unnecessary equipment/Purposeful proactive rounding/Room/bathroom lighting operational, light cord in reach

## 2023-05-18 NOTE — ED PROVIDER NOTE - PATIENT PORTAL LINK FT
You can access the FollowMyHealth Patient Portal offered by  by registering at the following website: [email or web address]. By joining [employer]’s FollowMyHealth portal, you will also be able to view your health information using other applications (apps) compatible with our system.

## 2023-05-18 NOTE — ED PROVIDER NOTE - CLINICAL SUMMARY MEDICAL DECISION MAKING FREE TEXT BOX
43-year-old female, history of appendicitis, presenting to the ED complaining of acute onset of right lower quadrant pain x1 day with nausea and vomiting.   inquire any further evaluations.  She denies any urinary symptoms, diarrhea, fevers or chills.  Patient noted to have right lower quadrant tenderness on exam.  Will obtain CT abdomen pelvis, medical labs, and give IV fluids and pain control for symptoms relief.  Likely recurrence of the patient's previous appendicitis.  Will likely get surgery on board once results are back and patient is already agreeable with admission at this time.  Will reassess and dispo pending medical work-up.

## 2023-05-18 NOTE — ED ADULT TRIAGE NOTE - CHIEF COMPLAINT QUOTE
pt c/o severe RLQ abdominal pain, nausea and vomiting since this morning.  states she was schedule for surgery to remove her  appendicitis 1 year ago, missed the appointment.  history of dm, depression and anxiety.

## 2023-05-18 NOTE — ED PROVIDER NOTE - OBJECTIVE STATEMENT
43-year-old female, history of appendicitis, presenting to the ED complaining of acute onset of right lower quadrant pain x1 day with nausea and vomiting.  Patient states she was diagnosed with appendicitis a year ago, however was initially treated with IV antibiotics.  Patient was supposed to have her appendix removed as an outpatient after she completed the antibiotics but states she missed the appointment and was not able to follow-up with them.  After her symptoms improved she did not 43-year-old female, history of appendicitis, presenting to the ED complaining of acute onset of right lower quadrant pain x1 day with nausea and vomiting.  Patient states she was diagnosed with appendicitis a year ago, however was initially treated with IV antibiotics.  Patient was supposed to have her appendix removed as an outpatient after she completed the antibiotics but states she missed the appointment and was not able to follow-up with them.  After her symptoms improved she did not return to follow-up with them again.  Patient endorses about 3 episodes of emesis.  She states she is currently nauseated.  Denies fevers, chills, urinary symptoms, back pain, headaches, dizziness, or recent travel.

## 2023-05-20 LAB
CULTURE RESULTS: SIGNIFICANT CHANGE UP
SPECIMEN SOURCE: SIGNIFICANT CHANGE UP

## 2023-05-25 NOTE — ED ADULT NURSE NOTE - NSSUHOSCREENINGYN_ED_ALL_ED
Yes - the patient is able to be screened Post-Care Instructions: I reviewed with the patient in detail post-care instructions. Patient is to keep the biopsy site dry overnight, and then apply bacitracin twice daily until healed. Patient may apply hydrogen peroxide soaks to remove any crusting.

## 2023-06-15 NOTE — ED PROVIDER NOTE - DOMESTIC TRAVEL HIGH RISK QUESTION
[facility]  Dermatology  [phone]    WOUND CARE INSTRUCTIONS      Begin in 24 - 48 hours    You may shower, get the wound wet or get soap in the wound but AVOID soaking the wound such as in a bath, pool, hot tub or Jacuzzi. If you cannot avoid soaking, use water-proof bandages such as Nexcare Waterproof bandages or a generic equivalent of waterproof bandage.     1. Cleanse wound gently with tap water and Q-tip once daily, or wash when showering/washing face using fingertips to gently cleanse area.     If there is drainage, remove as much as possible.   2. Apply Vaseline-do not let the wound dry out.  Reapply Vaseline as needed.  DO NOT LET THE WOUND DRY OUT. Do NOT let the wound scab over.   3.  Cover the wound with a clean Band-Aid or non-stick dressing, unless instructed differently.    4.  If there is any oozing or bleeding, apply firm pressure for 10-20 minutes or more. Do NOT stop holding pressure or peek under the gauze while holding pressure. Holding pressure for a solid 10-20 minutes will help form a clot to control bleeding. If you lift up too soon you can disrupt the clot forming process and start oozing again.    5. In about 10-14 days (sometimes sooner or later) you will see a sticky yellowish-whitish substance begin forming in the wound. THIS IS A NORMAL PART OF HEALING.  This is part of the inflammatory healing process and is cells and other substances the body is sending to start to close the wound.  Pus is liquid and this substance is not. If you disrupt it, it will come out in pieces, not as a liquid. The edges of the wound will also be red at this time. THIS IS A NORMAL PART OF HEALING. The redness and yellowish sticky substances is a sign that the wound is starting to fill in from the bottom up and the outside in. The blood supply has increased to the edges of the wound.      Continue to wash the wound as normal and cover with Vaseline and a bandage.  Red  dispersed areas will typically begin to appear throughout the wound base as the tissue begins to fill in from the bottom up and the outsides in.  Continue applying Vaseline and a bandage daily and DO NOT LET THE WOUND DRY OUT. Do NOT let a scab form - scabs will delay healing, can cause bleeding and more scarring.     Redness that spreads or streaks of red, LIQUID pus, or fever with associated sharp unrelieved pain is an indication of an infection and you should contact our office. If our office is closed, please contact your primary care doctor or go to a walk-in clinic.       No

## 2023-07-03 NOTE — ED PROVIDER NOTE - PRO INTERPRETER NEED 2
English
Call primary care provider for follow up after discharge/Activities as tolerated/Low salt diet/Monitor weight daily/Report signs and symptoms to primary care provider

## 2023-08-02 NOTE — PROGRESS NOTE ADULT - SUBJECTIVE AND OBJECTIVE BOX
SUBJECTIVE:   Patient was seen and evaluated at bedside. Patient is resting in bed and is in no new acute distress.  OVERNIGHT EVENTS:   none   Vital Signs Last 24 Hrs  T(C): 37 (27 Aug 2019 07:20), Max: 37.3 (27 Aug 2019 04:32)  T(F): 98.6 (27 Aug 2019 07:20), Max: 99.2 (27 Aug 2019 04:32)  HR: 93 (27 Aug 2019 07:20) (79 - 109)  BP: 99/68 (27 Aug 2019 07:20) (96/62 - 132/76)  BP(mean): 73 (26 Aug 2019 12:15) (73 - 85)  RR: 18 (27 Aug 2019 07:20) (17 - 18)  SpO2: 93% (27 Aug 2019 07:20) (93% - 100%)    PHYSICAL EXAM:    General: No Acute Distress     Neurological: Awake, alert oriented to person, place and time, Following Commands, PERRL, EOMI, Face Symmetrical, Speech Fluent, Moving all extremities, Muscle Strength normal in all four extremities, No Drift, Sensation to Light Touch Intact    Pulmonary: Clear to Auscultation, No Rales, No Rhonchi, No Wheezes     Cardiovascular: S1, S2, Regular Rate and Rhythm     Gastrointestinal: Soft, Nontender, Nondistended     Incision:   clean and dry   LABS:                        10.5   7.12  )-----------( 259      ( 27 Aug 2019 08:33 )             33.5    08-27    139  |  104  |  9   ----------------------------<  94  4.1   |  26  |  0.82    Ca    8.6      27 Aug 2019 06:34          08-26 @ 07:01 - 08-27 @ 07:00  --------------------------------------------------------  IN: 1950 mL / OUT: 1775 mL / NET: 175 mL    08-27 @ 07:01 - 08-27 @ 09:44  --------------------------------------------------------  IN: 420 mL / OUT: 0 mL / NET: 420 mL      DRAINS:     MEDICATIONS:  Antibiotics:  ceFAZolin   IVPB 2000 milliGRAM(s) IV Intermittent once    Neuro:  acetaminophen   Tablet .. 650 milliGRAM(s) Oral every 6 hours PRN Temp greater or equal to 38C (100.4F), Mild Pain (1 - 3)  clonazePAM  Tablet 0.5 milliGRAM(s) Oral three times a day PRN as needed  cyclobenzaprine 5 milliGRAM(s) Oral three times a day  DULoxetine 90 milliGRAM(s) Oral daily  FLUoxetine 40 milliGRAM(s) Oral daily  gabapentin 600 milliGRAM(s) Oral at bedtime  HYDROmorphone   Tablet 2 milliGRAM(s) Oral every 4 hours PRN Moderate Pain (4 - 6)  HYDROmorphone   Tablet 4 milliGRAM(s) Oral every 4 hours PRN Severe Pain (7 - 10)  HYDROmorphone  Injectable 1 milliGRAM(s) IV Push every 4 hours PRN Severe Pain (7 - 10)  lamoTRIgine 100 milliGRAM(s) Oral at bedtime  traZODone 150 milliGRAM(s) Oral at bedtime  venlafaxine XR. 150 milliGRAM(s) Oral daily    Cardiac:    Pulm:    GI/:  docusate sodium 100 milliGRAM(s) Oral three times a day  senna 2 Tablet(s) Oral at bedtime    Other:   ascorbic acid 500 milliGRAM(s) Oral two times a day  enoxaparin Injectable 40 milliGRAM(s) SubCutaneous at bedtime  folic acid 1 milliGRAM(s) Oral daily  lidocaine 1% Injectable 0.2 milliLiter(s) Local Injection once  sodium chloride 0.9% lock flush 3 milliLiter(s) IV Push every 8 hours  sodium chloride 0.9% with potassium chloride 20 mEq/L 1000 milliLiter(s) IV Continuous <Continuous>    DIET: [] Regular [] CCD [] Renal [] Puree [] Dysphagia [] Tube Feeds:   regular   IMAGING:   no new imaging today Anesthesia Volume In Cc: 9

## 2023-08-08 NOTE — ED ADULT TRIAGE NOTE - NS ED NURSE DIRECT TO ROOM YN
No Pt presents to ED c/o shortness of breath. P tstates "I had a procedure done last week for my fractured back. I fractured it last Sunday. I have a feeling I am anemic because when I am anemic this happens." Pt is on xeralto. Endorses lightheadedness/ weakness. Denies dark/bloody stools, hematuria.

## 2023-08-22 ENCOUNTER — RX RENEWAL (OUTPATIENT)
Age: 43
End: 2023-08-22

## 2023-08-22 RX ORDER — METFORMIN ER 500 MG 500 MG/1
500 TABLET ORAL
Qty: 360 | Refills: 0 | Status: ACTIVE | COMMUNITY
Start: 2021-02-05 | End: 1900-01-01

## 2023-08-23 ENCOUNTER — RX RENEWAL (OUTPATIENT)
Age: 43
End: 2023-08-23

## 2023-08-25 ENCOUNTER — RX RENEWAL (OUTPATIENT)
Age: 43
End: 2023-08-25

## 2023-10-03 NOTE — ED ADULT NURSE NOTE - CARDIO WDL
This RN unable to collect labs with IV start. Lab called for draw.  Per ALMA DELIA Kelley, [facility] to medicate pt before labs     Joaquín Avina RN  10/03/23 0500 Normal rate, regular rhythm, normal S1, S2 heart sounds heard.

## 2023-10-07 ENCOUNTER — NON-APPOINTMENT (OUTPATIENT)
Age: 43
End: 2023-10-07

## 2023-10-08 ENCOUNTER — EMERGENCY (EMERGENCY)
Facility: HOSPITAL | Age: 43
LOS: 1 days | Discharge: HOME | End: 2023-10-08
Attending: STUDENT IN AN ORGANIZED HEALTH CARE EDUCATION/TRAINING PROGRAM | Admitting: STUDENT IN AN ORGANIZED HEALTH CARE EDUCATION/TRAINING PROGRAM
Payer: MEDICAID

## 2023-10-08 VITALS
TEMPERATURE: 98 F | RESPIRATION RATE: 16 BRPM | SYSTOLIC BLOOD PRESSURE: 112 MMHG | HEART RATE: 72 BPM | DIASTOLIC BLOOD PRESSURE: 73 MMHG | OXYGEN SATURATION: 97 %

## 2023-10-08 VITALS
DIASTOLIC BLOOD PRESSURE: 86 MMHG | RESPIRATION RATE: 18 BRPM | HEART RATE: 64 BPM | WEIGHT: 184.97 LBS | HEIGHT: 62 IN | OXYGEN SATURATION: 98 % | SYSTOLIC BLOOD PRESSURE: 130 MMHG | TEMPERATURE: 98 F

## 2023-10-08 DIAGNOSIS — Z98.89 OTHER SPECIFIED POSTPROCEDURAL STATES: Chronic | ICD-10-CM

## 2023-10-08 DIAGNOSIS — M67.432 GANGLION, LEFT WRIST: Chronic | ICD-10-CM

## 2023-10-08 DIAGNOSIS — N84.0 POLYP OF CORPUS UTERI: Chronic | ICD-10-CM

## 2023-10-08 DIAGNOSIS — Z98.84 BARIATRIC SURGERY STATUS: Chronic | ICD-10-CM

## 2023-10-08 DIAGNOSIS — Z90.49 ACQUIRED ABSENCE OF OTHER SPECIFIED PARTS OF DIGESTIVE TRACT: Chronic | ICD-10-CM

## 2023-10-08 LAB
ALBUMIN SERPL ELPH-MCNC: 4 G/DL — SIGNIFICANT CHANGE UP (ref 3.3–5)
ALP SERPL-CCNC: 64 U/L — SIGNIFICANT CHANGE UP (ref 40–120)
ALT FLD-CCNC: 30 U/L — SIGNIFICANT CHANGE UP (ref 12–78)
ANION GAP SERPL CALC-SCNC: 9 MMOL/L — SIGNIFICANT CHANGE UP (ref 5–17)
APPEARANCE UR: ABNORMAL
AST SERPL-CCNC: 20 U/L — SIGNIFICANT CHANGE UP (ref 15–37)
BASOPHILS # BLD AUTO: 0.05 K/UL — SIGNIFICANT CHANGE UP (ref 0–0.2)
BASOPHILS NFR BLD AUTO: 0.6 % — SIGNIFICANT CHANGE UP (ref 0–2)
BILIRUB SERPL-MCNC: 0.4 MG/DL — SIGNIFICANT CHANGE UP (ref 0.2–1.2)
BILIRUB UR-MCNC: NEGATIVE — SIGNIFICANT CHANGE UP
BUN SERPL-MCNC: 15 MG/DL — SIGNIFICANT CHANGE UP (ref 7–23)
CALCIUM SERPL-MCNC: 9.3 MG/DL — SIGNIFICANT CHANGE UP (ref 8.5–10.1)
CHLORIDE SERPL-SCNC: 109 MMOL/L — HIGH (ref 96–108)
CO2 SERPL-SCNC: 26 MMOL/L — SIGNIFICANT CHANGE UP (ref 22–31)
COLOR SPEC: SIGNIFICANT CHANGE UP
CREAT SERPL-MCNC: 0.87 MG/DL — SIGNIFICANT CHANGE UP (ref 0.5–1.3)
DIFF PNL FLD: ABNORMAL
EGFR: 85 ML/MIN/1.73M2 — SIGNIFICANT CHANGE UP
EOSINOPHIL # BLD AUTO: 0.02 K/UL — SIGNIFICANT CHANGE UP (ref 0–0.5)
EOSINOPHIL NFR BLD AUTO: 0.2 % — SIGNIFICANT CHANGE UP (ref 0–6)
GLUCOSE SERPL-MCNC: 66 MG/DL — LOW (ref 70–99)
GLUCOSE UR QL: NEGATIVE MG/DL — SIGNIFICANT CHANGE UP
HCG UR QL: NEGATIVE — SIGNIFICANT CHANGE UP
HCT VFR BLD CALC: 38.4 % — SIGNIFICANT CHANGE UP (ref 34.5–45)
HGB BLD-MCNC: 12.7 G/DL — SIGNIFICANT CHANGE UP (ref 11.5–15.5)
IMM GRANULOCYTES NFR BLD AUTO: 0.4 % — SIGNIFICANT CHANGE UP (ref 0–0.9)
KETONES UR-MCNC: ABNORMAL MG/DL
LEUKOCYTE ESTERASE UR-ACNC: ABNORMAL
LIDOCAIN IGE QN: 40 U/L — SIGNIFICANT CHANGE UP (ref 13–75)
LYMPHOCYTES # BLD AUTO: 2.53 K/UL — SIGNIFICANT CHANGE UP (ref 1–3.3)
LYMPHOCYTES # BLD AUTO: 31.3 % — SIGNIFICANT CHANGE UP (ref 13–44)
MCHC RBC-ENTMCNC: 31 PG — SIGNIFICANT CHANGE UP (ref 27–34)
MCHC RBC-ENTMCNC: 33.1 GM/DL — SIGNIFICANT CHANGE UP (ref 32–36)
MCV RBC AUTO: 93.7 FL — SIGNIFICANT CHANGE UP (ref 80–100)
MONOCYTES # BLD AUTO: 0.5 K/UL — SIGNIFICANT CHANGE UP (ref 0–0.9)
MONOCYTES NFR BLD AUTO: 6.2 % — SIGNIFICANT CHANGE UP (ref 2–14)
NEUTROPHILS # BLD AUTO: 4.96 K/UL — SIGNIFICANT CHANGE UP (ref 1.8–7.4)
NEUTROPHILS NFR BLD AUTO: 61.3 % — SIGNIFICANT CHANGE UP (ref 43–77)
NITRITE UR-MCNC: NEGATIVE — SIGNIFICANT CHANGE UP
NRBC # BLD: 0 /100 WBCS — SIGNIFICANT CHANGE UP (ref 0–0)
PH UR: 5.5 — SIGNIFICANT CHANGE UP (ref 5–8)
PLATELET # BLD AUTO: 357 K/UL — SIGNIFICANT CHANGE UP (ref 150–400)
POTASSIUM SERPL-MCNC: 4.2 MMOL/L — SIGNIFICANT CHANGE UP (ref 3.5–5.3)
POTASSIUM SERPL-SCNC: 4.2 MMOL/L — SIGNIFICANT CHANGE UP (ref 3.5–5.3)
PROT SERPL-MCNC: 7.3 G/DL — SIGNIFICANT CHANGE UP (ref 6–8.3)
PROT UR-MCNC: SIGNIFICANT CHANGE UP MG/DL
RBC # BLD: 4.1 M/UL — SIGNIFICANT CHANGE UP (ref 3.8–5.2)
RBC # FLD: 12.4 % — SIGNIFICANT CHANGE UP (ref 10.3–14.5)
SODIUM SERPL-SCNC: 144 MMOL/L — SIGNIFICANT CHANGE UP (ref 135–145)
SP GR SPEC: 1.03 — HIGH (ref 1–1.03)
UROBILINOGEN FLD QL: 1 MG/DL — SIGNIFICANT CHANGE UP (ref 0.2–1)
WBC # BLD: 8.09 K/UL — SIGNIFICANT CHANGE UP (ref 3.8–10.5)
WBC # FLD AUTO: 8.09 K/UL — SIGNIFICANT CHANGE UP (ref 3.8–10.5)

## 2023-10-08 PROCEDURE — 99285 EMERGENCY DEPT VISIT HI MDM: CPT

## 2023-10-08 PROCEDURE — 96361 HYDRATE IV INFUSION ADD-ON: CPT

## 2023-10-08 PROCEDURE — 74177 CT ABD & PELVIS W/CONTRAST: CPT | Mod: MA

## 2023-10-08 PROCEDURE — 36415 COLL VENOUS BLD VENIPUNCTURE: CPT

## 2023-10-08 PROCEDURE — 93971 EXTREMITY STUDY: CPT | Mod: 26,RT

## 2023-10-08 PROCEDURE — 96374 THER/PROPH/DIAG INJ IV PUSH: CPT | Mod: XU

## 2023-10-08 PROCEDURE — 74177 CT ABD & PELVIS W/CONTRAST: CPT | Mod: 26,MA

## 2023-10-08 PROCEDURE — 87086 URINE CULTURE/COLONY COUNT: CPT

## 2023-10-08 PROCEDURE — 81001 URINALYSIS AUTO W/SCOPE: CPT

## 2023-10-08 PROCEDURE — 96376 TX/PRO/DX INJ SAME DRUG ADON: CPT

## 2023-10-08 PROCEDURE — 83690 ASSAY OF LIPASE: CPT

## 2023-10-08 PROCEDURE — 85025 COMPLETE CBC W/AUTO DIFF WBC: CPT

## 2023-10-08 PROCEDURE — 80053 COMPREHEN METABOLIC PANEL: CPT

## 2023-10-08 PROCEDURE — 81025 URINE PREGNANCY TEST: CPT

## 2023-10-08 PROCEDURE — 96375 TX/PRO/DX INJ NEW DRUG ADDON: CPT

## 2023-10-08 PROCEDURE — 99284 EMERGENCY DEPT VISIT MOD MDM: CPT | Mod: 25

## 2023-10-08 PROCEDURE — 93971 EXTREMITY STUDY: CPT

## 2023-10-08 RX ORDER — SODIUM CHLORIDE 9 MG/ML
1000 INJECTION INTRAMUSCULAR; INTRAVENOUS; SUBCUTANEOUS ONCE
Refills: 0 | Status: COMPLETED | OUTPATIENT
Start: 2023-10-08 | End: 2023-10-08

## 2023-10-08 RX ORDER — MORPHINE SULFATE 50 MG/1
4 CAPSULE, EXTENDED RELEASE ORAL ONCE
Refills: 0 | Status: DISCONTINUED | OUTPATIENT
Start: 2023-10-08 | End: 2023-10-08

## 2023-10-08 RX ORDER — ONDANSETRON 8 MG/1
4 TABLET, FILM COATED ORAL ONCE
Refills: 0 | Status: COMPLETED | OUTPATIENT
Start: 2023-10-08 | End: 2023-10-08

## 2023-10-08 RX ADMIN — MORPHINE SULFATE 4 MILLIGRAM(S): 50 CAPSULE, EXTENDED RELEASE ORAL at 21:34

## 2023-10-08 RX ADMIN — MORPHINE SULFATE 4 MILLIGRAM(S): 50 CAPSULE, EXTENDED RELEASE ORAL at 19:50

## 2023-10-08 RX ADMIN — SODIUM CHLORIDE 1000 MILLILITER(S): 9 INJECTION INTRAMUSCULAR; INTRAVENOUS; SUBCUTANEOUS at 20:24

## 2023-10-08 RX ADMIN — MORPHINE SULFATE 4 MILLIGRAM(S): 50 CAPSULE, EXTENDED RELEASE ORAL at 21:07

## 2023-10-08 RX ADMIN — MORPHINE SULFATE 4 MILLIGRAM(S): 50 CAPSULE, EXTENDED RELEASE ORAL at 19:16

## 2023-10-08 RX ADMIN — ONDANSETRON 4 MILLIGRAM(S): 8 TABLET, FILM COATED ORAL at 19:16

## 2023-10-08 RX ADMIN — SODIUM CHLORIDE 1000 MILLILITER(S): 9 INJECTION INTRAMUSCULAR; INTRAVENOUS; SUBCUTANEOUS at 19:16

## 2023-10-08 NOTE — ED ADULT NURSE NOTE - CAS DISCH CONDITION
10am:  I called the patient  No answer  I left a message  The patient needs to come into the office today to have the drain removed  Please try calling him again to schedule an appointment today  This is very important  If there are any issues, please let Dr Danilo Fsiher know  Thank you     12:00  The patient called the phone room and transferred him to my line  The patient refused to come in today to have the drain pulled  He says he has no ride  He is aware that he has a drain and aware that we would like to see him today  The patient states he can come tomorrow and an appointment at [facility] was made at 1pm   The address was given to the patient  I spoke to Dr Danilo Fisher and he is aware  I sent RX for Bactrim and Clinda for 14 days to the pharmacy  The pharmacy was verified with the patient and he says he can pick it up today  He was advised to start the antibiotics today  Janak Coreas Stable

## 2023-10-08 NOTE — ED ADULT NURSE REASSESSMENT NOTE - NS ED NURSE REASSESS COMMENT FT1
Patient states wanted to go as her transport is outside and states she feels better. Discharged papers given with instructions. IV cannula removed.

## 2023-10-08 NOTE — ED ADULT TRIAGE NOTE - CHIEF COMPLAINT QUOTE
Patient A/Ox4 with clear speech. BIBA from  for LLQ abdominal pain that began today at 1pm. Also endorses nausea. Was given Zofran SL PTA.

## 2023-10-08 NOTE — ED ADULT NURSE NOTE - NSFALLUNIVINTERV_ED_ALL_ED
Bed/Stretcher in lowest position, wheels locked, appropriate side rails in place/Call bell, personal items and telephone in reach/Instruct patient to call for assistance before getting out of bed/chair/stretcher/Non-slip footwear applied when patient is off stretcher/[place] to call system/Physically safe environment - no spills, clutter or unnecessary equipment/Purposeful proactive rounding/Room/bathroom lighting operational, light cord in reach

## 2023-10-08 NOTE — ED PROVIDER NOTE - MUSCULOSKELETAL, MLM
Spine appears normal, range of motion is not limited, no muscle or joint tenderness right lower extremity with no swelling small varicose vein with ttp no redness nvi

## 2023-10-08 NOTE — ED PROVIDER NOTE - NSFOLLOWUPINSTRUCTIONS_ED_ALL_ED_FT
Follow up with GI and vascular  return to er for any worsening symptoms     Abdominal Pain, Adult  Pain in the abdomen (abdominal pain) can be caused by many things. Often, abdominal pain is not serious and it gets better with no treatment or by being treated at home. However, sometimes abdominal pain is serious.    Your health care provider will ask questions about your medical history and do a physical exam to try to determine the cause of your abdominal pain.    Follow these instructions at home:  Medicines    Take over-the-counter and prescription medicines only as told by your health care provider.  Do not take a laxative unless told by your health care provider.  General instructions      Watch your condition for any changes.  Drink enough fluid to keep your urine pale yellow.  Keep all follow-up visits as told by your health care provider. This is important.  Contact a health care provider if:  Your abdominal pain changes or gets worse.  You are not hungry or you lose weight without trying.  You are constipated or have diarrhea for more than 2–3 days.  You have pain when you urinate or have a bowel movement.  Your abdominal pain wakes you up at night.  Your pain gets worse with meals, after eating, or with certain foods.  You are vomiting and cannot keep anything down.  You have a fever.  You have blood in your urine.  Get help right away if:  Your pain does not go away as soon as your health care provider told you to expect.  You cannot stop vomiting.  Your pain is only in areas of the abdomen, such as the right side or the left lower portion of the abdomen. Pain on the right side could be caused by appendicitis.  You have bloody or black stools, or stools that look like tar.  You have severe pain, cramping, or bloating in your abdomen.  You have signs of dehydration, such as:  Dark urine, very little urine, or no urine.  Cracked lips.  Dry mouth.  Sunken eyes.  Sleepiness.  Weakness.  You have trouble breathing or chest pain.  Summary  Often, abdominal pain is not serious and it gets better with no treatment or by being treated at home. However, sometimes abdominal pain is serious.  Watch your condition for any changes.  Take over-the-counter and prescription medicines only as told by your health care provider.  Contact a health care provider if your abdominal pain changes or gets worse.  Get help right away if you have severe pain, cramping, or bloating in your abdomen.  This information is not intended to replace advice given to you by your health care provider. Make sure you discuss any questions you have with your health care provider.

## 2023-10-08 NOTE — ED PROVIDER NOTE - OBJECTIVE STATEMENT
Patient is a 43-year-old female with past medical history of ADD anemia bipolar depression GERD  cholecystectomy bariatric surgery status post gastric sleeve gastric band which was removed in 2017 due to displacement coming in for abdominal pain located by the umbilical and right lower quadrant was started around 1 PM today.  Patient states has been having diarrhea since yesterday with associated nausea no vomiting no fever no chills no chest pain or urinary symptoms.  Patient states she was post to have her appendix out last year but was treated with IV antibiotics and was advised to follow-up which she did not.  Patient states she went to urgent care recently for her right lower leg pain and noticed some varicose veins which were tender for the last few days.  Patient denies any shortness of breath birth control use.  Patient states she mentioned she was also having abdominal pain so sent to ER for further evaluation.

## 2023-10-08 NOTE — ED PROVIDER NOTE - CARE PROVIDER_API CALL
Julio Salmon  Gastroenterology  [address] [place] [address]  Phone: [phone]  Fax: [phone]  Follow Up Time:     Nickolas Gomez  Vascular Surgery  [address] [place] [address], [address]  [place], [place] [id number]  Phone: [phone]  Fax: [phone]  Follow Up Time:

## 2023-10-08 NOTE — ED PROVIDER NOTE - PROGRESS NOTE DETAILS
ct and US no acute findings f/u with gi and vascular urine pt on period  pain controlled  Reevaluated patient at bedside.  Patient feeling much improved.  Discussed the results of all diagnostic testing in ED and copies of all available reports given.   An opportunity to ask questions was provided.  Discussed the importance of prompt, close medical follow-up.  Patient will return with any changes, concerns or persistent/worsening symptoms.  Understanding of all instructions verbalized.

## 2023-10-08 NOTE — ED PROVIDER NOTE - DIFFERENTIAL DIAGNOSIS
Colitis, diverticulitis, appendicitis, kidney stone, UTI, pyelonephritis, enteritis, pancreatitis. Differential Diagnosis

## 2023-10-08 NOTE — ED ADULT NURSE NOTE - OBJECTIVE STATEMENT
patient alert and oriented from home presenting to ED with multiple problems.  patient was seen at urgent care because she believes that she has a blood clot in her lower right leg.  patient appears to have a spider vein in her leg at the place where she believes is a clot.  patient leg is not warm to touch or swollen but it is painful.  patient has also has diarrhea since last night ( soiling herself ) and lower left abdominal pain that started at 1330 today.

## 2023-10-08 NOTE — ED PROVIDER NOTE - ATTENDING APP SHARED VISIT CONTRIBUTION OF CARE
This was a shared visit with ILIANA. I reviewed and verified the documentation and independently performed the documented MDM.

## 2023-10-08 NOTE — ED PROVIDER NOTE - PATIENT PORTAL LINK FT
You can access the FollowMyHealth Patient Portal offered by [facility] by registering at the following website: [email or web address]. By joining [employer]’s FollowMyHealth portal, you will also be able to view your health information using other applications (apps) compatible with our system.

## 2023-10-10 LAB
CULTURE RESULTS: SIGNIFICANT CHANGE UP
SPECIMEN SOURCE: SIGNIFICANT CHANGE UP

## 2023-10-27 NOTE — PHYSICAL THERAPY INITIAL EVALUATION ADULT - HEALTH SCREEN CRITERIA
Fasting glucose is 111 with an A1c of 6.4 which is controlled and microalbumin is within normal limits. No changes to current outlook and patient is no longer taking any medications for this diagnosis. Recheck 6 months.   Lab Results   Component Value Date    HGBA1C 6.4 (H) 10/18/2023 yes

## 2023-11-08 ENCOUNTER — EMERGENCY (EMERGENCY)
Facility: HOSPITAL | Age: 43
LOS: 1 days | Discharge: HOME | End: 2023-11-08
Attending: EMERGENCY MEDICINE | Admitting: EMERGENCY MEDICINE
Payer: MEDICARE

## 2023-11-08 VITALS
HEART RATE: 93 BPM | OXYGEN SATURATION: 97 % | SYSTOLIC BLOOD PRESSURE: 127 MMHG | DIASTOLIC BLOOD PRESSURE: 80 MMHG | RESPIRATION RATE: 20 BRPM | WEIGHT: 169.09 LBS | HEIGHT: 62 IN | TEMPERATURE: 99 F

## 2023-11-08 VITALS
OXYGEN SATURATION: 97 % | HEART RATE: 84 BPM | DIASTOLIC BLOOD PRESSURE: 80 MMHG | RESPIRATION RATE: 18 BRPM | TEMPERATURE: 98 F | SYSTOLIC BLOOD PRESSURE: 132 MMHG

## 2023-11-08 DIAGNOSIS — M67.432 GANGLION, LEFT WRIST: Chronic | ICD-10-CM

## 2023-11-08 DIAGNOSIS — Z98.89 OTHER SPECIFIED POSTPROCEDURAL STATES: Chronic | ICD-10-CM

## 2023-11-08 DIAGNOSIS — Z98.84 BARIATRIC SURGERY STATUS: Chronic | ICD-10-CM

## 2023-11-08 DIAGNOSIS — N84.0 POLYP OF CORPUS UTERI: Chronic | ICD-10-CM

## 2023-11-08 DIAGNOSIS — Z90.49 ACQUIRED ABSENCE OF OTHER SPECIFIED PARTS OF DIGESTIVE TRACT: Chronic | ICD-10-CM

## 2023-11-08 LAB
ALBUMIN SERPL ELPH-MCNC: 3.8 G/DL — SIGNIFICANT CHANGE UP (ref 3.3–5)
ALBUMIN SERPL ELPH-MCNC: 3.8 G/DL — SIGNIFICANT CHANGE UP (ref 3.3–5)
ALP SERPL-CCNC: 61 U/L — SIGNIFICANT CHANGE UP (ref 40–120)
ALP SERPL-CCNC: 61 U/L — SIGNIFICANT CHANGE UP (ref 40–120)
ALT FLD-CCNC: 42 U/L — SIGNIFICANT CHANGE UP (ref 12–78)
ALT FLD-CCNC: 42 U/L — SIGNIFICANT CHANGE UP (ref 12–78)
ANION GAP SERPL CALC-SCNC: 9 MMOL/L — SIGNIFICANT CHANGE UP (ref 5–17)
ANION GAP SERPL CALC-SCNC: 9 MMOL/L — SIGNIFICANT CHANGE UP (ref 5–17)
APPEARANCE UR: CLEAR — SIGNIFICANT CHANGE UP
APPEARANCE UR: CLEAR — SIGNIFICANT CHANGE UP
AST SERPL-CCNC: 27 U/L — SIGNIFICANT CHANGE UP (ref 15–37)
AST SERPL-CCNC: 27 U/L — SIGNIFICANT CHANGE UP (ref 15–37)
BASOPHILS # BLD AUTO: 0.04 K/UL — SIGNIFICANT CHANGE UP (ref 0–0.2)
BASOPHILS # BLD AUTO: 0.04 K/UL — SIGNIFICANT CHANGE UP (ref 0–0.2)
BASOPHILS NFR BLD AUTO: 0.6 % — SIGNIFICANT CHANGE UP (ref 0–2)
BASOPHILS NFR BLD AUTO: 0.6 % — SIGNIFICANT CHANGE UP (ref 0–2)
BILIRUB SERPL-MCNC: 0.5 MG/DL — SIGNIFICANT CHANGE UP (ref 0.2–1.2)
BILIRUB SERPL-MCNC: 0.5 MG/DL — SIGNIFICANT CHANGE UP (ref 0.2–1.2)
BILIRUB UR-MCNC: NEGATIVE — SIGNIFICANT CHANGE UP
BILIRUB UR-MCNC: NEGATIVE — SIGNIFICANT CHANGE UP
BUN SERPL-MCNC: 6 MG/DL — LOW (ref 7–23)
BUN SERPL-MCNC: 6 MG/DL — LOW (ref 7–23)
CALCIUM SERPL-MCNC: 9.1 MG/DL — SIGNIFICANT CHANGE UP (ref 8.5–10.1)
CALCIUM SERPL-MCNC: 9.1 MG/DL — SIGNIFICANT CHANGE UP (ref 8.5–10.1)
CHLORIDE SERPL-SCNC: 108 MMOL/L — SIGNIFICANT CHANGE UP (ref 96–108)
CHLORIDE SERPL-SCNC: 108 MMOL/L — SIGNIFICANT CHANGE UP (ref 96–108)
CO2 SERPL-SCNC: 26 MMOL/L — SIGNIFICANT CHANGE UP (ref 22–31)
CO2 SERPL-SCNC: 26 MMOL/L — SIGNIFICANT CHANGE UP (ref 22–31)
COLOR SPEC: YELLOW — SIGNIFICANT CHANGE UP
COLOR SPEC: YELLOW — SIGNIFICANT CHANGE UP
CREAT SERPL-MCNC: 0.7 MG/DL — SIGNIFICANT CHANGE UP (ref 0.5–1.3)
CREAT SERPL-MCNC: 0.7 MG/DL — SIGNIFICANT CHANGE UP (ref 0.5–1.3)
DIFF PNL FLD: NEGATIVE — SIGNIFICANT CHANGE UP
DIFF PNL FLD: NEGATIVE — SIGNIFICANT CHANGE UP
EGFR: 110 ML/MIN/1.73M2 — SIGNIFICANT CHANGE UP
EGFR: 110 ML/MIN/1.73M2 — SIGNIFICANT CHANGE UP
EOSINOPHIL # BLD AUTO: 0.01 K/UL — SIGNIFICANT CHANGE UP (ref 0–0.5)
EOSINOPHIL # BLD AUTO: 0.01 K/UL — SIGNIFICANT CHANGE UP (ref 0–0.5)
EOSINOPHIL NFR BLD AUTO: 0.1 % — SIGNIFICANT CHANGE UP (ref 0–6)
EOSINOPHIL NFR BLD AUTO: 0.1 % — SIGNIFICANT CHANGE UP (ref 0–6)
GLUCOSE SERPL-MCNC: 85 MG/DL — SIGNIFICANT CHANGE UP (ref 70–99)
GLUCOSE SERPL-MCNC: 85 MG/DL — SIGNIFICANT CHANGE UP (ref 70–99)
GLUCOSE UR QL: NEGATIVE MG/DL — SIGNIFICANT CHANGE UP
GLUCOSE UR QL: NEGATIVE MG/DL — SIGNIFICANT CHANGE UP
HCG SERPL-ACNC: <1 MIU/ML — SIGNIFICANT CHANGE UP
HCG SERPL-ACNC: <1 MIU/ML — SIGNIFICANT CHANGE UP
HCT VFR BLD CALC: 37.9 % — SIGNIFICANT CHANGE UP (ref 34.5–45)
HCT VFR BLD CALC: 37.9 % — SIGNIFICANT CHANGE UP (ref 34.5–45)
HGB BLD-MCNC: 12.7 G/DL — SIGNIFICANT CHANGE UP (ref 11.5–15.5)
HGB BLD-MCNC: 12.7 G/DL — SIGNIFICANT CHANGE UP (ref 11.5–15.5)
IMM GRANULOCYTES NFR BLD AUTO: 0.6 % — SIGNIFICANT CHANGE UP (ref 0–0.9)
IMM GRANULOCYTES NFR BLD AUTO: 0.6 % — SIGNIFICANT CHANGE UP (ref 0–0.9)
KETONES UR-MCNC: 40 MG/DL
KETONES UR-MCNC: 40 MG/DL
LEUKOCYTE ESTERASE UR-ACNC: ABNORMAL
LEUKOCYTE ESTERASE UR-ACNC: ABNORMAL
LIDOCAIN IGE QN: 26 U/L — SIGNIFICANT CHANGE UP (ref 13–75)
LIDOCAIN IGE QN: 26 U/L — SIGNIFICANT CHANGE UP (ref 13–75)
LYMPHOCYTES # BLD AUTO: 0.92 K/UL — LOW (ref 1–3.3)
LYMPHOCYTES # BLD AUTO: 0.92 K/UL — LOW (ref 1–3.3)
LYMPHOCYTES # BLD AUTO: 13.6 % — SIGNIFICANT CHANGE UP (ref 13–44)
LYMPHOCYTES # BLD AUTO: 13.6 % — SIGNIFICANT CHANGE UP (ref 13–44)
MCHC RBC-ENTMCNC: 30.9 PG — SIGNIFICANT CHANGE UP (ref 27–34)
MCHC RBC-ENTMCNC: 30.9 PG — SIGNIFICANT CHANGE UP (ref 27–34)
MCHC RBC-ENTMCNC: 33.5 GM/DL — SIGNIFICANT CHANGE UP (ref 32–36)
MCHC RBC-ENTMCNC: 33.5 GM/DL — SIGNIFICANT CHANGE UP (ref 32–36)
MCV RBC AUTO: 92.2 FL — SIGNIFICANT CHANGE UP (ref 80–100)
MCV RBC AUTO: 92.2 FL — SIGNIFICANT CHANGE UP (ref 80–100)
MONOCYTES # BLD AUTO: 0.3 K/UL — SIGNIFICANT CHANGE UP (ref 0–0.9)
MONOCYTES # BLD AUTO: 0.3 K/UL — SIGNIFICANT CHANGE UP (ref 0–0.9)
MONOCYTES NFR BLD AUTO: 4.5 % — SIGNIFICANT CHANGE UP (ref 2–14)
MONOCYTES NFR BLD AUTO: 4.5 % — SIGNIFICANT CHANGE UP (ref 2–14)
NEUTROPHILS # BLD AUTO: 5.43 K/UL — SIGNIFICANT CHANGE UP (ref 1.8–7.4)
NEUTROPHILS # BLD AUTO: 5.43 K/UL — SIGNIFICANT CHANGE UP (ref 1.8–7.4)
NEUTROPHILS NFR BLD AUTO: 80.6 % — HIGH (ref 43–77)
NEUTROPHILS NFR BLD AUTO: 80.6 % — HIGH (ref 43–77)
NITRITE UR-MCNC: NEGATIVE — SIGNIFICANT CHANGE UP
NITRITE UR-MCNC: NEGATIVE — SIGNIFICANT CHANGE UP
NRBC # BLD: 0 /100 WBCS — SIGNIFICANT CHANGE UP (ref 0–0)
NRBC # BLD: 0 /100 WBCS — SIGNIFICANT CHANGE UP (ref 0–0)
PH UR: 6.5 — SIGNIFICANT CHANGE UP (ref 5–8)
PH UR: 6.5 — SIGNIFICANT CHANGE UP (ref 5–8)
PLATELET # BLD AUTO: 354 K/UL — SIGNIFICANT CHANGE UP (ref 150–400)
PLATELET # BLD AUTO: 354 K/UL — SIGNIFICANT CHANGE UP (ref 150–400)
POTASSIUM SERPL-MCNC: 3.2 MMOL/L — LOW (ref 3.5–5.3)
POTASSIUM SERPL-MCNC: 3.2 MMOL/L — LOW (ref 3.5–5.3)
POTASSIUM SERPL-SCNC: 3.2 MMOL/L — LOW (ref 3.5–5.3)
POTASSIUM SERPL-SCNC: 3.2 MMOL/L — LOW (ref 3.5–5.3)
PROT SERPL-MCNC: 7 G/DL — SIGNIFICANT CHANGE UP (ref 6–8.3)
PROT SERPL-MCNC: 7 G/DL — SIGNIFICANT CHANGE UP (ref 6–8.3)
PROT UR-MCNC: SIGNIFICANT CHANGE UP MG/DL
PROT UR-MCNC: SIGNIFICANT CHANGE UP MG/DL
RBC # BLD: 4.11 M/UL — SIGNIFICANT CHANGE UP (ref 3.8–5.2)
RBC # BLD: 4.11 M/UL — SIGNIFICANT CHANGE UP (ref 3.8–5.2)
RBC # FLD: 12.8 % — SIGNIFICANT CHANGE UP (ref 10.3–14.5)
RBC # FLD: 12.8 % — SIGNIFICANT CHANGE UP (ref 10.3–14.5)
SODIUM SERPL-SCNC: 143 MMOL/L — SIGNIFICANT CHANGE UP (ref 135–145)
SODIUM SERPL-SCNC: 143 MMOL/L — SIGNIFICANT CHANGE UP (ref 135–145)
SP GR SPEC: 1.02 — SIGNIFICANT CHANGE UP (ref 1–1.03)
SP GR SPEC: 1.02 — SIGNIFICANT CHANGE UP (ref 1–1.03)
UROBILINOGEN FLD QL: 1 MG/DL — SIGNIFICANT CHANGE UP (ref 0.2–1)
UROBILINOGEN FLD QL: 1 MG/DL — SIGNIFICANT CHANGE UP (ref 0.2–1)
WBC # BLD: 6.74 K/UL — SIGNIFICANT CHANGE UP (ref 3.8–10.5)
WBC # BLD: 6.74 K/UL — SIGNIFICANT CHANGE UP (ref 3.8–10.5)
WBC # FLD AUTO: 6.74 K/UL — SIGNIFICANT CHANGE UP (ref 3.8–10.5)
WBC # FLD AUTO: 6.74 K/UL — SIGNIFICANT CHANGE UP (ref 3.8–10.5)

## 2023-11-08 PROCEDURE — 99284 EMERGENCY DEPT VISIT MOD MDM: CPT | Mod: 25

## 2023-11-08 PROCEDURE — 96360 HYDRATION IV INFUSION INIT: CPT | Mod: XU

## 2023-11-08 PROCEDURE — 74177 CT ABD & PELVIS W/CONTRAST: CPT | Mod: ME

## 2023-11-08 PROCEDURE — 99285 EMERGENCY DEPT VISIT HI MDM: CPT

## 2023-11-08 PROCEDURE — 80053 COMPREHEN METABOLIC PANEL: CPT

## 2023-11-08 PROCEDURE — G1004: CPT

## 2023-11-08 PROCEDURE — 36415 COLL VENOUS BLD VENIPUNCTURE: CPT

## 2023-11-08 PROCEDURE — 81001 URINALYSIS AUTO W/SCOPE: CPT

## 2023-11-08 PROCEDURE — 83690 ASSAY OF LIPASE: CPT

## 2023-11-08 PROCEDURE — 84702 CHORIONIC GONADOTROPIN TEST: CPT

## 2023-11-08 PROCEDURE — 87086 URINE CULTURE/COLONY COUNT: CPT

## 2023-11-08 PROCEDURE — 74177 CT ABD & PELVIS W/CONTRAST: CPT | Mod: 26,ME

## 2023-11-08 PROCEDURE — 96361 HYDRATE IV INFUSION ADD-ON: CPT

## 2023-11-08 PROCEDURE — 85025 COMPLETE CBC W/AUTO DIFF WBC: CPT

## 2023-11-08 RX ORDER — POTASSIUM CHLORIDE 20 MEQ
40 PACKET (EA) ORAL ONCE
Refills: 0 | Status: COMPLETED | OUTPATIENT
Start: 2023-11-08 | End: 2023-11-08

## 2023-11-08 RX ORDER — SODIUM CHLORIDE 9 MG/ML
1000 INJECTION INTRAMUSCULAR; INTRAVENOUS; SUBCUTANEOUS ONCE
Refills: 0 | Status: COMPLETED | OUTPATIENT
Start: 2023-11-08 | End: 2023-11-08

## 2023-11-08 RX ADMIN — Medication 40 MILLIEQUIVALENT(S): at 15:13

## 2023-11-08 RX ADMIN — SODIUM CHLORIDE 1000 MILLILITER(S): 9 INJECTION INTRAMUSCULAR; INTRAVENOUS; SUBCUTANEOUS at 15:18

## 2023-11-08 RX ADMIN — SODIUM CHLORIDE 1000 MILLILITER(S): 9 INJECTION INTRAMUSCULAR; INTRAVENOUS; SUBCUTANEOUS at 12:44

## 2023-11-08 NOTE — ED ADULT TRIAGE NOTE - AS TEMP SITE
Chart reviewed by . Triggered by observation status. Pt on b3 being evaluated for chest pain. Trop neg x 3. Stress test pending. Pt has PCP and insurance. No immediate needs identified by . Please consult CM team if needs arise. oral

## 2023-11-08 NOTE — ED PROVIDER NOTE - ATTENDING APP SHARED VISIT CONTRIBUTION OF CARE
42yo female with urgency and frequency, hx of sepsis, no fever, chills, nausea or vomiting  exam: abd soft non tender no rebound or guarding  plan: labs, fluids, ct abd  agree with assessment and plan of PA

## 2023-11-08 NOTE — ED PROVIDER NOTE - CARE PROVIDER_API CALL
Julio Samlon  Gastroenterology  [address] [place] [address]  Phone: [phone]  Fax: [phone]  Follow Up Time: 1-3 Days

## 2023-11-08 NOTE — ED ADULT NURSE NOTE - OBJECTIVE STATEMENT
pt to er states she has been having frequent urination oob gait steady iv started 20 angio left ac labs drawn

## 2023-11-08 NOTE — ED ADULT TRIAGE NOTE - CHIEF COMPLAINT QUOTE
Patient A/Ox4 with clear speech. BIBA from home for urinary frequency and generalized fatigue. Hx of urosepsis. EMS . patient denies SI/HI.

## 2023-11-08 NOTE — ED PROVIDER NOTE - OBJECTIVE STATEMENT
43-year-old female with history of diabetes, and Von Willebrand's presents to the ED complaining of increased urinary frequency left lower abdominal pain, nausea and diarrhea for the past 3 days.  Associated with generalized weakness and fatigue.  Patient with history of  x2 gastric sleeve which was converted to a gastric band done removed.  Denies fever, chills, chest pain, shortness of breath, hematuria, flank pain.

## 2023-11-08 NOTE — ED PROVIDER NOTE - PATIENT PORTAL LINK FT
You can access the FollowMyHealth Patient Portal offered by [facility] by registering at the following website: [email or web address]. By joining [employer]’s FollowMyHealth portal, you will also be able to view your health information using other applications (apps) compatible with our system.

## 2023-11-08 NOTE — ED ADULT NURSE NOTE - NSSUHOSCREENINGYN_ED_ALL_ED
Detail Level: Simple Patient Specific Counseling (Will Not Stick From Patient To Patient): Patient is leaving the country in May, gave Rx for trip in case cyst returns while out of town. Advised to have cyst removed this summer in [place] if still troublesome. Yes - the patient is able to be screened

## 2023-11-17 NOTE — ED ADULT NURSE NOTE - NS ED PATIENT SAFETY CONCERN
"I have reviewed the surgical (or preoperative) H&P that is linked to this encounter, and examined the patient. There are no significant changes  Patient started  Eliquis  BID one week ago, no missed doses reported by patient    Clinical Conditions Present on Arrival:  Clinically Significant Risk Factors Present on Admission                # Drug Induced Coagulation Defect: home medication list includes an anticoagulant medication  # Drug Induced Platelet Defect: home medication list includes an antiplatelet medication  # Obesity: Estimated body mass index is 38.99 kg/m  as calculated from the following:    Height as of this encounter: 1.753 m (5' 9\").    Weight as of this encounter: 119.7 kg (264 lb).       "
No

## 2023-12-12 ENCOUNTER — APPOINTMENT (OUTPATIENT)
Dept: ENDOCRINOLOGY | Facility: CLINIC | Age: 43
End: 2023-12-12

## 2023-12-27 NOTE — H&P ADULT - NSICDXPASTSURGICALHX_GEN_ALL_CORE_FT
PAST SURGICAL HISTORY:  Bariatric surgery status s/p gastric sleeve, then s/p insertion of gastric band which was   removed in  secondary to displacement    Delivery by emergency caesarean section     Endometrial polyp s/p ablation 2019    Ganglion cyst of wrist, left     S/P  section     S/P cholecystectomy
No

## 2024-01-02 ENCOUNTER — NON-APPOINTMENT (OUTPATIENT)
Age: 44
End: 2024-01-02

## 2024-01-04 ENCOUNTER — EMERGENCY (EMERGENCY)
Facility: HOSPITAL | Age: 44
LOS: 1 days | Discharge: HOME | End: 2024-01-04
Attending: EMERGENCY MEDICINE | Admitting: EMERGENCY MEDICINE
Payer: MEDICARE

## 2024-01-04 VITALS
SYSTOLIC BLOOD PRESSURE: 117 MMHG | HEART RATE: 97 BPM | TEMPERATURE: 98 F | RESPIRATION RATE: 18 BRPM | DIASTOLIC BLOOD PRESSURE: 80 MMHG | OXYGEN SATURATION: 99 %

## 2024-01-04 VITALS
RESPIRATION RATE: 18 BRPM | TEMPERATURE: 98 F | HEIGHT: 62 IN | SYSTOLIC BLOOD PRESSURE: 120 MMHG | WEIGHT: 175.05 LBS | OXYGEN SATURATION: 96 % | DIASTOLIC BLOOD PRESSURE: 88 MMHG | HEART RATE: 94 BPM

## 2024-01-04 DIAGNOSIS — Z98.84 BARIATRIC SURGERY STATUS: Chronic | ICD-10-CM

## 2024-01-04 DIAGNOSIS — M67.432 GANGLION, LEFT WRIST: Chronic | ICD-10-CM

## 2024-01-04 DIAGNOSIS — Z98.89 OTHER SPECIFIED POSTPROCEDURAL STATES: Chronic | ICD-10-CM

## 2024-01-04 DIAGNOSIS — Z90.49 ACQUIRED ABSENCE OF OTHER SPECIFIED PARTS OF DIGESTIVE TRACT: Chronic | ICD-10-CM

## 2024-01-04 DIAGNOSIS — N84.0 POLYP OF CORPUS UTERI: Chronic | ICD-10-CM

## 2024-01-04 PROCEDURE — 99284 EMERGENCY DEPT VISIT MOD MDM: CPT | Mod: 25

## 2024-01-04 PROCEDURE — 99284 EMERGENCY DEPT VISIT MOD MDM: CPT

## 2024-01-04 PROCEDURE — 71046 X-RAY EXAM CHEST 2 VIEWS: CPT | Mod: 26

## 2024-01-04 PROCEDURE — 71046 X-RAY EXAM CHEST 2 VIEWS: CPT

## 2024-01-04 RX ORDER — DULOXETINE HYDROCHLORIDE 30 MG/1
2 CAPSULE, DELAYED RELEASE ORAL
Qty: 0 | Refills: 0 | DISCHARGE

## 2024-01-04 RX ORDER — VENLAFAXINE HCL 75 MG
2 CAPSULE, EXT RELEASE 24 HR ORAL
Qty: 0 | Refills: 0 | DISCHARGE

## 2024-01-04 RX ORDER — CLONAZEPAM 1 MG
1 TABLET ORAL
Qty: 0 | Refills: 0 | DISCHARGE

## 2024-01-04 RX ORDER — ALBUTEROL 90 UG/1
2 AEROSOL, METERED ORAL
Qty: 1 | Refills: 0
Start: 2024-01-04 | End: 2024-01-08

## 2024-01-04 RX ORDER — FLUOXETINE HCL 10 MG
4 CAPSULE ORAL
Qty: 0 | Refills: 0 | DISCHARGE

## 2024-01-04 RX ORDER — HYDROCODONE BITARTRATE AND HOMATROPINE METHYLBROMIDE 5; 1.5 MG/5ML; MG/5ML
5 SOLUTION ORAL
Qty: 100 | Refills: 0
Start: 2024-01-04 | End: 2024-01-08

## 2024-01-04 NOTE — ED PROVIDER NOTE - PATIENT PORTAL LINK FT
You can access the FollowMyHealth Patient Portal offered by [facility] by registering at the following website: [email or web address]. By joining [employer]’s FollowMyHealth portal, you will also be able to view your health information using other applications (apps) compatible with our system. You can access the FollowMyHealth Patient Portal offered by [facility] by registering at the following website: [email or web address]. By joining [employer]’s FollowMyHealth portal, you will also be able to view your health information using other applications (apps) compatible with our system.

## 2024-01-04 NOTE — ED ADULT NURSE NOTE - OBJECTIVE STATEMENT
pt  complains of shortness of breath, cough with positive flu diagnosed yesterday at urgent care.  Patient states she was sent from urgent care to the ER for x-ray to rule out pneumonia.  Patient declines any labs.

## 2024-01-04 NOTE — ED ADULT TRIAGE NOTE - NS ED TRIAGE AVPU SCALE
Alert-The patient is alert, awake and responds to voice. The patient is oriented to time, place, and person. The triage nurse is able to obtain subjective information. Itraconazole Counseling:  I discussed with the patient the risks of itraconazole including but not limited to liver damage, nausea/vomiting, neuropathy, and severe allergy.  The patient understands that this medication is best absorbed when taken with acidic beverages such as non-diet cola or ginger ale.  The patient understands that monitoring is required including baseline LFTs and repeat LFTs at intervals.  The patient understands that they are to contact us or the primary physician if concerning signs are noted.

## 2024-01-04 NOTE — ED PROVIDER NOTE - OBJECTIVE STATEMENT
43-year-old female with significant past medical history for bipolar mood disorder, depression, anemia presents to the ED with complaints of shortness of breath, cough with positive flu diagnosed yesterday at urgent care.  Patient states she was sent from urgent care to the ER for x-ray to rule out pneumonia.  Patient declines any labs.

## 2024-01-04 NOTE — ED PROVIDER NOTE - NSFOLLOWUPINSTRUCTIONS_ED_ALL_ED_FT
Influenza, Adult  Influenza is also called "the flu." It is an infection in the lungs, nose, and throat (respiratory tract). It spreads easily from person to person (is contagious). The flu causes symptoms that are like a cold, along with high fever and body aches.    What are the causes?  This condition is caused by the influenza virus. You can get the virus by:  Breathing in droplets that are in the air after a person infected with the flu coughed or sneezed.  Touching something that has the virus on it and then touching your mouth, nose, or eyes.  What increases the risk?  Certain things may make you more likely to get the flu. These include:  Not washing your hands often.  Having close contact with many people during cold and flu season.  Touching your mouth, eyes, or nose without first washing your hands.  Not getting a flu shot every year.  You may have a higher risk for the flu, and serious problems, such as a lung infection (pneumonia), if you:  Are older than 65.  Are pregnant.  Have a weakened disease-fighting system (immune system) because of a disease or because you are taking certain medicines.  Have a long-term (chronic) condition, such as:  Heart, kidney, or lung disease.  Diabetes.  Asthma.  Have a liver disorder.  Are very overweight (morbidly obese).  Have anemia.  What are the signs or symptoms?  Symptoms usually begin suddenly and last 4–14 days. They may include:  Fever and chills.  Headaches, body aches, or muscle aches.  Sore throat.  Cough.  Runny or stuffy (congested) nose.  Feeling discomfort in your chest.  Not wanting to eat as much as normal.  Feeling weak or tired.  Feeling dizzy.  Feeling sick to your stomach or throwing up.  How is this treated?  If the flu is found early, you can be treated with antiviral medicine. This can help to reduce how bad the illness is and how long it lasts. This may be given by mouth or through an IV tube.    Taking care of yourself at home can help your symptoms get better. Your doctor may want you to:  Take over-the-counter medicines.  Drink plenty of fluids.  The flu often goes away on its own. If you have very bad symptoms or other problems, you may be treated in a hospital.    Follow these instructions at home:      Activity    Rest as needed. Get plenty of sleep.  Stay home from work or school as told by your doctor.  Do not leave home until you do not have a fever for 24 hours without taking medicine.  Leave home only to go to your doctor.  Eating and drinking    Take an ORS (oral rehydration solution). This is a drink that is sold at pharmacies and stores.  Drink enough fluid to keep your pee pale yellow.  Drink clear fluids in small amounts as you are able. Clear fluids include:  Water.  Ice chips.  Fruit juice mixed with water.  Low-calorie sports drinks.  Eat bland foods that are easy to digest. Eat small amounts as you are able. These foods include:  Bananas.  Applesauce.  Rice.  Lean meats.  Toast.  Crackers.  Do not eat or drink:  Fluids that have a lot of sugar or caffeine.  Alcohol.  Spicy or fatty foods.  General instructions    Take over-the-counter and prescription medicines only as told by your doctor.  Use a cool mist humidifier to add moisture to the air in your home. This can make it easier for you to breathe.  When using a cool mist humidifier, clean it daily. Empty water and replace with clean water.  Cover your mouth and nose when you cough or sneeze.  Wash your hands with soap and water often and for at least 20 seconds. This is also important after you cough or sneeze. If you cannot use soap and water, use alcohol-based hand .  Keep all follow-up visits.  How is this prevented?    Get a flu shot every year. You may get the flu shot in late summer, fall, or winter. Ask your doctor when you should get your flu shot.  Avoid contact with people who are sick during fall and winter. This is cold and flu season.  Contact a doctor if:  You get new symptoms.  You have:  Chest pain.  Watery poop (diarrhea).  A fever.  Your cough gets worse.  You start to have more mucus.  You feel sick to your stomach.  You throw up.  Get help right away if you:  Have shortness of breath.  Have trouble breathing.  Have skin or nails that turn a bluish color.  Have very bad pain or stiffness in your neck.  Get a sudden headache.  Get sudden pain in your face or ear.  Cannot eat or drink without throwing up.  These symptoms may represent a serious problem that is an emergency. Get medical help right away. Call your local emergency services (911 in the [place]  Do not wait to see if the symptoms will go away.  Do not drive yourself to the hospital.  Summary  Influenza is also called "the flu." It is an infection in the lungs, nose, and throat. It spreads easily from person to person.  Take over-the-counter and prescription medicines only as told by your doctor.  Getting a flu shot every year is the best way to not get the flu.  This information is not intended to replace advice given to you by your health care provider. Make sure you discuss any questions you have with your health care provider.    Document Revised: 08/06/2021 Document Reviewed: 08/06/2021  Elsevier Patient Education © 2023 Elsevier Inc.

## 2024-01-04 NOTE — ED ADULT NURSE NOTE - NSFALLUNIVINTERV_ED_ALL_ED
Bed/Stretcher in lowest position, wheels locked, appropriate side rails in place/Call bell, personal items and telephone in reach/Instruct patient to call for assistance before getting out of bed/chair/stretcher/Non-slip footwear applied when patient is off stretcher/[place] to call system/Physically safe environment - no spills, clutter or unnecessary equipment/Purposeful proactive rounding/Room/bathroom lighting operational, light cord in reach Bed/Stretcher in lowest position, wheels locked, appropriate side rails in place/Call bell, personal items and telephone in reach/Instruct patient to call for assistance before getting out of bed/chair/stretcher/Non-slip footwear applied when patient is off stretcher/[place] to call system/Physically safe environment - no spills, clutter or unnecessary equipment/Purposeful proactive rounding/Room/bathroom lighting operational, light cord in reach

## 2024-03-18 ENCOUNTER — RX RENEWAL (OUTPATIENT)
Age: 44
End: 2024-03-18

## 2024-03-18 RX ORDER — SEMAGLUTIDE 2.68 MG/ML
8 INJECTION, SOLUTION SUBCUTANEOUS
Qty: 9 | Refills: 1 | Status: ACTIVE | COMMUNITY
Start: 2022-07-26 | End: 1900-01-01

## 2024-06-04 ENCOUNTER — NON-APPOINTMENT (OUTPATIENT)
Age: 44
End: 2024-06-04

## 2024-06-28 ENCOUNTER — APPOINTMENT (OUTPATIENT)
Dept: ENDOCRINOLOGY | Facility: CLINIC | Age: 44
End: 2024-06-28

## 2024-07-22 NOTE — H&P ADULT - NSHPRISKHIVSCREEN_GEN_ALL_CORE
James Prashant was seen and treated in our emergency department on 7/22/2024.should be cleared by a physician before returning to gym class or sports on 07/23/2024.      If you have any questions or concerns, please don't hesitate to call.      Stefany Gomez PA-C Unable to offer due to clinical condition

## 2024-08-07 NOTE — DISCHARGE NOTE PROVIDER - NSDCCONDITION_GEN_ALL_CORE
I called and spoke with Ms. Hermilo to remind her of her pending CT Cardiac Calcium, Mammogram and Bone Density Xray's. I gave her the scheduling phone number .   
Stable

## 2024-09-07 NOTE — OCCUPATIONAL THERAPY INITIAL EVALUATION ADULT - DIAGNOSIS, OT EVAL
Patient with deficits in ADL status and functional mobility due to impaired balance, strength, ROM, coordination
Calm

## 2024-09-11 NOTE — H&P ADULT - NSHPPOADEEPVENOUSTHROMB_GEN_A_CORE
no MEDICATIONS  (STANDING):  atorvastatin 10 milliGRAM(s) Oral at bedtime  diltiazem    milliGRAM(s) Oral daily  donepezil 5 milliGRAM(s) Oral at bedtime  pantoprazole    Tablet 40 milliGRAM(s) Oral before breakfast  rivaroxaban 15 milliGRAM(s) Oral with dinner  senna 2 Tablet(s) Oral at bedtime  vancomycin  IVPB 750 milliGRAM(s) IV Intermittent every 24 hours    MEDICATIONS  (PRN):  acetaminophen     Tablet .. 650 milliGRAM(s) Oral every 6 hours PRN Temp greater or equal to 38C (100.4F), Moderate Pain (4 - 6)  magnesium hydroxide Suspension 30 milliLiter(s) Oral daily PRN Constipation  polyethylene glycol 3350 17 Gram(s) Oral daily PRN Constipation

## 2024-09-26 NOTE — DISCHARGE NOTE PROVIDER - CONDITIONS AT DISCHARGE
[Time Spent: ___ minutes] : I have spent [unfilled] minutes of time on the encounter which excludes teaching and separately reported services.
ok fir discharge d/w Dr. Sarmiento

## 2024-10-17 ENCOUNTER — EMERGENCY (EMERGENCY)
Facility: HOSPITAL | Age: 44
LOS: 1 days | Discharge: HOME | End: 2024-10-17
Attending: EMERGENCY MEDICINE | Admitting: EMERGENCY MEDICINE
Payer: MEDICARE

## 2024-10-17 VITALS
DIASTOLIC BLOOD PRESSURE: 81 MMHG | TEMPERATURE: 98 F | RESPIRATION RATE: 15 BRPM | OXYGEN SATURATION: 100 % | HEART RATE: 67 BPM | SYSTOLIC BLOOD PRESSURE: 120 MMHG

## 2024-10-17 VITALS
HEART RATE: 95 BPM | OXYGEN SATURATION: 97 % | WEIGHT: 162.92 LBS | RESPIRATION RATE: 18 BRPM | TEMPERATURE: 98 F | DIASTOLIC BLOOD PRESSURE: 69 MMHG | SYSTOLIC BLOOD PRESSURE: 109 MMHG

## 2024-10-17 DIAGNOSIS — Z98.84 BARIATRIC SURGERY STATUS: Chronic | ICD-10-CM

## 2024-10-17 DIAGNOSIS — Z90.49 ACQUIRED ABSENCE OF OTHER SPECIFIED PARTS OF DIGESTIVE TRACT: Chronic | ICD-10-CM

## 2024-10-17 DIAGNOSIS — Z98.89 OTHER SPECIFIED POSTPROCEDURAL STATES: Chronic | ICD-10-CM

## 2024-10-17 DIAGNOSIS — N84.0 POLYP OF CORPUS UTERI: Chronic | ICD-10-CM

## 2024-10-17 DIAGNOSIS — M67.432 GANGLION, LEFT WRIST: Chronic | ICD-10-CM

## 2024-10-17 LAB
ALBUMIN SERPL ELPH-MCNC: 4.1 G/DL — SIGNIFICANT CHANGE UP (ref 3.3–5)
ALP SERPL-CCNC: 55 U/L — SIGNIFICANT CHANGE UP (ref 40–120)
ALT FLD-CCNC: 15 U/L — SIGNIFICANT CHANGE UP (ref 4–33)
ANION GAP SERPL CALC-SCNC: 10 MMOL/L — SIGNIFICANT CHANGE UP (ref 7–14)
APPEARANCE UR: CLEAR — SIGNIFICANT CHANGE UP
AST SERPL-CCNC: 14 U/L — SIGNIFICANT CHANGE UP (ref 4–32)
BACTERIA # UR AUTO: ABNORMAL /HPF
BASOPHILS # BLD AUTO: 0.05 K/UL — SIGNIFICANT CHANGE UP (ref 0–0.2)
BASOPHILS NFR BLD AUTO: 0.6 % — SIGNIFICANT CHANGE UP (ref 0–2)
BILIRUB SERPL-MCNC: 0.3 MG/DL — SIGNIFICANT CHANGE UP (ref 0.2–1.2)
BILIRUB UR-MCNC: NEGATIVE — SIGNIFICANT CHANGE UP
BUN SERPL-MCNC: 15 MG/DL — SIGNIFICANT CHANGE UP (ref 7–23)
CALCIUM SERPL-MCNC: 9 MG/DL — SIGNIFICANT CHANGE UP (ref 8.4–10.5)
CAST: 0 /LPF — SIGNIFICANT CHANGE UP (ref 0–4)
CHLORIDE SERPL-SCNC: 103 MMOL/L — SIGNIFICANT CHANGE UP (ref 98–107)
CO2 SERPL-SCNC: 25 MMOL/L — SIGNIFICANT CHANGE UP (ref 22–31)
COLOR SPEC: YELLOW — SIGNIFICANT CHANGE UP
CREAT SERPL-MCNC: 0.84 MG/DL — SIGNIFICANT CHANGE UP (ref 0.5–1.3)
DIFF PNL FLD: NEGATIVE — SIGNIFICANT CHANGE UP
EGFR: 88 ML/MIN/1.73M2 — SIGNIFICANT CHANGE UP
EGFR: 88 ML/MIN/1.73M2 — SIGNIFICANT CHANGE UP
EOSINOPHIL # BLD AUTO: 0.07 K/UL — SIGNIFICANT CHANGE UP (ref 0–0.5)
EOSINOPHIL NFR BLD AUTO: 0.9 % — SIGNIFICANT CHANGE UP (ref 0–6)
GLUCOSE SERPL-MCNC: 65 MG/DL — LOW (ref 70–99)
GLUCOSE UR QL: NEGATIVE MG/DL — SIGNIFICANT CHANGE UP
HCT VFR BLD CALC: 37.8 % — SIGNIFICANT CHANGE UP (ref 34.5–45)
HGB BLD-MCNC: 12.4 G/DL — SIGNIFICANT CHANGE UP (ref 11.5–15.5)
IANC: 4.54 K/UL — SIGNIFICANT CHANGE UP (ref 1.8–7.4)
IMM GRANULOCYTES NFR BLD AUTO: 0.3 % — SIGNIFICANT CHANGE UP (ref 0–0.9)
KETONES UR-MCNC: NEGATIVE MG/DL — SIGNIFICANT CHANGE UP
LEUKOCYTE ESTERASE UR-ACNC: ABNORMAL
LYMPHOCYTES # BLD AUTO: 2.73 K/UL — SIGNIFICANT CHANGE UP (ref 1–3.3)
LYMPHOCYTES # BLD AUTO: 34.6 % — SIGNIFICANT CHANGE UP (ref 13–44)
MCHC RBC-ENTMCNC: 30.1 PG — SIGNIFICANT CHANGE UP (ref 27–34)
MCHC RBC-ENTMCNC: 32.8 GM/DL — SIGNIFICANT CHANGE UP (ref 32–36)
MCV RBC AUTO: 91.7 FL — SIGNIFICANT CHANGE UP (ref 80–100)
MONOCYTES # BLD AUTO: 0.47 K/UL — SIGNIFICANT CHANGE UP (ref 0–0.9)
MONOCYTES NFR BLD AUTO: 6 % — SIGNIFICANT CHANGE UP (ref 2–14)
NEUTROPHILS # BLD AUTO: 4.54 K/UL — SIGNIFICANT CHANGE UP (ref 1.8–7.4)
NEUTROPHILS NFR BLD AUTO: 57.6 % — SIGNIFICANT CHANGE UP (ref 43–77)
NITRITE UR-MCNC: NEGATIVE — SIGNIFICANT CHANGE UP
NRBC # BLD AUTO: 0 K/UL — SIGNIFICANT CHANGE UP (ref 0–0)
NRBC # BLD: 0 /100 WBCS — SIGNIFICANT CHANGE UP (ref 0–0)
NRBC # FLD: 0 K/UL — SIGNIFICANT CHANGE UP (ref 0–0)
NRBC BLD-RTO: 0 /100 WBCS — SIGNIFICANT CHANGE UP (ref 0–0)
PH UR: 7 — SIGNIFICANT CHANGE UP (ref 5–8)
PLATELET # BLD AUTO: 354 K/UL — SIGNIFICANT CHANGE UP (ref 150–400)
POTASSIUM SERPL-MCNC: 4.1 MMOL/L — SIGNIFICANT CHANGE UP (ref 3.5–5.3)
POTASSIUM SERPL-SCNC: 4.1 MMOL/L — SIGNIFICANT CHANGE UP (ref 3.5–5.3)
PROT SERPL-MCNC: 6.4 G/DL — SIGNIFICANT CHANGE UP (ref 6–8.3)
PROT UR-MCNC: NEGATIVE MG/DL — SIGNIFICANT CHANGE UP
RBC # BLD: 4.12 M/UL — SIGNIFICANT CHANGE UP (ref 3.8–5.2)
RBC # FLD: 12.7 % — SIGNIFICANT CHANGE UP (ref 10.3–14.5)
RBC CASTS # UR COMP ASSIST: 1 /HPF — SIGNIFICANT CHANGE UP (ref 0–4)
REVIEW: SIGNIFICANT CHANGE UP
SODIUM SERPL-SCNC: 138 MMOL/L — SIGNIFICANT CHANGE UP (ref 135–145)
SP GR SPEC: 1.02 — SIGNIFICANT CHANGE UP (ref 1–1.03)
SQUAMOUS # UR AUTO: 18 /HPF — HIGH (ref 0–5)
UROBILINOGEN FLD QL: 1 MG/DL — SIGNIFICANT CHANGE UP (ref 0.2–1)
WBC # BLD: 7.88 K/UL — SIGNIFICANT CHANGE UP (ref 3.8–10.5)
WBC # FLD AUTO: 7.88 K/UL — SIGNIFICANT CHANGE UP (ref 3.8–10.5)
WBC UR QL: 1 /HPF — SIGNIFICANT CHANGE UP (ref 0–5)

## 2024-10-17 PROCEDURE — 72020 X-RAY EXAM OF SPINE 1 VIEW: CPT | Mod: 26

## 2024-10-17 PROCEDURE — 74176 CT ABD & PELVIS W/O CONTRAST: CPT | Mod: 26,MC

## 2024-10-17 PROCEDURE — 76770 US EXAM ABDO BACK WALL COMP: CPT | Mod: 26

## 2024-10-17 PROCEDURE — 99285 EMERGENCY DEPT VISIT HI MDM: CPT

## 2024-10-17 RX ORDER — LIDOCAINE HYDROCHLORIDE 20 MG/ML
1 JELLY TOPICAL ONCE
Refills: 0 | Status: COMPLETED | OUTPATIENT
Start: 2024-10-17 | End: 2024-10-17

## 2024-10-17 RX ORDER — KETOROLAC TROMETHAMINE 30 MG/ML
15 INJECTION, SOLUTION INTRAMUSCULAR; INTRAVENOUS ONCE
Refills: 0 | Status: DISCONTINUED | OUTPATIENT
Start: 2024-10-17 | End: 2024-10-17

## 2024-10-17 RX ORDER — CYCLOBENZAPRINE HYDROCHLORIDE 15 MG/1
10 CAPSULE, EXTENDED RELEASE ORAL ONCE
Refills: 0 | Status: COMPLETED | OUTPATIENT
Start: 2024-10-17 | End: 2024-10-17

## 2024-10-17 RX ORDER — IBUPROFEN 200 MG
1 TABLET ORAL
Qty: 28 | Refills: 0
Start: 2024-10-17 | End: 2024-10-23

## 2024-10-17 RX ORDER — DIAZEPAM 5 MG/1
1 TABLET ORAL
Qty: 12 | Refills: 0
Start: 2024-10-17 | End: 2024-10-20

## 2024-10-17 RX ORDER — DIAZEPAM 5 MG/1
5 TABLET ORAL ONCE
Refills: 0 | Status: DISCONTINUED | OUTPATIENT
Start: 2024-10-17 | End: 2024-10-17

## 2024-10-17 RX ADMIN — LIDOCAINE HYDROCHLORIDE 1 PATCH: 20 JELLY TOPICAL at 11:34

## 2024-10-17 RX ADMIN — CYCLOBENZAPRINE HYDROCHLORIDE 10 MILLIGRAM(S): 15 CAPSULE, EXTENDED RELEASE ORAL at 13:14

## 2024-10-17 RX ADMIN — Medication 1000 MILLILITER(S): at 13:42

## 2024-10-17 RX ADMIN — Medication 4 MILLIGRAM(S): at 16:15

## 2024-10-17 RX ADMIN — KETOROLAC TROMETHAMINE 15 MILLIGRAM(S): 30 INJECTION, SOLUTION INTRAMUSCULAR; INTRAVENOUS at 13:41

## 2024-10-17 RX ADMIN — Medication 4 MILLIGRAM(S): at 15:15

## 2024-10-17 RX ADMIN — DIAZEPAM 5 MILLIGRAM(S): 5 TABLET ORAL at 11:34

## 2024-10-17 RX ADMIN — KETOROLAC TROMETHAMINE 15 MILLIGRAM(S): 30 INJECTION, SOLUTION INTRAMUSCULAR; INTRAVENOUS at 11:35

## 2024-11-13 ENCOUNTER — APPOINTMENT (OUTPATIENT)
Dept: ENDOCRINOLOGY | Facility: CLINIC | Age: 44
End: 2024-11-13
Payer: MEDICARE

## 2024-11-13 VITALS
DIASTOLIC BLOOD PRESSURE: 78 MMHG | SYSTOLIC BLOOD PRESSURE: 124 MMHG | OXYGEN SATURATION: 100 % | HEART RATE: 89 BPM | RESPIRATION RATE: 16 BRPM | WEIGHT: 168 LBS | BODY MASS INDEX: 29.77 KG/M2 | HEIGHT: 63 IN

## 2024-11-13 DIAGNOSIS — E55.9 VITAMIN D DEFICIENCY, UNSPECIFIED: ICD-10-CM

## 2024-11-13 DIAGNOSIS — E88.819 INSULIN RESISTANCE, UNSPECIFIED: ICD-10-CM

## 2024-11-13 DIAGNOSIS — E78.5 HYPERLIPIDEMIA, UNSPECIFIED: ICD-10-CM

## 2024-11-13 PROCEDURE — G2211 COMPLEX E/M VISIT ADD ON: CPT

## 2024-11-13 PROCEDURE — 99204 OFFICE O/P NEW MOD 45 MIN: CPT

## 2024-11-14 PROBLEM — E78.5 HYPERLIPIDEMIA: Status: ACTIVE | Noted: 2024-11-14

## 2024-11-14 PROBLEM — E88.819 INSULIN RESISTANCE: Status: ACTIVE | Noted: 2021-02-05

## 2024-11-14 NOTE — ED ADULT NURSE NOTE - NS PRO PASSIVE SMOKE EXP
Caller: Cassie Rodrígueze    Relationship: Emergency Contact    Best call back number: [phone]     Requested Prescriptions:   Requested Prescriptions     Pending Prescriptions Disp Refills    tamsulosin (FLOMAX) 0.4 MG capsule 24 hr capsule 90 capsule 1     Sig: Take 1 capsule by mouth Daily.        Pharmacy where request should be sent: [facility] [address] [place] [address] [phone] [facility] [phone] FX     Last office visit with prescribing clinician: 10/25/2024   Last telemedicine visit with prescribing clinician: Visit date not found   Next office visit with prescribing clinician: 1/27/2025     Additional details provided by patient:     Does the patient have less than a 3 day supply:  [] Yes  [x] No    Would you like a call back once the refill request has been completed: [] Yes [x] No    If the office needs to give you a call back, can they leave a voicemail: [] Yes [x] No    CadXenia Swift Rep   11/14/24 12:40 EST            Unknown

## 2024-11-18 LAB
25(OH)D3 SERPL-MCNC: 26.7 NG/ML
ALBUMIN SERPL ELPH-MCNC: 4.4 G/DL
ALP BLD-CCNC: 82 U/L
ALT SERPL-CCNC: 19 U/L
ANION GAP SERPL CALC-SCNC: 13 MMOL/L
AST SERPL-CCNC: 22 U/L
BASOPHILS # BLD AUTO: 0.05 K/UL
BASOPHILS NFR BLD AUTO: 0.6 %
BILIRUB SERPL-MCNC: 0.2 MG/DL
BUN SERPL-MCNC: 15 MG/DL
CALCIUM SERPL-MCNC: 9.4 MG/DL
CHLORIDE SERPL-SCNC: 101 MMOL/L
CHOLEST SERPL-MCNC: 272 MG/DL
CO2 SERPL-SCNC: 28 MMOL/L
CREAT SERPL-MCNC: 0.91 MG/DL
EGFR: 80 ML/MIN/1.73M2
EOSINOPHIL # BLD AUTO: 0.06 K/UL
EOSINOPHIL NFR BLD AUTO: 0.7 %
ESTIMATED AVERAGE GLUCOSE: 97 MG/DL
GLUCOSE SERPL-MCNC: 85 MG/DL
HBA1C MFR BLD HPLC: 5 %
HCT VFR BLD CALC: 43 %
HDLC SERPL-MCNC: 93 MG/DL
HGB BLD-MCNC: 13.2 G/DL
IMM GRANULOCYTES NFR BLD AUTO: 0.5 %
INSULIN SERPL-MCNC: 15.5 UU/ML
LDLC SERPL CALC-MCNC: 166 MG/DL
LYMPHOCYTES # BLD AUTO: 1.92 K/UL
LYMPHOCYTES NFR BLD AUTO: 22.1 %
MAN DIFF?: NORMAL
MCHC RBC-ENTMCNC: 30.7 G/DL
MCHC RBC-ENTMCNC: 30.7 PG
MCV RBC AUTO: 100 FL
MONOCYTES # BLD AUTO: 0.43 K/UL
MONOCYTES NFR BLD AUTO: 4.9 %
NEUTROPHILS # BLD AUTO: 6.19 K/UL
NEUTROPHILS NFR BLD AUTO: 71.2 %
NONHDLC SERPL-MCNC: 179 MG/DL
PLATELET # BLD AUTO: 467 K/UL
POTASSIUM SERPL-SCNC: 4.5 MMOL/L
PROT SERPL-MCNC: 7 G/DL
RBC # BLD: 4.3 M/UL
RBC # FLD: 13.2 %
SODIUM SERPL-SCNC: 141 MMOL/L
T4 FREE SERPL-MCNC: 1.2 NG/DL
TRIGL SERPL-MCNC: 79 MG/DL
TSH SERPL-ACNC: 1.64 UIU/ML
VIT B12 SERPL-MCNC: 755 PG/ML
WBC # FLD AUTO: 8.69 K/UL

## 2024-11-18 RX ORDER — SEMAGLUTIDE 0.25 MG/.5ML
0.25 INJECTION, SOLUTION SUBCUTANEOUS
Qty: 1 | Refills: 3 | Status: ACTIVE | COMMUNITY
Start: 2024-11-18 | End: 1900-01-01

## 2024-11-19 NOTE — DISCHARGE NOTE NURSING/CASE MANAGEMENT/SOCIAL WORK - NSDCPELOVENOXDIET_GEN_ALL_CORE
Eliel Ruiz (: 2021) is a 2 y.o. male patient, here for evaluation of the following chief complaint(s):  Cough (In office with mom and dad) and Congestion     SUBJECTIVE/OBJECTIVE:  HPI    History given by mother    Eliel Ruiz is a 2 y.o. male  who complains of congestion, cough described as productive, and brown nasal discharge for 10 days, gradually worsening since that time. Appetite okay, drinking fluids well  No history of fevers and shortness of breath.  Current child-care arrangements: in home: primary caregiver is mother  Ill contact cousins with respiratory illness    Evaluation to date: none.   Treatment to date: none.    Patient Active Problem List   Diagnosis    Metatarsus adductus of both feet    Underimmunized    Immunization not carried out because of parent refusal    Prolonged fever    Iron deficiency    Behavior causing concern in biological child      No Known Allergies   Immunization History   Administered Date(s) Administered    DTaP, INFANRIX, (age 6w-6y), IM, 0.5mL 2024    DTaP-IPV/Hib, PENTACEL, (age 6w-4y), IM, 0.5mL 2022, 2022, 2022    Hep A, HAVRIX, VAQTA, (age 12m-18y), IM, 0.5mL 2024    Hep B, ENGERIX-B, RECOMBIVAX-HB, (age Birth - 19y), IM, 0.5mL 2022, 2022, 2022    Hib PRP-T, ACTHIB (age 2m-5y, Adlt Risk), HIBERIX (age 6w-4y, Adlt Risk), IM, 0.5mL 02/15/2024    MMR, PRIORIX, M-M-R II, (age 12m+), SC, 0.5mL 2024    Pneumococcal, PCV-13, PREVNAR 13, (age 6w+), IM, 0.5mL 2022, 2022    Pneumococcal, PCV20, PREVNAR 20, (age 6w+), IM, 0.5mL 02/15/2024    Varicella, VARIVAX, (age 12m+), SC, 0.5mL 2024        Current Outpatient Medications   Medication Instructions    ibuprofen (ADVIL;MOTRIN) 100 MG/5ML suspension Oral, 4 TIMES DAILY PRN        Review of Systems   Constitutional:  Negative for fever.   HENT:  Positive for congestion and rhinorrhea. Negative for ear pain.    Respiratory:  
Eat healthy foods you enjoy. Enoxaparin/Lovenox DOES NOT have a special diet. Limit your alcohol intake.

## 2024-12-05 NOTE — ED PROVIDER NOTE - NS ED MD DISPO DISCHARGE CCDA
Electrophysiology Progress Note    Patient seen and examined at bedside.    Overnight Events: Feels well overnight. Nervous for procedure today.Discussed plan for ICD testing with possible VT ablation and/or device change.    Review Of Systems: No chest pain, shortness of breath, or palpitations            Current Meds:  atorvastatin 40 milliGRAM(s) Oral at bedtime  budesonide 160 MICROgram(s)/formoterol 4.5 MICROgram(s) Inhaler 2 Puff(s) Inhalation two times a day  carvedilol 3.125 milliGRAM(s) Oral every 12 hours  clopidogrel Tablet 75 milliGRAM(s) Oral daily  clotrimazole 2% Vaginal Cream 1 Applicatorful Vaginal every 24 hours PRN  furosemide    Tablet 40 milliGRAM(s) Oral daily  influenza  Vaccine (HIGH DOSE) 0.7 milliLiter(s) IntraMuscular once  insulin lispro (ADMELOG) corrective regimen sliding scale   SubCutaneous at bedtime  insulin lispro (ADMELOG) corrective regimen sliding scale   SubCutaneous three times a day before meals  mexiletine 150 milliGRAM(s) Oral two times a day  multivitamin 1 Tablet(s) Oral daily  nicotine - 21 mG/24Hr(s) Patch 1 Patch Transdermal daily  nystatin Powder 1 Application(s) Topical two times a day  quiNIDine gluconate  milliGRAM(s) Oral every 8 hours  sacubitril 24 mG/valsartan 26 mG 1 Tablet(s) Oral two times a day      Vitals:  T(F): 97.9 (10-06), Max: 98.2 (10-05)  HR: 81 (10-06) (66 - 87)  BP: 118/35 (10-06) (118/35 - 144/71)  RR: 20 (10-06)  SpO2: 92% (10-06)  I&O's Summary    05 Oct 2023 07:01  -  06 Oct 2023 07:00  --------------------------------------------------------  IN: 240 mL / OUT: 250 mL / NET: -10 mL        Physical Exam:  Appearance: No acute distress; well appearing  Eyes: PERRL, EOMI, pink conjunctiva  HEENT: Normal oral mucosa  Cardiovascular: RRR, S1, S2, no murmurs, rubs, or gallops; no edema  Respiratory: Clear to auscultation bilaterally  Neurologic: Non-focal  Psychiatry: AAOx3, mood & affect appropriate  Skin: No rashes, ecchymoses, or cyanosis                        13.3   12.36 )-----------( 286      ( 06 Oct 2023 06:03 )             41.3     10-06    140  |  104  |  16  ----------------------------<  105<H>  4.1   |  26  |  0.90    Ca    8.7      06 Oct 2023 06:03  Phos  3.6     10-06  Mg     2.1     10-06        CARDIAC MARKERS ( 30 Sep 2023 09:56 )  117 ng/L / x     / x     / 594 U/L / x     / 8.3 ng/mL  CARDIAC MARKERS ( 30 Sep 2023 05:10 )  x     / x     / x     / 78 U/L / x     / <1.0 ng/mL      New ECG(s): Personally reviewed  SR, anteroseptal infarct, QTc 501    Echo:    Stress Testing:     Cath:    Imaging:    Interpretation of Telemetry:  Sinus rhythm 60s-80s with rare PVCs     appointments, and call your doctor if you are having problems. It's also a good idea to know your test results and keep a list of the medicines you take.  Where can you learn more?  Go to https://www.[employer].net/patientEd and enter M676 to learn more about \"Learning About Mindfulness for Stress.\"  Current as of: June 24, 2023  Content Version: 14.2  © 2024 [employer].   Care instructions adapted under license by [employer]. If you have questions about a medical condition or this instruction, always ask your healthcare professional. [employer] disclaims any warranty or liability for your use of this information.           Learning About Emotional Support  When do you need emotional support?     You might find getting support from others helpful when you have a long-term health problem. Often people feel alone, confused, or scared when coping with an illness. But you aren't alone. Other people are going through the same thing you are and know how you feel.  Talking with others about your feelings can help you feel better.  Your family and friends can give you support. So can your doctor, a support group, or a [employer]. If you have a support network, you will not feel as alone. You will learn new ways to deal with your situation, and you may try harder to overcome it.  Where you can get support  Family and friends: They can help you cope by giving you comfort and encouragement.  Counseling: Professional counseling can help you cope with situations that interfere with your life and cause stress. Counseling can help you understand and deal with your illness.  Your doctor: Find a doctor you trust and feel comfortable with. Be open and honest about your fears and concerns. Your doctor can help you get the right medical treatments, including counseling.  Spiritual or Protestant groups: They can provide comfort and may be able to help you find counseling or other social support services.  Social  Electrophysiology Progress Note    Patient seen and examined at bedside.    Overnight Events: Feels well overnight. Nervous for procedure today. Discussed plan for ICD testing with possible VT ablation and/or device change.    Review Of Systems: No chest pain, shortness of breath, or palpitations            Current Meds:  atorvastatin 40 milliGRAM(s) Oral at bedtime  budesonide 160 MICROgram(s)/formoterol 4.5 MICROgram(s) Inhaler 2 Puff(s) Inhalation two times a day  carvedilol 3.125 milliGRAM(s) Oral every 12 hours  clopidogrel Tablet 75 milliGRAM(s) Oral daily  clotrimazole 2% Vaginal Cream 1 Applicatorful Vaginal every 24 hours PRN  furosemide    Tablet 40 milliGRAM(s) Oral daily  influenza  Vaccine (HIGH DOSE) 0.7 milliLiter(s) IntraMuscular once  insulin lispro (ADMELOG) corrective regimen sliding scale   SubCutaneous at bedtime  insulin lispro (ADMELOG) corrective regimen sliding scale   SubCutaneous three times a day before meals  mexiletine 150 milliGRAM(s) Oral two times a day  multivitamin 1 Tablet(s) Oral daily  nicotine - 21 mG/24Hr(s) Patch 1 Patch Transdermal daily  nystatin Powder 1 Application(s) Topical two times a day  quiNIDine gluconate  milliGRAM(s) Oral every 8 hours  sacubitril 24 mG/valsartan 26 mG 1 Tablet(s) Oral two times a day      Vitals:  T(F): 97.9 (10-06), Max: 98.2 (10-05)  HR: 81 (10-06) (66 - 87)  BP: 118/35 (10-06) (118/35 - 144/71)  RR: 20 (10-06)  SpO2: 92% (10-06)  I&O's Summary    05 Oct 2023 07:01  -  06 Oct 2023 07:00  --------------------------------------------------------  IN: 240 mL / OUT: 250 mL / NET: -10 mL        Physical Exam:  Appearance: No acute distress; well appearing  Eyes: PERRL, EOMI, pink conjunctiva  HEENT: Normal oral mucosa  Cardiovascular: RRR, S1, S2, no murmurs, rubs, or gallops; no edema  Respiratory: Clear to auscultation bilaterally  Neurologic: Non-focal  Psychiatry: AAOx3, mood & affect appropriate  Skin: No rashes, ecchymoses, or cyanosis                        13.3   12.36 )-----------( 286      ( 06 Oct 2023 06:03 )             41.3     10-06    140  |  104  |  16  ----------------------------<  105<H>  4.1   |  26  |  0.90    Ca    8.7      06 Oct 2023 06:03  Phos  3.6     10-06  Mg     2.1     10-06        CARDIAC MARKERS ( 30 Sep 2023 09:56 )  117 ng/L / x     / x     / 594 U/L / x     / 8.3 ng/mL  CARDIAC MARKERS ( 30 Sep 2023 05:10 )  x     / x     / x     / 78 U/L / x     / <1.0 ng/mL      New ECG(s): Personally reviewed  SR, anteroseptal infarct, QTc 501    Echo:    Stress Testing:     Cath:    Imaging:    Interpretation of Telemetry:  Sinus rhythm 60s-80s with rare PVCs     Patient/Caregiver provided printed discharge information.

## 2024-12-13 NOTE — PHYSICAL THERAPY INITIAL EVALUATION ADULT - ADL SKILLS, REHAB EVAL
Complete course of doxycycline for suspected chlamydia infection.  Abstain from sexual activity for 14 days.  Do recommend following up with primary doctor or community health for retesting to confirm successful treatment for both you and your partner prior to resuming sexual intercourse.  
independent

## 2024-12-30 RX ORDER — SEMAGLUTIDE 0.5 MG/.5ML
0.5 INJECTION, SOLUTION SUBCUTANEOUS
Qty: 1 | Refills: 2 | Status: ACTIVE | COMMUNITY
Start: 2024-12-30 | End: 1900-01-01

## 2025-02-03 ENCOUNTER — APPOINTMENT (OUTPATIENT)
Dept: ENDOCRINOLOGY | Facility: CLINIC | Age: 45
End: 2025-02-03
Payer: MEDICARE

## 2025-02-03 VITALS
OXYGEN SATURATION: 98 % | SYSTOLIC BLOOD PRESSURE: 115 MMHG | DIASTOLIC BLOOD PRESSURE: 70 MMHG | WEIGHT: 167 LBS | HEART RATE: 80 BPM | BODY MASS INDEX: 29.59 KG/M2 | HEIGHT: 63 IN

## 2025-02-03 DIAGNOSIS — E66.9 OBESITY, UNSPECIFIED: ICD-10-CM

## 2025-02-03 PROCEDURE — 99213 OFFICE O/P EST LOW 20 MIN: CPT

## 2025-02-03 RX ORDER — SEMAGLUTIDE 1 MG/.5ML
1 INJECTION, SOLUTION SUBCUTANEOUS
Qty: 3 | Refills: 2 | Status: ACTIVE | COMMUNITY
Start: 2025-02-03 | End: 1900-01-01

## 2025-02-24 NOTE — PATIENT PROFILE ADULT - OVER THE PAST TWO WEEKS, HAVE YOU FELT LITTLE INTEREST OR PLEASURE IN DOING THINGS?
[facility]  [address] [place] [address]  [phone]    Lamin Beebe  : 1953     HPI   71 y.o., male returns in follow up for gross hematuria and BPH.  Recent work up neg on 2024 other than trilobar hypertrophy and tiny bilateral renal stones.  Denies any hematuria since.  PSA is 2.1 on 25.  Cont to report nocturia 3-4x per night.      History reviewed. No pertinent past medical history.  History reviewed. No pertinent surgical history.  Current Outpatient Medications   Medication Sig Dispense Refill    ibuprofen (ADVIL;MOTRIN) 200 MG tablet Take 2 tablets by mouth every 8 hours as needed for Pain      tamsulosin (FLOMAX) 0.4 MG capsule Take 1 capsule by mouth every evening 30 capsule 11     No current facility-administered medications for this visit.     Allergies   Allergen Reactions    Penicillins Anxiety, Rash, Hives and Shortness Of Breath    Codeine      Other Reaction(s): Unknown     Social History     Socioeconomic History    Marital status:      Spouse name: Not on file    Number of children: Not on file    Years of education: Not on file    Highest education level: Not on file   Occupational History    Not on file   Tobacco Use    Smoking status: Never    Smokeless tobacco: Never   Substance and Sexual Activity    Alcohol use: Not on file    Drug use: Not on file    Sexual activity: Not on file   Other Topics Concern    Not on file   Social History Narrative    Not on file     Social Determinants of Health     Financial Resource Strain: Low Risk  (2025)    Received from [facility]    Financial Resource Strain     Difficulty Paying Living Expenses: Not hard at all     Difficulty Paying Medical Expenses: No   Food Insecurity: No Food Insecurity (2025)    Received from [facility]    Food Insecurity     Worried about Running Out of Food in the Last Year: Never true     Ran Out of Food in the Last Year: Never true   Transportation Needs: No 
no

## 2025-03-10 NOTE — ED ADULT NURSE NOTE - OBJECTIVE STATEMENT
DISPLAY PLAN FREE TEXT pt presents to the ED c/o LRQ pain 10/10 that started this morning. States that a year ago she needed to get her appendix out but when she came to scheduled OR,  stated that she was too inflamed to proceed with the operation. so she stayed in the hospital for a couple of days on a/b and was told to go back but never went back. patient appears to be in distress with unsteady gait.

## 2025-03-27 NOTE — PRE-ANESTHESIA EVALUATION ADULT - NSANTHPMHFT_GEN_ALL_CORE
Patient notified & verbalized understanding.   s/p 8/26/19 laminectomy now with recurrent L2-3 L sided disc with intractable L3 radiculopathy for 2 weeks 8/26/19 laminectomy now with recurrent L2-3 L sided disc with intractable L3 radiculopathy for 2 weeks

## 2025-05-12 ENCOUNTER — APPOINTMENT (OUTPATIENT)
Dept: ENDOCRINOLOGY | Facility: CLINIC | Age: 45
End: 2025-05-12

## 2025-05-27 ENCOUNTER — EMERGENCY (EMERGENCY)
Facility: HOSPITAL | Age: 45
LOS: 1 days | End: 2025-05-27
Attending: STUDENT IN AN ORGANIZED HEALTH CARE EDUCATION/TRAINING PROGRAM | Admitting: STUDENT IN AN ORGANIZED HEALTH CARE EDUCATION/TRAINING PROGRAM
Payer: MEDICARE

## 2025-05-27 VITALS
TEMPERATURE: 99 F | RESPIRATION RATE: 20 BRPM | HEART RATE: 96 BPM | SYSTOLIC BLOOD PRESSURE: 105 MMHG | DIASTOLIC BLOOD PRESSURE: 83 MMHG | OXYGEN SATURATION: 96 %

## 2025-05-27 DIAGNOSIS — Z98.89 OTHER SPECIFIED POSTPROCEDURAL STATES: Chronic | ICD-10-CM

## 2025-05-27 DIAGNOSIS — M67.432 GANGLION, LEFT WRIST: Chronic | ICD-10-CM

## 2025-05-27 DIAGNOSIS — N84.0 POLYP OF CORPUS UTERI: Chronic | ICD-10-CM

## 2025-05-27 DIAGNOSIS — Z98.84 BARIATRIC SURGERY STATUS: Chronic | ICD-10-CM

## 2025-05-27 DIAGNOSIS — Z90.49 ACQUIRED ABSENCE OF OTHER SPECIFIED PARTS OF DIGESTIVE TRACT: Chronic | ICD-10-CM

## 2025-05-27 LAB — HCG UR QL: NEGATIVE — SIGNIFICANT CHANGE UP

## 2025-05-27 PROCEDURE — 81025 URINE PREGNANCY TEST: CPT

## 2025-05-27 PROCEDURE — 99284 EMERGENCY DEPT VISIT MOD MDM: CPT | Mod: 25

## 2025-05-27 PROCEDURE — 99285 EMERGENCY DEPT VISIT HI MDM: CPT

## 2025-05-27 PROCEDURE — 72131 CT LUMBAR SPINE W/O DYE: CPT

## 2025-05-27 PROCEDURE — 96372 THER/PROPH/DIAG INJ SC/IM: CPT

## 2025-05-27 PROCEDURE — 72100 X-RAY EXAM L-S SPINE 2/3 VWS: CPT

## 2025-05-27 PROCEDURE — 72100 X-RAY EXAM L-S SPINE 2/3 VWS: CPT | Mod: 26

## 2025-05-27 PROCEDURE — 72131 CT LUMBAR SPINE W/O DYE: CPT | Mod: 26

## 2025-05-27 RX ORDER — KETOROLAC TROMETHAMINE 30 MG/ML
30 INJECTION, SOLUTION INTRAMUSCULAR; INTRAVENOUS ONCE
Refills: 0 | Status: DISCONTINUED | OUTPATIENT
Start: 2025-05-27 | End: 2025-05-27

## 2025-05-27 RX ORDER — LIDOCAINE HYDROCHLORIDE 20 MG/ML
1 JELLY TOPICAL ONCE
Refills: 0 | Status: COMPLETED | OUTPATIENT
Start: 2025-05-27 | End: 2025-05-27

## 2025-05-27 RX ORDER — DIAZEPAM 2 MG/1
5 TABLET ORAL ONCE
Refills: 0 | Status: DISCONTINUED | OUTPATIENT
Start: 2025-05-27 | End: 2025-05-27

## 2025-05-27 RX ADMIN — DIAZEPAM 5 MILLIGRAM(S): 2 TABLET ORAL at 21:07

## 2025-05-27 RX ADMIN — LIDOCAINE HYDROCHLORIDE 1 PATCH: 20 JELLY TOPICAL at 21:08

## 2025-05-27 RX ADMIN — KETOROLAC TROMETHAMINE 30 MILLIGRAM(S): 30 INJECTION, SOLUTION INTRAMUSCULAR; INTRAVENOUS at 21:08

## 2025-05-27 NOTE — ED ADULT NURSE NOTE - OBJECTIVE STATEMENT
patient comes in with complaints of lower back pain that radiates down the R leg. patient states that she has a hx of back surgery in the past.

## 2025-05-27 NOTE — ED PROVIDER NOTE - PATIENT PORTAL LINK FT
You can access the FollowMyHealth Patient Portal offered by [facility] by registering at the following website: [email or web address]. By joining [facility]’s FollowMyHealth portal, you will also be able to view your health information using other applications (apps) compatible with our system.

## 2025-05-27 NOTE — ED PROVIDER NOTE - NSFOLLOWUPINSTRUCTIONS_ED_ALL_ED_FT
Follow up with your orthopedic surgeon  return to er for any worsening symptoms    Acute Low Back Pain    WHAT YOU NEED TO KNOW:    Acute low back pain is sudden discomfort that lasts up to 6 weeks and makes activity difficult.    DISCHARGE INSTRUCTIONS:    Return to the emergency department if:    You have severe pain.    You have sudden stiffness and heaviness on both buttocks down to both legs.    You have numbness or weakness in one leg, or pain in both legs.    You have numbness in your genital area or across your lower back.    You cannot control your urine or bowel movements.  Call your doctor if:    You have a fever.    You have pain at night or when you rest.    Your pain does not get better with treatment.    You have pain that worsens when you cough or sneeze.    You suddenly feel something pop or snap in your back.    You have questions or concerns about your condition or care.  Medicines: You may need any of the following:    NSAIDs, such as ibuprofen, help decrease swelling, pain, and fever. This medicine is available with or without a doctor's order. NSAIDs can cause stomach bleeding or kidney problems in certain people. If you take blood thinner medicine, always ask your healthcare provider if NSAIDs are safe for you. Always read the medicine label and follow directions.    Acetaminophen decreases pain and fever. It is available without a doctor's order. Ask how much to take and how often to take it. Follow directions. Read the labels of all other medicines you are using to see if they also contain acetaminophen, or ask your doctor or pharmacist. Acetaminophen can cause liver damage if not taken correctly.    Muscle relaxers decrease pain by relaxing the muscles in your lower spine.    Prescription pain medicine may be given. Ask your healthcare provider how to take this medicine safely. Some prescription pain medicines contain acetaminophen. Do not take other medicines that contain acetaminophen without talking to your healthcare provider. Too much acetaminophen may cause liver damage. Prescription pain medicine may cause constipation. Ask your healthcare provider how to prevent or treat constipation.  Self-care:    Stay active as much as you can without causing more pain. Bed rest could make your back pain worse. Start with some light exercises, such as walking. Avoid heavy lifting until your pain is gone. Ask for more information about the activities or exercises that are right for you.   Family Walking for Exercise      Apply heat on your back for 20 to 30 minutes every 2 hours for as many days as directed. Heat helps decrease pain and muscle spasms. Alternate heat and ice.    Apply ice on your back for 15 to 20 minutes every hour or as directed. Use an ice pack, or put crushed ice in a plastic bag. Cover it with a towel before you apply it to your skin. Ice helps prevent tissue damage and decreases swelling and pain.  Prevent acute low back pain:    Use proper body mechanics.  Bend at the hips and knees when you  objects. Do not bend from the waist. Use your leg muscles as you lift the load. Do not use your back. Keep the object close to your chest as you lift it. Try not to twist or lift anything above your waist.  How to Lift Items Safely      Change your position often when you stand for long periods of time. Rest one foot on a small box or footrest, and then switch to the other foot often.    Try not to sit for long periods of time. When you do, sit in a straight-backed chair with your feet flat on the floor. Never reach, pull, or push while you are sitting.    Do exercises that strengthen your back muscles. Warm up before you exercise. Ask your healthcare provider the best exercises for you.  Warm up and Cool Down       Maintain a healthy weight. Ask your healthcare provider what a healthy weight is for you. Ask him or her to help you create a weight loss plan if you are overweight.  Follow up with your doctor as directed: Return for a follow-up visit if you still have pain after 1 to 3 weeks of treatment. You may need to visit an orthopedist if your back pain lasts longer than 12 weeks. Write down your questions so you remember to ask them during your visits. Follow up with your orthopedic surgeon  return to er for any worsening symptoms    Acute Low Back Pain    WHAT YOU NEED TO KNOW:    Acute low back pain is sudden discomfort that lasts up to 6 weeks and makes activity difficult.  Do not drive or operate heavy machinery while taking Percocet.       DISCHARGE INSTRUCTIONS:    Return to the emergency department if:    You have severe pain.    You have sudden stiffness and heaviness on both buttocks down to both legs.    You have numbness or weakness in one leg, or pain in both legs.    You have numbness in your genital area or across your lower back.    You cannot control your urine or bowel movements.  Call your doctor if:    You have a fever.    You have pain at night or when you rest.    Your pain does not get better with treatment.    You have pain that worsens when you cough or sneeze.    You suddenly feel something pop or snap in your back.    You have questions or concerns about your condition or care.  Medicines: You may need any of the following:    NSAIDs, such as ibuprofen, help decrease swelling, pain, and fever. This medicine is available with or without a doctor's order. NSAIDs can cause stomach bleeding or kidney problems in certain people. If you take blood thinner medicine, always ask your healthcare provider if NSAIDs are safe for you. Always read the medicine label and follow directions.    Acetaminophen decreases pain and fever. It is available without a doctor's order. Ask how much to take and how often to take it. Follow directions. Read the labels of all other medicines you are using to see if they also contain acetaminophen, or ask your doctor or pharmacist. Acetaminophen can cause liver damage if not taken correctly.    Muscle relaxers decrease pain by relaxing the muscles in your lower spine.    Prescription pain medicine may be given. Ask your healthcare provider how to take this medicine safely. Some prescription pain medicines contain acetaminophen. Do not take other medicines that contain acetaminophen without talking to your healthcare provider. Too much acetaminophen may cause liver damage. Prescription pain medicine may cause constipation. Ask your healthcare provider how to prevent or treat constipation.  Self-care:    Stay active as much as you can without causing more pain. Bed rest could make your back pain worse. Start with some light exercises, such as walking. Avoid heavy lifting until your pain is gone. Ask for more information about the activities or exercises that are right for you.   Family Walking for Exercise      Apply heat on your back for 20 to 30 minutes every 2 hours for as many days as directed. Heat helps decrease pain and muscle spasms. Alternate heat and ice.    Apply ice on your back for 15 to 20 minutes every hour or as directed. Use an ice pack, or put crushed ice in a plastic bag. Cover it with a towel before you apply it to your skin. Ice helps prevent tissue damage and decreases swelling and pain.  Prevent acute low back pain:    Use proper body mechanics.  Bend at the hips and knees when you  objects. Do not bend from the waist. Use your leg muscles as you lift the load. Do not use your back. Keep the object close to your chest as you lift it. Try not to twist or lift anything above your waist.  How to Lift Items Safely      Change your position often when you stand for long periods of time. Rest one foot on a small box or footrest, and then switch to the other foot often.    Try not to sit for long periods of time. When you do, sit in a straight-backed chair with your feet flat on the floor. Never reach, pull, or push while you are sitting.    Do exercises that strengthen your back muscles. Warm up before you exercise. Ask your healthcare provider the best exercises for you.  Warm up and Cool Down       Maintain a healthy weight. Ask your healthcare provider what a healthy weight is for you. Ask him or her to help you create a weight loss plan if you are overweight.  Follow up with your doctor as directed: Return for a follow-up visit if you still have pain after 1 to 3 weeks of treatment. You may need to visit an orthopedist if your back pain lasts longer than 12 weeks. Write down your questions so you remember to ask them during your visits.

## 2025-05-27 NOTE — ED PROVIDER NOTE - PROGRESS NOTE DETAILS
Patient states she would like to leave. She does not want to wait for the CT results.  States she still has pain but is ambulatory in the ED and in no apparent distress.  Will d/c with ortho follow up. Patient states she would like to leave. She does not want to wait for the CT results.  States she still has pain but is ambulatory in the ED and in no apparent distress.  Will d/c with ortho follow up. Advised heating pad, NSAIDS and consultation with pain management if symptoms persist.  Return precautions discussed.  Patient expressed understanding of discharge instructions.  She is calling a friend to pick her up.

## 2025-05-27 NOTE — ED PROVIDER NOTE - ATTENDING APP SHARED VISIT CONTRIBUTION OF CARE
45-year-old female with a history of ADD, bipolar disorder, anemia, lumbar spinal fusion surgery (2020) presents with right lower back pain.  Patient states that symptoms started yesterday while playing with her dog.  She denies any acute trauma or falls.  Patient reports pain to her right lower back that radiates to her right posterior leg and to her foot.  She took 400 mg of Motrin this afternoon with no relief.  States she did not take any other medication for pain.  She denies gait disturbance, urinary/fecal incontinence, fever.  Patient states she has been able to weight-bear with difficulty. 45-year-old female with a history of ADD, bipolar disorder, anemia, lumbar spinal fusion surgery (2020) presents with right lower back pain.  Patient states that symptoms started yesterday while playing with her dog.  She denies any acute trauma or falls.  Patient reports pain to her right lower back that radiates to her right posterior leg and to her foot.  She took 400 mg of Motrin this afternoon with no relief.  States she did not take any other medication for pain.  She denies gait disturbance, urinary/fecal incontinence, fever.  Patient states she has been able to weight-bear with difficulty. Ortho Dr. Carreon

## 2025-05-27 NOTE — ED PROVIDER NOTE - OBJECTIVE STATEMENT
Patient is a 45-year-old female past medical show anemia GERD depression ADD bipolar status post bariatric surgery  cholecystectomy lumbar fusion coming complaining of sudden onset of low back and right sided buttocks pain yesterday while playing with dog.  Patient states pain shooting down her right leg.  Patient complaining of intermittent tingling in her foot denies any bowel bladder incontinence saddle anesthesia or falls trauma.  Patient states she took ibuprofen around 3 PM and pain not improved.

## 2025-05-27 NOTE — ED PROVIDER NOTE - CLINICAL SUMMARY MEDICAL DECISION MAKING FREE TEXT BOX
45 year old female with a history of lumbar spinal fusion p/w right lower back pain radiating to her right leg since yesterday.  No trauma or falls. No focal neuro deficits on exam.  Analgesia, Lumar X-ray, consider CT for persistent symptoms

## 2025-05-27 NOTE — ED PROVIDER NOTE - DIFFERENTIAL DIAGNOSIS
Differential Diagnosis Ddx includes but not limited to sciatica, lumbar strain, herniated disc, fracture, radiculopathy, sacroiliitis

## 2025-05-27 NOTE — ED ADULT NURSE REASSESSMENT NOTE - NS ED NURSE REASSESS COMMENT FT1
report received from previous ED RN. pt transported to ct scan via wheelchair in NAD, RR even and unlabored. safety measures maintained

## 2025-05-27 NOTE — ED PROVIDER NOTE - CARE PROVIDER_API CALL
Lewis Carreon)  Spine Surgery  [address], [address]  [place] [address]  Phone: [phone]  Fax: [phone]  Follow Up Time:

## 2025-05-28 VITALS
OXYGEN SATURATION: 98 % | HEART RATE: 72 BPM | SYSTOLIC BLOOD PRESSURE: 109 MMHG | DIASTOLIC BLOOD PRESSURE: 71 MMHG | TEMPERATURE: 98 F | RESPIRATION RATE: 18 BRPM

## 2025-05-28 RX ORDER — OXYCODONE HYDROCHLORIDE AND ACETAMINOPHEN 10; 325 MG/1; MG/1
1 TABLET ORAL
Qty: 8 | Refills: 0
Start: 2025-05-28 | End: 2025-05-29

## 2025-05-28 NOTE — ED ADULT NURSE REASSESSMENT NOTE - NS ED NURSE REASSESS COMMENT FT1
pt requesting to be discharged, states does not want to wait for ct scan results any longer, MD made aware. discharge papers provided, pt verbalized understanding of instructions. pt in NAD, A&OX4, RR even and unlabored, VSS. pt able to walk with steady gait, safety measures maintained, all belongings with pt

## 2025-06-02 RX ORDER — TIRZEPATIDE 2.5 MG/.5ML
2.5 INJECTION, SOLUTION SUBCUTANEOUS
Qty: 1 | Refills: 1 | Status: ACTIVE | COMMUNITY
Start: 2025-06-02 | End: 1900-01-01

## 2025-06-06 NOTE — PATIENT PROFILE ADULT - LAST TOBACCO USE (DD-MM-YY)
Diet, Regular:   Consistent Carbohydrate {No Snacks} (CSTCHO) (06-06-25 @ 15:05) [Active]       16-Oct-2016

## 2025-07-18 ENCOUNTER — NON-APPOINTMENT (OUTPATIENT)
Age: 45
End: 2025-07-18

## 2025-07-31 NOTE — ED ADULT NURSE NOTE - NSSUSCREENINGQ5_ED_ALL_ED
Patient reports swelling to right middle finger, the middle joint, x few days.  Reports hx of gout.  Has been taking indomethacin with no relief.    No